# Patient Record
Sex: FEMALE | Race: WHITE | NOT HISPANIC OR LATINO | Employment: OTHER | URBAN - METROPOLITAN AREA
[De-identification: names, ages, dates, MRNs, and addresses within clinical notes are randomized per-mention and may not be internally consistent; named-entity substitution may affect disease eponyms.]

---

## 2017-02-12 ENCOUNTER — HOSPITAL ENCOUNTER (INPATIENT)
Facility: CLINIC | Age: 77
LOS: 4 days | Discharge: NURSING, REHAB OR LONG-TERM CARE | DRG: 195 | End: 2017-02-16
Attending: EMERGENCY MEDICINE | Admitting: INTERNAL MEDICINE
Payer: COMMERCIAL

## 2017-02-12 ENCOUNTER — APPOINTMENT (OUTPATIENT)
Dept: GENERAL RADIOLOGY | Facility: CLINIC | Age: 77
DRG: 195 | End: 2017-02-12
Attending: EMERGENCY MEDICINE
Payer: COMMERCIAL

## 2017-02-12 DIAGNOSIS — J18.9 PNEUMONIA DUE TO INFECTIOUS ORGANISM, UNSPECIFIED LATERALITY, UNSPECIFIED PART OF LUNG: ICD-10-CM

## 2017-02-12 DIAGNOSIS — J11.1 INFLUENZA: ICD-10-CM

## 2017-02-12 DIAGNOSIS — F31.9 BIPOLAR 1 DISORDER (H): ICD-10-CM

## 2017-02-12 LAB
ALBUMIN SERPL-MCNC: 3.6 G/DL (ref 3.4–5)
ALBUMIN UR-MCNC: 30 MG/DL
ALP SERPL-CCNC: 63 U/L (ref 40–150)
ALT SERPL W P-5'-P-CCNC: 25 U/L (ref 0–50)
ANION GAP SERPL CALCULATED.3IONS-SCNC: 8 MMOL/L (ref 3–14)
APPEARANCE UR: CLEAR
AST SERPL W P-5'-P-CCNC: 32 U/L (ref 0–45)
BACTERIA #/AREA URNS HPF: ABNORMAL /HPF
BASOPHILS # BLD AUTO: 0 10E9/L (ref 0–0.2)
BASOPHILS NFR BLD AUTO: 0.2 %
BILIRUB SERPL-MCNC: 0.6 MG/DL (ref 0.2–1.3)
BILIRUB UR QL STRIP: ABNORMAL
BUN SERPL-MCNC: 16 MG/DL (ref 7–30)
CALCIUM SERPL-MCNC: 8.4 MG/DL (ref 8.5–10.1)
CHLORIDE SERPL-SCNC: 105 MMOL/L (ref 94–109)
CO2 BLDCOV-SCNC: 24 MMOL/L (ref 21–28)
CO2 SERPL-SCNC: 24 MMOL/L (ref 20–32)
COLOR UR AUTO: YELLOW
CREAT SERPL-MCNC: 0.98 MG/DL (ref 0.52–1.04)
DIFFERENTIAL METHOD BLD: ABNORMAL
EOSINOPHIL # BLD AUTO: 0 10E9/L (ref 0–0.7)
EOSINOPHIL NFR BLD AUTO: 0 %
ERYTHROCYTE [DISTWIDTH] IN BLOOD BY AUTOMATED COUNT: 12.6 % (ref 10–15)
FLUAV+FLUBV AG SPEC QL: ABNORMAL
FLUAV+FLUBV AG SPEC QL: ABNORMAL
GFR SERPL CREATININE-BSD FRML MDRD: 55 ML/MIN/1.7M2
GLUCOSE SERPL-MCNC: 93 MG/DL (ref 70–99)
GLUCOSE UR STRIP-MCNC: NEGATIVE MG/DL
HCT VFR BLD AUTO: 40.8 % (ref 35–47)
HGB BLD-MCNC: 13.9 G/DL (ref 11.7–15.7)
HGB UR QL STRIP: ABNORMAL
HYALINE CASTS #/AREA URNS LPF: ABNORMAL /LPF (ref 0–2)
IMM GRANULOCYTES # BLD: 0 10E9/L (ref 0–0.4)
IMM GRANULOCYTES NFR BLD: 0 %
INTERPRETATION ECG - MUSE: NORMAL
KETONES UR STRIP-MCNC: 15 MG/DL
LACTATE BLD-SCNC: 0.7 MMOL/L (ref 0.7–2.1)
LEUKOCYTE ESTERASE UR QL STRIP: NEGATIVE
LYMPHOCYTES # BLD AUTO: 0.7 10E9/L (ref 0.8–5.3)
LYMPHOCYTES NFR BLD AUTO: 16.7 %
MCH RBC QN AUTO: 30.3 PG (ref 26.5–33)
MCHC RBC AUTO-ENTMCNC: 34.1 G/DL (ref 31.5–36.5)
MCV RBC AUTO: 89 FL (ref 78–100)
MONOCYTES # BLD AUTO: 0.4 10E9/L (ref 0–1.3)
MONOCYTES NFR BLD AUTO: 8.7 %
MUCOUS THREADS #/AREA URNS LPF: PRESENT /LPF
NEUTROPHILS # BLD AUTO: 3.2 10E9/L (ref 1.6–8.3)
NEUTROPHILS NFR BLD AUTO: 74.4 %
NITRATE UR QL: NEGATIVE
NRBC # BLD AUTO: 0 10*3/UL
NRBC BLD AUTO-RTO: 0 /100
PCO2 BLDV: 34 MM HG (ref 40–50)
PH BLDV: 7.46 PH (ref 7.32–7.43)
PH UR STRIP: 5.5 PH (ref 5–7)
PLATELET # BLD AUTO: 132 10E9/L (ref 150–450)
PO2 BLDV: 106 MM HG (ref 25–47)
POTASSIUM SERPL-SCNC: 3.6 MMOL/L (ref 3.4–5.3)
PROT SERPL-MCNC: 7.1 G/DL (ref 6.8–8.8)
RBC # BLD AUTO: 4.58 10E12/L (ref 3.8–5.2)
RBC #/AREA URNS AUTO: ABNORMAL /HPF (ref 0–2)
SAO2 % BLDV FROM PO2: 98 %
SODIUM SERPL-SCNC: 137 MMOL/L (ref 133–144)
SP GR UR STRIP: >1.03 (ref 1–1.03)
SPECIMEN SOURCE: ABNORMAL
URN SPEC COLLECT METH UR: ABNORMAL
UROBILINOGEN UR STRIP-ACNC: 1 EU/DL (ref 0.2–1)
WBC # BLD AUTO: 4.2 10E9/L (ref 4–11)
WBC #/AREA URNS AUTO: ABNORMAL /HPF (ref 0–2)

## 2017-02-12 PROCEDURE — 85025 COMPLETE CBC W/AUTO DIFF WBC: CPT | Performed by: EMERGENCY MEDICINE

## 2017-02-12 PROCEDURE — 87040 BLOOD CULTURE FOR BACTERIA: CPT | Performed by: EMERGENCY MEDICINE

## 2017-02-12 PROCEDURE — 96374 THER/PROPH/DIAG INJ IV PUSH: CPT

## 2017-02-12 PROCEDURE — 36415 COLL VENOUS BLD VENIPUNCTURE: CPT

## 2017-02-12 PROCEDURE — 87804 INFLUENZA ASSAY W/OPTIC: CPT | Performed by: EMERGENCY MEDICINE

## 2017-02-12 PROCEDURE — 81001 URINALYSIS AUTO W/SCOPE: CPT | Performed by: EMERGENCY MEDICINE

## 2017-02-12 PROCEDURE — 25000132 ZZH RX MED GY IP 250 OP 250 PS 637: Performed by: EMERGENCY MEDICINE

## 2017-02-12 PROCEDURE — 99223 1ST HOSP IP/OBS HIGH 75: CPT | Mod: AI | Performed by: INTERNAL MEDICINE

## 2017-02-12 PROCEDURE — 87086 URINE CULTURE/COLONY COUNT: CPT | Performed by: EMERGENCY MEDICINE

## 2017-02-12 PROCEDURE — 96361 HYDRATE IV INFUSION ADD-ON: CPT

## 2017-02-12 PROCEDURE — 80053 COMPREHEN METABOLIC PANEL: CPT | Performed by: EMERGENCY MEDICINE

## 2017-02-12 PROCEDURE — 25000128 H RX IP 250 OP 636: Performed by: EMERGENCY MEDICINE

## 2017-02-12 PROCEDURE — 71020 XR CHEST 2 VW: CPT

## 2017-02-12 PROCEDURE — 12000000 ZZH R&B MED SURG/OB

## 2017-02-12 PROCEDURE — 93005 ELECTROCARDIOGRAM TRACING: CPT

## 2017-02-12 PROCEDURE — 99285 EMERGENCY DEPT VISIT HI MDM: CPT | Mod: 25

## 2017-02-12 PROCEDURE — 82803 BLOOD GASES ANY COMBINATION: CPT

## 2017-02-12 PROCEDURE — 83605 ASSAY OF LACTIC ACID: CPT

## 2017-02-12 RX ORDER — PROCHLORPERAZINE MALEATE 5 MG
5 TABLET ORAL EVERY 6 HOURS PRN
Status: DISCONTINUED | OUTPATIENT
Start: 2017-02-12 | End: 2017-02-16 | Stop reason: HOSPADM

## 2017-02-12 RX ORDER — OSELTAMIVIR PHOSPHATE 75 MG/1
75 CAPSULE ORAL 2 TIMES DAILY
Status: DISCONTINUED | OUTPATIENT
Start: 2017-02-13 | End: 2017-02-13 | Stop reason: DRUGHIGH

## 2017-02-12 RX ORDER — SODIUM CHLORIDE 9 MG/ML
INJECTION, SOLUTION INTRAVENOUS CONTINUOUS
Status: DISCONTINUED | OUTPATIENT
Start: 2017-02-13 | End: 2017-02-15

## 2017-02-12 RX ORDER — PROCHLORPERAZINE 25 MG
12.5 SUPPOSITORY, RECTAL RECTAL EVERY 12 HOURS PRN
Status: DISCONTINUED | OUTPATIENT
Start: 2017-02-12 | End: 2017-02-16 | Stop reason: HOSPADM

## 2017-02-12 RX ORDER — LEVOFLOXACIN 5 MG/ML
500 INJECTION, SOLUTION INTRAVENOUS EVERY 24 HOURS
Status: DISCONTINUED | OUTPATIENT
Start: 2017-02-13 | End: 2017-02-13 | Stop reason: DRUGHIGH

## 2017-02-12 RX ORDER — FERROUS GLUCONATE 324(38)MG
324 TABLET ORAL
Status: DISCONTINUED | OUTPATIENT
Start: 2017-02-13 | End: 2017-02-16 | Stop reason: HOSPADM

## 2017-02-12 RX ORDER — HYDROMORPHONE HYDROCHLORIDE 1 MG/ML
.3-.5 INJECTION, SOLUTION INTRAMUSCULAR; INTRAVENOUS; SUBCUTANEOUS
Status: DISCONTINUED | OUTPATIENT
Start: 2017-02-12 | End: 2017-02-16 | Stop reason: HOSPADM

## 2017-02-12 RX ORDER — ONDANSETRON 2 MG/ML
4 INJECTION INTRAMUSCULAR; INTRAVENOUS EVERY 6 HOURS PRN
Status: DISCONTINUED | OUTPATIENT
Start: 2017-02-12 | End: 2017-02-16 | Stop reason: HOSPADM

## 2017-02-12 RX ORDER — LEVOFLOXACIN 5 MG/ML
500 INJECTION, SOLUTION INTRAVENOUS ONCE
Status: COMPLETED | OUTPATIENT
Start: 2017-02-12 | End: 2017-02-13

## 2017-02-12 RX ORDER — LEVOTHYROXINE SODIUM 75 UG/1
75 TABLET ORAL DAILY
Status: DISCONTINUED | OUTPATIENT
Start: 2017-02-13 | End: 2017-02-16 | Stop reason: HOSPADM

## 2017-02-12 RX ORDER — BISACODYL 10 MG
10 SUPPOSITORY, RECTAL RECTAL DAILY PRN
Status: DISCONTINUED | OUTPATIENT
Start: 2017-02-12 | End: 2017-02-16 | Stop reason: HOSPADM

## 2017-02-12 RX ORDER — ACETAMINOPHEN 325 MG/1
650 TABLET ORAL EVERY 4 HOURS PRN
Status: DISCONTINUED | OUTPATIENT
Start: 2017-02-12 | End: 2017-02-16 | Stop reason: HOSPADM

## 2017-02-12 RX ORDER — OSELTAMIVIR PHOSPHATE 75 MG/1
75 CAPSULE ORAL ONCE
Status: COMPLETED | OUTPATIENT
Start: 2017-02-12 | End: 2017-02-12

## 2017-02-12 RX ORDER — LOPERAMIDE HCL 2 MG
2 CAPSULE ORAL 4 TIMES DAILY PRN
Status: DISCONTINUED | OUTPATIENT
Start: 2017-02-12 | End: 2017-02-16 | Stop reason: HOSPADM

## 2017-02-12 RX ORDER — CLONAZEPAM 0.25 MG/1
0.25 TABLET, ORALLY DISINTEGRATING ORAL AT BEDTIME
Status: DISCONTINUED | OUTPATIENT
Start: 2017-02-13 | End: 2017-02-16 | Stop reason: HOSPADM

## 2017-02-12 RX ORDER — NALOXONE HYDROCHLORIDE 0.4 MG/ML
.1-.4 INJECTION, SOLUTION INTRAMUSCULAR; INTRAVENOUS; SUBCUTANEOUS
Status: DISCONTINUED | OUTPATIENT
Start: 2017-02-12 | End: 2017-02-16 | Stop reason: HOSPADM

## 2017-02-12 RX ORDER — POLYETHYLENE GLYCOL 3350 17 G/17G
17 POWDER, FOR SOLUTION ORAL DAILY PRN
Status: DISCONTINUED | OUTPATIENT
Start: 2017-02-12 | End: 2017-02-16 | Stop reason: HOSPADM

## 2017-02-12 RX ORDER — ONDANSETRON 4 MG/1
4 TABLET, ORALLY DISINTEGRATING ORAL EVERY 6 HOURS PRN
Status: DISCONTINUED | OUTPATIENT
Start: 2017-02-12 | End: 2017-02-16 | Stop reason: HOSPADM

## 2017-02-12 RX ADMIN — OSELTAMIVIR PHOSPHATE 75 MG: 75 CAPSULE ORAL at 23:19

## 2017-02-12 RX ADMIN — LEVOFLOXACIN 500 MG: 5 INJECTION, SOLUTION INTRAVENOUS at 23:19

## 2017-02-12 RX ADMIN — SODIUM CHLORIDE 1000 ML: 9 INJECTION, SOLUTION INTRAVENOUS at 21:35

## 2017-02-12 ASSESSMENT — ENCOUNTER SYMPTOMS
SORE THROAT: 0
WEAKNESS: 1
FEVER: 1
SHORTNESS OF BREATH: 0
COUGH: 1

## 2017-02-12 NOTE — IP AVS SNAPSHOT
"[place] [address] [facility]: [phone]                                              INTERAGENCY TRANSFER FORM - PHYSICIAN ORDERS   2017                    Hospital Admission Date: 2017  CHILO PEREA   : 1940  Sex: Female        Attending Provider: Rubina Sewell MD     Allergies:  Penicillins, Bactrim [Sulfamethoxazole W/trimethoprim]    Infection:  None   Service:  HOSPITALIST    Ht:  1.626 m (5' 4\")   Wt:  71.5 kg (157 lb 10.1 oz)   Admission Wt:  71.5 kg (157 lb 10.1 oz)    BMI:  27.06 kg/m 2   BSA:  1.8 m 2            Patient PCP Information     Provider PCP Type    Kassie Wyatt MD General      ED Clinical Impression     Diagnosis Description Comment Added By Time Added    Influenza [J11.1] Influenza [J11.1]  Stone Perry 2017 10:54 PM    Pneumonia due to infectious organism, unspecified laterality, unspecified part of lung [J18.9] Pneumonia due to infectious organism, unspecified laterality, unspecified part of lung [J18.9]  Lizette Avendaño MD 2017 11:06 PM      Hospital Problems as of 2017              Priority Class Noted POA    Pneumonia Medium  2017 Yes      Non-Hospital Problems as of 2017              Priority Class Noted    Bipolar 1 disorder (H)   2013    Chronic diarrhea   2013    Thyroid function test abnormal   11/15/2013    Other specified hypothyroidism Medium  10/18/2016      Code Status History     Date Active Date Inactive Code Status Order ID Comments User Context    2017 10:39 AM  Full Code [id number]  Amada Dove DO Outpatient    2017 11:57 PM 2017 10:39 AM Full Code [id number]  Rubina Sewell MD Inpatient         Medication Review      START taking        Dose / Directions Comments    oseltamivir 75 MG capsule   Commonly known as:  TAMIFLU   Indication:  Flu   Used for:  Influenza        Dose:  75 mg   Take 1 capsule (75 mg) by mouth 2 times daily for 3 doses "   Quantity:  3 capsule   Refills:  0          CONTINUE these medications which have NOT CHANGED        Dose / Directions Comments    ACETAMINOPHEN PO        Dose:  500 mg   Take 500 mg by mouth every 6 hours as needed for pain   Refills:  0        ARIPiprazole 15 MG tablet   Commonly known as:  ABILIFY   Used for:  Bipolar 1 disorder (H)        Dose:  7.5 mg   Take 0.5 tablets (7.5 mg) by mouth At Bedtime   Quantity:  30 tablet   Refills:  1        calcium carb 1250 mg (500 mg [place])/vitamin D 200 units 500-200 MG-UNIT per tablet   Commonly known as:  OSCAL with D   Used for:  Routine general medical examination at a health care facility        Dose:  1 tablet   Take 1 tablet by mouth daily   Quantity:  90 tablet   Refills:  1        clonazePAM 0.25 MG Tbdp ODT tab   Commonly known as:  klonoPIN   Used for:  Bipolar 1 disorder (H)        Dose:  0.25 mg   Take 1 tablet (0.25 mg) by mouth At Bedtime   Quantity:  30 tablet   Refills:  0        ferrous gluconate 324 (38 FE) MG tablet   Commonly known as:  FERGON   Used for:  Fatigue        Dose:  324 mg   Take 1 tablet (324 mg) by mouth daily (with breakfast)   Quantity:  90 tablet   Refills:  1        levothyroxine 75 MCG tablet   Commonly known as:  SYNTHROID/LEVOTHROID   Used for:  Other specified hypothyroidism        Dose:  75 mcg   Take 1 tablet (75 mcg) by mouth daily   Quantity:  30 tablet   Refills:  3        loperamide 2 MG tablet   Commonly known as:  IMODIUM A-D   Used for:  Diarrhea        Start with 2 tabs (4 mg), then take one tab (2 mg) after each diarrheal stool.  Do not use more than  8 tabs (16 mg) per day.   Quantity:  30 tablet   Refills:  0        nystatin [id number] UNIT/GM Powd   Commonly known as:  MYCOSTATIN   Used for:  Candidiasis of skin        Apply to area as needed three times per day   Quantity:  60 g   Refills:  1                  Further instructions from your care team       Discharge to [facility]  [phone]    Summary  of Visit     Reason for your hospital stay       Management of the flu and pneumonia.             After Care     Activity - Up with nursing assistance           Advance Diet as Tolerated       Follow this diet upon discharge: Regular       General info for SNF       Length of Stay Estimate: Short Term Care: Estimated # of Days <30  Condition at Discharge: Improving  Level of care: skilled   Rehabilitation Potential: Excellent  Admission H&P remains valid and up-to-date: Yes  Recent Chemotherapy: N/A                     Use Nursing Home Standing Orders: N/A       Mantoux instructions       Give two-step Mantoux (PPD) Per Facility Policy Yes             Referrals     Occupational Therapy Adult Consult       Evaluate and treat as clinically indicated.    Reason:  Generalized weakness/deconditioning       Physical Therapy Adult Consult       Evaluate and treat as clinically indicated.    Reason:  Generalized weakness/deconditioning             Follow-Up Appointment Instructions     Future Labs/Procedures    Follow Up and recommended labs and tests     Comments:    1. Follow up with your PCP in 1-2 weeks.      Follow-Up Appointment Instructions     Follow Up and recommended labs and tests       1. Follow up with your PCP in 1-2 weeks.             Statement of Approval     Ordered          02/16/17 1040  I have reviewed and agree with all the recommendations and orders detailed in this document.  EFFECTIVE NOW     Approved and electronically signed by:  Amada Dove DO

## 2017-02-12 NOTE — IP AVS SNAPSHOT
` `     [place] [address] [facility]: [phone]                 INTERAGENCY TRANSFER FORM - NOTES (H&P, Discharge Summary, Consults, Procedures, Therapies)   2017                    Hospital Admission Date: 2017  CHILO PEREA   : 1940  Sex: Female        Patient PCP Information     Provider PCP Type    Kassie Wyatt MD General         History & Physicals      H&P signed by Rubina Sewell MD at 2017  3:25 PM      Author:  Rubina Sewell MD Service:  Hospitalist Author Type:  Physician    Filed:  2017  3:25 PM Date of Service:  2017 11:35 PM Note Created:  2017  2:15 AM    Status:  Signed :  Rubina Sewell MD (Physician)         PRIMARY CARE PHYSICIAN:  Kassie Wyatt MD      CHIEF COMPLAINT:  Weakness and fever and cough.      HISTORY OF PRESENT ILLNESS:  Ms. Chilo Perea is a 76-year-old female with history of bipolar disorder, personality disorder, and a history of chronic diarrhea with negative colonoscopy and biopsies x2 in the past, who presented to the emergency room with complaints of several days of nonproductive cough and generalized weakness and fever.  She had fever up to 103 at home prior to hospitalization.  She is a resident of an assisted living facility currently, so EMS was called and she was brought into the emergency room.      She was given Tylenol 30 minutes prior to hospital arrival, and here her temperature was 101.9 degrees Fahrenheit.  Influenza testing was done and it came back positive for influenza B.      Chest x-ray was done, and it showed minimally increased density seen in both lung bases.  This could be some minimal atelectasis or infiltrates.  This was with slightly shallow inspiration.  Upper lung zones were clear.  The aorta was unfolded.      She was hypoxic in the emergency room at 89% on room air.  With a couple of liters of oxygen, she improved to 96%.  The patient was very listless in the emergency  room with flat affect and was a very poor historian.  She did not want to answer any of my questions.  She told me she has a tickly throat, and a cough, but not productive of any sputum, not able to bring up anything when she coughs.  Denied any shortness of breath or wheezing.      She had one episode of diarrhea this morning.  In her history, she has chronic diarrhea issues, and she had two colonoscopies in the past for evaluation of the diarrhea, and biopsies were obtained, and they have all been negative for any type of colitis.  There was only one polyp, and it was removed on last colonoscopy in 2015, which came back as a hyperplastic polyp.      She otherwise denies any sore throat, any blood in the stool, or black stools.      PAST MEDICAL HISTORY:   1.  Hypothyroidism.   2.  Bipolar disorder.   3.  Personality disorder, not otherwise specified.      MEDICATIONS:   1.  Levothyroxine 75 mcg tablet p.o. daily.   2.  Loperamide 4 mg, take 1 tablet with each diarrheal stool.   3.  Ferrous gluconate 325 mg p.o. daily.   4.  Clonazepam 0.25 mg p.o. at bedtime.   5.  Abilify 7.5 mg p.o. at bedtime.   6.  Calcium plus vitamin D.      ALLERGIES:  Penicillins, Bactrim.      PAST SURGICAL HISTORY:   1.  Cholecystectomy.   2.  Gynecological surgery.      FAMILY HISTORY:  Reviewed and noncontributory.      SOCIAL HISTORY:  She has never smoked.  No alcohol use.  She is .  Currently lives in an assisted living facility.      REVIEW OF SYSTEMS:  Positive for fever, cough, and generalized weakness.  Negative for shortness of breath.  A 10-point review of systems was done and is negative except as in HPI and as listed above.      PHYSICAL EXAMINATION:   VITAL SIGNS:  Her temperature currently is febrile at 101.9 degrees Fahrenheit, with heart rate of 101, blood pressure 119/68, respiratory rate of 20, and she is satting 89% on room air.   GENERAL:  She is alert, oriented, age appropriate-looking female who has a flat  affect.   HEENT:  Head is atraumatic.  Pupils equal, round and reactive to light.   NECK:  Supple.   HEART:  S1-S2 heard.  No murmurs, rubs or gallops.   LUNGS:  Clear to auscultation.  No rales, rhonchi or wheezing.   ABDOMEN:  Soft, nontender, nondistended.  No hepatosplenomegaly.   EXTREMITIES:  No clubbing, cyanosis or edema.   NEUROLOGICAL:  Able to move all extremities.  No focal weakness.   SKIN:  Warm and dry.      LABS AND RADIOLOGICAL INVESTIGATIONS:  CBC showed WBC count of 4.2, hemoglobin 13.9, platelet count 132,000.  Venous blood gas showed pH of 7.46, pCO2 of 34, pO2 of 106, bicarbonate 24, 98% oxygen saturation.  Lactate level 0.7.  Influenza B testing positive.  UA is negative for UTI.  BMP is reviewed and is normal.  All of her LFTs are normal.  Blood cultures x2 are done and results are pending.      Chest x-ray showed minimally increased density seen at both lung bases, could be atelectasis versus infiltrates.      ASSESSMENT AND PLAN:  A 76-year-old female with history of bipolar disorder, personality disorder, not otherwise specified, and history of hypothyroidism, and chronic diarrhea of unclear etiology, presenting with:   1.  Fever, cough, and generalized weakness, secondary to influenza and superimposed possible bacterial pneumonia:  She was given Tamiflu in the emergency room, which will be continued, and we will place her on Levaquin 500 mg daily to treat for the pneumonia.  We will follow up on the blood culture results, and follow the fever curve closely, and try to wean her off of oxygen as tolerated.  She is currently needing 2 liters of oxygen by nasal cannula.  She was 89% on room air.   2.  Hypothyroidism.  Will continue levothyroxine.   3.  Iron deficiency anemia:  Will continue with iron.   4.  History of bipolar I disorder and personality disorder:  Will continue Abilify and Klonopin at bedtime.   5.  Chronic diarrhea:  Continue Imodium p.r.n.   6.  DVT prophylaxis:  PCDs.   7.   GI prophylaxis:  Not needed.   8.  She will be admitted as an inpatient, as I am anticipating more than 2-midnight stay.         RUBINA RICHARDS MD             D: 2017 23:35   T: 2017 02:14   MT: EM#[id number]      Name:     SERGE PEREA   MRN:      [id number]        Account:      [id number]   :      1940           Admitted:     [id number]      Document: [id number]       cc: BRIELLE PERSON MD   [ST1.1]   Revision History        User Key Date/Time User Provider Type Action    > ST1.1 2017  3:25 PM Rubina Richards MD Physician Sign                     Discharge Summaries      Discharge Summaries by Amada Dove DO at 2017 10:40 AM     Author:  Amada Dove DO Service:  Hospitalist Author Type:  Physician    Filed:  2017 10:48 AM Date of Service:  2017 10:40 AM Note Created:  2017 10:40 AM    Status:  Signed :  Amada Dove DO (Physician)         [facility]    Discharge Summary  Hospitalist    Date of Admission:  2017  Date of Discharge:  2017  Discharging Provider: Amada Dove    Discharge Diagnoses   Influenza B  Supected CAP  Hypothyroidism  Chronic Iron Deficiency Anemia   Bipolar Disorder / Personality Disorder   Chronic Diarrhea  Generalized Weakness    History of Present Illness   Serge Perea is a 76 year old female with PMHx of hypothyroidism, bipolar disorder, personality disorder and hx of chronic diarrhea with neg workup who was admitted on 2017 with fever, cough and shortness of breath. She was found to be positive for influenza B and supspected to have a superimposed pneumonia was CXR was suggestive of infiltrates.     Hospital Course   Serge Perea was admitted on 2017.  The following problems were addressed during her hospitalization:    Influenza B:[KW1.1]   Started on Tamiflu on admission. Dose adjusted based on CrCl. Maintained on droplet precautions. Was  initially febrile, though fevers resolved with Tamiflu and Tylenol.[KW1.2]      Supected CAP:  Presented with fever (Tmax 101.9) and tachycardia. WBC 4.2 and lactate nl. Initially hypoxic with O2 sats 89% on RA. Improved on 2L NC. CXR showed minimal increased density in the bilateral lung bases dt minimal atelectasis vs infiltrate. ?viral vs bacterial etiology. Urine strep Ag negative. Blood cultures drawn on admission are neg.[KW1.1] Received 5d course of Levaquin this stay. Weaned of supplemental O2 by the time of discharge.[KW1.2]      Hypothyroidism:  Chronic and stable on levothyroxine     Chronic Iron Deficiency Anemia:  Chronic and stable on oral iron supplement     Bipolar Disorder / Personality Disorder:  Chronic and stable on Abilify HS and Klonopin HS     Chronic Diarrhea:  Has undergone workup per GI in the past including colonoscopy and biopsies x2.[KW1.1]   N[KW1.2]o acute issues[KW1.1] this stay. No loose stools.[KW1.2]     Generalized Weakness:  Likely secondary to acute illness. At baseline resides in [facility] and is independent with daily cares and mobility. Staff there note she will need to be at her baseline in order to return[KW1.1]. Seen by PT/OT, TCU stay recommended. [facility].[KW1.2]     Amada Dove    Code Status   Full Code       Primary Care Physician   Kassie Wyatt    Physical Exam   Temp: 98.3  F (36.8  C) Temp src: Oral BP: 115/62   Heart Rate: 67 Resp: 16 SpO2: 94 % O2 Device: None (Room air)    Vitals:    02/13/17 0000   Weight: 71.5 kg (157 lb 10.1 oz)     Vital Signs with Ranges  Temp:  [98.3  F (36.8  C)-98.6  F (37  C)] 98.3  F (36.8  C)  Heart Rate:  [61-67] 67  Resp:  [16-18] 16  BP: (115)/(62-67) 115/62  SpO2:  [94 %-95 %] 94 %  I/O last 3 completed shifts:  In: 500 [P.O.:500]  Out: 400 [Urine:400]    General: Resting comfortably, alert though affect notably flat, answering questions appropriately, NAD  CVS: HRRR, no  MGR  Respiratory: CTAB, no wheeze/rales/rhonchi  GI: S, NT, ND, +BS  Ext: no LE edema  Skin: Warm/dry    Discharge Disposition   Discharged to short-term care facility  Condition at discharge: Stable    Consultations This Hospital Stay   PHYSICAL THERAPY ADULT IP CONSULT  OCCUPATIONAL THERAPY ADULT IP CONSULT    Time Spent on this Encounter   I, Amada Dove, personally saw the patient today and spent greater than 30 minutes discharging this patient.    Discharge Orders     General info for SNF   Length of Stay Estimate: Short Term Care: Estimated # of Days <30  Condition at Discharge: Improving  Level of care: skilled   Rehabilitation Potential: Excellent  Admission H&P remains valid and up-to-date: Yes  Recent Chemotherapy: N/A                     Use Nursing Home Standing Orders: N/A     Mantoux instructions   Give two-step Mantoux (PPD) Per Facility Policy Yes     Reason for your hospital stay   Management of the flu and pneumonia.     Follow Up and recommended labs and tests   1. Follow up with your PCP in 1-2 weeks.     Activity - Up with nursing assistance     Full Code     Physical Therapy Adult Consult   Evaluate and treat as clinically indicated.    Reason:  Generalized weakness/deconditioning     Occupational Therapy Adult Consult   Evaluate and treat as clinically indicated.    Reason:  Generalized weakness/deconditioning     Advance Diet as Tolerated   Follow this diet upon discharge: Regular       Discharge Medications   Current Discharge Medication List      START taking these medications    Details   oseltamivir (TAMIFLU) 75 MG capsule Take 1 capsule (75 mg) by mouth 2 times daily for 3 doses  Qty: 3 capsule, Refills: 0    Associated Diagnoses: Influenza         CONTINUE these medications which have NOT CHANGED    Details   ACETAMINOPHEN PO Take 500 mg by mouth every 6 hours as needed for pain      levothyroxine (SYNTHROID, LEVOTHROID) 75 MCG tablet Take 1 tablet (75 mcg) by mouth  daily  Qty: 30 tablet, Refills: 3    Associated Diagnoses: Other specified hypothyroidism      ARIPiprazole (ABILIFY) 15 MG tablet Take 0.5 tablets (7.5 mg) by mouth At Bedtime  Qty: 30 tablet, Refills: 1    Comments: Ordered by Dr. Carias (Psychiatry)  Associated Diagnoses: Bipolar 1 disorder (H)      clonazePAM (KLONOPIN) 0.25 MG TBDP Take 1 tablet (0.25 mg) by mouth At Bedtime  Qty: 180 tablet    Comments: Ordered by Dr. Carias (Psychiatry)  Associated Diagnoses: Bipolar 1 disorder (H)      nystatin (MYCOSTATIN) 467163 UNIT/GM POWD Apply to area as needed three times per day  Qty: 60 g, Refills: 1    Associated Diagnoses: Candidiasis of skin      ferrous gluconate (FERGON) 324 (38 FE) MG tablet Take 1 tablet (324 mg) by mouth daily (with breakfast)  Qty: 90 tablet, Refills: 1    Associated Diagnoses: Fatigue      calcium carb 1250 mg, 500 mg [place],/vitamin D 200 units (CALCIUM CARB 1250 MG, 500 MG [place],/VITAMIN D 200 UNIT) 500-200 MG-UNIT per tablet Take 1 tablet by mouth daily  Qty: 90 tablet, Refills: 1    Associated Diagnoses: Routine general medical examination at a health care facility      loperamide (IMODIUM A-D) 2 MG tablet Start with 2 tabs (4 mg), then take one tab (2 mg) after each diarrheal stool.  Do not use more than  8 tabs (16 mg) per day.  Qty: 30 tablet, Refills: 0    Associated Diagnoses: Diarrhea           Allergies   Allergies   Allergen Reactions     Penicillins      Bactrim [Sulfamethoxazole W/Trimethoprim] Itching     Patient prescribed Bactrim at eye appointment. Assisted living reports eyes were red and itching.      Data   Most Recent 3 CBC's:  Recent Labs   Lab Test  02/16/17   0823  02/15/17   0755  02/14/17   0905   WBC  2.1*  2.1*  3.5*   HGB  14.2  13.3  13.9   MCV  88  90  89   PLT  111*  83*  100*      Most Recent 3 BMP's:  Recent Labs   Lab Test  02/16/17   0823  02/15/17   0755  02/14/17   0905   NA  143  144  141   POTASSIUM  3.5  3.8  3.5   CHLORIDE  109  112*  108   CO2  27   26  22   BUN  10  11  10   CR  0.79  0.74  0.84   ANIONGAP  7  6  11   BERTA  8.8  8.0*  8.2*   GLC  89  85  127*     Most Recent 2 LFT's:  Recent Labs   Lab Test  02/12/17 2137 06/12/12   1509   AST  32  24   ALT  25  <6   ALKPHOS  63  64   BILITOTAL  0.6  0.7     Most Recent 6 Bacteria Isolates From Any Culture (See EPIC Reports for Culture Details):  Recent Labs   Lab Test  02/12/17   2315 02/12/17 2145 02/12/17 2137   CULT  No growth after 3 days  No growth  No growth after 3 days       Results for orders placed or performed during the hospital encounter of 02/12/17   Chest XR,  PA & LAT    Narrative    CHEST TWO VIEW   2/12/2017 10:25 PM     HISTORY: Check for pneumonia. Cough, fever.    COMPARISON: 4/1/2013      Impression    IMPRESSION: Minimal increased density is seen in both lung bases. This  could be due to some minimal atelectasis or infiltrates. This is a  slightly shallow inspiration. Upper lung zones are clear. Heart size  and pulmonary vasculature are within normal limits. The aorta is  unfolded.    JEFF MA MD[KW1.1]          Revision History        User Key Date/Time User Provider Type Action    > KW1.2 2/16/2017 10:48 AM Amada Dove DO Physician Sign     KW1.1 2/16/2017 10:40 AM Amada Dove DO Physician                   Consult Notes     No notes of this type exist for this encounter.         Progress Notes - Physician (Notes from 02/13/17 through 02/16/17)      Progress Notes by Silvia Castaneda LICSW at 2/16/2017  9:37 AM     Author:  Silvia Castaneda LICSW Service:  (none) Author Type:      Filed:  2/16/2017  2:15 PM Date of Service:  2/16/2017  9:37 AM Note Created:  2/16/2017  9:37 AM    Status:  Addendum :  Silvia Castaneda LICSW ()         SW  D:   spoke with GOLD Bray at patient's building.  Reviewed PT evaluation with him.  Asa does want patient to transfer to  a TCU since she is not independent  with transfers or walking.  Writer met with patient and intially she did not want to transfer to a TCU however understands she is weak and needs to be independent with walking and transfers before she can return to her apartment.[KH1.1] Patient told writer she needs time to process the information.  Writer explained I will make referrals and try to arranage for a TCU in or close to [place].[KH1.2]   Contacted [facility] which requires patient to complete tamilfu before admission, call out to [facility].    Referral was made to [facility] in [place] which has a vacancy for today and their requirement for admission is that patient is symptom free for 24 hours.[KH1.1]    PAS-RR    D: Per DHS regulation, SW completed and submitted PAS-RR to [place] [employer] on Aging Direct Connect via the Senior LinkAge Line.  PAS-RR confirmation # is : [id number]    I: SW spoke with patient and they are aware a PAS-RR has been submitted.  SW reviewed with her that they may be contacted for a follow up appointment within 10 days of hospital discharge if their SNF stay is < 30 days.  Contact information for Senior LinkAge Line was also provided.    A: Patient verbalized understanding.    P: Further questions may be directed to [facility] LinkAge Line at #[phone], option #4 for PAS-RR staff.[KH1.3]    Patient was accepted by [facility] and will be transported at 1500.[KH1.4]    Left messages for the nursing office at patient's building [phone]  and for her [facility] case virgen Gonzalez at [phone][KH1.5].  Also, with patient's permission left a message or her son with her d/c plan and the address and phone number of Dar[place] Moss.[KH1.6]     Revision History        User Key Date/Time User Provider Type Action    > KH1.6 2/16/2017  2:15 PM [facility]  Addend     KH1.5 2/16/2017  2:04 PM [facility]  Addend     KH1.4 2/16/2017  1:28 PM  Silvia Castaneda LICSW  Incomplete Revision     KH1.3 2/16/2017 12:48 PM Silvia Castaneda LICSW  Addend     [N/A] 2/16/2017  9:43 AM Silvia Castaneda LICSW  Addend     KH1.2 2/16/2017  9:41 AM Silvia Castaneda LICSW  Sign     KH1.1 2/16/2017  9:37 AM Silvia Castaneda LICSW              Progress Notes by Shyann Auguste PT at 2/16/2017  8:26 AM     Author:  Shyann Auguste PT Service:  (none) Author Type:  Physical Therapist    Filed:  2/16/2017  8:26 AM Date of Service:  2/16/2017  8:26 AM Note Created:  2/16/2017  8:26 AM    Status:  Signed :  Shyann Auguste PT (Physical Therapist)            02/16/17 0800   Quick Adds   Type of Visit Initial PT Evaluation   Living Environment   Lives With alone   Living Arrangements assisted living   Home Accessibility no concerns   Number of Stairs to Enter Home 0   Number of Stairs Within Home 0   Self-Care   Usual Activity Tolerance moderate   Current Activity Tolerance fair   Regular Exercise no   Equipment Currently Used at Home none   Activity/Exercise/Self-Care Comment Pt has assist for walking to meals in the dining room.   Functional Level Prior   Ambulation 0-->independent   Transferring 0-->independent   Fall history within last six months no   General Information   Onset of Illness/Injury or Date of Surgery - Date 02/12/17   Referring Physician Amada Dove,    Patient/Family Goals Statement return home   Pertinent History of Current Problem (include personal factors and/or comorbidities that impact the POC) Admitted with weakness, fever, cough, pneumonia and influenza. PMH: bipolar, chronic diarrhea.   Precautions/Limitations fall precautions   Weight-Bearing Status - LLE full weight-bearing   Weight-Bearing Status - RLE full weight-bearing   Cognitive Status Examination   Orientation orientation to person, place and time   Level of Consciousness alert   Follows Commands  "and Answers Questions 100% of the time;able to follow single-step instructions   Personal Safety and Judgment impaired   Memory impaired   Pain Assessment   Patient Currently in Pain No   Posture    Posture Forward head position;Protracted shoulders   Range of Motion (ROM)   ROM Quick Adds No deficits were identified   Strength   Strength Comments Pt not following directions for MMT   Bed Mobility   Bed Mobility Comments Supine to sit with SBA   Transfer Skills   Transfer Comments Sit to stand with CGA    Gait   Gait Comments Pt amb 5 ft with no AD and min A, poor balance and shuffling gait   Balance   Balance Comments Balance poor with gait without AD use   General Therapy Interventions   Planned Therapy Interventions bed mobility training;gait training;neuromuscular re-education;strengthening;transfer training   Clinical Impression   Criteria for Skilled Therapeutic Intervention yes, treatment indicated   PT Diagnosis Difficulty ambulating   Influenced by the following impairments Dec strength, balance, activity tolerance   Functional limitations due to impairments Difficulty ambulating and transferring   Clinical Presentation Stable/Uncomplicated   Clinical Presentation Rationale medically stable   Clinical Decision Making (Complexity) Low complexity   Therapy Frequency` daily   Predicted Duration of Therapy Intervention (days/wks) 4 days   Anticipated Discharge Disposition Transitional Care Facility   Risk & Benefits of therapy have been explained Yes   Patient, Family & other staff in agreement with plan of care Yes   [facility] [employer] TM \"6 Clicks\"   2016, Trustees of [facility], under license to [employer].  All rights reserved.   6 Clicks Short Forms Basic Mobility Inpatient Short Form   [facility] [employer]  \"6 Clicks\" V.2 Basic Mobility Inpatient Short Form   1. Turning from your back to your side while in a flat bed without using bedrails? 4 - None   2. Moving from lying on your back " to sitting on the side of a flat bed without using bedrails? 3 - A Little   3. Moving to and from a bed to a chair (including a wheelchair)? 3 - A Little   4. Standing up from a chair using your arms (e.g., wheelchair, or bedside chair)? 3 - A Little   5. To walk in hospital room? 2 - A Lot   6. Climbing 3-5 steps with a railing? 1 - Total   Basic Mobility Raw Score (Score out of 24.Lower scores equate to lower levels of function) 16   Total Evaluation Time   Total Evaluation Time (Minutes) 15[EW1.1]        Revision History        User Key Date/Time User Provider Type Action    > EW1.1 2/16/2017  8:26 AM Shyann Auguste PT Physical Therapist Sign            Progress Notes by Lillie Pino OT at 2/15/2017  4:13 PM     Author:  Lillie Pino OT Service:  (none) Author Type:  Occupational Therapist    Filed:  2/16/2017  7:37 AM Date of Service:  2/15/2017  4:13 PM Note Created:  2/15/2017  4:13 PM    Status:  Signed :  Lillie Pino OT (Occupational Therapist)            02/15/17 0825   Quick Adds   Type of Visit Initial Occupational Therapy Evaluation   Living Environment   Lives With alone   Living Arrangements assisted living   Home Accessibility no concerns   Functional Level Prior   Ambulation 0-->independent   Transferring 0-->independent   Toileting 0-->independent   Bathing 2-->assistive person   Dressing 0-->independent   Eating 0-->independent   Communication 0-->understands/communicates without difficulty   Swallowing 0-->swallows foods/liquids without difficulty   Prior Functional Level Comment Has assistance with bathing and has provided meals.    General Information   Onset of Illness/Injury or Date of Surgery - Date 02/12/17   Referring Physician White   Patient/Family Goals Statement None stated   Additional Occupational Profile Info/Pertinent History of Current Problem Patient admitted 2/12 with fever, cough, and SOB. Patient found to have influenza B and suspected to have a  superimposed pneumonia was CXR was suggestive of infiltrates. PMH includes hypothyroidism, bipolar personality disorder and history of chronic diarrhea with negative workup.    Precautions/Limitations fall precautions;oxygen therapy device and L/min  (2L, 97% )   General Observations Patient [place], flat affect, moves slowly   General Info Comments Patient in chair upon OT arrival. Sats 97% on 2L.   Cognitive Status Examination   Orientation orientation to person, place and time   Level of Consciousness alert   Able to Follow Commands WNL/WFL   Personal Safety (Cognitive) WNL/WFL   Memory impaired   Cognitive Comment Patient able to recall 1/3 words after distraction.   Visual Perception   Visual Perception Wears glasses   Sensory Examination   Sensory Comments denies   Pain Assessment   Patient Currently in Pain No   Integumentary/Edema   Integumentary/Edema Comments Swelling noted to BLE   Posture   Posture forward head position;protracted shoulders   Range of Motion (ROM)   ROM Comment B shoulder flexion AROM about 80 degrees, AAROM WNL, B elbow flexion AROM WNL   Strength   Strength Comments B shoulder and elbow flexion 5/5   Hand Strength   Hand Strength Comments B grasp WFL   Transfer Skill: Sit to Stand   Level of [place]: Sit/Stand minimum assist (75% patients effort)   Transfer Skill: Toilet Transfer   Level of [place]: Toilet minimum assist (75% patients effort)   Balance   Balance Comments impaired, ambulates very slowly and cautiously   Lower Body Dressing   Level of [place]: Dress Lower Body minimum assist (75% patients effort)   Instrumental Activities of Daily Living (IADL)   IADL Comments Meds managed and meals provided by [facility] staff   Activities of Daily Living Analysis   Impairments Contributing to Impaired Activities of Daily Living balance impaired;cognition impaired;ROM decreased;strength decreased   General Therapy Interventions   Planned Therapy Interventions ADL retraining;balance  training;strengthening;transfer training   Clinical Impression   Criteria for Skilled Therapeutic Interventions Met yes, treatment indicated   OT Diagnosis Decreased participation and I in ADLs   Influenced by the following impairments Decreased balance, strength, ROM, activity tolerance and cognition   Assessment of Occupational Performance 1-3 Performance Deficits   Identified Performance Deficits Dressing, toileting transfers, grooming and hygiene, ADL routine   Clinical Decision Making (Complexity) Low complexity   Therapy Frequency daily   Predicted Duration of Therapy Intervention (days/wks) 3 days   Anticipated Equipment Needs at Discharge (to be determined)   Anticipated Discharge Disposition Transitional Care Facility   Risks and Benefits of Treatment have been explained. Yes   Patient, Family & other staff in agreement with plan of care Yes   Total Evaluation Time   Total Evaluation Time (Minutes) 15[JM1.1]        Revision History        User Key Date/Time User Provider Type Action    > JM1.1 2/16/2017  7:37 AM Lillie Pino OT Occupational Therapist Sign            Progress Notes by Amada Dove DO at 2/15/2017  9:12 AM     Author:  Amada Dove DO Service:  Hospitalist Author Type:  Physician    Filed:  2/15/2017  9:27 AM Date of Service:  2/15/2017  9:12 AM Note Created:  2/15/2017  9:12 AM    Status:  Signed :  Amada Dove DO (Physician)         [facility]    Hospitalist Progress Note    Date of Service (when I saw the patient): 02/15/2017    Assessment & Plan   Serge Marin is a 76 year old female with PMHx of hypothyroidism, bipolar disorder, personality disorder and hx of chronic diarrhea with neg workup who was admitted on 2/12/2017 with fever, cough and shortness of breath. She was found to be positive for influenza B and supspected to have a superimposed pneumonia was CXR was suggestive of infiltrates.     Influenza B:   --  started on Tamiflu 30mg BID on 2/12 PM  -- cont droplet precautions  -- cont supportive cares with Tylenol/Ibuprofen prn for fever    Supected CAP:  Presented with fever (Tmax 101.9) and tachycardia. WBC 4.2 and lactate nl. Initially hypoxic with O2 sats 89% on RA. Improved on 2L NC. CXR showed minimal increased density in the bilateral lung bases dt minimal atelectasis vs infiltrate. ?viral vs bacterial etiology. Urine strep Ag negative. Blood cultures drawn on admission are neg.  -- conts Levaquin 250mg IV daily  -- remains on 2L NC -> wean off O2 as able  -- encourage use of incentive spirometer    Hypothyroidism:  Chronic and stable on levothyroxine    Chronic Iron Deficiency Anemia:  Chronic and stable on oral iron supplement    Bipolar Disorder / Personality Disorder:  Chronic and stable on Abilify HS and Klonopin HS    Chronic Diarrhea:  Has undergone workup per GI in the past including colonoscopy and biopsies x2.   -- no acute issues, no concern for cdiff  -- cont prn Imodium    Generalized Weakness:  Likely secondary to acute illness. At baseline resides in [facility] and is independent with daily cares and mobility. Staff there note she will need to be at her baseline in order to return  -- PT/OT consulted -> anticipate she may need TCU stay    FEN: dc IVFs, lytes stable, regular diet  DVT Prophylaxis: PCDs  Code Status: Full Code    Disposition: Pending clinical course and therapy recommendations for discharge. If TCU recommended, will need to complete 5d treatment for influenza prior to discharge.    Amada Dove    Interval History   Remained afebrile since yesterday morning. conts on 2L NC. Sitting up in chair today - looking better, more conversive today. Denies complaints including cp/sob/cough. Tolerating po - ate oatmeal this morning. Asking how much longer she will have to be here.    -Data reviewed today: I reviewed all new labs and imaging results over the last 24 hours. I personally  reviewed no images or EKG's today.    Physical Exam   Temp: 98.4  F (36.9  C) Temp src: Oral BP: 115/68 Pulse: 59 Heart Rate: 51 Resp: 18 SpO2: 97 % O2 Device: Nasal cannula Oxygen Delivery: 2 LPM  Vitals:    02/13/17 0000   Weight: 71.5 kg (157 lb 10.1 oz)     Vital Signs with Ranges  Temp:  [97.5  F (36.4  C)-100.1  F (37.8  C)] 98.4  F (36.9  C)  Pulse:  [59] 59  Heart Rate:  [51-58] 51  Resp:  [14-18] 18  BP: (102-115)/(58-68) 115/68  SpO2:  [87 %-97 %] 97 %  I/O last 3 completed shifts:  In: 763 [P.O.:460; I.V.:303]  Out: 2400 [Urine:2400]    Constitutional: Resting comfortably, flat affect but answering questions appropriately, NAD  Respiratory:CTAB, no wheeze/rales/rhonchi  Cardiovascular: HRRR, no MGR  GI: S, NT, ND, +BS  Skin/Integumen: warm/dry  Other:  no LE edema    Medications     NaCl 50 mL/hr at 02/15/17 0205       levofloxacin  500 mg Intravenous Q24H     oseltamivir  30 mg Oral BID     ARIPiprazole  7.5 mg Oral At Bedtime     clonazePAM  0.25 mg Oral At Bedtime     ferrous gluconate  324 mg Oral Daily with breakfast     levothyroxine  75 mcg Oral Daily       Data     Recent Labs  Lab 02/15/17  0755 02/14/17  0905 02/13/17  0930  02/12/17  2137   WBC 2.1* 3.5*  --   --  4.2   HGB 13.3 13.9  --   --  13.9   MCV 90 89  --   --  89   PLT 83* 100*  --   --  132*    141 140  < > 137   POTASSIUM 3.8 3.5 3.9  < > 3.6   CHLORIDE 112* 108 108  < > 105   CO2 26 22 24  < > 24   BUN 11 10 11  < > 16   CR 0.74 0.84 0.86  < > 0.98   ANIONGAP 6 11 8  < > 8   BERTA 8.0* 8.2* 7.9*  < > 8.4*   GLC 85 127* 87  < > 93   ALBUMIN  --   --   --   --  3.6   PROTTOTAL  --   --   --   --  7.1   BILITOTAL  --   --   --   --  0.6   ALKPHOS  --   --   --   --  63   ALT  --   --   --   --  25   AST  --   --   --   --  32   < > = values in this interval not displayed.    No results found for this or any previous visit (from the past 24 hour(s)).[KW1.1]       Revision History        User Key Date/Time User Provider Type Action     > KW1.1 2/15/2017  9:27 AM Amada Dove DO Physician Sign            Progress Notes by Amada Dove DO at 2/14/2017  2:10 PM     Author:  Amada Dove DO Service:  Hospitalist Author Type:  Physician    Filed:  2/14/2017  2:28 PM Date of Service:  2/14/2017  2:10 PM Note Created:  2/14/2017  2:10 PM    Status:  Signed :  Amada Dove DO (Physician)         [facility]    Hospitalist Progress Note    Date of Service (when I saw the patient): 02/14/2017    Assessment & Plan   Serge Marin is a 76 year old female with PMHx of hypothyroidism, bipolar disorder, personality disorder and hx of chronic diarrhea with neg workup who was admitted on 2/12/2017 with fever, cough and shortness of breath. She was found to be positive for influenza B and supspected to have a superimposed pneumonia was CXR was suggestive of infiltrates.     Influenza B:   -- started on Tamiflu 30mg BID.  -- cont droplet precautions  -- cont supportive cares with Tylenol[KW1.1]/Ibuprofen[KW1.2] prn for fever    Supected CAP:  Presented with fever (Tmax 101.9) and tachycardia. WBC 4.2 and lactate nl. Initially hypoxic with O2 sats 89% on RA. Improved on 2L NC. CXR showed minimal increased density in the bilateral lung bases dt minimal atelectasis vs infiltrate. ?viral vs bacterial etiology. Urine strep Ag negative.   -- blood cultures remain neg  -- conts Levaquin 250mg IV daily for now  -- O2 needs variable - fluctuating between RA and 2L NC     Hypothyroidism:  Chronic and stable on levothyroxine    Chronic Iron Deficiency Anemia:  Chronic and stable on oral iron supplement    Bipolar Disorder / Personality Disorder:  Chronic and stable on Abilify HS and Klonopin HS    Chronic Diarrhea:  Has undergone workup per GI in the past including colonoscopy and biopsies x2.   -- no acute issues, no concern for cdiff  -- cont prn Imodium    Generalized Weakness:  Likely  secondary to acute illness. At baseline resides in [facility] and is independent with daily cares and mobility. Staff there note she will need to be at her baseline in order to return  -- PT/OT consulted -> anticipate she may need TCU stay    FEN: cont NS@ 50ml/h, lytes stable, regular diet  DVT Prophylaxis: PCDs  Code Status: Full Code    Disposition: Pending clinical course and therapy recommendations for discharge. If TCU recommended, will need to complete 5d treatment for influenza prior to discharge.    Amada Dove    Interval History   Febrile overnight with Tmax 102.1. Given Tylenol and Ibuprofen. Febrile again this morning though Tmax 101.3. Affect remains flat. Per RN is notably weak, concerns about her returning to [facility].[KW1.1] Patient with flat affect - no specific concerns this afternoon. Asking when she can go home and if she is over the flu yet. Explained plans for therapy evaluation, ongoing treatment for the flu.[KW1.2]    -Data reviewed today: I reviewed all new labs and imaging results over the last 24 hours. I personally reviewed no images or EKG's today.    Physical Exam   Temp: 99.1  F (37.3  C) Temp src: Oral BP: 107/67 Pulse: 87 Heart Rate: 64 Resp: 13 SpO2: 95 % O2 Device: Nasal cannula Oxygen Delivery: 2 LPM  Vitals:    02/13/17 0000   Weight: 71.5 kg (157 lb 10.1 oz)     Vital Signs with Ranges  Temp:  [99.1  F (37.3  C)-102.8  F (39.3  C)] 99.1  F (37.3  C)  Pulse:  [84-87] 87  Heart Rate:  [64-66] 64  Resp:  [13-20] 13  BP: (106-120)/(52-67) 107/67  SpO2:  [87 %-95 %] 95 %  I/O last 3 completed shifts:  In: 1519 [P.O.:180; I.V.:1339]  Out: 1925 [Urine:1925]    Constitutional: Resting comfortably, flat affect, answering questions appropriately, NAD  Respiratory:CTAB, no wheeze/rales/rhonchi  Cardiovascular: HRRR, no MGR  GI: S, NT, ND, +BS  Skin/Integumen: warm/dry  Other:  no LE edema    Medications     NaCl 50 mL/hr at 02/14/17 0559       levofloxacin  500 mg Intravenous Q24H      oseltamivir  30 mg Oral BID     ARIPiprazole  7.5 mg Oral At Bedtime     clonazePAM  0.25 mg Oral At Bedtime     ferrous gluconate  324 mg Oral Daily with breakfast     levothyroxine  75 mcg Oral Daily       Data     Recent Labs  Lab 02/14/17  0905 02/13/17  0930 02/13/17  0735 02/12/17  2137   WBC 3.5*  --   --  4.2   HGB 13.9  --   --  13.9   MCV 89  --   --  89   *  --   --  132*    140 Canceled, Test credited Unsatisfactory specimen - hemolyzed 137   POTASSIUM 3.5 3.9 Canceled, Test credited Unsatisfactory specimen - hemolyzed 3.6   CHLORIDE 108 108 Canceled, Test credited Unsatisfactory specimen - hemolyzed 105   CO2 22 24 Canceled, Test credited Unsatisfactory specimen - hemolyzed 24   BUN 10 11 Canceled, Test credited Unsatisfactory specimen - hemolyzed 16   CR 0.84 0.86 Canceled, Test credited Unsatisfactory specimen - hemolyzed 0.98   ANIONGAP 11 8 Canceled, Test credited Unsatisfactory specimen - hemolyzed 8   BERTA 8.2* 7.9* Canceled, Test credited Unsatisfactory specimen - hemolyzed 8.4*   * 87 Canceled, Test credited Unsatisfactory specimen - hemolyzed 93   ALBUMIN  --   --   --  3.6   PROTTOTAL  --   --   --  7.1   BILITOTAL  --   --   --  0.6   ALKPHOS  --   --   --  63   ALT  --   --   --  25   AST  --   --   --  32       No results found for this or any previous visit (from the past 24 hour(s)).[KW1.1]       Revision History        User Key Date/Time User Provider Type Action    > KW1.2 2/14/2017  2:28 PM Amada Dove DO Physician Sign     KW1.1 2/14/2017  2:10 PM Amada Dove DO Physician             Progress Notes by Silvia Castaneda LICSW at 2/14/2017 10:08 AM     Author:  Silvia Castaneda LICSW Service:  (none) Author Type:      Filed:  2/14/2017 10:12 AM Date of Service:  2/14/2017 10:08 AM Note Created:  2/14/2017 10:08 AM    Status:  Signed :  Silvia Castaneda LICSW ()         D:  Received a call today from  GOLD Bray, at [facility] where patient resides.  He shares patient at baseline is independent with daily personal cares and mobility.  Her care plan includes lunch and dinner provided, medication management and SBA with shower/bath.  In order for patient to return to her apartment, she needs to be at her baseline.  Here patient is up in room with assistance.  Discussed in Plan of Care Rounds.  PT will be consulted.[KH1.1]       Revision History        User Key Date/Time User Provider Type Action    > KH1.1 2/14/2017 10:12 AM Silvai Castaneda LICSW  Sign            Progress Notes by Silvia Castaneda LICSW at 2/13/2017  4:22 PM     Author:  Silvia Castaneda LICSW Service:  (none) Author Type:      Filed:  2/13/2017  4:26 PM Date of Service:  2/13/2017  4:22 PM Note Created:  2/13/2017  4:22 PM    Status:  Signed :  Silvia Castaneda LICSW ()         SW  D:  Social Work will follow for d/c planning.  Patient resides at [facility], a [facility] with [facility].    Based on patient's care plan, it appears to receives assistanc with meals, showers, medication management and  behavioral managmernt.  Patient's care plan indicates she has a  under her [employer] program and writer has left her a message to call writer. Her name is Ute Gonzalez [phone].[KH1.1]     Revision History        User Key Date/Time User Provider Type Action    > KH1.1 2/13/2017  4:26 PM Silvia Castaneda LICSW  Sign            Progress Notes by Amada Dove DO at 2/13/2017  9:16 AM     Author:  Amada Dove DO Service:  Hospitalist Author Type:  Physician    Filed:  2/13/2017  9:41 AM Date of Service:  2/13/2017  9:16 AM Note Created:  2/13/2017  9:16 AM    Status:  Signed :  Amada Dove DO (Physician)         [facility]    Hospitalist Progress Note    Date of Service  (when I saw the patient):[KW1.1] 02/13/2017    Assessment & Plan[KW1.2]   Serge Marin is a 76 year old female with PMHx of hypothyroidism, bipolar disorder, personality disorder and hx of chronic diarrhea with neg workup who was admitted on 2/12/2017 with fever, cough and shortness of breath. She was found to be positive for influenza B and supspected to have a superimposed pneumonia was CXR was suggestive of infiltrates.     Influenza B:   -- started on Tamiflu 30mg BID.  -- cont droplet precautions  -- cont supportive cares with Tylenol prn for fever    Supected CAP:  Presented with fever (Tmax 101.9) and tachycardia. WBC 4.2 and lactate nl. Initially hypoxic with O2 sats 89% on RA. Improved on 2L NC. CXR showed minimal increased density in the bilateral lung bases dt minimal atelectasis vs infiltrate. ?viral vs bacterial etiology.   -- will add on urine strep Ag  -- blood cultures remain neg  -- conts Levaquin 250mg IV daily for now  -- wean off O2 as able    Hypothyroidism:  Chronic and stable on levothyroxine    Chronic Iron Deficiency Anemia:  Chronic and stable on oral iron supplement    Bipolar Disorder / Personality Disorder:  Chronic and stable on Abilify HS and Klonopin HS    Chronic Diarrhea:  Has undergone workup per GI in the past including colonoscopy and biopsies x2.   -- no acute issues, no concern for cdiff  -- cont prn Imodium    FEN: decrease NS to 50ml/h as she has good appetite this rmoning, lytes stable on admission, regular diet  DVT Prophylaxis: PCDs  Code Status:[KW1.1] Full Code[KW1.2]    Disposition: Pending clinical course - likely 2-3 more days. Anticipate dc back to assisted living facility.[KW1.1]    Amada Dove    Interval History[KW1.2]   Uneventful night. Low grade fever this morning. Flat affect. Tells me she feels hungry this morning. No sob/cough.     -Data reviewed today: I reviewed all new labs and imaging results over the last 24 hours. I personally reviewed  no images or EKG's today.[KW1.1]    Physical Exam   Temp: 100.6  F (38.1  C) Temp src: Oral BP: 114/56 Pulse: 71 Heart Rate: 75 Resp: 18 SpO2: 97 % O2 Device: Nasal cannula Oxygen Delivery: 2 LPM  Vitals:    02/13/17 0000   Weight: 71.5 kg (157 lb 10.1 oz)[KW1.2]     Vital Signs with Ranges[KW1.1]  Temp:  [99.5  F (37.5  C)-101.9  F (38.8  C)] 100.6  F (38.1  C)  Pulse:  [71-81] 71  Heart Rate:  [] 75  Resp:  [16-20] 18  BP: (111-123)/(56-68) 114/56  SpO2:  [89 %-97 %] 97 %  I/O last 3 completed shifts:  In: 549 [I.V.:549]  Out: -[KW1.2]     Constitutional: Resting comfortably, flat affect, answering questions appropriately, NAD  Respiratory:CTAB, no wheeze/rales/rhonchi  Cardiovascular: HRRR, no MGR  GI: S, NT, ND, +BS  Skin/Integumen: warm/dry  Other:  no LE edema[KW1.1]    Medications     NaCl 100 mL/hr at 02/13/17 0031       oseltamivir  30 mg Oral BID     levofloxacin  250 mg Intravenous Q24H     ARIPiprazole  7.5 mg Oral At Bedtime     clonazePAM  0.25 mg Oral At Bedtime     ferrous gluconate  324 mg Oral Daily with breakfast     levothyroxine  75 mcg Oral Daily       Data     Recent Labs  Lab 02/13/17  0735 02/12/17  2137   WBC  --  4.2   HGB  --  13.9   MCV  --  89   PLT  --  132*   NA Canceled, Test credited Unsatisfactory specimen - hemolyzed 137   POTASSIUM Canceled, Test credited Unsatisfactory specimen - hemolyzed 3.6   CHLORIDE Canceled, Test credited Unsatisfactory specimen - hemolyzed 105   CO2 Canceled, Test credited Unsatisfactory specimen - hemolyzed 24   BUN Canceled, Test credited Unsatisfactory specimen - hemolyzed 16   CR Canceled, Test credited Unsatisfactory specimen - hemolyzed 0.98   ANIONGAP Canceled, Test credited Unsatisfactory specimen - hemolyzed 8   BERTA Canceled, Test credited Unsatisfactory specimen - hemolyzed 8.4*   GLC Canceled, Test credited Unsatisfactory specimen - hemolyzed 93   ALBUMIN  --  3.6   PROTTOTAL  --  7.1   BILITOTAL  --  0.6   ALKPHOS  --  63   ALT  --  25    AST  --  32       Recent Results (from the past 24 hour(s))   Chest XR,  PA & LAT    Narrative    CHEST TWO VIEW   2/12/2017 10:25 PM     HISTORY: Check for pneumonia. Cough, fever.    COMPARISON: 4/1/2013      Impression    IMPRESSION: Minimal increased density is seen in both lung bases. This  could be due to some minimal atelectasis or infiltrates. This is a  slightly shallow inspiration. Upper lung zones are clear. Heart size  and pulmonary vasculature are within normal limits. The aorta is  unfolded.    JEFF MA MD[KW1.2]          Revision History        User Key Date/Time User Provider Type Action    > KW1.2 2/13/2017  9:41 AM Amada Dove DO Physician Sign     KW1.1 2/13/2017  9:16 AM Amada Dove DO Physician             ED Provider Notes by Lizette Avendaño MD at 2/12/2017  8:55 PM     Author:  Lizette Avendaño MD Service:  Emergency Medicine Author Type:  Physician    Filed:  2/13/2017 12:41 AM Date of Service:  2/12/2017  8:55 PM Note Created:  2/12/2017  9:05 PM    Status:  Addendum :  Lizette Avendaño MD (Physician)           History     Chief Complaint:  Weakness & Fever       HPI   HPI is limited[LJ1.1] as the patient is a poor historian.[LJ1.2]     Serge Marin is a 76 year old female with a history of Bipolar I disorder who presents to the emergency department today from assisted living via EMS for evaluation of weakness[LJ1.1] and fever[LJ1.2]. EMS notes that the patient has been having generalized weakness, fever and cough for the past 2 days. The patient had a recorded fever of 103 F at home and had a dose of Tylenol 30 minutes prior to arrival[LJ1.1] per EMS[LJ1.2]. The patient states that she had 1 episode of diarrhea this morning. The patient appears listless. The patient denies sore throat. Denies shortness of breath.[LJ1.1] She denies vomiting, blood in stool, melena, headache, neck pain,  fall.[CA1.1]      Allergies:[LJ1.1]  Penicillins   Bactrim [Sulfamethoxazole W/Trimethoprim][LJ1.2]     Medications:[LJ1.1]    Levothyroxine (synthroid, levothroid) 75 mcg tablet  Aripiprazole (abilify) 15 mg tablet  Clonazepam (klonopin) 0.25 mg tbdp  Nystatin (mycostatin) 415616 unit/gm powd  Ferrous gluconate (fergon) 324 (38 fe) mg tablet  Calcium carb 1250 mg, 500 mg Squaxin,/vitamin d 200 units (calcium carb 1250 mg, 500 mg Squaxin,/vitamin d 200 unit) 500-200 mg-unit per tablet  Loperamide (imodium a-d) 2 mg tablet[LJ1.2]    Past Medical History:[LJ1.1]    Other specified hypothyroidism   Thyroid function test abnormal   Bipolar 1 disorder (H)   Chronic diarrhea[LJ1.2]     Past Surgical History:[LJ1.1]    Gyn surgery   Cholecystectomy   Colonoscopy     Family History:    History reviewed. No pertinent family history.     Social History:  The patient was accompanied to the ED by EMS.  Smoking Status: never  Smokeless Tobacco: never  Alcohol Use: negative[LJ1.2]  Marital Status:   [4]     Review of Systems[LJ1.1]   Unable to perform ROS[CA1.2]:[LJ1.1] Mental status change[CA1.2]   Constitutional: Positive for fever.   HENT: Negative for sore throat.    Respiratory: Positive for cough. Negative for shortness of breath.    Neurological: Positive for weakness (generalized).   All other systems reviewed and are negative.    Physical Exam   Vitals:[LJ1.1]  Patient Vitals for the past 24 hrs:   BP Temp Temp src Heart Rate Resp SpO2   02/12/17 2108 119/68 101.9  F (38.8  C) Oral 101 20 (!) 89 %[LJ1.3]          Physical Exam  Nursing note and vitals reviewed.  Constitutional:  Appears well-developed and well-nourished.   HENT:   Head:    Atraumatic.   Mouth/Throat:   Oropharynx is clear and moist. No oropharyngeal exudate.   Eyes:    Pupils are equal, round, and reactive to light.   Ears:    TMs are clear bilaterally.   Neck:    Normal range of motion. Neck supple.      No tracheal deviation present. No thyromegaly  present.   Cardiovascular:  Normal rate, regular rhythm, no murmur   Pulmonary/Chest: Breath sounds are clear and equal without wheezes or crackles.  Abdominal:   Soft. Bowel sounds are normal. Exhibits no distension and      no mass. There is no tenderness.      There is no rebound and no guarding.   Musculoskeletal:  Exhibits no edema.   Lymphadenopathy:  No cervical adenopathy.   Neurological:   Alert and oriented to person, place, and time. Follows commands. Moves all    extremities. Answers questions.   Skin:    Skin is hot. No rash noted. No pallor.     Emergency Department Course     ECG:  ECG taken at[LJ1.1] 2128[LJ1.4], ECG read at[LJ1.1] 2140  Normal sinus rhythm  Left axis deviation   Anterolateral infarct, age undetermined   Abnormal ECG[LJ1.4]  Rate[LJ1.1] 95[LJ1.4] bpm. WA interval[LJ1.1] 142[LJ1.4]. QRS duration[LJ1.1] 82[LJ1.4]. QT/QTc[LJ1.1] 340/427[LJ1.4]. P-R-T axes[LJ1.1] 25 -40 17[LJ1.4].      Imaging:  Radiology findings were communicated with the[LJ1.1] patient[LJ1.2] who voiced understanding of the findings.[LJ1.1]    Chest XR,  PA & LAT  Final Result  IMPRESSION: Minimal increased density is seen in both lung bases. This  could be due to some minimal atelectasis or infiltrates. This is a  slightly shallow inspiration. Upper lung zones are clear. Heart size  and pulmonary vasculature are within normal limits. The aorta is  Unfolded.  Reading per radiology[LJ1.2]      Laboratory:  Laboratory findings were communicated with the[LJ1.1] patient[LJ1.2] who voiced understanding of the findings.[LJ1.1]    UA: urinebilin small (A), urineketon 15 (A), urine blood moderate (A), protein albumin urine 30 (A), bacteria few (A), mucous urine present (A) o/w WNL.   Urine microscopic: bacteria few (A), mucous urine present (A) o/w WNL.   Urine culture: pending     Blood culture: pending   Blood culture: pending   CBC:  (L) o/w WNL. (WBC 4.2, HGB 13.9)   CMP: GFR estimate 55 (L), calcium 8.4 (L) o/w  WNL (Creatinine: 0.98)  ISTAT gases lactate [place] POCT: pH: 7.46 (H), PCO2: 34 (L), PO2: 106 (H), Bicarbonate: 24, Oxyhgb: 98, lactic acid: 0.7    Influenza A: negative  Influenza B: positive[LJ1.2]      Interventions:[LJ1.1]  2135: NS 1000 mL IV   2319: Tamiflu 75 mg Oral   2319: Levaquin 500 mg IV[LJ1.2]        Emergency Department Course:  Nursing notes and vitals reviewed.  I performed an exam of the patient as documented above.[LJ1.1]   2128: EKG obtained in the ED, see results above.[LJ1.4]   IV was inserted and blood was drawn for laboratory testing, results above.  The patient provided a urine sample here in the emergency department. This was sent for laboratory testing, findings above.  The patient provided specimens from the nose while here in the emergency department. This was sent for laboratory testing, findings above.  The patient was sent for a Chest XR while in the emergency department, results above.   2252: I spoke with Dr. Sewell of the Hospitalist service from [facility] regarding patient's presentation, findings, and plan of care.  I discussed the treatment plan with the patient. They expressed understanding of this plan and consented to admission. I discussed the patient with Dr. Sewell, who will admit the patient to a monitored bed for further evaluation and treatment.[LJ1.2]  At[LJ1.1] 2315[LJ1.2] the patient was rechecked and was updated on the results of[LJ1.1] her[LJ1.2] laboratory and imaging studies.[LJ1.1]   I discussed the treatment plan with the patient. They expressed understanding of this plan and consented to admission. I discussed the patient with Dr. Sewell, who will admit the patient to a monitored bed for further evaluation and treatment.[LJ1.2]  I personally reviewed the laboratory and imaging results with the[LJ1.1] Patient[LJ1.2] and answered all related questions prior to[LJ1.1] admission[LJ1.2].    Impression & Plan      Medical Decision Making:[LJ1.1]  I found the patient  to have acute influenza type B and bibasilar infiltrates concerning for pneumonia with hypoxia. Therefore, she was given Tamiflu orally and IV Levaquin after blood cultures were sent. She is kept on supplemental oxygen for her hypoxia and admitted to the medical telemetry floor under the care of the hospitalist, Dr. Sewell.[LJ1.2]       Diagnosis:[LJ1.1]    ICD-10-CM    1. Influenza J11.1    2. Pneumonia due to infectious organism, unspecified laterality, unspecified part of lung J18.9[LJ1.3]        Disposition:[LJ1.1]   The patient is admitted to the hospital.[LJ1.2]       Scribe Disclosure:  Erinn HUIZAR, am serving as a scribe at 9:05 PM on 2/12/2017 to document services personally performed by Lizette Avendaño MD, based on my observations and the provider's statements to me.    2/12/2017    EMERGENCY DEPARTMENT[LJ1.1]       Lizette Avendaño MD  02/13/17 0041       Lizette Avendaño MD  02/13/17 0041  [CA1.1]     Revision History        User Key Date/Time User Provider Type Action    > [N/A] 2/13/2017 12:41 AM Lizette Avendaño MD Physician Addend     CA1.1 2/13/2017 12:41 AM Lizette Avendaño MD Physician Sign     CA1.2 2/13/2017 12:40 AM Lizette Avendaño MD Physician      LJ1.3 2/12/2017 11:26 PM Erinn Talley     LJ1.2 2/12/2017 10:52 PM Erinn Talley      LJ1.4 2/12/2017 10:09 PM Erinn Talley     LJ1.1 2/12/2017  9:47 PM Erinn Talley                  Procedure Notes     No notes of this type exist for this encounter.         Progress Notes - Therapies (Notes from 02/13/17 through 02/16/17)      Progress Notes by Shyann Auguste PT at 2/16/2017  8:26 AM     Author:  Shyann Auguste PT Service:  (none) Author Type:  Physical Therapist    Filed:  2/16/2017  8:26 AM Date of Service:  2/16/2017  8:26 AM Note Created:  2/16/2017  8:26 AM    Status:  Signed :  Shyann Auguste PT (Physical Therapist)            02/16/17 0800   Quick Adds   Type  of Visit Initial PT Evaluation   Living Environment   Lives With alone   Living Arrangements assisted living   Home Accessibility no concerns   Number of Stairs to Enter Home 0   Number of Stairs Within Home 0   Self-Care   Usual Activity Tolerance moderate   Current Activity Tolerance fair   Regular Exercise no   Equipment Currently Used at Home none   Activity/Exercise/Self-Care Comment Pt has assist for walking to meals in the dining room.   Functional Level Prior   Ambulation 0-->independent   Transferring 0-->independent   Fall history within last six months no   General Information   Onset of Illness/Injury or Date of Surgery - Date 02/12/17   Referring Physician Amada Dove,    Patient/Family Goals Statement return home   Pertinent History of Current Problem (include personal factors and/or comorbidities that impact the POC) Admitted with weakness, fever, cough, pneumonia and influenza. PMH: bipolar, chronic diarrhea.   Precautions/Limitations fall precautions   Weight-Bearing Status - LLE full weight-bearing   Weight-Bearing Status - RLE full weight-bearing   Cognitive Status Examination   Orientation orientation to person, place and time   Level of Consciousness alert   Follows Commands and Answers Questions 100% of the time;able to follow single-step instructions   Personal Safety and Judgment impaired   Memory impaired   Pain Assessment   Patient Currently in Pain No   Posture    Posture Forward head position;Protracted shoulders   Range of Motion (ROM)   ROM Quick Adds No deficits were identified   Strength   Strength Comments Pt not following directions for MMT   Bed Mobility   Bed Mobility Comments Supine to sit with SBA   Transfer Skills   Transfer Comments Sit to stand with CGA    Gait   Gait Comments Pt amb 5 ft with no AD and min A, poor balance and shuffling gait   Balance   Balance Comments Balance poor with gait without AD use   General Therapy Interventions   Planned Therapy Interventions  "bed mobility training;gait training;neuromuscular re-education;strengthening;transfer training   Clinical Impression   Criteria for Skilled Therapeutic Intervention yes, treatment indicated   PT Diagnosis Difficulty ambulating   Influenced by the following impairments Dec strength, balance, activity tolerance   Functional limitations due to impairments Difficulty ambulating and transferring   Clinical Presentation Stable/Uncomplicated   Clinical Presentation Rationale medically stable   Clinical Decision Making (Complexity) Low complexity   Therapy Frequency` daily   Predicted Duration of Therapy Intervention (days/wks) 4 days   Anticipated Discharge Disposition Transitional Care Facility   Risk & Benefits of therapy have been explained Yes   Patient, Family & other staff in agreement with plan of care Yes   [facility] TM \"6 Clicks\"   2016, Trustees of [facility], under license to [employer].  All rights reserved.   6 Clicks Short Forms Basic Mobility Inpatient Short Form   [facility]  \"6 Clicks\" V.2 Basic Mobility Inpatient Short Form   1. Turning from your back to your side while in a flat bed without using bedrails? 4 - None   2. Moving from lying on your back to sitting on the side of a flat bed without using bedrails? 3 - A Little   3. Moving to and from a bed to a chair (including a wheelchair)? 3 - A Little   4. Standing up from a chair using your arms (e.g., wheelchair, or bedside chair)? 3 - A Little   5. To walk in hospital room? 2 - A Lot   6. Climbing 3-5 steps with a railing? 1 - Total   Basic Mobility Raw Score (Score out of 24.Lower scores equate to lower levels of function) 16   Total Evaluation Time   Total Evaluation Time (Minutes) 15[EW1.1]        Revision History        User Key Date/Time User Provider Type Action    > EW1.1 2/16/2017  8:26 AM Shyann Auguste, PT Physical Therapist Sign            Progress Notes by Lillie Pino OT at 2/15/2017  4:13 " PM     Author:  Lillie Pino OT Service:  (none) Author Type:  Occupational Therapist    Filed:  2/16/2017  7:37 AM Date of Service:  2/15/2017  4:13 PM Note Created:  2/15/2017  4:13 PM    Status:  Signed :  Lillie Pino OT (Occupational Therapist)            02/15/17 0825   Quick Adds   Type of Visit Initial Occupational Therapy Evaluation   Living Environment   Lives With alone   Living Arrangements assisted living   Home Accessibility no concerns   Functional Level Prior   Ambulation 0-->independent   Transferring 0-->independent   Toileting 0-->independent   Bathing 2-->assistive person   Dressing 0-->independent   Eating 0-->independent   Communication 0-->understands/communicates without difficulty   Swallowing 0-->swallows foods/liquids without difficulty   Prior Functional Level Comment Has assistance with bathing and has provided meals.    General Information   Onset of Illness/Injury or Date of Surgery - Date 02/12/17   Referring Physician White   Patient/Family Goals Statement None stated   Additional Occupational Profile Info/Pertinent History of Current Problem Patient admitted 2/12 with fever, cough, and SOB. Patient found to have influenza B and suspected to have a superimposed pneumonia was CXR was suggestive of infiltrates. PMH includes hypothyroidism, bipolar personality disorder and history of chronic diarrhea with negative workup.    Precautions/Limitations fall precautions;oxygen therapy device and L/min  (2L, 97% )   General Observations Patient Cheesh-Na, flat affect, moves slowly   General Info Comments Patient in chair upon OT arrival. Sats 97% on 2L.   Cognitive Status Examination   Orientation orientation to person, place and time   Level of Consciousness alert   Able to Follow Commands WNL/WFL   Personal Safety (Cognitive) WNL/WFL   Memory impaired   Cognitive Comment Patient able to recall 1/3 words after distraction.   Visual Perception   Visual Perception Wears glasses    Sensory Examination   Sensory Comments denies   Pain Assessment   Patient Currently in Pain No   Integumentary/Edema   Integumentary/Edema Comments Swelling noted to BLE   Posture   Posture forward head position;protracted shoulders   Range of Motion (ROM)   ROM Comment B shoulder flexion AROM about 80 degrees, AAROM WNL, B elbow flexion AROM WNL   Strength   Strength Comments B shoulder and elbow flexion 5/5   Hand Strength   Hand Strength Comments B grasp WFL   Transfer Skill: Sit to Stand   Level of [place]: Sit/Stand minimum assist (75% patients effort)   Transfer Skill: Toilet Transfer   Level of [place]: Toilet minimum assist (75% patients effort)   Balance   Balance Comments impaired, ambulates very slowly and cautiously   Lower Body Dressing   Level of [place]: Dress Lower Body minimum assist (75% patients effort)   Instrumental Activities of Daily Living (IADL)   IADL Comments Meds managed and meals provided by [facility] staff   Activities of Daily Living Analysis   Impairments Contributing to Impaired Activities of Daily Living balance impaired;cognition impaired;ROM decreased;strength decreased   General Therapy Interventions   Planned Therapy Interventions ADL retraining;balance training;strengthening;transfer training   Clinical Impression   Criteria for Skilled Therapeutic Interventions Met yes, treatment indicated   OT Diagnosis Decreased participation and I in ADLs   Influenced by the following impairments Decreased balance, strength, ROM, activity tolerance and cognition   Assessment of Occupational Performance 1-3 Performance Deficits   Identified Performance Deficits Dressing, toileting transfers, grooming and hygiene, ADL routine   Clinical Decision Making (Complexity) Low complexity   Therapy Frequency daily   Predicted Duration of Therapy Intervention (days/wks) 3 days   Anticipated Equipment Needs at Discharge (to be determined)   Anticipated Discharge Disposition Transitional Care  Facility   Risks and Benefits of Treatment have been explained. Yes   Patient, Family & other staff in agreement with plan of care Yes   Total Evaluation Time   Total Evaluation Time (Minutes) 15[JM1.1]        Revision History        User Key Date/Time User Provider Type Action    > JM1.1 2/16/2017  7:37 AM Lillie Pino, OT Occupational Therapist Sign

## 2017-02-12 NOTE — IP AVS SNAPSHOT
"          [place] [address] [facility]: [phone]                                              INTERAGENCY TRANSFER FORM - LAB / IMAGING / EKG / EMG RESULTS   2017                    Hospital Admission Date: 2017  CHILO PEREA   : 1940  Sex: Female        Attending Provider: Rubina Sewell MD     Allergies:  Penicillins, Bactrim [Sulfamethoxazole W/trimethoprim]    Infection:  None   Service:  HOSPITALIST    Ht:  1.626 m (5' 4\")   Wt:  71.5 kg (157 lb 10.1 oz)   Admission Wt:  71.5 kg (157 lb 10.1 oz)    BMI:  27.06 kg/m 2   BSA:  1.8 m 2            Patient PCP Information     Provider PCP Type    Kassie Wyatt MD General         Lab Results - 3 Days      Basic metabolic panel [[id number]]  Resulted: 17 0853, Result status: Final result    Ordering provider: Amada Dove DO  17 0000 Resulting lab: [facility]    Specimen Information    Type Source Collected On   Blood  17 0823          Components       Value Reference Range Flag Lab   Sodium 143 133 - 144 mmol/L  FrStHsLb   Potassium 3.5 3.4 - 5.3 mmol/L  FrStHsLb   Chloride 109 94 - 109 mmol/L  FrStHsLb   Carbon Dioxide 27 20 - 32 mmol/L  FrStHsLb   Anion Gap 7 3 - 14 mmol/L  FrStHsLb   Glucose 89 70 - 99 mg/dL  FrStHsLb   Urea Nitrogen 10 7 - 30 mg/dL  FrStHsLb   Creatinine 0.79 0.52 - 1.04 mg/dL  FrStHsLb   GFR Estimate 71 >60 mL/min/1.7m2  FrStHsLb   Comment:  Non  GFR Calc   GFR Estimate If Black 86 >60 mL/min/1.7m2  FrStHsLb   Comment:  African American GFR Calc   Calcium 8.8 8.5 - 10.1 mg/dL  FrStHsLb            CBC with platelets [[id number]] (Abnormal)  Resulted: 17 0838, Result status: Final result    Ordering provider: Amada Dove DO  17 0000 Resulting lab: [facility]    Specimen Information    Type Source Collected On   Blood  17 0823          Components       Value Reference Range Flag Lab   WBC " 2.1 4.0 - 11.0 10e9/L L FrStHsLb   RBC Count 4.74 3.8 - 5.2 10e12/L  FrStHsLb   Hemoglobin 14.2 11.7 - 15.7 g/dL  FrStHsLb   Hematocrit 41.9 35.0 - 47.0 %  FrStHsLb   MCV 88 78 - 100 fl  FrStHsLb   MCH 30.0 26.5 - 33.0 pg  FrStHsLb   MCHC 33.9 31.5 - 36.5 g/dL  FrStHsLb   RDW 12.7 10.0 - 15.0 %  FrStHsLb   Platelet Count 111 150 - 450 10e9/L L FrStHsLb            Blood culture [[id number]]  Resulted: 02/16/17 0722, Result status: Preliminary result    Ordering provider: Lizette Avendaño MD  02/12/17 2121 Resulting lab: [facility]    Specimen Information    Type Source Collected On   Blood Hand, Right 02/12/17 2315          Components       Value Reference Range Flag Lab   Specimen Description Blood Right Arm   FrStHsLb   Special Requests Aerobic and anaerobic bottles received   FrStHsLb   Culture Micro No growth after 3 days   75   Micro Report Status Pending   75            Blood culture [[id number]]  Resulted: 02/16/17 0722, Result status: Preliminary result    Ordering provider: Lizette Avendaño MD  02/12/17 2121 Resulting lab: [facility]    Specimen Information    Type Source Collected On   Blood Hand, Left 02/12/17 2137          Components       Value Reference Range Flag Lab   Specimen Description Blood Left Hand   FrStHsLb   Special Requests Aerobic and anaerobic bottles received   FrStHsLb   Culture Micro No growth after 3 days   75   Micro Report Status Pending   75            Basic metabolic panel [[id number]] (Abnormal)  Resulted: 02/15/17 0833, Result status: Final result    Ordering provider: Amada Dove DO  02/15/17 0001 Resulting lab: [facility]    Specimen Information    Type Source Collected On   Blood  02/15/17 0755          Components       Value Reference Range Flag Lab   Sodium 144 133 - 144 mmol/L  FrStHsLb   Potassium 3.8 3.4 - 5.3 mmol/L  FrStHsLb   Chloride 112 94 - 109 mmol/L H FrStHsLb   Carbon  Dioxide 26 20 - 32 mmol/L  FrStHsLb   Anion Gap 6 3 - 14 mmol/L  FrStHsLb   Glucose 85 70 - 99 mg/dL  FrStHsLb   Urea Nitrogen 11 7 - 30 mg/dL  FrStHsLb   Creatinine 0.74 0.52 - 1.04 mg/dL  FrStHsLb   GFR Estimate 76 >60 mL/min/1.7m2  FrStHsLb   Comment:  Non  GFR Calc   GFR Estimate If Black -- >60 mL/min/1.7m2  FrStHsLb   Result:         >90   GFR Calc     Calcium 8.0 8.5 - 10.1 mg/dL L FrStHsLb   Result:              CBC with platelets [[id number]] (Abnormal)  Resulted: 02/15/17 0810, Result status: Final result    Ordering provider: Amada Dove DO  02/15/17 0001 Resulting lab: [facility]    Specimen Information    Type Source Collected On   Blood  02/15/17 0755          Components       Value Reference Range Flag Lab   WBC 2.1 4.0 - 11.0 10e9/L L FrStHsLb   RBC Count 4.49 3.8 - 5.2 10e12/L  FrStHsLb   Hemoglobin 13.3 11.7 - 15.7 g/dL  FrStHsLb   Hematocrit 40.2 35.0 - 47.0 %  FrStHsLb   MCV 90 78 - 100 fl  FrStHsLb   MCH 29.6 26.5 - 33.0 pg  FrStHsLb   MCHC 33.1 31.5 - 36.5 g/dL  FrStHsLb   RDW 12.8 10.0 - 15.0 %  FrStHsLb   Platelet Count 83 150 - 450 10e9/L L FrStHsLb            Basic metabolic panel [[id number]] (Abnormal)  Resulted: 02/14/17 0933, Result status: Final result    Ordering provider: Amada Dove DO  02/14/17 0000 Resulting lab: [facility]    Specimen Information    Type Source Collected On   Blood  02/14/17 0905          Components       Value Reference Range Flag Lab   Sodium 141 133 - 144 mmol/L  FrStHsLb   Potassium 3.5 3.4 - 5.3 mmol/L  FrStHsLb   Chloride 108 94 - 109 mmol/L  FrStHsLb   Carbon Dioxide 22 20 - 32 mmol/L  FrStHsLb   Anion Gap 11 3 - 14 mmol/L  FrStHsLb   Glucose 127 70 - 99 mg/dL H FrStHsLb   Urea Nitrogen 10 7 - 30 mg/dL  FrStHsLb   Creatinine 0.84 0.52 - 1.04 mg/dL  FrStHsLb   GFR Estimate 66 >60 mL/min/1.7m2  FrStHsLb   Comment:  Non  GFR Calc   GFR Estimate If Black  80 >60 mL/min/1.7m2  FrStHsLb   Comment:  African American GFR Calc   Calcium 8.2 8.5 - 10.1 mg/dL L FrStHsLb            CBC with platelets [[id number]] (Abnormal)  Resulted: 02/14/17 0917, Result status: Final result    Ordering provider: Amada Dove DO  02/14/17 0000 Resulting lab: [facility]    Specimen Information    Type Source Collected On   Blood  02/14/17 0905          Components       Value Reference Range Flag Lab   WBC 3.5 4.0 - 11.0 10e9/L L FrStHsLb   RBC Count 4.58 3.8 - 5.2 10e12/L  FrStHsLb   Hemoglobin 13.9 11.7 - 15.7 g/dL  FrStHsLb   Hematocrit 40.8 35.0 - 47.0 %  FrStHsLb   MCV 89 78 - 100 fl  FrStHsLb   MCH 30.3 26.5 - 33.0 pg  FrStHsLb   MCHC 34.1 31.5 - 36.5 g/dL  FrStHsLb   RDW 12.7 10.0 - 15.0 %  FrStHsLb   Platelet Count 100 150 - 450 10e9/L L FrStHsLb            Urine Culture Aerobic Bacterial [[id number]]  Resulted: 02/14/17 0740, Result status: Final result    Ordering provider: Lizette Avendaño MD  02/12/17 2122 Resulting lab: [facility]    Specimen Information    Type Source Collected On   Urine Urine catheter 02/12/17 2145          Components       Value Reference Range Flag Lab   Specimen Description Catheterized Urine   FrStHsLb   Culture Micro No growth   225   Micro Report Status FINAL 02/14/2017   225            Strep Pneumoniae Agn 13 yrs and older [[id number]]  Resulted: 02/13/17 1542, Result status: Final result    Ordering provider: Amada Dove DO  02/13/17 0929 Resulting lab: [facility]    Specimen Information    Type Source Collected On   Urine  02/13/17 1041          Components       Value Reference Range Flag Lab   Specimen Description Midstream Urine   FrStHsLb   S Pneumoniae Antigen --   226   Result:         Negative, no Streptococcus pneumoniae antigen detected by immunochromatographic   membrane assay. A negative Streptococcus pneumoniae antigen result does not   rule out  infection with Streptococcus pneumoniae.     Micro Report Status FINAL 02/13/2017   226   Result:              Basic metabolic panel [[id number]] (Abnormal)  Resulted: 02/13/17 0953, Result status: Final result    Ordering provider: Rubina Sewell MD  02/13/17 0858 Resulting lab: [facility]    Specimen Information    Type Source Collected On     02/13/17 0930          Components       Value Reference Range Flag Lab   Sodium 140 133 - 144 mmol/L  FrStHsLb   Potassium 3.9 3.4 - 5.3 mmol/L  FrStHsLb   Chloride 108 94 - 109 mmol/L  FrStHsLb   Carbon Dioxide 24 20 - 32 mmol/L  FrStHsLb   Anion Gap 8 3 - 14 mmol/L  FrStHsLb   Glucose 87 70 - 99 mg/dL  FrStHsLb   Urea Nitrogen 11 7 - 30 mg/dL  FrStHsLb   Creatinine 0.86 0.52 - 1.04 mg/dL  FrStHsLb   GFR Estimate 64 >60 mL/min/1.7m2  FrStHsLb   Comment:  Non  GFR Calc   GFR Estimate If Black 78 >60 mL/min/1.7m2  FrStHsLb   Comment:  African American GFR Calc   Calcium 7.9 8.5 - 10.1 mg/dL L FrStHsLb            Basic metabolic panel [[id number]]  Resulted: 02/13/17 0857, Result status: Final result    Ordering provider: Rubina Sewell MD  02/13/17 0001 Resulting lab: [facility]    Specimen Information    Type Source Collected On   Blood  02/13/17 0735          Components       Value Reference Range Flag Lab   Sodium -- 133 - 144 mmol/L  FrStHsLb   Result:         Canceled, Test credited   Unsatisfactory specimen - hemolyzed     Potassium -- 3.4 - 5.3 mmol/L  FrStHsLb   Result:         Canceled, Test credited   Unsatisfactory specimen - hemolyzed     Chloride -- 94 - 109 mmol/L  FrStHsLb   Result:         Canceled, Test credited   Unsatisfactory specimen - hemolyzed     Carbon Dioxide -- 20 - 32 mmol/L  FrStHsLb   Result:         Canceled, Test credited   Unsatisfactory specimen - hemolyzed     Anion Gap -- 6 - 17 mmol/L  FrStHsLb   Result:         Canceled, Test credited   Unsatisfactory specimen - hemolyzed     Glucose -- 70 -  99 mg/dL  FrStHsLb   Result:         Canceled, Test credited   Unsatisfactory specimen - hemolyzed     Urea Nitrogen -- 7 - 30 mg/dL  FrStHsLb   Result:         Canceled, Test credited   Unsatisfactory specimen - hemolyzed     Creatinine -- 0.52 - 1.04 mg/dL  FrStHsLb   Result:         Canceled, Test credited   Unsatisfactory specimen - hemolyzed     GFR Estimate -- >60 mL/min/1.7m2  FrStHsLb   Result:         Canceled, Test credited   Unsatisfactory specimen - hemolyzed     GFR Estimate If Black -- >60 mL/min/1.7m2  FrStHsLb   Result:         Canceled, Test credited   Unsatisfactory specimen - hemolyzed     Calcium -- 8.5 - 10.1 mg/dL  FrStHsLb   Result:         Canceled, Test credited   Unsatisfactory specimen - hemolyzed              Testing Performed By     Lab - Abbreviation Name Director Address Valid Date Range    14 - FrStHsLb [facility] Unknown [address]  [place] [address] 05/08/15 1057 - Present    75 - Unknown [facility] Unknown [address] [facility] [address] 01/15/15 1019 - Present    225 - Unknown [facility] Unknown [address] [facility] [address] 12/19/14 0954 - Present    226 - Unknown [facility] Unknown [address] [facility] [address] 12/19/14 0955 - Present            Unresulted Labs     None      Encounter-Level Documents:     There are no encounter-level documents.      Order-Level Documents:     There are no order-level documents.

## 2017-02-12 NOTE — IP AVS SNAPSHOT
"` `     [place] [address] [facility]: [phone]                                              INTERAGENCY TRANSFER FORM - NURSING   2017                    Hospital Admission Date: 2017  CHILO PEREA   : 1940  Sex: Female        Attending Provider: Rubina Sewell MD     Allergies:  Penicillins, Bactrim [Sulfamethoxazole W/trimethoprim]    Infection:  None   Service:  HOSPITALIST    Ht:  1.626 m (5' 4\")   Wt:  71.5 kg (157 lb 10.1 oz)   Admission Wt:  71.5 kg (157 lb 10.1 oz)    BMI:  27.06 kg/m 2   BSA:  1.8 m 2            Patient PCP Information     Provider PCP Type    Kassie Wyatt MD General      Current Code Status     Date Active Code Status Order ID Comments User Context       Prior      Code Status History     Date Active Date Inactive Code Status Order ID Comments User Context    2017 10:39 AM  Full Code [id number]  Amada Dove DO Outpatient    2017 11:57 PM 2017 10:39 AM Full Code [id number]  Rubina Sewell MD Inpatient      Advance Directives        Does patient have a scanned Advance Directive/ACP document in EPIC?           No        Hospital Problems as of 2017              Priority Class Noted POA    Pneumonia Medium  2017 Yes      Non-Hospital Problems as of 2017              Priority Class Noted    Bipolar 1 disorder (H)   2013    Chronic diarrhea   2013    Thyroid function test abnormal   11/15/2013    Other specified hypothyroidism Medium  10/18/2016      Immunizations     Name Date      Influenza (High Dose) 3 valent vaccine 10/13/16     Pneumococcal (PCV 13) 16          END      ASSESSMENT     Discharge Profile Flowsheet     EXPECTED DISCHARGE     Patient's communication style  spoken language (English or Bilingual) 14 1033    Expected Discharge Date  02/17/17 02/15/17 1504   SKIN      DISCHARGE NEEDS ASSESSMENT     Inspection  Full 17 0922    Equipment Currently Used at Home  " "none 02/16/17 0802   Skin WDL  ex 02/16/17 0922    GASTROINTESTINAL (ADULT,PEDIATRIC,OB)     Skin Color/Characteristics  bruised (ecchymotic) 02/16/17 0922    GI WDL  ex 02/16/17 0922   Skin Temperature  warm 02/16/17 0922    Abdominal Appearance  obese 02/16/17 0922   Skin Moisture  dry 02/16/17 0922    All Quadrants Bowel Sounds  audible and active in all quadrants 02/16/17 0922   Skin Integrity  bruise(s) 02/16/17 0922    Last Bowel Movement  02/13/17 02/14/17 0845   SAFETY      Passing flatus  yes 02/16/17 0922   Safety WDL  WDL 02/16/17 0922    COMMUNICATION ASSESSMENT                        Assessment WDL (Within Defined Limits) Definitions           Safety WDL     Effective: 09/28/15    Row Information: <b>WDL Definition:</b> Bed in low position, wheels locked; call light in reach; upper side rails up x 2; ID band on<br> <font color=\"gray\"><i>Item=AS safety wdl>>List=AS safety wdl>>Version=F14</i></font>      Skin WDL     Effective: 09/28/15    Row Information: <b>WDL Definition:</b> Warm; dry; intact; elastic; without discoloration; pressure points without redness<br> <font color=\"gray\"><i>Item=AS skin wdl>>List=AS skin wdl>>Version=F14</i></font>      Vitals     Vital Signs Flowsheet     VITAL SIGNS     Patient's Stated Pain Goal  2 02/13/17 1533    Temp  98.3  F (36.8  C) 02/16/17 0847   0-10 Pain Scale  0 02/14/17 1317    Temp src  Oral 02/16/17 0050   Pain Location  Neck 02/13/17 1659    Resp  16 02/16/17 0847   Pain Orientation  Posterior 02/13/17 1659    Pulse  59 02/14/17 2056   Pain Intervention(s)  Distraction;Emotional support 02/13/17 1533    Heart Rate  -- 02/16/17 1059   HEIGHT AND WEIGHT      Pulse/Heart Rate Source  Monitor 02/16/17 0050   Weight  71.5 kg (157 lb 10.1 oz) 02/13/17 0003    BP  115/62 02/16/17 0847   DAILY CARE      BP Location  Right arm 02/16/17 0847   Activity Type  up in chair 02/16/17 0847    OXYGEN THERAPY     Activity Level of Assistance  assistance, 1 person 02/16/17 " 1013    SpO2  94 % 02/16/17 0847   Activity Assistive Device  gait belt (refuses walker) 02/16/17 1013    O2 Device  None (Room air) 02/16/17 0847   POSITIONING      Oxygen Delivery  2 LPM 02/15/17 0822   Body Position  independently positioning 02/16/17 0924    PAIN/COMFORT     Head of Bed (HOB)  HOB at 30-45 degrees 02/16/17 1013    Patient Currently in Pain  denies 02/16/17 0916   Positioning/Transfer Devices  pillows 02/16/17 0924    Preferred Pain Scale  number (Numeric Rating Pain Scale) 02/13/17 1533   Chair  Upright in chair 02/16/17 0924            Patient Lines/Drains/Airways Status    Active LINES/DRAINS/AIRWAYS     Name: Placement date: Placement time: Site: Days: Last dressing change:    Peripheral IV 02/12/17 Left Hand 02/12/17 2107   Hand   3     Peripheral IV 02/12/17 Right Hand 02/12/17   2333   Hand   3             Patient Lines/Drains/Airways Status    Active PICC/CVC     None            Intake/Output Detail Report     Date Intake     Output Net    Shift P.O. I.V. IV Piggyback Total Urine Total       Noc 02/14/17 2300 - 02/15/17 0659 -- 303 -- 303 1250 1250 -947    Day 02/15/17 0700 - 02/15/17 1459 200 -- -- 200 200 200 0    Yumi 02/15/17 1500 - 02/15/17 2259 300 -- -- 300 200 200 100    Noc 02/15/17 2300 - 02/16/17 0659 -- -- -- -- -- -- 0    Day 02/16/17 0700 - 02/16/17 1459 300 -- -- 300 -- -- 300      Last Void/BM       Most Recent Value    Urine Occurrence 1 at 02/16/2017 0750    Stool Occurrence       Case Management/Discharge Planning     Case Management/Discharge Planning Flowsheet     LIVING ENVIRONMENT     Equipment Currently Used at Home  none 02/16/17 0802    Lives With  alone 02/16/17 0802   ABUSE RISK SCREEN      Living Arrangements  assisted living 02/16/17 0820   QUESTION TO PATIENT:  Has a member of your family or a partner(now or in the past) intimidated, hurt, manipulated, or controlled you in any way?  no 02/13/17 0253    COPING/STRESS     QUESTION TO PATIENT: Do you feel safe  going back to the place where you are living?  yes 02/13/17 0253    Major Change/Loss/Stressor  denies 02/13/17 0111   OBSERVATION: Is there reason to believe there has been maltreatment of a vulnerable adult (ie. Physical/Sexual/Emotional abuse, self neglect, lack of adequate food, shelter, medical care, or financial exploitation)?  no 02/13/17 0253    EXPECTED DISCHARGE     (R) MENTAL HEALTH SUICIDE RISK      Expected Discharge Date  02/17/17 02/15/17 1504   Are you depressed or being treated for depression?  Yes 02/13/17 0109    FINAL RESOURCES

## 2017-02-12 NOTE — IP AVS SNAPSHOT
MRN:[id number]                      After Visit Summary   2/12/2017    Serge Marin    MRN: [id number]           Thank you!     Thank you for choosing [facility] for your care. Our goal is always to provide you with excellent care. Hearing back from our patients is one way we can continue to improve our services. Please take a few minutes to complete the written survey that you may receive in the mail after you visit with us. Thank you!        Patient Information     Date Of Birth          1940        About your hospital stay     You were admitted on:  February 12, 2017 You last received care in the:  [place] [address] [facility]    You were discharged on:  February 16, 2017        Reason for your hospital stay       Management of the flu and pneumonia.                  Who to Call     For medical emergencies, please call 911.  For non-urgent questions about your medical care, please call your primary care provider or clinic, [phone]          Attending Provider     Provider Specialty    Lizette Avendaño MD Emergency Medicine    [facility], MD [facility] Internal Medicine       Primary Care Provider Office Phone # Fax #    Kassie Wyatt [phone]       [facility] [address] [facility] [address]        After Care Instructions     Activity - Up with nursing assistance           Advance Diet as Tolerated       Follow this diet upon discharge: Regular            General info for SNF       Length of Stay Estimate: Short Term Care: Estimated # of Days <30  Condition at Discharge: Improving  Level of care: skilled   Rehabilitation Potential: Excellent  Admission H&P remains valid and up-to-date: Yes  Recent Chemotherapy: N/A                     Use Nursing Home Standing Orders: N/A            Mantoux instructions       Give two-step Mantoux (PPD) Per Facility Policy Yes                  Follow-up Appointments     Follow Up and recommended labs and  "tests       1. Follow up with your PCP in 1-2 weeks.                  Additional Services     Occupational Therapy Adult Consult       Evaluate and treat as clinically indicated.    Reason:  Generalized weakness/deconditioning            Physical Therapy Adult Consult       Evaluate and treat as clinically indicated.    Reason:  Generalized weakness/deconditioning                  Further instructions from your care team       Discharge to [facility]  [phone]    Pending Results     Date and Time Order Name Status Description    2017 Blood culture Preliminary     2017 Blood culture Preliminary             Statement of Approval     Ordered          17 1040  I have reviewed and agree with all the recommendations and orders detailed in this document.  EFFECTIVE NOW     Approved and electronically signed by:  Amada Dove DO             Admission Information     Date & Time Provider Department Dept. Phone    2017 Rubina Sewell MD [place] [address] [facility] [phone]      Your Vitals Were     Blood Pressure Pulse Temperature Respirations Weight Pulse Oximetry    115/62 (BP Location: Right arm) 59 98.3  F (36.8  C) 16 71.5 kg (157 lb 10.1 oz) 94%    BMI (Body Mass Index)                   27.06 kg/m2           MyChart Information     [facility] lets you send messages to your doctor, view your test results, renew your prescriptions, schedule appointments and more. To sign up, go to [email or web address] . Click on \"Log in\" on the left side of the screen, which will take you to the Welcome page. Then click on \"Sign up Now\" on the right side of the page.     You will be asked to enter the access code listed below, as well as some personal information. Please follow the directions to create your username and password.     Your access code is: [email or web address]  Expires: 2017  9:05 AM     Your access code will  in 90 days. If you need " help or a new code, please call your [facility] clinic or [phone].        Care EveryWhere ID     This is your Care EveryWhere ID. This could be used by other organizations to access your [facility] medical records  [id number]           Review of your medicines      START taking        Dose / Directions    oseltamivir 75 MG capsule   Commonly known as:  TAMIFLU   Indication:  Flu   Used for:  Influenza        Dose:  75 mg   Take 1 capsule (75 mg) by mouth 2 times daily for 3 doses   Quantity:  3 capsule   Refills:  0         CONTINUE these medicines which have NOT CHANGED        Dose / Directions    ACETAMINOPHEN PO        Dose:  500 mg   Take 500 mg by mouth every 6 hours as needed for pain   Refills:  0       ARIPiprazole 15 MG tablet   Commonly known as:  ABILIFY   Used for:  Bipolar 1 disorder (H)        Dose:  7.5 mg   Take 0.5 tablets (7.5 mg) by mouth At Bedtime   Quantity:  30 tablet   Refills:  1       calcium carb 1250 mg (500 mg [place])/vitamin D 200 units 500-200 MG-UNIT per tablet   Commonly known as:  OSCAL with D   Used for:  Routine general medical examination at a health care facility        Dose:  1 tablet   Take 1 tablet by mouth daily   Quantity:  90 tablet   Refills:  1       clonazePAM 0.25 MG Tbdp ODT tab   Commonly known as:  klonoPIN   Used for:  Bipolar 1 disorder (H)        Dose:  0.25 mg   Take 1 tablet (0.25 mg) by mouth At Bedtime   Quantity:  30 tablet   Refills:  0       ferrous gluconate 324 (38 FE) MG tablet   Commonly known as:  FERGON   Used for:  Fatigue        Dose:  324 mg   Take 1 tablet (324 mg) by mouth daily (with breakfast)   Quantity:  90 tablet   Refills:  1       levothyroxine 75 MCG tablet   Commonly known as:  SYNTHROID/LEVOTHROID   Used for:  Other specified hypothyroidism        Dose:  75 mcg   Take 1 tablet (75 mcg) by mouth daily   Quantity:  30 tablet   Refills:  3       loperamide 2 MG tablet   Commonly known as:  IMODIUM A-D   Used for:  Diarrhea        Start  with 2 tabs (4 mg), then take one tab (2 mg) after each diarrheal stool.  Do not use more than  8 tabs (16 mg) per day.   Quantity:  30 tablet   Refills:  0       nystatin 540403 UNIT/GM Powd   Commonly known as:  MYCOSTATIN   Used for:  Candidiasis of skin        Apply to area as needed three times per day   Quantity:  60 g   Refills:  1            Where to get your medicines      These medications were sent to [facility] - [place], [place] - [address]  [address], [facility] [address]     Phone:  [phone]     oseltamivir 75 MG capsule         Some of these will need a paper prescription and others can be bought over the counter. Ask your nurse if you have questions.     Bring a paper prescription for each of these medications     ARIPiprazole 15 MG tablet    clonazePAM 0.25 MG Tbdp ODT tab                Protect others around you: Learn how to safely use, store and throw away your medicines at www.disposemymeds.org.             Medication List: This is a list of all your medications and when to take them. Check marks below indicate your daily home schedule. Keep this list as a reference.      Medications           Morning Afternoon Evening Bedtime As Needed    ACETAMINOPHEN PO   Take 500 mg by mouth every 6 hours as needed for pain   Last time this was given:  650 mg on 2/14/2017  8:57 PM                                   ARIPiprazole 15 MG tablet   Commonly known as:  ABILIFY   Take 0.5 tablets (7.5 mg) by mouth At Bedtime   Last time this was given:  7.5 mg on 2/15/2017  9:01 PM                                   calcium carb 1250 mg (500 mg [place])/vitamin D 200 units 500-200 MG-UNIT per tablet   Commonly known as:  OSCAL with D   Take 1 tablet by mouth daily                                   clonazePAM 0.25 MG Tbdp ODT tab   Commonly known as:  klonoPIN   Take 1 tablet (0.25 mg) by mouth At Bedtime   Last time this was given:  0.25 mg on 2/15/2017  9:00 PM                                    ferrous gluconate 324 (38 FE) MG tablet   Commonly known as:  FERGON   Take 1 tablet (324 mg) by mouth daily (with breakfast)   Last time this was given:  324 mg on 2/16/2017  8:52 AM                                   levothyroxine 75 MCG tablet   Commonly known as:  SYNTHROID/LEVOTHROID   Take 1 tablet (75 mcg) by mouth daily   Last time this was given:  75 mcg on 2/16/2017  8:52 AM                                   loperamide 2 MG tablet   Commonly known as:  IMODIUM A-D   Start with 2 tabs (4 mg), then take one tab (2 mg) after each diarrheal stool.  Do not use more than  8 tabs (16 mg) per day.                                   nystatin 209308 UNIT/GM Powd   Commonly known as:  MYCOSTATIN   Apply to area as needed three times per day                                         oseltamivir 75 MG capsule   Commonly known as:  TAMIFLU   Take 1 capsule (75 mg) by mouth 2 times daily for 3 doses   Last time this was given:  75 mg on 2/16/2017  8:52 AM

## 2017-02-12 NOTE — IP AVS SNAPSHOT
` ` Patient Information     Patient Name Sex     Serge Marin ([id number]) Female 1940       Room Bed    4710 6610-01      Patient Demographics     Address Phone    [address] [id number] [phone] (Home)  NONE (Work)  [phone] (Mobile)      Patient Ethnicity & Race     Ethnic Group Patient Race    American White      Emergency Contact(s)     Name Relation Home Work Mobile    Delfina Marin [phone]      Documents on File        Status Date Received Description       Documents for the Patient    Consent for Services - [facility] Received 13     External Medication Information Consent       Privacy Notice - [place] Received 13     Insurance Card Received 13     Patient ID Received 13     Consent for Services - Hospital/Clinic Received () 13     Consent for EHR Access Received 13     [facility] Specified Other       Consent for Services - P  13 GENERAL CONSENT FORM: SHARED EHR - ENGLISH, 13    HIM YUSRA Authorization - File Only  [facility] TO [facility]    Consent for Services - Hospital/Clinic Received () 03/23/15 CONSENT FOR SERVICE    HIM YUSRA Authorization - File Only  06[facility]    Consent for Services/Privacy Notice - Hospital/Clinic-[facility] Received 17     Consent for Services/Privacy Notice - Hospital/Clinic          Documents for the Encounter    CMS IM for Patient Signature         Admission Information     Attending Provider Admitting Provider Admission Type Admission Date/Time    Rubina Sewell MD Tata, Sujatha, MD Emergency 17    Discharge Date Hospital Service Auth/Cert Status Service Area     Hospitalist [facility] SERVICES    Unit Room/Bed Admission Status       [address] SPEC UNIT 6610/6610-01 Admission (Confirmed)       Admission     Complaint    Pneumonia      Hospital  Account     Name Acct ID Class Status Primary Coverage    Tania Serge GODFREY [id number] Inpatient Open MEDICA - [employer] [facility] [facility]            Guarantor Account (for Hospital Account #[id number])     Name Relation to Pt Service Area Active? Acct Type    Serge Marin Self FCS Yes Personal/Family    Address Phone          [address] [address] [phone](H)  NONE(O)              Coverage Information (for Hospital Account #[id number])     F/O Payor/Plan Precert #    MEDICA/MED[employer] [facility]     Subscriber Subscriber #    Tania Daltonkatarina EPIFANIO [id number]    Address Phone    [address] [address]  [place] [address] [phone]

## 2017-02-12 NOTE — IP AVS SNAPSHOT
[place] [address] [facility]    [address] [place] [address]    Phone:  [phone]                                       After Visit Summary   2/12/2017    Serge Marin    MRN: [id number]           After Visit Summary Signature Page     I have received my discharge instructions, and my questions have been answered. I have discussed any challenges I see with this plan with the nurse or doctor.    ..........................................................................................................................................  Patient/Patient Representative Signature      ..........................................................................................................................................  Patient Representative Print Name and Relationship to Patient    ..................................................               ................................................  Date                                            Time    ..........................................................................................................................................  Reviewed by Signature/Title    ...................................................              ..............................................  Date                                                            Time

## 2017-02-12 NOTE — IP AVS SNAPSHOT
` `     [place] [address] [facility]: [phone]            Medication Administration Report for Serge Marin as of 02/16/17 1440   Legend:    Given Hold Not Given Due Canceled Entry Other Actions    Time Time (Time) Time  Time-Action       Inactive    Active    Linked        Medications 02/10/17 02/11/17 02/12/17 02/13/17 02/14/17 02/15/17 02/16/17    acetaminophen (TYLENOL) tablet 650 mg  Dose: 650 mg Freq: EVERY 4 HOURS PRN Route: PO  PRN Reasons: mild pain,fever  Start: 02/12/17 2357   Admin Instructions: Alternate ibuprofen (if ordered) with acetaminophen.  Maximum acetaminophen dose from all sources = 75 mg/kg/day not to exceed 4 grams/day.        1628 (650 mg)-Given        1315 (650 mg)-Given       2057 (650 mg)-Given             ARIPiprazole (ABILIFY) half-tab 7.5 mg  Dose: 7.5 mg Freq: AT BEDTIME Route: PO  Start: 02/13/17 0000       0045 (7.5 mg)-Given       2123 (7.5 mg)-Given        2057 (7.5 mg)-Given               2101 (7.5 mg)-Given        [ ] 2200           bisacodyl (DULCOLAX) Suppository 10 mg  Dose: 10 mg Freq: DAILY PRN Route: RE  PRN Reason: constipation  Start: 02/12/17 2357   Admin Instructions: Hold for loose stools.  This is the third step of a three step constipation treatment protocol.               clonazePAM (klonoPIN) ODT tab 0.25 mg  Dose: 0.25 mg Freq: AT BEDTIME Route: PO  Start: 02/13/17 0000       0045 (0.25 mg)-Given       2123 (0.25 mg)-Given        2057 (0.25 mg)-Given               2100 (0.25 mg)-Given        [ ] 2200           ferrous gluconate (FERGON) tablet 324 mg  Dose: 324 mg Freq: DAILY WITH BREAKFAST Route: PO  Start: 02/13/17 0900   Admin Instructions: DO NOT CRUSH. Absorbed best on an empty stomach. If stomach upset occurs, can take with meals.        0946 (324 mg)-Given        0831 (324 mg)-Given        0822 (324 mg)-Given        0852 (324 mg)-Given           HYDROmorphone (PF) (DILAUDID) injection 0.3-0.5 mg  Dose: 0.3-0.5 mg Freq: EVERY 2  HOURS PRN Route: IV  PRN Reason: severe pain  Start: 02/12/17 2357   Admin Instructions: Hold while on PCA.               ibuprofen (ADVIL/MOTRIN) tablet 200-400 mg  Dose: 200-400 mg Freq: EVERY 6 HOURS PRN Route: PO  PRN Reason: moderate pain  Start: 02/13/17 1739              levofloxacin (LEVAQUIN) infusion 500 mg  Dose: 500 mg Freq: EVERY 24 HOURS Route: IV  Indications of Use: COMMUNITY ACQUIRED PNEUMONIA  Last Dose: 500 mg (02/16/17 1142)  Start: 02/14/17 2300   Admin Instructions: Dose adjusted per renal dosing policy.  Estimated CrCl = >50 mL/min.  Administer at a rate of no greater than 100 mL/hr         2227 (500 mg)-New Bag        2231 (500 mg)-New Bag        1142 (500 mg)-New Bag       [ ] 2300           levothyroxine (SYNTHROID/LEVOTHROID) tablet 75 mcg  Dose: 75 mcg Freq: DAILY Route: PO  Start: 02/13/17 0900       0946 (75 mcg)-Given        0831 (75 mcg)-Given        0822 (75 mcg)-Given        0852 (75 mcg)-Given           loperamide (IMODIUM) capsule 2 mg  Dose: 2 mg Freq: 4 TIMES DAILY PRN Route: PO  PRN Reason: diarrhea  Start: 02/12/17 2357              naloxone (NARCAN) injection 0.1-0.4 mg  Dose: 0.1-0.4 mg Freq: EVERY 2 MIN PRN Route: IV  PRN Reason: opioid reversal  Start: 02/12/17 2357   Admin Instructions: For respiratory rate LESS than or EQUAL to 8.  Partial reversal dose:  0.1 mg titrated q 2 minutes for Analgesia Side Effects Monitoring Sedation Level of 3 (frequently drowsy, arousable, drifts to sleep during conversation).Full reversal dose:  0.4 mg bolus for Analgesia Side Effects Monitoring Sedation Level of 4 (somnolent, minimal or no response to stimulation).               ondansetron (ZOFRAN-ODT) ODT tab 4 mg  Dose: 4 mg Freq: EVERY 6 HOURS PRN Route: PO  PRN Reason: nausea  Start: 02/12/17 2357   Admin Instructions: This is Step 1 of nausea and vomiting management.  If nausea not resolved in 15 minutes, go to Step 2 prochlorperazine (COMPAZINE). Do not push through foil backing.  Peel back foil and gently remove. Place on tongue immediately. Administration with liquid unnecessary              Or  ondansetron (ZOFRAN) injection 4 mg  Dose: 4 mg Freq: EVERY 6 HOURS PRN Route: IV  PRN Reasons: nausea,vomiting  Start: 02/12/17 2357   Admin Instructions: This is Step 1 of nausea and vomiting management.  If nausea not resolved in 15 minutes, go to Step 2 prochlorperazine (COMPAZINE).               oseltamivir (TAMIFLU) capsule 75 mg  Dose: 75 mg Freq: 2 TIMES DAILY Route: PO  Indications of Use: INFLUENZA  Start: 02/16/17 0900   Admin Instructions: Dose adjusted per renal dosing policy.  Estimated CrCl = 62 mL/min.           0852 (75 mg)-Given       [ ] 2100           polyethylene glycol (MIRALAX/GLYCOLAX) Packet 17 g  Dose: 17 g Freq: DAILY PRN Route: PO  PRN Reason: constipation  Start: 02/12/17 2357   Admin Instructions: Give in 8oz of  water, juice, or soda. Hold for loose stools.  This is the second step of a three step constipation treatment protocol.  1 Packet = 17 grams. Mixed prescribed dose in 8 ounces of water. Follow with 8 oz. of water.               prochlorperazine (COMPAZINE) injection 5 mg  Dose: 5 mg Freq: EVERY 6 HOURS PRN Route: IV  PRN Reasons: nausea,vomiting  Start: 02/12/17 2357   Admin Instructions: This is Step 2 of nausea and vomiting management.   If nausea not resolved in 15 minutes, give metoclopramide (REGLAN) if ordered (step 3 of nausea and vomiting management)              Or  prochlorperazine (COMPAZINE) tablet 5 mg  Dose: 5 mg Freq: EVERY 6 HOURS PRN Route: PO  PRN Reason: vomiting  Start: 02/12/17 2357   Admin Instructions: This is Step 2 of nausea and vomiting management.   If nausea not resolved in 15 minutes, give metoclopramide (REGLAN) if ordered (step 3 of nausea and vomiting management)              Or  prochlorperazine (COMPAZINE) Suppository 12.5 mg  Dose: 12.5 mg Freq: EVERY 12 HOURS PRN Route: RE  PRN Reasons: nausea,vomiting  Start: 02/12/17 2357    Admin Instructions: This is Step 2 of nausea and vomiting management.   If nausea not resolved in 15 minutes, give metoclopramide (REGLAN) if ordered (step 3 of nausea and vomiting management)              Completed Medications  Medications 02/10/17 02/11/17 02/12/17 02/13/17 02/14/17 02/15/17 02/16/17         Dose: 325 mg Freq: ONCE Route: PO  Start: 02/13/17 1745   End: 02/13/17 1752   Admin Instructions: Maximum acetaminophen dose from all sources = 75 mg/kg/day not to exceed 4 grams/day.        1752 (325 mg)-Given             Discontinued Medications  Medications 02/10/17 02/11/17 02/12/17 02/13/17 02/14/17 02/15/17 02/16/17         Rate: 50 mL/hr Freq: CONTINUOUS Route: IV  Start: 02/13/17 0000   End: 02/15/17 0924       0031 ( )-New Bag       0949 ( )-Rate/Dose Change        0559 ( )-New Bag        0205 ( )-New Bag       0924-Med Discontinued          Dose: 250 mg Freq: EVERY 24 HOURS Route: IV  Indications of Use: COMMUNITY ACQUIRED PNEUMONIA  Last Dose: 250 mg (02/13/17 2233)  Start: 02/13/17 2300   End: 02/14/17 0836   Admin Instructions: Dose adjusted per renal dosing policy.  Estimated CrCl = 47 mL/min.  Administer at a rate of no greater than 100 mL/hr        2233 (250 mg)-New Bag        0836-Med Discontinued           Dose: 30 mg Freq: 2 TIMES DAILY Route: PO  Indications of Use: INFLUENZA  Start: 02/13/17 0900   End: 02/16/17 0827   Admin Instructions: Dose adjusted per renal dosing policy.  Estimated CrCl = 47 mL/min.        0946 (30 mg)-Given       2123 (30 mg)-Given        0831 (30 mg)-Given       2057 (30 mg)-Given        0822 (30 mg)-Given       2101 (30 mg)-Given        0827-Med Discontinued

## 2017-02-13 LAB
ANION GAP SERPL CALCULATED.3IONS-SCNC: 8 MMOL/L (ref 3–14)
ANION GAP SERPL CALCULATED.3IONS-SCNC: NORMAL MMOL/L (ref 6–17)
BUN SERPL-MCNC: 11 MG/DL (ref 7–30)
BUN SERPL-MCNC: NORMAL MG/DL (ref 7–30)
CALCIUM SERPL-MCNC: 7.9 MG/DL (ref 8.5–10.1)
CALCIUM SERPL-MCNC: NORMAL MG/DL (ref 8.5–10.1)
CHLORIDE SERPL-SCNC: 108 MMOL/L (ref 94–109)
CHLORIDE SERPL-SCNC: NORMAL MMOL/L (ref 94–109)
CO2 SERPL-SCNC: 24 MMOL/L (ref 20–32)
CO2 SERPL-SCNC: NORMAL MMOL/L (ref 20–32)
CREAT SERPL-MCNC: 0.86 MG/DL (ref 0.52–1.04)
CREAT SERPL-MCNC: NORMAL MG/DL (ref 0.52–1.04)
GFR SERPL CREATININE-BSD FRML MDRD: 64 ML/MIN/1.7M2
GFR SERPL CREATININE-BSD FRML MDRD: NORMAL ML/MIN/1.7M2
GLUCOSE SERPL-MCNC: 87 MG/DL (ref 70–99)
GLUCOSE SERPL-MCNC: NORMAL MG/DL (ref 70–99)
MICRO REPORT STATUS: NORMAL
POTASSIUM SERPL-SCNC: 3.9 MMOL/L (ref 3.4–5.3)
POTASSIUM SERPL-SCNC: NORMAL MMOL/L (ref 3.4–5.3)
S PNEUM AG SPEC QL: NORMAL
SODIUM SERPL-SCNC: 140 MMOL/L (ref 133–144)
SODIUM SERPL-SCNC: NORMAL MMOL/L (ref 133–144)
SPECIMEN SOURCE: NORMAL

## 2017-02-13 PROCEDURE — 25000132 ZZH RX MED GY IP 250 OP 250 PS 637: Performed by: INTERNAL MEDICINE

## 2017-02-13 PROCEDURE — 36415 COLL VENOUS BLD VENIPUNCTURE: CPT | Performed by: INTERNAL MEDICINE

## 2017-02-13 PROCEDURE — 25000128 H RX IP 250 OP 636: Performed by: INTERNAL MEDICINE

## 2017-02-13 PROCEDURE — 12000000 ZZH R&B MED SURG/OB

## 2017-02-13 PROCEDURE — 99232 SBSQ HOSP IP/OBS MODERATE 35: CPT | Performed by: INTERNAL MEDICINE

## 2017-02-13 PROCEDURE — 80048 BASIC METABOLIC PNL TOTAL CA: CPT | Performed by: INTERNAL MEDICINE

## 2017-02-13 PROCEDURE — 87899 AGENT NOS ASSAY W/OPTIC: CPT | Performed by: INTERNAL MEDICINE

## 2017-02-13 RX ORDER — ACETAMINOPHEN 325 MG/1
325 TABLET ORAL ONCE
Status: COMPLETED | OUTPATIENT
Start: 2017-02-13 | End: 2017-02-13

## 2017-02-13 RX ORDER — LEVOFLOXACIN 5 MG/ML
250 INJECTION, SOLUTION INTRAVENOUS EVERY 24 HOURS
Status: DISCONTINUED | OUTPATIENT
Start: 2017-02-13 | End: 2017-02-14 | Stop reason: DRUGHIGH

## 2017-02-13 RX ORDER — IBUPROFEN 200 MG
200-400 TABLET ORAL EVERY 6 HOURS PRN
Status: DISCONTINUED | OUTPATIENT
Start: 2017-02-13 | End: 2017-02-16 | Stop reason: HOSPADM

## 2017-02-13 RX ORDER — OSELTAMIVIR PHOSPHATE 30 MG/1
30 CAPSULE ORAL 2 TIMES DAILY
Status: DISCONTINUED | OUTPATIENT
Start: 2017-02-13 | End: 2017-02-16

## 2017-02-13 RX ADMIN — Medication 7.5 MG: at 00:45

## 2017-02-13 RX ADMIN — SODIUM CHLORIDE: 9 INJECTION, SOLUTION INTRAVENOUS at 00:31

## 2017-02-13 RX ADMIN — CLONAZEPAM 0.25 MG: 0.25 TABLET, ORALLY DISINTEGRATING ORAL at 00:45

## 2017-02-13 RX ADMIN — FERROUS GLUCONATE 324 MG: 324 TABLET ORAL at 09:46

## 2017-02-13 RX ADMIN — Medication 7.5 MG: at 21:23

## 2017-02-13 RX ADMIN — ACETAMINOPHEN 325 MG: 325 TABLET, FILM COATED ORAL at 17:52

## 2017-02-13 RX ADMIN — OSELTAMIVIR PHOSPHATE 30 MG: 30 CAPSULE ORAL at 09:46

## 2017-02-13 RX ADMIN — CLONAZEPAM 0.25 MG: 0.25 TABLET, ORALLY DISINTEGRATING ORAL at 21:23

## 2017-02-13 RX ADMIN — LEVOTHYROXINE SODIUM 75 MCG: 75 TABLET ORAL at 09:46

## 2017-02-13 RX ADMIN — OSELTAMIVIR PHOSPHATE 30 MG: 30 CAPSULE ORAL at 21:23

## 2017-02-13 RX ADMIN — ACETAMINOPHEN 650 MG: 325 TABLET, FILM COATED ORAL at 16:28

## 2017-02-13 RX ADMIN — LEVOFLOXACIN 250 MG: 5 INJECTION, SOLUTION INTRAVENOUS at 22:33

## 2017-02-13 ASSESSMENT — ACTIVITIES OF DAILY LIVING (ADL)
TRANSFERRING: 0-->INDEPENDENT
FALL_HISTORY_WITHIN_LAST_SIX_MONTHS: NO
SWALLOWING: 0-->SWALLOWS FOODS/LIQUIDS WITHOUT DIFFICULTY
COGNITION: 0 - NO COGNITION ISSUES REPORTED
TOILETING: 0-->INDEPENDENT
BATHING: 2-->ASSISTIVE PERSON
RETIRED_EATING: 0-->INDEPENDENT
RETIRED_COMMUNICATION: 0-->UNDERSTANDS/COMMUNICATES WITHOUT DIFFICULTY
AMBULATION: 0-->INDEPENDENT
DRESS: 0-->INDEPENDENT

## 2017-02-13 NOTE — H&P
PRIMARY CARE PHYSICIAN:  Kassie Wyatt MD      CHIEF COMPLAINT:  Weakness and fever and cough.      HISTORY OF PRESENT ILLNESS:  Ms. Serge Marin is a 76-year-old female with history of bipolar disorder, personality disorder, and a history of chronic diarrhea with negative colonoscopy and biopsies x2 in the past, who presented to the emergency room with complaints of several days of nonproductive cough and generalized weakness and fever.  She had fever up to 103 at home prior to hospitalization.  She is a resident of an assisted living facility currently, so EMS was called and she was brought into the emergency room.      She was given Tylenol 30 minutes prior to hospital arrival, and here her temperature was 101.9 degrees Fahrenheit.  Influenza testing was done and it came back positive for influenza B.      Chest x-ray was done, and it showed minimally increased density seen in both lung bases.  This could be some minimal atelectasis or infiltrates.  This was with slightly shallow inspiration.  Upper lung zones were clear.  The aorta was unfolded.      She was hypoxic in the emergency room at 89% on room air.  With a couple of liters of oxygen, she improved to 96%.  The patient was very listless in the emergency room with flat affect and was a very poor historian.  She did not want to answer any of my questions.  She told me she has a tickly throat, and a cough, but not productive of any sputum, not able to bring up anything when she coughs.  Denied any shortness of breath or wheezing.      She had one episode of diarrhea this morning.  In her history, she has chronic diarrhea issues, and she had two colonoscopies in the past for evaluation of the diarrhea, and biopsies were obtained, and they have all been negative for any type of colitis.  There was only one polyp, and it was removed on last colonoscopy in 2015, which came back as a hyperplastic polyp.      She otherwise denies any sore throat, any blood in  the stool, or black stools.      PAST MEDICAL HISTORY:   1.  Hypothyroidism.   2.  Bipolar disorder.   3.  Personality disorder, not otherwise specified.      MEDICATIONS:   1.  Levothyroxine 75 mcg tablet p.o. daily.   2.  Loperamide 4 mg, take 1 tablet with each diarrheal stool.   3.  Ferrous gluconate 325 mg p.o. daily.   4.  Clonazepam 0.25 mg p.o. at bedtime.   5.  Abilify 7.5 mg p.o. at bedtime.   6.  Calcium plus vitamin D.      ALLERGIES:  Penicillins, Bactrim.      PAST SURGICAL HISTORY:   1.  Cholecystectomy.   2.  Gynecological surgery.      FAMILY HISTORY:  Reviewed and noncontributory.      SOCIAL HISTORY:  She has never smoked.  No alcohol use.  She is .  Currently lives in an assisted living facility.      REVIEW OF SYSTEMS:  Positive for fever, cough, and generalized weakness.  Negative for shortness of breath.  A 10-point review of systems was done and is negative except as in HPI and as listed above.      PHYSICAL EXAMINATION:   VITAL SIGNS:  Her temperature currently is febrile at 101.9 degrees Fahrenheit, with heart rate of 101, blood pressure 119/68, respiratory rate of 20, and she is satting 89% on room air.   GENERAL:  She is alert, oriented, age appropriate-looking female who has a flat affect.   HEENT:  Head is atraumatic.  Pupils equal, round and reactive to light.   NECK:  Supple.   HEART:  S1-S2 heard.  No murmurs, rubs or gallops.   LUNGS:  Clear to auscultation.  No rales, rhonchi or wheezing.   ABDOMEN:  Soft, nontender, nondistended.  No hepatosplenomegaly.   EXTREMITIES:  No clubbing, cyanosis or edema.   NEUROLOGICAL:  Able to move all extremities.  No focal weakness.   SKIN:  Warm and dry.      LABS AND RADIOLOGICAL INVESTIGATIONS:  CBC showed WBC count of 4.2, hemoglobin 13.9, platelet count 132,000.  Venous blood gas showed pH of 7.46, pCO2 of 34, pO2 of 106, bicarbonate 24, 98% oxygen saturation.  Lactate level 0.7.  Influenza B testing positive.  UA is negative for UTI.   BMP is reviewed and is normal.  All of her LFTs are normal.  Blood cultures x2 are done and results are pending.      Chest x-ray showed minimally increased density seen at both lung bases, could be atelectasis versus infiltrates.      ASSESSMENT AND PLAN:  A 76-year-old female with history of bipolar disorder, personality disorder, not otherwise specified, and history of hypothyroidism, and chronic diarrhea of unclear etiology, presenting with:   1.  Fever, cough, and generalized weakness, secondary to influenza and superimposed possible bacterial pneumonia:  She was given Tamiflu in the emergency room, which will be continued, and we will place her on Levaquin 500 mg daily to treat for the pneumonia.  We will follow up on the blood culture results, and follow the fever curve closely, and try to wean her off of oxygen as tolerated.  She is currently needing 2 liters of oxygen by nasal cannula.  She was 89% on room air.   2.  Hypothyroidism.  Will continue levothyroxine.   3.  Iron deficiency anemia:  Will continue with iron.   4.  History of bipolar I disorder and personality disorder:  Will continue Abilify and Klonopin at bedtime.   5.  Chronic diarrhea:  Continue Imodium p.r.n.   6.  DVT prophylaxis:  PCDs.   7.  GI prophylaxis:  Not needed.   8.  She will be admitted as an inpatient, as I am anticipating more than 2-midnight stay.         SUZETTE RICHARDS MD             D: 2017 23:35   T: 2017 02:14   MT: [id number]      Name:     CHILO PEREA   MRN:      [id number]        Account:      [id number]   :      1940           Admitted:     [id number]      Document: [id number]       cc: BRIELLE PERSON MD

## 2017-02-13 NOTE — ED NOTES
[facility]  ED Nurse Handoff Report    ED Chief complaint: Flu Symptoms (Several days of worsening cough, fever, and weakness)      ED Diagnosis:   Final diagnoses:   None       Code Status: Howpitalist to address     Allergies:   Allergies   Allergen Reactions     Penicillins      Bactrim [Sulfamethoxazole W/Trimethoprim] Itching     Patient prescribed Bactrim at eye appointment. Assisted living reports eyes were red and itching.        Activity level:  Total Care     Needed?: No    Isolation: Yes  Infection: Not Applicable  Influenza    Bariatric?: No      Vital Signs:   Vitals:    02/12/17 2108   BP: 119/68   Resp: 20   Temp: 101.9  F (38.8  C)   TempSrc: Oral   SpO2: (!) 89%       Cardiac Rhythm: ,        Pain level:  Denies     Is this patient confused?: No    Patient Report: Initial Complaint: Pt presents to ED with 2 day history of frequent non-productive cough, fevers, and increased weakness.    Focused Assessment: Frequent non-productive cough. Generalized weakness. Skin is clammy, diaphoretic, and hot to the touch. Fever 101.9 on arrival. Pt has hx bipolar disorder. She has a flat affect.   Tests Performed: CBC w/diff. BMP. Lactic acid. Blood cultures. CXR. U/A. Influenza swab.   Abnormal Results: + ve influenza B. CXR: Possible pneumonia   Treatments provided: 1 L NS. Tamiflu. Levaquin     Family Comments: ROBERTO    OBS brochure/video discussed/provided to patient:  ROBERTO     ED Medications:   Medications   0.9% sodium chloride BOLUS (1,000 mLs Intravenous New Bag 2/12/17 2134)   oseltamivir (TAMIFLU) capsule 75 mg (not administered)       Drips infusing?:  Yes. Levaquin       ED NURSE PHONE NUMBER: [phone]

## 2017-02-13 NOTE — PROGRESS NOTES
ADELE  D:  Social Work will follow for d/c planning.  Patient resides at [facility], a [facility] with [facility].    Based on patient's care plan, it appears to receives assistanc with meals, showers, medication management and  behavioral managmernt.  Patient's care plan indicates she has a  under her [facility] and writer has left her a message to call writer. Her name is Ute Gonzalez [phone].

## 2017-02-13 NOTE — ED NOTES
Bed: ED28  Expected date: 2/12/17  Expected time:   Means of arrival: Ambulance  Comments:  712 75 f/weakness

## 2017-02-13 NOTE — PLAN OF CARE
Problem: Goal Outcome Summary  Goal: Goal Outcome Summary  Outcome: No Change  Arrived to floor at 2330. 2L O2. Up with 1 to commode. Generalized weakness. Bipolar, flat affect.

## 2017-02-13 NOTE — ED PROVIDER NOTES
History     Chief Complaint:  Weakness & Fever       HPI   HPI is limited as the patient is a poor historian.     Serge Marin is a 76 year old female with a history of Bipolar I disorder who presents to the emergency department today from assisted living via EMS for evaluation of weakness and fever. EMS notes that the patient has been having generalized weakness, fever and cough for the past 2 days. The patient had a recorded fever of 103 F at home and had a dose of Tylenol 30 minutes prior to arrival per EMS. The patient states that she had 1 episode of diarrhea this morning. The patient appears listless. The patient denies sore throat. Denies shortness of breath. She denies vomiting, blood in stool, melena, headache, neck pain, fall.      Allergies:  Penicillins   Bactrim [Sulfamethoxazole W/Trimethoprim]     Medications:    Levothyroxine (synthroid, levothroid) 75 mcg tablet  Aripiprazole (abilify) 15 mg tablet  Clonazepam (klonopin) 0.25 mg tbdp  Nystatin (mycostatin) 887208 unit/gm powd  Ferrous gluconate (fergon) 324 (38 fe) mg tablet  Calcium carb 1250 mg, 500 mg [place],/vitamin d 200 units (calcium carb 1250 mg, 500 mg [place],/vitamin d 200 unit) 500-200 mg-unit per tablet  Loperamide (imodium a-d) 2 mg tablet    Past Medical History:    Other specified hypothyroidism   Thyroid function test abnormal   Bipolar 1 disorder (H)   Chronic diarrhea     Past Surgical History:    Gyn surgery   Cholecystectomy   Colonoscopy     Family History:    History reviewed. No pertinent family history.     Social History:  The patient was accompanied to the ED by EMS.  Smoking Status: never  Smokeless Tobacco: never  Alcohol Use: negative  Marital Status:   [4]     Review of Systems   Unable to perform ROS: Mental status change   Constitutional: Positive for fever.   HENT: Negative for sore throat.    Respiratory: Positive for cough. Negative for shortness of breath.    Neurological: Positive for weakness  (generalized).   All other systems reviewed and are negative.    Physical Exam   Vitals:  Patient Vitals for the past 24 hrs:   BP Temp Temp src Heart Rate Resp SpO2   02/12/17 2108 119/68 101.9  F (38.8  C) Oral 101 20 (!) 89 %          Physical Exam  Nursing note and vitals reviewed.  Constitutional:  Appears well-developed and well-nourished.   HENT:   Head:    Atraumatic.   Mouth/Throat:   Oropharynx is clear and moist. No oropharyngeal exudate.   Eyes:    Pupils are equal, round, and reactive to light.   Ears:    TMs are clear bilaterally.   Neck:    Normal range of motion. Neck supple.      No tracheal deviation present. No thyromegaly present.   Cardiovascular:  Normal rate, regular rhythm, no murmur   Pulmonary/Chest: Breath sounds are clear and equal without wheezes or crackles.  Abdominal:   Soft. Bowel sounds are normal. Exhibits no distension and      no mass. There is no tenderness.      There is no rebound and no guarding.   Musculoskeletal:  Exhibits no edema.   Lymphadenopathy:  No cervical adenopathy.   Neurological:   Alert and oriented to person, place, and time. Follows commands. Moves all    extremities. Answers questions.   Skin:    Skin is hot. No rash noted. No pallor.     Emergency Department Course     ECG:  ECG taken at 2128, ECG read at 2140  Normal sinus rhythm  Left axis deviation   Anterolateral infarct, age undetermined   Abnormal ECG  Rate 95 bpm. IA interval 142. QRS duration 82. QT/QTc 340/427. P-R-T axes 25 -40 17.      Imaging:  Radiology findings were communicated with the patient who voiced understanding of the findings.    Chest XR,  PA & LAT  Final Result  IMPRESSION: Minimal increased density is seen in both lung bases. This  could be due to some minimal atelectasis or infiltrates. This is a  slightly shallow inspiration. Upper lung zones are clear. Heart size  and pulmonary vasculature are within normal limits. The aorta is  Unfolded.  Reading per  radiology      Laboratory:  Laboratory findings were communicated with the patient who voiced understanding of the findings.    UA: urinebilin small (A), urineketon 15 (A), urine blood moderate (A), protein albumin urine 30 (A), bacteria few (A), mucous urine present (A) o/w WNL.   Urine microscopic: bacteria few (A), mucous urine present (A) o/w WNL.   Urine culture: pending     Blood culture: pending   Blood culture: pending   CBC:  (L) o/w WNL. (WBC 4.2, HGB 13.9)   CMP: GFR estimate 55 (L), calcium 8.4 (L) o/w WNL (Creatinine: 0.98)  ISTAT gases lactate [place] POCT: pH: 7.46 (H), PCO2: 34 (L), PO2: 106 (H), Bicarbonate: 24, Oxyhgb: 98, lactic acid: 0.7    Influenza A: negative  Influenza B: positive      Interventions:  2135: NS 1000 mL IV   2319: Tamiflu 75 mg Oral   2319: Levaquin 500 mg IV        Emergency Department Course:  Nursing notes and vitals reviewed.  I performed an exam of the patient as documented above.   2128: EKG obtained in the ED, see results above.   IV was inserted and blood was drawn for laboratory testing, results above.  The patient provided a urine sample here in the emergency department. This was sent for laboratory testing, findings above.  The patient provided specimens from the nose while here in the emergency department. This was sent for laboratory testing, findings above.  The patient was sent for a Chest XR while in the emergency department, results above.   2252: I spoke with Dr. Sewell of the Hospitalist service from [facility] regarding patient's presentation, findings, and plan of care.  I discussed the treatment plan with the patient. They expressed understanding of this plan and consented to admission. I discussed the patient with Dr. Sewell, who will admit the patient to a monitored bed for further evaluation and treatment.  At 2315 the patient was rechecked and was updated on the results of her laboratory and imaging studies.   I discussed the treatment plan with  the patient. They expressed understanding of this plan and consented to admission. I discussed the patient with Dr. Sewell, who will admit the patient to a monitored bed for further evaluation and treatment.  I personally reviewed the laboratory and imaging results with the Patient and answered all related questions prior to admission.    Impression & Plan      Medical Decision Making:  I found the patient to have acute influenza type B and bibasilar infiltrates concerning for pneumonia with hypoxia. Therefore, she was given Tamiflu orally and IV Levaquin after blood cultures were sent. She is kept on supplemental oxygen for her hypoxia and admitted to the medical telemetry floor under the care of the hospitalist, Dr. Sewell.       Diagnosis:    ICD-10-CM    1. Influenza J11.1    2. Pneumonia due to infectious organism, unspecified laterality, unspecified part of lung J18.9        Disposition:   The patient is admitted to the hospital.       Scribe Disclosure:  GAYE, Erinn Talley, am serving as a scribe at 9:05 PM on 2/12/2017 to document services personally performed by Lizette Avendaño MD, based on my observations and the provider's statements to me.    2/12/2017    EMERGENCY DEPARTMENT       Lizette Avendaño MD  02/13/17 0041       Lizette Avendaño MD  02/13/17 0041

## 2017-02-13 NOTE — PROVIDER NOTIFICATION
T-102.8-Dr Dove notified, BC done pending, continue with Tylenol, additional dose of Tyl 325 mg ordered once, new order for Ibuprofen received.

## 2017-02-13 NOTE — PHARMACY-ADMISSION MEDICATION HISTORY
Admission medication history interview status for the 2/12/2017  admission is complete. See EPIC admission navigator for prior to admission medications     Medication history source reliability:Good    Actions taken by pharmacist (provider contacted, etc): Contacted nursing assisted facility to verify medication administration history.     Additional medication history information not noted on PTA med list :    - Added acetaminophen 500mg -  1Q6H PRN    Medication reconciliation/reorder completed by provider prior to medication history? Yes    Time spent in this activity: 20 minutes    Prior to Admission medications    Medication Sig Last Dose Taking? Auth Provider   ACETAMINOPHEN PO Take 500 mg by mouth every 6 hours as needed for pain PRN Yes Unknown, Entered By History   levothyroxine (SYNTHROID, LEVOTHROID) 75 MCG tablet Take 1 tablet (75 mcg) by mouth daily 2/12/2017 at Unknown time Yes Kassie Wyatt MD   ARIPiprazole (ABILIFY) 15 MG tablet Take 0.5 tablets (7.5 mg) by mouth At Bedtime 2/12/2017 at 8PM Yes Kassie Wyatt MD   clonazePAM (KLONOPIN) 0.25 MG TBDP Take 1 tablet (0.25 mg) by mouth At Bedtime 2/12/2017 at 8PM Yes Kassie Wyatt MD   nystatin (MYCOSTATIN) 799266 UNIT/GM POWD Apply to area as needed three times per day PRN Yes Beka Juarez MD   ferrous gluconate (FERGON) 324 (38 FE) MG tablet Take 1 tablet (324 mg) by mouth daily (with breakfast) 2/12/2017 at Unknown time Yes Jackeline Keane MD   calcium carb 1250 mg, 500 mg [place],/vitamin D 200 units (CALCIUM CARB 1250 MG, 500 MG [place],/VITAMIN D 200 UNIT) 500-200 MG-UNIT per tablet Take 1 tablet by mouth daily 2/12/2017 at Unknown time Yes Guerrero Arthur MD   loperamide (IMODIUM A-D) 2 MG tablet Start with 2 tabs (4 mg), then take one tab (2 mg) after each diarrheal stool.  Do not use more than  8 tabs (16 mg) per day. PRN Yes Guerrero Arthur MD Jonathan L, [employer]acy Student

## 2017-02-13 NOTE — PROGRESS NOTES
[facility]    Hospitalist Progress Note    Date of Service (when I saw the patient): 02/13/2017    Assessment & Plan   Serge Marin is a 76 year old female with PMHx of hypothyroidism, bipolar disorder, personality disorder and hx of chronic diarrhea with neg workup who was admitted on 2/12/2017 with fever, cough and shortness of breath. She was found to be positive for influenza B and supspected to have a superimposed pneumonia was CXR was suggestive of infiltrates.     Influenza B:   -- started on Tamiflu 30mg BID.  -- cont droplet precautions  -- cont supportive cares with Tylenol prn for fever    Supected CAP:  Presented with fever (Tmax 101.9) and tachycardia. WBC 4.2 and lactate nl. Initially hypoxic with O2 sats 89% on RA. Improved on 2L NC. CXR showed minimal increased density in the bilateral lung bases dt minimal atelectasis vs infiltrate. ?viral vs bacterial etiology.   -- will add on urine strep Ag  -- blood cultures remain neg  -- conts Levaquin 250mg IV daily for now  -- wean off O2 as able    Hypothyroidism:  Chronic and stable on levothyroxine    Chronic Iron Deficiency Anemia:  Chronic and stable on oral iron supplement    Bipolar Disorder / Personality Disorder:  Chronic and stable on Abilify HS and Klonopin HS    Chronic Diarrhea:  Has undergone workup per GI in the past including colonoscopy and biopsies x2.   -- no acute issues, no concern for cdiff  -- cont prn Imodium    FEN: decrease NS to 50ml/h as she has good appetite this rmoning, lytes stable on admission, regular diet  DVT Prophylaxis: PCDs  Code Status: Full Code    Disposition: Pending clinical course - likely 2-3 more days. Anticipate dc back to assisted living facility.    Amada Dove    Interval History   Uneventful night. Low grade fever this morning. Flat affect. Tells me she feels hungry this morning. No sob/cough.     -Data reviewed today: I reviewed all new labs and imaging results over the  last 24 hours. I personally reviewed no images or EKG's today.    Physical Exam   Temp: 100.6  F (38.1  C) Temp src: Oral BP: 114/56 Pulse: 71 Heart Rate: 75 Resp: 18 SpO2: 97 % O2 Device: Nasal cannula Oxygen Delivery: 2 LPM  Vitals:    02/13/17 0000   Weight: 71.5 kg (157 lb 10.1 oz)     Vital Signs with Ranges  Temp:  [99.5  F (37.5  C)-101.9  F (38.8  C)] 100.6  F (38.1  C)  Pulse:  [71-81] 71  Heart Rate:  [] 75  Resp:  [16-20] 18  BP: (111-123)/(56-68) 114/56  SpO2:  [89 %-97 %] 97 %  I/O last 3 completed shifts:  In: 549 [I.V.:549]  Out: -     Constitutional: Resting comfortably, flat affect, answering questions appropriately, NAD  Respiratory:CTAB, no wheeze/rales/rhonchi  Cardiovascular: HRRR, no MGR  GI: S, NT, ND, +BS  Skin/Integumen: warm/dry  Other:  no LE edema    Medications     NaCl 100 mL/hr at 02/13/17 0031       oseltamivir  30 mg Oral BID     levofloxacin  250 mg Intravenous Q24H     ARIPiprazole  7.5 mg Oral At Bedtime     clonazePAM  0.25 mg Oral At Bedtime     ferrous gluconate  324 mg Oral Daily with breakfast     levothyroxine  75 mcg Oral Daily       Data     Recent Labs  Lab 02/13/17  0735 02/12/17  2137   WBC  --  4.2   HGB  --  13.9   MCV  --  89   PLT  --  132*   NA Canceled, Test credited Unsatisfactory specimen - hemolyzed 137   POTASSIUM Canceled, Test credited Unsatisfactory specimen - hemolyzed 3.6   CHLORIDE Canceled, Test credited Unsatisfactory specimen - hemolyzed 105   CO2 Canceled, Test credited Unsatisfactory specimen - hemolyzed 24   BUN Canceled, Test credited Unsatisfactory specimen - hemolyzed 16   CR Canceled, Test credited Unsatisfactory specimen - hemolyzed 0.98   ANIONGAP Canceled, Test credited Unsatisfactory specimen - hemolyzed 8   BERTA Canceled, Test credited Unsatisfactory specimen - hemolyzed 8.4*   GLC Canceled, Test credited Unsatisfactory specimen - hemolyzed 93   ALBUMIN  --  3.6   PROTTOTAL  --  7.1   BILITOTAL  --  0.6   ALKPHOS  --  63   ALT  --  25    AST  --  32       Recent Results (from the past 24 hour(s))   Chest XR,  PA & LAT    Narrative    CHEST TWO VIEW   2/12/2017 10:25 PM     HISTORY: Check for pneumonia. Cough, fever.    COMPARISON: 4/1/2013      Impression    IMPRESSION: Minimal increased density is seen in both lung bases. This  could be due to some minimal atelectasis or infiltrates. This is a  slightly shallow inspiration. Upper lung zones are clear. Heart size  and pulmonary vasculature are within normal limits. The aorta is  unfolded.    JEFF MA MD

## 2017-02-13 NOTE — PLAN OF CARE
Problem: Goal Outcome Summary  Goal: Goal Outcome Summary  Outcome: No Change  Pt alert/oriented x3, confused to situation at times? Difficult to assess, pt very flat affect/ slow to respond to questions/ commands. 95% on room air, lowe grade fever 99.5. Up w/ 1/ walker. Up to chair for meals. Poor appetite. Tamiflu/ IV levaquin infrequent cough. D/c pending. 1-2 days

## 2017-02-14 LAB
ANION GAP SERPL CALCULATED.3IONS-SCNC: 11 MMOL/L (ref 3–14)
BACTERIA SPEC CULT: NO GROWTH
BUN SERPL-MCNC: 10 MG/DL (ref 7–30)
CALCIUM SERPL-MCNC: 8.2 MG/DL (ref 8.5–10.1)
CHLORIDE SERPL-SCNC: 108 MMOL/L (ref 94–109)
CO2 SERPL-SCNC: 22 MMOL/L (ref 20–32)
CREAT SERPL-MCNC: 0.84 MG/DL (ref 0.52–1.04)
ERYTHROCYTE [DISTWIDTH] IN BLOOD BY AUTOMATED COUNT: 12.7 % (ref 10–15)
GFR SERPL CREATININE-BSD FRML MDRD: 66 ML/MIN/1.7M2
GLUCOSE SERPL-MCNC: 127 MG/DL (ref 70–99)
HCT VFR BLD AUTO: 40.8 % (ref 35–47)
HGB BLD-MCNC: 13.9 G/DL (ref 11.7–15.7)
MCH RBC QN AUTO: 30.3 PG (ref 26.5–33)
MCHC RBC AUTO-ENTMCNC: 34.1 G/DL (ref 31.5–36.5)
MCV RBC AUTO: 89 FL (ref 78–100)
MICRO REPORT STATUS: NORMAL
PLATELET # BLD AUTO: 100 10E9/L (ref 150–450)
POTASSIUM SERPL-SCNC: 3.5 MMOL/L (ref 3.4–5.3)
RBC # BLD AUTO: 4.58 10E12/L (ref 3.8–5.2)
SODIUM SERPL-SCNC: 141 MMOL/L (ref 133–144)
SPECIMEN SOURCE: NORMAL
WBC # BLD AUTO: 3.5 10E9/L (ref 4–11)

## 2017-02-14 PROCEDURE — 80048 BASIC METABOLIC PNL TOTAL CA: CPT | Performed by: INTERNAL MEDICINE

## 2017-02-14 PROCEDURE — 12000000 ZZH R&B MED SURG/OB

## 2017-02-14 PROCEDURE — 25000128 H RX IP 250 OP 636: Performed by: INTERNAL MEDICINE

## 2017-02-14 PROCEDURE — 25000132 ZZH RX MED GY IP 250 OP 250 PS 637: Performed by: INTERNAL MEDICINE

## 2017-02-14 PROCEDURE — 36415 COLL VENOUS BLD VENIPUNCTURE: CPT | Performed by: INTERNAL MEDICINE

## 2017-02-14 PROCEDURE — 99232 SBSQ HOSP IP/OBS MODERATE 35: CPT | Performed by: INTERNAL MEDICINE

## 2017-02-14 PROCEDURE — 85027 COMPLETE CBC AUTOMATED: CPT | Performed by: INTERNAL MEDICINE

## 2017-02-14 RX ORDER — LEVOFLOXACIN 5 MG/ML
500 INJECTION, SOLUTION INTRAVENOUS EVERY 24 HOURS
Status: DISCONTINUED | OUTPATIENT
Start: 2017-02-14 | End: 2017-02-16 | Stop reason: HOSPADM

## 2017-02-14 RX ADMIN — CLONAZEPAM 0.25 MG: 0.25 TABLET, ORALLY DISINTEGRATING ORAL at 20:57

## 2017-02-14 RX ADMIN — Medication 7.5 MG: at 20:57

## 2017-02-14 RX ADMIN — OSELTAMIVIR PHOSPHATE 30 MG: 30 CAPSULE ORAL at 20:57

## 2017-02-14 RX ADMIN — LEVOTHYROXINE SODIUM 75 MCG: 75 TABLET ORAL at 08:31

## 2017-02-14 RX ADMIN — FERROUS GLUCONATE 324 MG: 324 TABLET ORAL at 08:31

## 2017-02-14 RX ADMIN — ACETAMINOPHEN 650 MG: 325 TABLET, FILM COATED ORAL at 13:15

## 2017-02-14 RX ADMIN — SODIUM CHLORIDE: 9 INJECTION, SOLUTION INTRAVENOUS at 05:59

## 2017-02-14 RX ADMIN — ACETAMINOPHEN 650 MG: 325 TABLET, FILM COATED ORAL at 20:57

## 2017-02-14 RX ADMIN — OSELTAMIVIR PHOSPHATE 30 MG: 30 CAPSULE ORAL at 08:31

## 2017-02-14 RX ADMIN — LEVOFLOXACIN 500 MG: 5 INJECTION, SOLUTION INTRAVENOUS at 22:27

## 2017-02-14 NOTE — PLAN OF CARE
Problem: Goal Outcome Summary  Goal: Goal Outcome Summary  Outcome: No Change  Pt alert/oriented x3, forgetful, very flat affect, withdrawn/ lethargic. Uses bedside commode. Up to chair for meals. Tmax 100.1, gave tylenol x1, 87% on room air, placed pt on 2L, 92%.  IV levaquin. Encouraged activity. D/c pending.

## 2017-02-14 NOTE — PLAN OF CARE
Problem: Goal Outcome Summary  Goal: Goal Outcome Summary  Outcome: No Change  Pt A/O#3-4?, forgetful, flat affect/slow to respond, up w/1 to BR/chair, poor po, T-102.8-Tylenol given/re-check 99.8, Dr Dove called/updated, other VSS on RA, denies pain, continues on Abx, BC/UC-done/pending, will cont to monitor.

## 2017-02-14 NOTE — PLAN OF CARE
Problem: Goal Outcome Summary  Goal: Goal Outcome Summary  Outcome: No Change  A&Ox4, flat affect, withdrawn, doesnt respond to questions at times.  Up w/ asst 1, walker, belt.  T-max 99.4, all other VSS, sats 92% on RA.  Denies pain.  LS diminished, course crackles.  BANEGAS.  Infreq non-productive cough.  Trace BLE edema.  IVF infusing at 50/hr.  IV Levaquin, tamiflu.  Droplet precautions maintained.  D/C 2-3 days pending progress, nursing will continue to monitor.

## 2017-02-14 NOTE — PROGRESS NOTES
[facility]    Hospitalist Progress Note    Date of Service (when I saw the patient): 02/14/2017    Assessment & Plan   Serge Marin is a 76 year old female with PMHx of hypothyroidism, bipolar disorder, personality disorder and hx of chronic diarrhea with neg workup who was admitted on 2/12/2017 with fever, cough and shortness of breath. She was found to be positive for influenza B and supspected to have a superimposed pneumonia was CXR was suggestive of infiltrates.     Influenza B:   -- started on Tamiflu 30mg BID.  -- cont droplet precautions  -- cont supportive cares with Tylenol/Ibuprofen prn for fever    Supected CAP:  Presented with fever (Tmax 101.9) and tachycardia. WBC 4.2 and lactate nl. Initially hypoxic with O2 sats 89% on RA. Improved on 2L NC. CXR showed minimal increased density in the bilateral lung bases dt minimal atelectasis vs infiltrate. ?viral vs bacterial etiology. Urine strep Ag negative.   -- blood cultures remain neg  -- conts Levaquin 250mg IV daily for now  -- O2 needs variable - fluctuating between RA and 2L NC     Hypothyroidism:  Chronic and stable on levothyroxine    Chronic Iron Deficiency Anemia:  Chronic and stable on oral iron supplement    Bipolar Disorder / Personality Disorder:  Chronic and stable on Abilify HS and Klonopin HS    Chronic Diarrhea:  Has undergone workup per GI in the past including colonoscopy and biopsies x2.   -- no acute issues, no concern for cdiff  -- cont prn Imodium    Generalized Weakness:  Likely secondary to acute illness. At baseline resides in [facility] and is independent with daily cares and mobility. Staff there note she will need to be at her baseline in order to return  -- PT/OT consulted -> anticipate she may need TCU stay    FEN: cont NS@ 50ml/h, lytes stable, regular diet  DVT Prophylaxis: PCDs  Code Status: Full Code    Disposition: Pending clinical course and therapy recommendations for discharge. If TCU recommended, will  need to complete 5d treatment for influenza prior to discharge.    Amada Dove    Interval History   Febrile overnight with Tmax 102.1. Given Tylenol and Ibuprofen. Febrile again this morning though Tmax 101.3. Affect remains flat. Per RN is notably weak, concerns about her [facility] to [facility]. Patient with flat affect - no specific concerns this afternoon. Asking when she can go home and if she is over the flu yet. Explained plans for therapy evaluation, ongoing treatment for the flu.    -Data reviewed today: I reviewed all new labs and imaging results over the last 24 hours. I personally reviewed no images or EKG's today.    Physical Exam   Temp: 99.1  F (37.3  C) Temp src: Oral BP: 107/67 Pulse: 87 Heart Rate: 64 Resp: 13 SpO2: 95 % O2 Device: Nasal cannula Oxygen Delivery: 2 LPM  Vitals:    02/13/17 0000   Weight: 71.5 kg (157 lb 10.1 oz)     Vital Signs with Ranges  Temp:  [99.1  F (37.3  C)-102.8  F (39.3  C)] 99.1  F (37.3  C)  Pulse:  [84-87] 87  Heart Rate:  [64-66] 64  Resp:  [13-20] 13  BP: (106-120)/(52-67) 107/67  SpO2:  [87 %-95 %] 95 %  I/O last 3 completed shifts:  In: 1519 [P.O.:180; I.V.:1339]  Out: 1925 [Urine:1925]    Constitutional: Resting comfortably, flat affect, answering questions appropriately, NAD  Respiratory:CTAB, no wheeze/rales/rhonchi  Cardiovascular: HRRR, no MGR  GI: S, NT, ND, +BS  Skin/Integumen: warm/dry  Other:  no LE edema    Medications     NaCl 50 mL/hr at 02/14/17 0559       levofloxacin  500 mg Intravenous Q24H     oseltamivir  30 mg Oral BID     ARIPiprazole  7.5 mg Oral At Bedtime     clonazePAM  0.25 mg Oral At Bedtime     ferrous gluconate  324 mg Oral Daily with breakfast     levothyroxine  75 mcg Oral Daily       Data     Recent Labs  Lab 02/14/17  0905 02/13/17  0930 02/13/17  0735 02/12/17  2137   WBC 3.5*  --   --  4.2   HGB 13.9  --   --  13.9   MCV 89  --   --  89   *  --   --  132*    140 Canceled, Test credited Unsatisfactory specimen -  hemolyzed 137   POTASSIUM 3.5 3.9 Canceled, Test credited Unsatisfactory specimen - hemolyzed 3.6   CHLORIDE 108 108 Canceled, Test credited Unsatisfactory specimen - hemolyzed 105   CO2 22 24 Canceled, Test credited Unsatisfactory specimen - hemolyzed 24   BUN 10 11 Canceled, Test credited Unsatisfactory specimen - hemolyzed 16   CR 0.84 0.86 Canceled, Test credited Unsatisfactory specimen - hemolyzed 0.98   ANIONGAP 11 8 Canceled, Test credited Unsatisfactory specimen - hemolyzed 8   BERTA 8.2* 7.9* Canceled, Test credited Unsatisfactory specimen - hemolyzed 8.4*   * 87 Canceled, Test credited Unsatisfactory specimen - hemolyzed 93   ALBUMIN  --   --   --  3.6   PROTTOTAL  --   --   --  7.1   BILITOTAL  --   --   --  0.6   ALKPHOS  --   --   --  63   ALT  --   --   --  25   AST  --   --   --  32       No results found for this or any previous visit (from the past 24 hour(s)).

## 2017-02-15 ENCOUNTER — APPOINTMENT (OUTPATIENT)
Dept: OCCUPATIONAL THERAPY | Facility: CLINIC | Age: 77
DRG: 195 | End: 2017-02-15
Attending: INTERNAL MEDICINE
Payer: COMMERCIAL

## 2017-02-15 LAB
ANION GAP SERPL CALCULATED.3IONS-SCNC: 6 MMOL/L (ref 3–14)
BUN SERPL-MCNC: 11 MG/DL (ref 7–30)
CALCIUM SERPL-MCNC: 8 MG/DL (ref 8.5–10.1)
CHLORIDE SERPL-SCNC: 112 MMOL/L (ref 94–109)
CO2 SERPL-SCNC: 26 MMOL/L (ref 20–32)
CREAT SERPL-MCNC: 0.74 MG/DL (ref 0.52–1.04)
ERYTHROCYTE [DISTWIDTH] IN BLOOD BY AUTOMATED COUNT: 12.8 % (ref 10–15)
GFR SERPL CREATININE-BSD FRML MDRD: 76 ML/MIN/1.7M2
GLUCOSE SERPL-MCNC: 85 MG/DL (ref 70–99)
HCT VFR BLD AUTO: 40.2 % (ref 35–47)
HGB BLD-MCNC: 13.3 G/DL (ref 11.7–15.7)
MCH RBC QN AUTO: 29.6 PG (ref 26.5–33)
MCHC RBC AUTO-ENTMCNC: 33.1 G/DL (ref 31.5–36.5)
MCV RBC AUTO: 90 FL (ref 78–100)
PLATELET # BLD AUTO: 83 10E9/L (ref 150–450)
POTASSIUM SERPL-SCNC: 3.8 MMOL/L (ref 3.4–5.3)
RBC # BLD AUTO: 4.49 10E12/L (ref 3.8–5.2)
SODIUM SERPL-SCNC: 144 MMOL/L (ref 133–144)
WBC # BLD AUTO: 2.1 10E9/L (ref 4–11)

## 2017-02-15 PROCEDURE — 97165 OT EVAL LOW COMPLEX 30 MIN: CPT | Mod: GO

## 2017-02-15 PROCEDURE — 36415 COLL VENOUS BLD VENIPUNCTURE: CPT | Performed by: INTERNAL MEDICINE

## 2017-02-15 PROCEDURE — 12000000 ZZH R&B MED SURG/OB

## 2017-02-15 PROCEDURE — 40000133 ZZH STATISTIC OT WARD VISIT

## 2017-02-15 PROCEDURE — 80048 BASIC METABOLIC PNL TOTAL CA: CPT | Performed by: INTERNAL MEDICINE

## 2017-02-15 PROCEDURE — 25000128 H RX IP 250 OP 636: Performed by: INTERNAL MEDICINE

## 2017-02-15 PROCEDURE — 97530 THERAPEUTIC ACTIVITIES: CPT | Mod: GO

## 2017-02-15 PROCEDURE — 97535 SELF CARE MNGMENT TRAINING: CPT | Mod: GO

## 2017-02-15 PROCEDURE — 25000132 ZZH RX MED GY IP 250 OP 250 PS 637: Performed by: INTERNAL MEDICINE

## 2017-02-15 PROCEDURE — 99232 SBSQ HOSP IP/OBS MODERATE 35: CPT | Performed by: INTERNAL MEDICINE

## 2017-02-15 PROCEDURE — 85027 COMPLETE CBC AUTOMATED: CPT | Performed by: INTERNAL MEDICINE

## 2017-02-15 RX ADMIN — Medication 7.5 MG: at 21:01

## 2017-02-15 RX ADMIN — FERROUS GLUCONATE 324 MG: 324 TABLET ORAL at 08:22

## 2017-02-15 RX ADMIN — CLONAZEPAM 0.25 MG: 0.25 TABLET, ORALLY DISINTEGRATING ORAL at 21:00

## 2017-02-15 RX ADMIN — LEVOFLOXACIN 500 MG: 5 INJECTION, SOLUTION INTRAVENOUS at 22:31

## 2017-02-15 RX ADMIN — SODIUM CHLORIDE: 9 INJECTION, SOLUTION INTRAVENOUS at 02:05

## 2017-02-15 RX ADMIN — LEVOTHYROXINE SODIUM 75 MCG: 75 TABLET ORAL at 08:22

## 2017-02-15 RX ADMIN — OSELTAMIVIR PHOSPHATE 30 MG: 30 CAPSULE ORAL at 08:22

## 2017-02-15 RX ADMIN — OSELTAMIVIR PHOSPHATE 30 MG: 30 CAPSULE ORAL at 21:01

## 2017-02-15 NOTE — PLAN OF CARE
Problem: Goal Outcome Summary  Goal: Goal Outcome Summary  Outcome: Improving  Afebrile, denies pain. Flat affect. Fall risk-sba. Ambulated to the bathroom. Up in the chair for meals. Diminished lung sounds, no cough, no SOB. Weaned to room air. Started incentive spirometer. Possible discharge tomorrow.

## 2017-02-15 NOTE — PROGRESS NOTES
[facility]    Hospitalist Progress Note    Date of Service (when I saw the patient): 02/15/2017    Assessment & Plan   Serge Marin is a 76 year old female with PMHx of hypothyroidism, bipolar disorder, personality disorder and hx of chronic diarrhea with neg workup who was admitted on 2/12/2017 with fever, cough and shortness of breath. She was found to be positive for influenza B and supspected to have a superimposed pneumonia was CXR was suggestive of infiltrates.     Influenza B:   -- started on Tamiflu 30mg BID on 2/12 PM  -- cont droplet precautions  -- cont supportive cares with Tylenol/Ibuprofen prn for fever    Supected CAP:  Presented with fever (Tmax 101.9) and tachycardia. WBC 4.2 and lactate nl. Initially hypoxic with O2 sats 89% on RA. Improved on 2L NC. CXR showed minimal increased density in the bilateral lung bases dt minimal atelectasis vs infiltrate. ?viral vs bacterial etiology. Urine strep Ag negative. Blood cultures drawn on admission are neg.  -- conts Levaquin 250mg IV daily  -- remains on 2L NC -> wean off O2 as able  -- encourage use of incentive spirometer    Hypothyroidism:  Chronic and stable on levothyroxine    Chronic Iron Deficiency Anemia:  Chronic and stable on oral iron supplement    Bipolar Disorder / Personality Disorder:  Chronic and stable on Abilify HS and Klonopin HS    Chronic Diarrhea:  Has undergone workup per GI in the past including colonoscopy and biopsies x2.   -- no acute issues, no concern for cdiff  -- cont prn Imodium    Generalized Weakness:  Likely secondary to acute illness. At baseline resides in [facility] and is independent with daily cares and mobility. Staff there note she will need to be at her baseline in order to return  -- PT/OT consulted -> anticipate she may need TCU stay    FEN: dc IVFs, lytes stable, regular diet  DVT Prophylaxis: PCDs  Code Status: Full Code    Disposition: Pending clinical course and therapy recommendations for  discharge. If TCU recommended, will need to complete 5d treatment for influenza prior to discharge.    Amada Dove    Interval History   Remained afebrile since yesterday morning. conts on 2L NC. Sitting up in chair today - looking better, more conversive today. Denies complaints including cp/sob/cough. Tolerating po - ate oatmeal this morning. Asking how much longer she will have to be here.    -Data reviewed today: I reviewed all new labs and imaging results over the last 24 hours. I personally reviewed no images or EKG's today.    Physical Exam   Temp: 98.4  F (36.9  C) Temp src: Oral BP: 115/68 Pulse: 59 Heart Rate: 51 Resp: 18 SpO2: 97 % O2 Device: Nasal cannula Oxygen Delivery: 2 LPM  Vitals:    02/13/17 0000   Weight: 71.5 kg (157 lb 10.1 oz)     Vital Signs with Ranges  Temp:  [97.5  F (36.4  C)-100.1  F (37.8  C)] 98.4  F (36.9  C)  Pulse:  [59] 59  Heart Rate:  [51-58] 51  Resp:  [14-18] 18  BP: (102-115)/(58-68) 115/68  SpO2:  [87 %-97 %] 97 %  I/O last 3 completed shifts:  In: 763 [P.O.:460; I.V.:303]  Out: 2400 [Urine:2400]    Constitutional: Resting comfortably, flat affect but answering questions appropriately, NAD  Respiratory:CTAB, no wheeze/rales/rhonchi  Cardiovascular: HRRR, no MGR  GI: S, NT, ND, +BS  Skin/Integumen: warm/dry  Other:  no LE edema    Medications     NaCl 50 mL/hr at 02/15/17 0205       levofloxacin  500 mg Intravenous Q24H     oseltamivir  30 mg Oral BID     ARIPiprazole  7.5 mg Oral At Bedtime     clonazePAM  0.25 mg Oral At Bedtime     ferrous gluconate  324 mg Oral Daily with breakfast     levothyroxine  75 mcg Oral Daily       Data     Recent Labs  Lab 02/15/17  0755 02/14/17  0905 02/13/17  0930  02/12/17  2137   WBC 2.1* 3.5*  --   --  4.2   HGB 13.3 13.9  --   --  13.9   MCV 90 89  --   --  89   PLT 83* 100*  --   --  132*    141 140  < > 137   POTASSIUM 3.8 3.5 3.9  < > 3.6   CHLORIDE 112* 108 108  < > 105   CO2 26 22 24  < > 24   BUN 11 10 11  < > 16    CR 0.74 0.84 0.86  < > 0.98   ANIONGAP 6 11 8  < > 8   BERTA 8.0* 8.2* 7.9*  < > 8.4*   GLC 85 127* 87  < > 93   ALBUMIN  --   --   --   --  3.6   PROTTOTAL  --   --   --   --  7.1   BILITOTAL  --   --   --   --  0.6   ALKPHOS  --   --   --   --  63   ALT  --   --   --   --  25   AST  --   --   --   --  32   < > = values in this interval not displayed.    No results found for this or any previous visit (from the past 24 hour(s)).

## 2017-02-15 NOTE — PLAN OF CARE
Problem: Goal Outcome Summary  Goal: Goal Outcome Summary  Outcome: Improving  Pt A/O#4, forgetful , withdrawn/flat, calm/cooperative, VSS on RA, afibrile this shift, denies pain, some BANEGAS, LS-diminished with kevin LL exp wheezes, up w/1 to BR, good po, adequate UOP, cont on IVF/Levaquin, d/c pending progress, will cont to monitor.

## 2017-02-15 NOTE — PLAN OF CARE
Problem: Goal Outcome Summary  Goal: Goal Outcome Summary  Outcome: No Change  Alert & oriented, flat affect, slow response, droplet precaution in placed, up with 1 assist/walker, lung diminished, vital signs stable on 2 liters oxygen via nasal cannula, denies pain and shortness of breath, IVF at 50 ml/hr.

## 2017-02-15 NOTE — PLAN OF CARE
Problem: Goal Outcome Summary  Goal: Goal Outcome Summary  Order received and chart reviewed.  Pt declined PT evaluation stating she was waiting for her lunch to arrive.  Will check back as schedule allows.

## 2017-02-15 NOTE — PLAN OF CARE
Problem: Goal Outcome Summary  Goal: Goal Outcome Summary  OT: Evaluation completed and treatment initiated. Patient requiring MIN A with LE dressing, sit <> stand, and <> toilet without AD. Patient presents with flat affect and is [place]. Patient ambulates very slowly, cautiously and with short steps without AD. Patient currently limited by decreased ROM, activity tolerance, balance, strength, and cognitive deficits. Recommend: TCU upon discharge.

## 2017-02-15 NOTE — PROGRESS NOTES
02/15/17 0825   Quick Adds   Type of Visit Initial Occupational Therapy Evaluation   Living Environment   Lives With alone   Living Arrangements assisted living   Home Accessibility no concerns   Functional Level Prior   Ambulation 0-->independent   Transferring 0-->independent   Toileting 0-->independent   Bathing 2-->assistive person   Dressing 0-->independent   Eating 0-->independent   Communication 0-->understands/communicates without difficulty   Swallowing 0-->swallows foods/liquids without difficulty   Prior Functional Level Comment Has assistance with bathing and has provided meals.    General Information   Onset of Illness/Injury or Date of Surgery - Date 02/12/17   Referring Physician White   Patient/Family Goals Statement None stated   Additional Occupational Profile Info/Pertinent History of Current Problem Patient admitted 2/12 with fever, cough, and SOB. Patient found to have influenza B and suspected to have a superimposed pneumonia was CXR was suggestive of infiltrates. PMH includes hypothyroidism, bipolar personality disorder and history of chronic diarrhea with negative workup.    Precautions/Limitations fall precautions;oxygen therapy device and L/min  (2L, 97% )   General Observations Patient [place], flat affect, moves slowly   General Info Comments Patient in chair upon OT arrival. Sats 97% on 2L.   Cognitive Status Examination   Orientation orientation to person, place and time   Level of Consciousness alert   Able to Follow Commands WNL/WFL   Personal Safety (Cognitive) WNL/WFL   Memory impaired   Cognitive Comment Patient able to recall 1/3 words after distraction.   Visual Perception   Visual Perception Wears glasses   Sensory Examination   Sensory Comments denies   Pain Assessment   Patient Currently in Pain No   Integumentary/Edema   Integumentary/Edema Comments Swelling noted to BLE   Posture   Posture forward head position;protracted shoulders   Range of Motion (ROM)   ROM Comment B  shoulder flexion AROM about 80 degrees, AAROM WNL, B elbow flexion AROM WNL   Strength   Strength Comments B shoulder and elbow flexion 5/5   Hand Strength   Hand Strength Comments B grasp WFL   Transfer Skill: Sit to Stand   Level of [place]: Sit/Stand minimum assist (75% patients effort)   Transfer Skill: Toilet Transfer   Level of [place]: Toilet minimum assist (75% patients effort)   Balance   Balance Comments impaired, ambulates very slowly and cautiously   Lower Body Dressing   Level of [place]: Dress Lower Body minimum assist (75% patients effort)   Instrumental Activities of Daily Living (IADL)   IADL Comments Meds managed and meals provided by [facility] staff   Activities of Daily Living Analysis   Impairments Contributing to Impaired Activities of Daily Living balance impaired;cognition impaired;ROM decreased;strength decreased   General Therapy Interventions   Planned Therapy Interventions ADL retraining;balance training;strengthening;transfer training   Clinical Impression   Criteria for Skilled Therapeutic Interventions Met yes, treatment indicated   OT Diagnosis Decreased participation and I in ADLs   Influenced by the following impairments Decreased balance, strength, ROM, activity tolerance and cognition   Assessment of Occupational Performance 1-3 Performance Deficits   Identified Performance Deficits Dressing, toileting transfers, grooming and hygiene, ADL routine   Clinical Decision Making (Complexity) Low complexity   Therapy Frequency daily   Predicted Duration of Therapy Intervention (days/wks) 3 days   Anticipated Equipment Needs at Discharge (to be determined)   Anticipated Discharge Disposition Transitional Care Facility   Risks and Benefits of Treatment have been explained. Yes   Patient, Family & other staff in agreement with plan of care Yes   Total Evaluation Time   Total Evaluation Time (Minutes) 15

## 2017-02-16 ENCOUNTER — APPOINTMENT (OUTPATIENT)
Dept: PHYSICAL THERAPY | Facility: CLINIC | Age: 77
DRG: 195 | End: 2017-02-16
Attending: INTERNAL MEDICINE
Payer: COMMERCIAL

## 2017-02-16 VITALS
TEMPERATURE: 98.3 F | WEIGHT: 157.63 LBS | OXYGEN SATURATION: 94 % | DIASTOLIC BLOOD PRESSURE: 62 MMHG | BODY MASS INDEX: 27.06 KG/M2 | SYSTOLIC BLOOD PRESSURE: 115 MMHG | HEART RATE: 59 BPM | RESPIRATION RATE: 16 BRPM

## 2017-02-16 LAB
ANION GAP SERPL CALCULATED.3IONS-SCNC: 7 MMOL/L (ref 3–14)
BUN SERPL-MCNC: 10 MG/DL (ref 7–30)
CALCIUM SERPL-MCNC: 8.8 MG/DL (ref 8.5–10.1)
CHLORIDE SERPL-SCNC: 109 MMOL/L (ref 94–109)
CO2 SERPL-SCNC: 27 MMOL/L (ref 20–32)
CREAT SERPL-MCNC: 0.79 MG/DL (ref 0.52–1.04)
ERYTHROCYTE [DISTWIDTH] IN BLOOD BY AUTOMATED COUNT: 12.7 % (ref 10–15)
GFR SERPL CREATININE-BSD FRML MDRD: 71 ML/MIN/1.7M2
GLUCOSE SERPL-MCNC: 89 MG/DL (ref 70–99)
HCT VFR BLD AUTO: 41.9 % (ref 35–47)
HGB BLD-MCNC: 14.2 G/DL (ref 11.7–15.7)
MCH RBC QN AUTO: 30 PG (ref 26.5–33)
MCHC RBC AUTO-ENTMCNC: 33.9 G/DL (ref 31.5–36.5)
MCV RBC AUTO: 88 FL (ref 78–100)
PLATELET # BLD AUTO: 111 10E9/L (ref 150–450)
POTASSIUM SERPL-SCNC: 3.5 MMOL/L (ref 3.4–5.3)
RBC # BLD AUTO: 4.74 10E12/L (ref 3.8–5.2)
SODIUM SERPL-SCNC: 143 MMOL/L (ref 133–144)
WBC # BLD AUTO: 2.1 10E9/L (ref 4–11)

## 2017-02-16 PROCEDURE — 25000128 H RX IP 250 OP 636: Performed by: INTERNAL MEDICINE

## 2017-02-16 PROCEDURE — 85027 COMPLETE CBC AUTOMATED: CPT | Performed by: INTERNAL MEDICINE

## 2017-02-16 PROCEDURE — 25000132 ZZH RX MED GY IP 250 OP 250 PS 637: Performed by: INTERNAL MEDICINE

## 2017-02-16 PROCEDURE — 97161 PT EVAL LOW COMPLEX 20 MIN: CPT | Mod: GP

## 2017-02-16 PROCEDURE — 97116 GAIT TRAINING THERAPY: CPT | Mod: GP

## 2017-02-16 PROCEDURE — 40000193 ZZH STATISTIC PT WARD VISIT

## 2017-02-16 PROCEDURE — 80048 BASIC METABOLIC PNL TOTAL CA: CPT | Performed by: INTERNAL MEDICINE

## 2017-02-16 PROCEDURE — 99239 HOSP IP/OBS DSCHRG MGMT >30: CPT | Performed by: INTERNAL MEDICINE

## 2017-02-16 PROCEDURE — 36415 COLL VENOUS BLD VENIPUNCTURE: CPT | Performed by: INTERNAL MEDICINE

## 2017-02-16 RX ORDER — OSELTAMIVIR PHOSPHATE 75 MG/1
75 CAPSULE ORAL 2 TIMES DAILY
Qty: 3 CAPSULE | Refills: 0 | Status: SHIPPED
Start: 2017-02-16 | End: 2017-02-18

## 2017-02-16 RX ORDER — OSELTAMIVIR PHOSPHATE 75 MG/1
75 CAPSULE ORAL 2 TIMES DAILY
Status: DISCONTINUED | OUTPATIENT
Start: 2017-02-16 | End: 2017-02-16 | Stop reason: HOSPADM

## 2017-02-16 RX ORDER — ARIPIPRAZOLE 15 MG/1
7.5 TABLET ORAL AT BEDTIME
Qty: 30 TABLET | Refills: 1 | Status: SHIPPED | OUTPATIENT
Start: 2017-02-16 | End: 2019-08-16

## 2017-02-16 RX ORDER — CLONAZEPAM 0.25 MG/1
0.25 TABLET, ORALLY DISINTEGRATING ORAL AT BEDTIME
Qty: 30 TABLET | Refills: 0 | Status: SHIPPED | OUTPATIENT
Start: 2017-02-16 | End: 2019-08-16

## 2017-02-16 RX ADMIN — OSELTAMIVIR PHOSPHATE 75 MG: 75 CAPSULE ORAL at 08:52

## 2017-02-16 RX ADMIN — LEVOFLOXACIN 500 MG: 5 INJECTION, SOLUTION INTRAVENOUS at 11:42

## 2017-02-16 RX ADMIN — LEVOTHYROXINE SODIUM 75 MCG: 75 TABLET ORAL at 08:52

## 2017-02-16 RX ADMIN — FERROUS GLUCONATE 324 MG: 324 TABLET ORAL at 08:52

## 2017-02-16 NOTE — PLAN OF CARE
Problem: Goal Outcome Summary  Goal: Goal Outcome Summary  PT: Orders received, eval completed, treatment initiated. Pt admitted with with weakness, fever, cough, influenza and pneumonia. Prior to admit pt was living alone in an skilled nursing apartment, no AD use, independent with mobility in her apartment but states she gets an escort to meals but does walk there. Currently requires SBA supine to sit, CGA sit to stand, min A for gait of 25 ft with no AD with poor balance and shuffled gait. Pt refusing to use walker and stating she was SOB, sats 97% post on RA. Pt demonstrates dec strength, balance, activity tolerance and difficulty ambulating and transferring and would benefit from skilled PT services in order to improve this. Recommend discharge to TCU.

## 2017-02-16 NOTE — PROGRESS NOTES
02/16/17 0800   Quick Adds   Type of Visit Initial PT Evaluation   Living Environment   Lives With alone   Living Arrangements assisted living   Home Accessibility no concerns   Number of Stairs to Enter Home 0   Number of Stairs Within Home 0   Self-Care   Usual Activity Tolerance moderate   Current Activity Tolerance fair   Regular Exercise no   Equipment Currently Used at Home none   Activity/Exercise/Self-Care Comment Pt has assist for walking to meals in the dining room.   Functional Level Prior   Ambulation 0-->independent   Transferring 0-->independent   Fall history within last six months no   General Information   Onset of Illness/Injury or Date of Surgery - Date 02/12/17   Referring Physician Amada Dove,    Patient/Family Goals Statement return home   Pertinent History of Current Problem (include personal factors and/or comorbidities that impact the POC) Admitted with weakness, fever, cough, pneumonia and influenza. PMH: bipolar, chronic diarrhea.   Precautions/Limitations fall precautions   Weight-Bearing Status - LLE full weight-bearing   Weight-Bearing Status - RLE full weight-bearing   Cognitive Status Examination   Orientation orientation to person, place and time   Level of Consciousness alert   Follows Commands and Answers Questions 100% of the time;able to follow single-step instructions   Personal Safety and Judgment impaired   Memory impaired   Pain Assessment   Patient Currently in Pain No   Posture    Posture Forward head position;Protracted shoulders   Range of Motion (ROM)   ROM Quick Adds No deficits were identified   Strength   Strength Comments Pt not following directions for MMT   Bed Mobility   Bed Mobility Comments Supine to sit with SBA   Transfer Skills   Transfer Comments Sit to stand with CGA    Gait   Gait Comments Pt amb 5 ft with no AD and min A, poor balance and shuffling gait   Balance   Balance Comments Balance poor with gait without AD use   General Therapy  "Interventions   Planned Therapy Interventions bed mobility training;gait training;neuromuscular re-education;strengthening;transfer training   Clinical Impression   Criteria for Skilled Therapeutic Intervention yes, treatment indicated   PT Diagnosis Difficulty ambulating   Influenced by the following impairments Dec strength, balance, activity tolerance   Functional limitations due to impairments Difficulty ambulating and transferring   Clinical Presentation Stable/Uncomplicated   Clinical Presentation Rationale medically stable   Clinical Decision Making (Complexity) Low complexity   Therapy Frequency` daily   Predicted Duration of Therapy Intervention (days/wks) 4 days   Anticipated Discharge Disposition Transitional Care Facility   Risk & Benefits of therapy have been explained Yes   Patient, Family & other staff in agreement with plan of care Yes   [facility] TM \"6 Clicks\"   2016, Trustees of [facility], under license to [employer].  All rights reserved.   6 Clicks Short Forms Basic Mobility Inpatient Short Form   [facility]  \"6 Clicks\" V.2 Basic Mobility Inpatient Short Form   1. Turning from your back to your side while in a flat bed without using bedrails? 4 - None   2. Moving from lying on your back to sitting on the side of a flat bed without using bedrails? 3 - A Little   3. Moving to and from a bed to a chair (including a wheelchair)? 3 - A Little   4. Standing up from a chair using your arms (e.g., wheelchair, or bedside chair)? 3 - A Little   5. To walk in hospital room? 2 - A Lot   6. Climbing 3-5 steps with a railing? 1 - Total   Basic Mobility Raw Score (Score out of 24.Lower scores equate to lower levels of function) 16   Total Evaluation Time   Total Evaluation Time (Minutes) 15     "

## 2017-02-16 NOTE — PLAN OF CARE
Problem: Goal Outcome Summary  Goal: Goal Outcome Summary  OT: pt has discharged to TCU prior to OT session, GOALS NOT MET, see discharge summary

## 2017-02-16 NOTE — DISCHARGE SUMMARY
[facility]    Discharge Summary  Hospitalist    Date of Admission:  2/12/2017  Date of Discharge:  2/16/2017  Discharging Provider: Amada Dove    Discharge Diagnoses   Influenza B  Supected CAP  Hypothyroidism  Chronic Iron Deficiency Anemia   Bipolar Disorder / Personality Disorder   Chronic Diarrhea  Generalized Weakness    History of Present Illness   Serge Marin is a 76 year old female with PMHx of hypothyroidism, bipolar disorder, personality disorder and hx of chronic diarrhea with neg workup who was admitted on 2/12/2017 with fever, cough and shortness of breath. She was found to be positive for influenza B and supspected to have a superimposed pneumonia was CXR was suggestive of infiltrates.     Hospital Course   Serge Marin was admitted on 2/12/2017.  The following problems were addressed during her hospitalization:    Influenza B:   Started on Tamiflu on admission. Dose adjusted based on CrCl. Maintained on droplet precautions. Was initially febrile, though fevers resolved with Tamiflu and Tylenol.      Supected CAP:  Presented with fever (Tmax 101.9) and tachycardia. WBC 4.2 and lactate nl. Initially hypoxic with O2 sats 89% on RA. Improved on 2L NC. CXR showed minimal increased density in the bilateral lung bases dt minimal atelectasis vs infiltrate. ?viral vs bacterial etiology. Urine strep Ag negative. Blood cultures drawn on admission are neg. Received 5d course of Levaquin this stay. Weaned of supplemental O2 by the time of discharge.      Hypothyroidism:  Chronic and stable on levothyroxine     Chronic Iron Deficiency Anemia:  Chronic and stable on oral iron supplement     Bipolar Disorder / Personality Disorder:  Chronic and stable on Abilify HS and Klonopin HS     Chronic Diarrhea:  Has undergone workup per GI in the past including colonoscopy and biopsies x2.   No acute issues this stay. No loose stools.     Generalized Weakness:  Likely secondary to  acute illness. At baseline resides in [facility] and is independent with daily cares and mobility. Staff there note she will need to be at her baseline in order to return. Seen by PT/OT, TCU stay recommended. Anticipate discharge back to [facility] after TCU.     Amada Dove    Code Status   Full Code       Primary Care Physician   Kassie Wyatt    Physical Exam   Temp: 98.3  F (36.8  C) Temp src: Oral BP: 115/62   Heart Rate: 67 Resp: 16 SpO2: 94 % O2 Device: None (Room air)    Vitals:    02/13/17 0000   Weight: 71.5 kg (157 lb 10.1 oz)     Vital Signs with Ranges  Temp:  [98.3  F (36.8  C)-98.6  F (37  C)] 98.3  F (36.8  C)  Heart Rate:  [61-67] 67  Resp:  [16-18] 16  BP: (115)/(62-67) 115/62  SpO2:  [94 %-95 %] 94 %  I/O last 3 completed shifts:  In: 500 [P.O.:500]  Out: 400 [Urine:400]    General: Resting comfortably, alert though affect notably flat, answering questions appropriately, NAD  CVS: HRRR, no MGR  Respiratory: CTAB, no wheeze/rales/rhonchi  GI: S, NT, ND, +BS  Ext: no LE edema  Skin: Warm/dry    Discharge Disposition   Discharged to short-term care facility  Condition at discharge: Stable    Consultations This Hospital Stay   PHYSICAL THERAPY ADULT IP CONSULT  OCCUPATIONAL THERAPY ADULT IP CONSULT    Time Spent on this Encounter   I, Amada Dove, personally saw the patient today and spent greater than 30 minutes discharging this patient.    Discharge Orders     General info for SNF   Length of Stay Estimate: Short Term Care: Estimated # of Days <30  Condition at Discharge: Improving  Level of care: skilled   Rehabilitation Potential: Excellent  Admission H&P remains valid and up-to-date: Yes  Recent Chemotherapy: N/A                     Use Nursing Home Standing Orders: N/A     Mantoux instructions   Give two-step Mantoux (PPD) Per Facility Policy Yes     Reason for your hospital stay   Management of the flu and pneumonia.     Follow Up and recommended labs and tests   1. Follow up  with your PCP in 1-2 weeks.     Activity - Up with nursing assistance     Full Code     Physical Therapy Adult Consult   Evaluate and treat as clinically indicated.    Reason:  Generalized weakness/deconditioning     Occupational Therapy Adult Consult   Evaluate and treat as clinically indicated.    Reason:  Generalized weakness/deconditioning     Advance Diet as Tolerated   Follow this diet upon discharge: Regular       Discharge Medications   Current Discharge Medication List      START taking these medications    Details   oseltamivir (TAMIFLU) 75 MG capsule Take 1 capsule (75 mg) by mouth 2 times daily for 3 doses  Qty: 3 capsule, Refills: 0    Associated Diagnoses: Influenza         CONTINUE these medications which have NOT CHANGED    Details   ACETAMINOPHEN PO Take 500 mg by mouth every 6 hours as needed for pain      levothyroxine (SYNTHROID, LEVOTHROID) 75 MCG tablet Take 1 tablet (75 mcg) by mouth daily  Qty: 30 tablet, Refills: 3    Associated Diagnoses: Other specified hypothyroidism      ARIPiprazole (ABILIFY) 15 MG tablet Take 0.5 tablets (7.5 mg) by mouth At Bedtime  Qty: 30 tablet, Refills: 1    Comments: Ordered by Dr. Carias (Psychiatry)  Associated Diagnoses: Bipolar 1 disorder (H)      clonazePAM (KLONOPIN) 0.25 MG TBDP Take 1 tablet (0.25 mg) by mouth At Bedtime  Qty: 180 tablet    Comments: Ordered by Dr. Carias (Psychiatry)  Associated Diagnoses: Bipolar 1 disorder (H)      nystatin (MYCOSTATIN) 523705 UNIT/GM POWD Apply to area as needed three times per day  Qty: 60 g, Refills: 1    Associated Diagnoses: Candidiasis of skin      ferrous gluconate (FERGON) 324 (38 FE) MG tablet Take 1 tablet (324 mg) by mouth daily (with breakfast)  Qty: 90 tablet, Refills: 1    Associated Diagnoses: Fatigue      calcium carb 1250 mg, 500 mg [place] IRA,/vitamin D 200 units (CALCIUM CARB 1250 MG, 500 MG [place],/VITAMIN D 200 UNIT) 500-200 MG-UNIT per tablet Take 1 tablet by mouth daily  Qty: 90 tablet, Refills: 1     Associated Diagnoses: Routine general medical examination at a health care facility      loperamide (IMODIUM A-D) 2 MG tablet Start with 2 tabs (4 mg), then take one tab (2 mg) after each diarrheal stool.  Do not use more than  8 tabs (16 mg) per day.  Qty: 30 tablet, Refills: 0    Associated Diagnoses: Diarrhea           Allergies   Allergies   Allergen Reactions     Penicillins      Bactrim [Sulfamethoxazole W/Trimethoprim] Itching     Patient prescribed Bactrim at eye appointment. Assisted living reports eyes were red and itching.      Data   Most Recent 3 CBC's:  Recent Labs   Lab Test  02/16/17   0823  02/15/17   0755  02/14/17   0905   WBC  2.1*  2.1*  3.5*   HGB  14.2  13.3  13.9   MCV  88  90  89   PLT  111*  83*  100*      Most Recent 3 BMP's:  Recent Labs   Lab Test  02/16/17   0823  02/15/17   0755  02/14/17   0905   NA  143  144  141   POTASSIUM  3.5  3.8  3.5   CHLORIDE  109  112*  108   CO2  27  26  22   BUN  10  11  10   CR  0.79  0.74  0.84   ANIONGAP  7  6  11   BERTA  8.8  8.0*  8.2*   GLC  89  85  127*     Most Recent 2 LFT's:  Recent Labs   Lab Test  02/12/17 2137 06/12/12   1509   AST  32  24   ALT  25  <6   ALKPHOS  63  64   BILITOTAL  0.6  0.7     Most Recent 6 Bacteria Isolates From Any Culture (See EPIC Reports for Culture Details):  Recent Labs   Lab Test  02/12/17   2315  02/12/17   2145  02/12/17 2137   CULT  No growth after 3 days  No growth  No growth after 3 days       Results for orders placed or performed during the hospital encounter of 02/12/17   Chest XR,  PA & LAT    Narrative    CHEST TWO VIEW   2/12/2017 10:25 PM     HISTORY: Check for pneumonia. Cough, fever.    COMPARISON: 4/1/2013      Impression    IMPRESSION: Minimal increased density is seen in both lung bases. This  could be due to some minimal atelectasis or infiltrates. This is a  slightly shallow inspiration. Upper lung zones are clear. Heart size  and pulmonary vasculature are within normal limits. The aorta  is  unfolded.    JEFF MA MD

## 2017-02-16 NOTE — PLAN OF CARE
Problem: Goal Outcome Summary  Goal: Goal Outcome Summary  Outcome: No Change  VSS and afebrile on RA. A&O X2, forgetful with flat affect. Fall risk - assist X2 with gait belt. Droplet precautions maintained. Bilateral wheezing LLS. BANEGAS while ambulating to the bathroom. Plan for possible discharge to TCU upon completion of Tamiflu.

## 2017-02-16 NOTE — PLAN OF CARE
Problem: Goal Outcome Summary  Goal: Goal Outcome Summary  PT: Patient discharged to TCU. PT goals partially met. Bed mobility met, transfers and gait not met.     Physical Therapy Discharge Summary     Reason for therapy discharge:    Discharged to transitional care facility.     Progress towards therapy goal(s). See goals on Care Plan in [facility] electronic health record for goal details.  Goals partially met.  Barriers to achieving goals:   discharge on same date as initial evaluation.     Therapy recommendation(s):    Continued therapy is recommended.  Rationale/Recommendations:  eval and treat at TCU.

## 2017-02-16 NOTE — PLAN OF CARE
Problem: Goal Outcome Summary  Goal: Goal Outcome Summary  Outcome: No Change  Alert & oriented, up with 1 assist/walker, lung sounds diminished, crackles in left lower lobes, VSS on RA, denies pain, c/o dyspnea on exertion, infrequent non productive cough, on Levaquin and tamiflu. Possible discharge to TCU.

## 2017-02-16 NOTE — PROGRESS NOTES
ADELE  D:   spoke with Asa, RN at patient's building.  Reviewed PT evaluation with him.  Asa does want patient to transfer to  a TCU since she is not independent with transfers or walking.  Writer met with patient and intially she did not want to transfer to a TCU however understands she is weak and needs to be independent with walking and transfers before she can return to her apartment. Patient told writer she needs time to process the information.  Writer explained I will make referrals and try to arranage for a TCU in or close to [place].   Contacted [facility] which requires patient to complete tamilfu before admission, call out to [facility].    Referral was made to [facility] in [place] which has a vacancy for today and their requirement for admission is that patient is symptom free for 24 hours.    PAS-RR    D: Per DHS regulation, SW completed and submitted PAS-RR to [place] [employer] on [employer]ging Direct Connect via the Senior LinkAge Line.  PAS-RR confirmation # is : [id number]    I: SW spoke with patient and they are aware a PAS-RR has been submitted.  SW reviewed with her that they may be contacted for a follow up appointment within 10 days of hospital discharge if their SNF stay is < 30 days.  Contact information for Senior LinkAge Line was also provided.    A: Patient verbalized understanding.    P: Further questions may be directed to [facility] LinkAge Line at #[phone], option #4 for PAS-RR staff.    Patient was accepted by [facility] and will be transported at 1500.    Left messages for the nursing office at patient's building [phone]  and for her [facility] case manger Ute Gonzalez at [phone].  Also, with patient's permission left a message or her son with her d/c plan and the address and phone number of [facility] Moss.

## 2017-02-16 NOTE — PLAN OF CARE
Problem: Goal Outcome Summary  Goal: Goal Outcome Summary  Outcome: Adequate for Discharge Date Met:  02/16/17  A&O x2 but forgetful. Flat affect. VSS on RA. Afebrile. Infrequent, nonproductive cough. Lung sounds diminished, wheezing and crackles auscultated. Ambulated with 1A with GB and walker to bathroom. Tolerating diet. Droplet precautions. Pt reports feeling better. Last dose of Levaquin administered.   Discharging to [facility] via medical transport. Original paper prescriptions faxed to facility and placed in packet. AVS discussed with patient and questions answered.

## 2017-02-19 LAB
BACTERIA SPEC CULT: NO GROWTH
BACTERIA SPEC CULT: NO GROWTH
Lab: NORMAL
Lab: NORMAL
MICRO REPORT STATUS: NORMAL
MICRO REPORT STATUS: NORMAL
SPECIMEN SOURCE: NORMAL
SPECIMEN SOURCE: NORMAL

## 2017-02-28 ENCOUNTER — CARE COORDINATION (OUTPATIENT)
Dept: FAMILY MEDICINE | Facility: CLINIC | Age: 77
End: 2017-02-28

## 2017-02-28 NOTE — PROGRESS NOTES
Planned discharge from TCU back to home/assisted living on 3/1/17.     Participated in Care Conference at [facility] today, participants included Serge, writer/[facility] CM, TCU SW, Nursing and Rehab. Assisted living RN also assessed client late last week in anticipation of her return home, and agrees with current discharge plan per our phone discussion after conference today.     Serge admitted on 2/16 following influenza/pneumonia with deconditioning. She has participated in rehab and has returned to her baseline functional status. She is deemed safe to return to her home in assisted living w/ previous level of care which includes:    -Assisted living to provide: hygiene/bathing assist; 2meals/day; med mgmt and administration; lifeline; hmking/laundry; some transportation; activities and behavioral supports  -Rep payee to manage all finances thru Payee Mgmt - Adrianna Greene [phone]  -[facility] worker 2h/mo to help client access community thru [facility] [phone]  -[facility] worker for  support thru [facility] - Gini Bello [phone]  -[facility]  will continue to follow and coordinate care as needed, follow client thru any transitions and complete interim and annual reviews per established shedule.     All parties agree on discharge plan. Ant was arranged to pick her up at [facility] 11am on 3/1/17.     Ute Gonzalez RN  [facility]   [phone]

## 2017-03-01 ENCOUNTER — DOCUMENTATION ONLY (OUTPATIENT)
Dept: FAMILY MEDICINE | Facility: CLINIC | Age: 77
End: 2017-03-01

## 2017-03-01 NOTE — PROGRESS NOTES
"When opening a documentation only encounter, be sure to enter in \"Chief Complaint\" Forms and in \" Comments\" Title of form, description if needed.    Serge is a 76 year old  female  Form received via: Fax  Form now resides in: Provider Ready    Shawna Schroeder        Form has been completed by provider.     Form sent out via: Fax to [facility] at Fax Number: [phone]  Patient informed: No  Output date: March 3, 2017    Shawna Schroeder      **Please close the encounter**              "

## 2017-03-02 ENCOUNTER — TELEPHONE (OUTPATIENT)
Dept: FAMILY MEDICINE | Facility: CLINIC | Age: 77
End: 2017-03-02

## 2017-03-02 NOTE — TELEPHONE ENCOUNTER
[facility] Family Medicine phone call message - order or referral request for patient:     Order or referral being requested: Verbal Order      Additional Comments: Patient discharging from nursing home with orders for PT and OT.  Okay needed for PT and OT in order for initial evaluations to happen on 03/03/2017.    OK to leave a message on voice mail? Yes    Primary language: English      needed? No    Call taken on March 2, 2017 at 3:10 PM by Sanjuana Marie

## 2017-03-03 ENCOUNTER — TELEPHONE (OUTPATIENT)
Dept: FAMILY MEDICINE | Facility: CLINIC | Age: 77
End: 2017-03-03

## 2017-03-03 ENCOUNTER — MEDICAL CORRESPONDENCE (OUTPATIENT)
Dept: HEALTH INFORMATION MANAGEMENT | Facility: CLINIC | Age: 77
End: 2017-03-03

## 2017-03-03 NOTE — TELEPHONE ENCOUNTER
Returned call to home care nurse, unable to reach. Left VM with verbal orders per protocol as requested. Left callback number for questions.    Nupur Velasquez RN

## 2017-03-03 NOTE — TELEPHONE ENCOUNTER
[facility] Family Medicine phone call message - order or referral request for patient:     Order or referral being requested: Orders for skilled nursing for 2x admission and discharge      Additional Comments: verbal order is ok    OK to leave a message on voice mail? Yes    Primary language: English      needed? No    Call taken on March 3, 2017 at 4:23 PM by Nohemi Laughlin

## 2017-03-06 ENCOUNTER — TELEPHONE (OUTPATIENT)
Dept: FAMILY MEDICINE | Facility: CLINIC | Age: 77
End: 2017-03-06

## 2017-03-06 NOTE — TELEPHONE ENCOUNTER
Returned call to home care OT to give verbal orders per protocol as requested. OT verbalized understanding.    Nupur Velasquez RN

## 2017-03-06 NOTE — TELEPHONE ENCOUNTER
[facility] Medicine phone call message - order or referral request for patient:     Order or referral being requested: Occupation Therapy orders for Strengthening, ADL's and Home safety      Additional Comments: OT 2x per week for 3 weeks    OK to leave a message on voice mail? Yes    Primary language: English      needed? No    Call taken on March 6, 2017 at 3:34 PM by Bri Luong

## 2017-03-07 ENCOUNTER — TELEPHONE (OUTPATIENT)
Dept: FAMILY MEDICINE | Facility: CLINIC | Age: 77
End: 2017-03-07

## 2017-03-07 NOTE — TELEPHONE ENCOUNTER
[facility] Family Medicine phone call message - order or referral request for patient:     Order or referral being requested: PT      Additional Comments: nurse from [facility] is requesting verbal ok for PT visits 1 times a week  For 2 weeks, 2 times a week for for 3 weeks for strenghting      OK to leave a message on voice mail? Yes    Primary language: English      needed? No    Call taken on March 7, 2017 at 4:42 PM by Cyn Ann

## 2017-03-07 NOTE — TELEPHONE ENCOUNTER
Returned call to home care nurse, unable to reach. Left VM on PT line with verbal orders per protocol as requested. Left callback number for questions.    Nupur Velasquez RN

## 2017-03-08 ENCOUNTER — DOCUMENTATION ONLY (OUTPATIENT)
Dept: FAMILY MEDICINE | Facility: CLINIC | Age: 77
End: 2017-03-08

## 2017-03-08 NOTE — PROGRESS NOTES
"When opening a documentation only encounter, be sure to enter in \"Chief Complaint\" Forms and in \" Comments\" Title of form, description if needed.    Serge is a 76 year old  female  Form received via: Fax  Form now resides in: Provider Ready    Shawna Schroeder                Form has been completed by provider.     Form sent out via: [facility] at Fax Number: [phone]  Patient informed: No  Output date: March 15, 2017    Shawna Schroeder      **Please close the encounter**      "

## 2017-03-08 NOTE — PROGRESS NOTES
"When opening a documentation only encounter, be sure to enter in \"Chief Complaint\" Forms and in \" Comments\" Title of form, description if needed.    Serge is a 76 year old  female  Form received via: Fax  Form now resides in: Provider Ready    Shawna Schroeder            Form has been completed by provider.     Form sent out via: Fax to [facility] at Fax Number: [phone]  Patient informed: No  Output date: March 15, 2017    Shawna Schroeder      **Please close the encounter**      "

## 2017-03-15 ENCOUNTER — MEDICAL CORRESPONDENCE (OUTPATIENT)
Dept: HEALTH INFORMATION MANAGEMENT | Facility: CLINIC | Age: 77
End: 2017-03-15

## 2017-03-16 ENCOUNTER — DOCUMENTATION ONLY (OUTPATIENT)
Dept: FAMILY MEDICINE | Facility: CLINIC | Age: 77
End: 2017-03-16

## 2017-03-16 ENCOUNTER — TELEPHONE (OUTPATIENT)
Dept: FAMILY MEDICINE | Facility: CLINIC | Age: 77
End: 2017-03-16

## 2017-03-16 NOTE — TELEPHONE ENCOUNTER
Returned call to home care OT, unable to reach. Left VM with verbal orders per protocol as requested. Left callback number for questions.    Nupur Velasquez RN

## 2017-03-16 NOTE — TELEPHONE ENCOUNTER
[facility] Family Medicine phone call message - order or referral request for patient:     Order or referral being requested: OT      Additional Comments: Needs verbal order to discharge early secondary to patient refusing all further OT services.     OK to leave a message on voice mail? Yes    Primary language: English      needed? No    Call taken on March 16, 2017 at 1:38 PM by Bri Luong

## 2017-03-16 NOTE — PROGRESS NOTES
"When opening a documentation only encounter, be sure to enter in \"Chief Complaint\" Forms and in \" Comments\" Title of form, description if needed.    Serge is a 76 year old  female  Form received via: Fax  Form now resides in: Provider Ready    Shawna Schroeder CMA         Form has been completed by provider.     Form sent out via: Fax to [facility] at Fax Number: [phone]  Patient informed: No  Output date: March 20, 2017    Shawna Schroeder      **Please close the encounter**      "

## 2017-03-17 ENCOUNTER — MEDICAL CORRESPONDENCE (OUTPATIENT)
Dept: HEALTH INFORMATION MANAGEMENT | Facility: CLINIC | Age: 77
End: 2017-03-17

## 2017-03-17 ENCOUNTER — TELEPHONE (OUTPATIENT)
Dept: FAMILY MEDICINE | Facility: CLINIC | Age: 77
End: 2017-03-17

## 2017-03-17 NOTE — TELEPHONE ENCOUNTER
Returned call to home care PT, unable to reach. Left VM with verbal orders per protocol as requested. Left callback number for questions.    Nupur Velasquez RN

## 2017-03-17 NOTE — TELEPHONE ENCOUNTER
[facility] Family Medicine phone call message - order or referral request for patient:     Order or referral being requested: PT to discharge prior to frequency per patient request.    Additional Comments: verbal order okay    OK to leave a message on voice mail? Yes    Primary language: English      needed? No    Call taken on March 17, 2017 at 3:37 PM by Shanelle Kong

## 2017-03-21 ENCOUNTER — OFFICE VISIT (OUTPATIENT)
Dept: FAMILY MEDICINE | Facility: CLINIC | Age: 77
End: 2017-03-21

## 2017-03-21 VITALS
SYSTOLIC BLOOD PRESSURE: 119 MMHG | TEMPERATURE: 98.3 F | DIASTOLIC BLOOD PRESSURE: 73 MMHG | RESPIRATION RATE: 18 BRPM | WEIGHT: 159 LBS | OXYGEN SATURATION: 94 % | BODY MASS INDEX: 27.29 KG/M2 | HEART RATE: 86 BPM

## 2017-03-21 DIAGNOSIS — J11.00 INFLUENZAL PNEUMONIA: Primary | ICD-10-CM

## 2017-03-21 DIAGNOSIS — F31.9 BIPOLAR 1 DISORDER (H): ICD-10-CM

## 2017-03-21 DIAGNOSIS — Z09 HOSPITAL DISCHARGE FOLLOW-UP: ICD-10-CM

## 2017-03-21 NOTE — MR AVS SNAPSHOT
After Visit Summary   3/21/2017    Serge Marin    MRN: [id number]           Patient Information     Date Of Birth          1940        Visit Information        Provider Department      3/21/2017 9:00 AM aKssie Wyatt MD [facility]        Care Instructions    Here is the plan from today's visit    Influenza Pneumonia - improving  - Continue to use incentive spirometry with all TV commercials      Bipolar and Anxiety  Follow up with Dr. Nieves regarding worsening anxiety and clonazepam dose. Also, consider switching to full tablet formulation (2mg + 5mg) of the Abilify if the 1/2 tablet of 15mg (7.5mg) is crumbling    Ok to continue iron supplement    Please call or return to clinic if your symptoms don't go away.    Follow up plan  Follow up in 6 months    Thank you for coming to [facility] today.  Lab Testing:  **If you had lab testing today and your results are reassuring or normal they will be mailed to you or sent through [employer] within 7 days.   **If the lab tests need quick action we will call you with the results.  The phone number we will call with results is # [phone] (home) NONE (work). If this is not the best number please call our clinic and change the number.  Medication Refills:  If you need any refills please call your pharmacy and they will contact us.   If you need to  your refill at a new pharmacy, please contact the new pharmacy directly. The new pharmacy will help you get your medications transferred faster.   Scheduling:  If you have any concerns about today's visit or wish to schedule another appointment please call our office during normal business hours [phone] (8-5:00 M-F)  If a referral was made to a [facility] and you don't get a call from central scheduling please call [phone].  If a Mammogram was ordered for you at [facility] call [phone] to schedule or change your  appointment.  If you had an XRay/CT/Ultrasound/MRI ordered the number is [phone] to schedule or change your radiology appointment.   Medical Concerns:  If you have urgent medical concerns please call [phone] at any time of the day.            Follow-ups after your visit        Who to contact     Please call your clinic at [phone] to:    Ask questions about your health    Make or cancel appointments    Discuss your medicines    Learn about your test results    Speak to your doctor   If you have compliments or concerns about an experience at your clinic, or if you wish to file a complaint, please contact [facility] Patient Relations at [phone] or email us at [email or web address]         Additional Information About Your Visit        [employer] Information     [employer] is an electronic gateway that provides easy, online access to your medical records. With [employer], you can request a clinic appointment, read your test results, renew a prescription or communicate with your care team.     To sign up for [employer] visit the website at [email or web address]   You will be asked to enter the access code listed below, as well as some personal information. Please follow the directions to create your username and password.     Your access code is: [email or web address]  Expires: 2017 10:05 AM     Your access code will  in 90 days. If you need help or a new code, please contact your [facility] or call [phone] for assistance.        Care EveryWhere ID     This is your Care EveryWhere ID. This could be used by other organizations to access your [facility] medical records  [id number]        Your Vitals Were     Pulse Temperature Respirations Pulse Oximetry Breastfeeding? BMI (Body Mass Index)    86 98.3  F (36.8  C) (Oral) 18 94% No 27.29 kg/m2       Blood Pressure from Last 3 Encounters:   17 119/73   17 115/62   10/21/16  136/79    Weight from Last 3 Encounters:   03/21/17 159 lb (72.1 kg)   02/13/17 157 lb 10.1 oz (71.5 kg)   10/21/16 163 lb (73.9 kg)              Today, you had the following     No orders found for display       Primary Care Provider Office Phone # Fax #    Kassie Wyatt [phone]       [facility] [address] [facility] [address]        Thank you!     Thank you for choosing [facility]  for your care. Our goal is always to provide you with excellent care. Hearing back from our patients is one way we can continue to improve our services. Please take a few minutes to complete the written survey that you may receive in the mail after your visit with us. Thank you!             Your Updated Medication List - Protect others around you: Learn how to safely use, store and throw away your medicines at www.disposemymeds.org.          This list is accurate as of: 3/21/17  9:43 AM.  Always use your most recent med list.                   Brand Name Dispense Instructions for use    ACETAMINOPHEN PO      Take 500 mg by mouth every 6 hours as needed for pain       ARIPiprazole 15 MG tablet    ABILIFY    30 tablet    Take 0.5 tablets (7.5 mg) by mouth At Bedtime       calcium carb 1250 mg (500 mg [place])/vitamin D 200 units 500-200 MG-UNIT per tablet    OSCAL with D    90 tablet    Take 1 tablet by mouth daily       clonazePAM 0.25 MG Tbdp ODT tab    klonoPIN    30 tablet    Take 1 tablet (0.25 mg) by mouth At Bedtime       ferrous gluconate 324 (38 FE) MG tablet    FERGON    90 tablet    Take 1 tablet (324 mg) by mouth daily (with breakfast)       levothyroxine 75 MCG tablet    SYNTHROID/LEVOTHROID    30 tablet    Take 1 tablet (75 mcg) by mouth daily       loperamide 2 MG tablet    IMODIUM A-D    30 tablet    Start with 2 tabs (4 mg), then take one tab (2 mg) after each diarrheal stool.  Do not use more than  8 tabs (16 mg) per day.       nystatin 224555 UNIT/GM Powd     MYCOSTATIN    60 g    Apply to area as needed three times per day

## 2017-03-21 NOTE — PROGRESS NOTES
HPI:       Serge Marin is a 76 year old who presents for the following  Patient presents with:  RECHECK    Pt is here for follow up after a hospitalization in February for influenza/CAP. Reports that she is feeling much better. She was in rehab at [facility] for one week working with PT/OT, continued with them for an additional week. Denies pain, CP, SOB at rest, fever, or cough. She has mild SOB going up the stairs, but feels that she is continuing to improve. She enjoyed the food at rehab. Concerned that she was taken off of her iron supplement in the hospital, reassured pt that she could continue to take it.     Some increased anxiety in the last 2 weeks over her baseline. She is wondering if anything changed with her psych medications, reassured pt that they had not changed. Would like to follow up with her psychiatrist to see if any changes can be made to help with her increased anxiety, and because her Abilify pills are crumbling when they are cut. They also taste very bitter.     Problem, Medication and Allergy Lists were reviewed and are current.  Patient is an established patient of this clinic.         Review of Systems:   Review of Systems: See above.           Physical Exam:   Patient Vitals for the past 24 hrs:   BP Temp Temp src Pulse Resp SpO2 Weight   03/21/17 0855 119/73 98.3  F (36.8  C) Oral 86 18 94 % 159 lb (72.1 kg)     Body mass index is 27.29 kg/(m^2).  Vitals were reviewed and were normal     Physical Exam   Cardiovascular: Normal rate, regular rhythm and normal heart sounds.  Exam reveals no gallop.    No murmur heard.  Pulmonary/Chest: Effort normal. She has no wheezes. She has no rales.   Mild decreased breath sounds bilaterally in the bases.    Psychiatric:   Flat Affect. Delayed responses.          Results:      Results from the last 24 hoursNo results found for this or any previous visit (from the past 24 hour(s)).  Assessment and Plan     Influenza Pneumonia - improving  -  Continue to use incentive spirometry with all TV commercials    Bipolar and Anxiety  Follow up with Dr. Nieves regarding worsening anxiety and clonazepam dose. Also, consider switching to full tablet formulation (2mg + 5mg) of the Abilify if the 1/2 tablet of 15mg (7.5mg) is crumbling    Ok to continue iron supplement    Follow up in 6 months    This note is scribed by Cm Cristobal, medical student on behalf of KASSIE PERSON.    There are no discontinued medications.  Options for treatment and follow-up care were reviewed with the patient. Serge Marin  engaged in the decision making process and verbalized understanding of the options discussed and agreed with the final plan.    The medical student acted as scribe and the encounter documented above was completely performed by myself and the documentation reflects the work I have performed today. Kassie Person MD     I spent 25 min face to face with the patient and >50% was spent counselling the patient about the above medical conditions, educating patient and discussing the recommendations and followup .    Kassie Person MD   [facility]

## 2017-03-21 NOTE — PATIENT INSTRUCTIONS
Here is the plan from today's visit    Influenza Pneumonia - improving  - Continue to use incentive spirometry with all TV commercials      Bipolar and Anxiety  Follow up with Dr. Nieves regarding worsening anxiety and clonazepam dose. Also, consider switching to full tablet formulation (2mg + 5mg) of the Abilify if the 1/2 tablet of 15mg (7.5mg) is crumbling    Ok to continue iron supplement    Please call or return to clinic if your symptoms don't go away.    Follow up plan  Follow up in 6 months    Thank you for coming to [facility] today.  Lab Testing:  **If you had lab testing today and your results are reassuring or normal they will be mailed to you or sent through [employer] within 7 days.   **If the lab tests need quick action we will call you with the results.  The phone number we will call with results is # [phone] (home) NONE (work). If this is not the best number please call our clinic and change the number.  Medication Refills:  If you need any refills please call your pharmacy and they will contact us.   If you need to  your refill at a new pharmacy, please contact the new pharmacy directly. The new pharmacy will help you get your medications transferred faster.   Scheduling:  If you have any concerns about today's visit or wish to schedule another appointment please call our office during normal business hours [phone] (8-5:00 M-F)  If a referral was made to a [facility] and you don't get a call from central scheduling please call [phone].  If a Mammogram was ordered for you at [facility] call [phone] to schedule or change your appointment.  If you had an XRay/CT/Ultrasound/MRI ordered the number is [phone] to schedule or change your radiology appointment.   Medical Concerns:  If you have urgent medical concerns please call [phone] at any time of the day.

## 2017-03-22 ENCOUNTER — TELEPHONE (OUTPATIENT)
Dept: FAMILY MEDICINE | Facility: CLINIC | Age: 77
End: 2017-03-22

## 2017-03-22 NOTE — TELEPHONE ENCOUNTER
[facility] phone call message - order or referral request for patient:     Order or referral being requested: New order needed to start iron supplements      Additional Comments: Mariama will fax the order to us so we can sign and fax back.,  No call back needed    OK to leave a message on voice mail? Yes    Primary language: English      needed? No    Call taken on March 22, 2017 at 12:22 PM by Nohemi Laughlin

## 2017-03-23 ENCOUNTER — DOCUMENTATION ONLY (OUTPATIENT)
Dept: FAMILY MEDICINE | Facility: CLINIC | Age: 77
End: 2017-03-23

## 2017-03-23 NOTE — PROGRESS NOTES
"When opening a documentation only encounter, be sure to enter in \"Chief Complaint\" Forms and in \" Comments\" Title of form, description if needed.    Serge is a 76 year old  female  Form received via: Fax  Form now resides in: Provider Ready    Shawna Schroeder      Form has been completed by provider.     Form sent out via: Fax to Nursing at Fax Number: [phone]  Patient informed: No  Output date: March 29, 2017    Shawna Schroeder      **Please close the encounter**                "

## 2017-04-07 ENCOUNTER — TELEPHONE (OUTPATIENT)
Dept: FAMILY MEDICINE | Facility: CLINIC | Age: 77
End: 2017-04-07

## 2017-04-07 DIAGNOSIS — J30.2 SEASONAL ALLERGIC RHINITIS, UNSPECIFIED ALLERGIC RHINITIS TRIGGER: Primary | ICD-10-CM

## 2017-04-07 NOTE — TELEPHONE ENCOUNTER
[facility] phone call message- medication clarification/question:    Question: Jenna calling from [facility] stating that patient is having really bad allergies.  They would like to know if there is something the doctor can prescribe for patient to use prn that would be able to be filled right on site there versus patient trying to find a ride to leave the facility to purchase OTC allergy medications.   Please call Jenna to advise and she stated the prescription could just be faxed to [facility] pharmacy.    Pharmacy confirmed as    [facility] - [place], [place] - [address]  : Yes    OK to leave a message on voice mail? Yes    Primary language: English      needed? No    Call taken on April 7, 2017 at 3:39 PM by Renee Garg

## 2017-04-10 RX ORDER — CETIRIZINE HYDROCHLORIDE 10 MG/1
10 TABLET ORAL DAILY PRN
Qty: 30 TABLET | Refills: 11 | Status: SHIPPED | OUTPATIENT
Start: 2017-04-10 | End: 2017-04-11

## 2017-04-11 ENCOUNTER — DOCUMENTATION ONLY (OUTPATIENT)
Dept: FAMILY MEDICINE | Facility: CLINIC | Age: 77
End: 2017-04-11

## 2017-04-11 RX ORDER — CETIRIZINE HYDROCHLORIDE 10 MG/1
10 TABLET ORAL DAILY PRN
Qty: 30 TABLET | Refills: 11 | Status: SHIPPED | OUTPATIENT
Start: 2017-04-11 | End: 2019-10-09

## 2017-04-11 NOTE — PROGRESS NOTES
"When opening a documentation only encounter, be sure to enter in \"Chief Complaint\" Forms and in \" Comments\" Title of form, description if needed.    Serge is a 76 year old  female  Form received via: Fax  Form now resides in: Provider Ready    Shawna Schroeder      Form has been completed by provider.     Form sent out via: Fax to [facility] at Fax Number: [phone]  Patient informed: No  Output date: April 19, 2017    Shawna Schroeder      **Please close the encounter**                "

## 2017-04-18 DIAGNOSIS — Z00.00 HEALTH CARE MAINTENANCE: Primary | ICD-10-CM

## 2017-04-18 RX ORDER — FERROUS GLUCONATE 324(38)MG
324 TABLET ORAL
Qty: 90 TABLET | Refills: 3 | Status: ON HOLD | OUTPATIENT
Start: 2017-04-18 | End: 2022-12-15

## 2017-04-18 NOTE — TELEPHONE ENCOUNTER
Request for medication refill:    Date of last visit at clinic: 3/21/17    Please complete refill if appropriate and CLOSE ENCOUNTER.    Closing the encounter signifies the refill is complete.    If refill has been denied, please complete the smart phrase .smirefuse and route it to the [facility] RN TRIAGE pool to inform the patient and the pharmacy.    Shawna Schroeder

## 2017-05-30 ENCOUNTER — DOCUMENTATION ONLY (OUTPATIENT)
Dept: FAMILY MEDICINE | Facility: CLINIC | Age: 77
End: 2017-05-30

## 2017-05-30 NOTE — PROGRESS NOTES
"When opening a documentation only encounter, be sure to enter in \"Chief Complaint\" Forms and in \" Comments\" Title of form, description if needed.    Serge is a 76 year old  female  Form received via: Fax  Form now resides in: Provider Ready    Shawna Schroeder      Form has been completed by provider.     Form sent out via: Fax to [employer] at Fax Number: [phone]  Patient informed: No  Output date: May 30, 2017    Shawna Schroeder CMA      **Please close the encounter**    Shawna Schroeder CMA            "

## 2017-06-20 ENCOUNTER — OFFICE VISIT (OUTPATIENT)
Dept: AUDIOLOGY | Facility: CLINIC | Age: 77
End: 2017-06-20
Payer: COMMERCIAL

## 2017-06-20 DIAGNOSIS — H91.93: Primary | ICD-10-CM

## 2017-06-20 PROCEDURE — V5266 BATTERY FOR HEARING DEVICE: HCPCS | Performed by: AUDIOLOGIST

## 2017-06-20 PROCEDURE — 92593 HC HEARING AID CHECK, BINAURAL: CPT | Performed by: AUDIOLOGIST

## 2017-06-20 NOTE — PROGRESS NOTES
Audiology RECHECK    SUBJECTIVE:  Serge Marin is a 76 year old female, was seen today for a recheck of her [place]  Reciever in the Canal Behind the Ear hearing aids obtained at [employer] on 1/30/13 and is not [place] medicaid [place] for replacement at this time.    OBJECTIVE:    Otoscopy revealed clear ear canals bilaterally.     Listening check revealed good output and sound quality bilaterally     Reviewed cleaning and battery replacement, but she does need help with assistive living for insertion and removal     New information on hearing aid batteries with the need to wait 2 minutes when tape is removed before use was discussed and a summary handout was provided.    24 size 312 batteries were provided upon request.    PLAN: As we do not work with [place] products she was referred to a dispenser that is closer to her home that has parts and programming ability.  Return to clinic as needed.    So Barrera M.S., -[facility]  Audiologist  [place] License #[id number]

## 2017-06-20 NOTE — MR AVS SNAPSHOT
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  Normal or non-critical lab and imaging results will be communicated to you by [employer], letter or phone within 4 business days after the clinic has received the results. If you do not hear from us within 7 days, please contact the clinic through [employer] or phone. If you have a critical or abnormal lab result, we will notify you by phone as soon as possible.  Submit refill requests through [employer] or call your pharmacy and they will forward the refill request to us. Please allow 3 business days for your refill to be completed.          Additional Information About Your Visit        MyChart Information     [employer] lets you send messages to your doctor, view your test results, renew your prescriptions, schedule appointments and more. To sign up, go to [email or web address] . Click on \"Log in\" on the left side of the screen, which will take you to the Welcome page. Then click on \"Sign up Now\" on the right side of the page.     You will be asked to enter the access code listed below, as well as some personal information. Please follow the directions to create your username and password.     Your access code is: [email or web address]  Expires: 2017  4:09 PM     Your access code will  in 90 days. If you need help or a new code, please call your [facility] or [phone].        Care EveryWhere ID     This is your Care EveryWhere ID. " This could be used by other organizations to access your [place] medical records  [id number]         Blood Pressure from Last 3 Encounters:   03/21/17 119/73   02/16/17 115/62   10/21/16 136/79    Weight from Last 3 Encounters:   03/21/17 159 lb (72.1 kg)   02/13/17 157 lb 10.1 oz (71.5 kg)   10/21/16 163 lb (73.9 kg)              We Performed the Following     HEARING AID CHECK, BINAURAL     HEARING DEVICE BATTERY        Primary Care Provider Office Phone # Fax #    Kassie Perry Wyatt [phone]       [facility] [address] [facility] [address]        Thank you!     Thank you for choosing [facility]  for your care. Our goal is always to provide you with excellent care. Hearing back from our patients is one way we can continue to improve our services. Please take a few minutes to complete the written survey that you may receive in the mail after your visit with us. Thank you!             Your Updated Medication List - Protect others around you: Learn how to safely use, store and throw away your medicines at www.disposemymeds.org.          This list is accurate as of: 6/20/17  4:09 PM.  Always use your most recent med list.                   Brand Name Dispense Instructions for use    ACETAMINOPHEN PO      Take 500 mg by mouth every 6 hours as needed for pain       ARIPiprazole 15 MG tablet    ABILIFY    30 tablet    Take 0.5 tablets (7.5 mg) by mouth At Bedtime       calcium carb 1250 mg (500 mg [place])/vitamin D 200 units 500-200 MG-UNIT per tablet    OSCAL with D    90 tablet    Take 1 tablet by mouth daily       cetirizine 10 MG tablet    zyrTEC    30 tablet    Take 1 tablet (10 mg) by mouth daily as needed for allergies or rhinitis       clonazePAM 0.25 MG Tbdp ODT tab    klonoPIN    30 tablet    Take 1 tablet (0.25 mg) by mouth At Bedtime       ferrous gluconate 324 (38 FE) MG tablet    FERGON    90 tablet    Take 1 tablet (324 mg) by mouth daily (with breakfast)        levothyroxine 75 MCG tablet    SYNTHROID/LEVOTHROID    30 tablet    Take 1 tablet (75 mcg) by mouth daily       loperamide 2 MG tablet    IMODIUM A-D    30 tablet    Start with 2 tabs (4 mg), then take one tab (2 mg) after each diarrheal stool.  Do not use more than  8 tabs (16 mg) per day.       nystatin 461224 UNIT/GM Powd    MYCOSTATIN    60 g    Apply to area as needed three times per day

## 2017-06-28 ENCOUNTER — DOCUMENTATION ONLY (OUTPATIENT)
Dept: FAMILY MEDICINE | Facility: CLINIC | Age: 77
End: 2017-06-28

## 2017-06-28 NOTE — PROGRESS NOTES
Face to face visit w/ client on 6/7/17 for annual [facility] health risk assessment, assisted living service agreement, and care planning.     Summary of visit and plan:      Current situation/living environment/hospitalization:  Serge lives in assisted living. She has a private apartment, which is well furnished. She is able to navigate her environment indep. She did have transition Feb. 2017 for influenza, followed by short TCU stay for rehab. She returned to baseline function and returned to assisted living.       Activities of daily living (ADL)/instrumental activities of daily living (IADL) and functional issues: Serge is primarily indep in ADL s, she does receive daily assist via prompting/cueing to maintain hygiene, and assist with bathing 2xwk. She receives IADL supports in all areas either thru assisted living, or outside provides, including: [facility] dulce, [facility] [place]  services [employer], as well as regular med mgmt., meals, hmking, laundry, and social/emotional/behavioral supports via assisted living.     Health concerns/updates: No current concerns expressed by Serge. She agrees to preventive health goals to have an eye and dental exam/clean this year.      Cognition/mental health:  Serge is A&Ox3, she directs her own care. She has bipolar w/ hx self- neglect including non-compliance and homelessness. Her mood and behaviors have been stable w/ ongoing supports including MH services (Psychiatry, Psychology, [facility], Meds, and daily behavioral supports thru assisted living).     CURRENT PLAN OF CARE:  -24h supervision in assisted living ([facility]) and the following regularly scheduled supports:  -Regularly scheduled check in's throughout the day, w/ behavior interventions as needed  -2 congregate meals (lunch and dinner) daily, they also provide breakfast foods for client to have in her apt.  -AM aide for prompting and assisting as needed to ensure adequate grooming/hygeine  -Assist w/ bathing  2x/wk  -[employer]/PERS  -Med mgmt and administration per assisted living nurse  -Assist w/ making appts as needed  -coordinated social events and outings as she desires, she also gets her hair done at the in-house salon weekly.  -[facility] worker, Geneva Dar [phone] thru [employer], 2h/mo for assist w/ mail, paperwork, accessing community  -[facility] worker, Gini Ace [phone] thru [employer] - weekly visit for MH supports  -Rep PayeeAdrianna [phone] thru Payee Mgmt - to ensure proper mgmt of funds and avoid loss of housing (client has hx homelessness and gambling issues)  -MH services thru [facility] Saini, she sees both Dr. Dowell and Dr. Carias.   -Rides to medical appts thru [employer] #[phone]  -[facility]  will continue to follow client and coordinate care as needed, follow client thru any transitions, and complete interim and annual assessment per established schedule.    Ute Gonzalez RN   [facility]   [phone]

## 2017-07-10 NOTE — PROGRESS NOTES
D:  Received a call today from GOLD Bray, at [facility] where patient resides.  He shares patient at baseline is independent with daily personal cares and mobility.  Her care plan includes lunch and dinner provided, medication management and SBA with shower/bath.  In order for patient to return to her apartment, she needs to be at her baseline.  Here patient is up in room with assistance.  Discussed in Plan of Care Rounds.  PT will be consulted.     abdomen

## 2017-11-17 ENCOUNTER — TELEPHONE (OUTPATIENT)
Dept: FAMILY MEDICINE | Facility: CLINIC | Age: 77
End: 2017-11-17

## 2017-11-17 ENCOUNTER — OFFICE VISIT (OUTPATIENT)
Dept: FAMILY MEDICINE | Facility: CLINIC | Age: 77
End: 2017-11-17

## 2017-11-17 VITALS
HEART RATE: 75 BPM | SYSTOLIC BLOOD PRESSURE: 130 MMHG | RESPIRATION RATE: 24 BRPM | BODY MASS INDEX: 28.32 KG/M2 | OXYGEN SATURATION: 93 % | DIASTOLIC BLOOD PRESSURE: 77 MMHG | WEIGHT: 165 LBS | TEMPERATURE: 98.5 F

## 2017-11-17 DIAGNOSIS — M79.641 PAIN OF RIGHT HAND: Primary | ICD-10-CM

## 2017-11-17 DIAGNOSIS — S09.90XA CLOSED HEAD INJURY, INITIAL ENCOUNTER: ICD-10-CM

## 2017-11-17 ASSESSMENT — ENCOUNTER SYMPTOMS
CHILLS: 0
LIGHT-HEADEDNESS: 0
CONFUSION: 0
WEAKNESS: 0
DIZZINESS: 0
FEVER: 0

## 2017-11-17 NOTE — TELEPHONE ENCOUNTER
[facility] phone call message- patient requesting to speak with RN    PCP: Kassie Wyatt    Additional Comments: patient fell out of bed last night. Has a bump/bruise on her forehead and nose. Patient right hand is also starting to swell. Jenna wanted to speak to an RN before scheduling appointment.    Is a call back needed? Yes    Patient informed that it may take up to 2 business days to hear back from PCP:No    OK to leave a message on voice mail? Yes    Primary language: English      needed? No    Call taken on November 17, 2017 at 11:21 AM by Veronica Monique

## 2017-11-17 NOTE — MR AVS SNAPSHOT
After Visit Summary   11/17/2017    Serge Marin    MRN: [id number]           Patient Information     Date Of Birth          1940        Visit Information        Provider Department      11/17/2017 2:20 PM Jani Juárez,  [facility]        Today's Diagnoses     Pain of right hand    -  1      Care Instructions    Here is the plan from today's visit    1. Pain of right hand  No fractures seen on xray although radiologist will read the imaging as well and will call if any concerns.  Take tylenol for the pain.  Can apply bacitracin (or triple antibiotic ointment) to forehead to keep from getting infected.  Otherwise, call or come in if any further concerns.  Expect to take 1-2 weeks to heal.  - XR WRIST RT G/E 3 VW; Future        Please call or return to clinic if your symptoms don't go away.    Follow up plan: if does not resolve or if worsens        Thank you for coming to [facility] today.  Lab Testing:  **If you had lab testing today and your results are reassuring or normal they will be mailed to you or sent through [employer] within 7 days.   **If the lab tests need quick action we will call you with the results.  The phone number we will call with results is # [phone] (home) NONE (work). If this is not the best number please call our clinic and change the number.  Medication Refills:  If you need any refills please call your pharmacy and they will contact us.   If you need to  your refill at a new pharmacy, please contact the new pharmacy directly. The new pharmacy will help you get your medications transferred faster.   Scheduling:  If you have any concerns about today's visit or wish to schedule another appointment please call our office during normal business hours [phone] (8-5:00 M-F)  If a referral was made to a [facility] and you don't get a call from central scheduling please call [phone].  If a  Mammogram was ordered for you at [facility] call [phone] to schedule or change your appointment.  If you had an XRay/CT/Ultrasound/MRI ordered the number is [phone] to schedule or change your radiology appointment.   Medical Concerns:  If you have urgent medical concerns please call [phone] at any time of the day.  If you have a medical emergency please call 911.            Follow-ups after your visit        Who to contact     Please call your clinic at [phone] to:    Ask questions about your health    Make or cancel appointments    Discuss your medicines    Learn about your test results    Speak to your doctor   If you have compliments or concerns about an experience at your clinic, or if you wish to file a complaint, please contact [facility] Patient Relations at [phone] or email us at [email or web address]         Additional Information About Your Visit        iSquareharREHAPP Information     Balakam is an electronic gateway that provides easy, online access to your medical records. With Balakam, you can request a clinic appointment, read your test results, renew a prescription or communicate with your care team.     To sign up for Balakam visit the website at [email or web address]   You will be asked to enter the access code listed below, as well as some personal information. Please follow the directions to create your username and password.     Your access code is: [email or web address]  Expires: 2/15/2018  3:08 PM     Your access code will  in 90 days. If you need help or a new code, please contact your [facility] or call [phone] for assistance.        Care EveryWhere ID     This is your Care EveryWhere ID. This could be used by other organizations to access your [place] medical records  [id number]        Your Vitals Were     Pulse Temperature Respirations Pulse Oximetry Breastfeeding? BMI (Body Mass Index)     75 98.5  F (36.9  C) (Oral) 24 93% No 28.32 kg/m2       Blood Pressure from Last 3 Encounters:   11/17/17 130/77   03/21/17 119/73   02/16/17 115/62    Weight from Last 3 Encounters:   11/17/17 165 lb (74.8 kg)   03/21/17 159 lb (72.1 kg)   02/13/17 157 lb 10.1 oz (71.5 kg)               Primary Care Provider Office Phone # Fax #    Kassie Perry Wyatt [phone]       [address] [facility] [address]        Equal Access to Services     [facility]: Hadii solo hartman hadasho Soobed, waaxda luqadaha, qaybta kaalmada brynn, dana gee . So [facility] [phone].    ATENCIÓN: Si habla español, tiene a whyte disposición servicios gratuitos de asistencia lingüística. Llame al [phone].    We comply with applicable federal civil rights laws and [place] laws. We do not discriminate on the basis of race, color, national origin, age, disability, sex, sexual orientation, or gender identity.            Thank you!     Thank you for choosing [facility]  for your care. Our goal is always to provide you with excellent care. Hearing back from our patients is one way we can continue to improve our services. Please take a few minutes to complete the written survey that you may receive in the mail after your visit with us. Thank you!             Your Updated Medication List - Protect others around you: Learn how to safely use, store and throw away your medicines at www.disposemymeds.org.          This list is accurate as of: 11/17/17  3:08 PM.  Always use your most recent med list.                   Brand Name Dispense Instructions for use Diagnosis    ACETAMINOPHEN PO      Take 500 mg by mouth every 6 hours as needed for pain        ARIPiprazole 15 MG tablet    ABILIFY    30 tablet    Take 0.5 tablets (7.5 mg) by mouth At Bedtime    Bipolar 1 disorder (H)       Calcium carb-Vitamin D 500 mg [place]-200 units 500-200 MG-UNIT per tablet    OSCAL with D;Oyster Shell  Calcium    90 tablet    Take 1 tablet by mouth daily    Routine general medical examination at a health care facility       cetirizine 10 MG tablet    zyrTEC    30 tablet    Take 1 tablet (10 mg) by mouth daily as needed for allergies or rhinitis    Seasonal allergic rhinitis, unspecified allergic rhinitis trigger       clonazePAM 0.25 MG Tbdp ODT tab    klonoPIN    30 tablet    Take 1 tablet (0.25 mg) by mouth At Bedtime    Bipolar 1 disorder (H)       ferrous gluconate 324 (38 FE) MG tablet    FERGON    90 tablet    Take 1 tablet (324 mg) by mouth daily (with breakfast)    Health care maintenance       levothyroxine 75 MCG tablet    SYNTHROID/LEVOTHROID    30 tablet    Take 1 tablet (75 mcg) by mouth daily    Other specified hypothyroidism       loperamide 2 MG tablet    IMODIUM A-D    30 tablet    Start with 2 tabs (4 mg), then take one tab (2 mg) after each diarrheal stool.  Do not use more than  8 tabs (16 mg) per day.    Diarrhea       nystatin 590635 UNIT/GM Powd    MYCOSTATIN    60 g    Apply to area as needed three times per day    Candidiasis of skin

## 2017-11-17 NOTE — TELEPHONE ENCOUNTER
"RN called edward back.     Stated patient stated they were having a \"wet dream\" and got up and then fell out of bed. Pt Hit head and hand. Has a skin tear on forehead. Rt hand is slightly swollen but can still move it. Same day appt made for 2:20 with Dr. Franck Mazariegos, RN    "

## 2017-11-17 NOTE — PROGRESS NOTES
HPI:       Serge Marin is a 77 year old who presents for the following  Patient presents with:  Fall: Fell yesterday from her bed, hurt her rt hand and face       Patient said she had a dream and fell out of her twin sized bed and hit her head on a rocker and fell on her right wrist.  She did not lose consciousness. She had no N/V after the fall.  Denies dizziness or other concerns. Did not trip or lose balance. Says the fall was due to the dream. She denies headache or confusion.      She complains of pain in her right wrist after the fall. Able to move her wrist. Notes a little swelling.    Problem, Medication and Allergy Lists were reviewed and are current.  Patient is an established patient of this clinic.         Review of Systems:   Review of Systems   Constitutional: Negative for chills and fever.   Musculoskeletal:        + right wrist pain   Neurological: Negative for dizziness, weakness and light-headedness.   Psychiatric/Behavioral: Negative for confusion.             Physical Exam:   Patient Vitals for the past 24 hrs:   BP Temp Temp src Pulse Resp SpO2 Weight   11/17/17 1414 130/77 98.5  F (36.9  C) Oral 75 24 93 % 165 lb (74.8 kg)     Body mass index is 28.32 kg/(m^2).  Vitals were reviewed and were normal     Physical Exam   Constitutional: She appears well-developed and well-nourished. No distress.   HENT:   Head:       Superficial laceration on nose and above left eye   Musculoskeletal:        Right shoulder: She exhibits normal range of motion, no deformity, no laceration and normal strength.   Right wrist tenderness of 4th and 5th metacarpal base with mild edema   Psychiatric:   Flat affect.  Psychomotor delay.       Results:     Xray Wrist 3 view 11/17/17  No fracture seen.  Radiology read pending.    Assessment and Plan     1. Pain of right hand  - XR WRIST RT G/E 3 VW; Future  Mild swelling and tenderness of 4th and 5th metacarpal base.  No fracture seen on xray.  Recommend ice and  tylenol as needed for pain and swelling and to follow up if not improving.  Consider re-imaging in 3-4 weeks if no improvement as some fractures difficult to see on initial xray.    2.  Head injury  Superficial laceration noted above left eye and on nose.  Patient asymptomatic with superficial laceration.  No need for head imaging at this time.  If any mental status changes, would recommend returning to clinic or going to ED. Recommended bacitracin to apply on laceration.      There are no discontinued medications.  Options for treatment and follow-up care were reviewed with the patient. Serge Marin  engaged in the decision making process and verbalized understanding of the options discussed and agreed with the final plan.    Jani Juárez, DO

## 2017-11-17 NOTE — PATIENT INSTRUCTIONS
Here is the plan from today's visit    1. Pain of right hand  No fractures seen on xray although radiologist will read the imaging as well and will call if any concerns.  Take tylenol for the pain.  Can apply bacitracin (or triple antibiotic ointment) to forehead to keep from getting infected.  Otherwise, call or come in if any further concerns.  Expect to take 1-2 weeks to heal.  - XR WRIST RT G/E 3 VW; Future        Please call or return to clinic if your symptoms don't go away.    Follow up plan: if does not resolve or if worsens        Thank you for coming to [facility] today.  Lab Testing:  **If you had lab testing today and your results are reassuring or normal they will be mailed to you or sent through "[employer] within 7 days.   **If the lab tests need quick action we will call you with the results.  The phone number we will call with results is # [phone] (home) NONE (work). If this is not the best number please call our clinic and change the number.  Medication Refills:  If you need any refills please call your pharmacy and they will contact us.   If you need to  your refill at a new pharmacy, please contact the new pharmacy directly. The new pharmacy will help you get your medications transferred faster.   Scheduling:  If you have any concerns about today's visit or wish to schedule another appointment please call our office during normal business hours [phone] (8-5:00 M-F)  If a referral was made to a [facility] and you don't get a call from central scheduling please call [phone].  If a Mammogram was ordered for you at [facility] call [phone] to schedule or change your appointment.  If you had an XRay/CT/Ultrasound/MRI ordered the number is [phone] to schedule or change your radiology appointment.   Medical Concerns:  If you have urgent medical concerns please call [phone] at any time of the day.  If you have a medical emergency please  call 911.

## 2017-11-20 NOTE — PROGRESS NOTES
Preceptor Attestation:   Patient seen and discussed with the resident. Assessment and plan reviewed with resident and agreed upon.   Supervising Physician:  Beka Juarez MD  [facility]

## 2017-11-28 ENCOUNTER — OFFICE VISIT (OUTPATIENT)
Dept: FAMILY MEDICINE | Facility: CLINIC | Age: 77
End: 2017-11-28

## 2017-11-28 VITALS
SYSTOLIC BLOOD PRESSURE: 125 MMHG | TEMPERATURE: 98.2 F | DIASTOLIC BLOOD PRESSURE: 79 MMHG | WEIGHT: 160.2 LBS | HEART RATE: 73 BPM | OXYGEN SATURATION: 94 % | BODY MASS INDEX: 27.5 KG/M2

## 2017-11-28 DIAGNOSIS — R39.15 URINARY URGENCY: Primary | ICD-10-CM

## 2017-11-28 DIAGNOSIS — B35.4 TINEA CORPORIS: ICD-10-CM

## 2017-11-28 LAB
BILIRUBIN UR: NEGATIVE
BLOOD UR: ABNORMAL
GLUCOSE URINE: NEGATIVE
KETONES UR QL: NEGATIVE
LEUKOCYTE ESTERASE UR: ABNORMAL
NITRITE UR QL STRIP: NEGATIVE
PH UR STRIP: 7 [PH] (ref 5–7)
PROTEIN UR: NEGATIVE
SP GR UR STRIP: 1.02
UROBILINOGEN UR STRIP-ACNC: ABNORMAL

## 2017-11-28 RX ORDER — KETOCONAZOLE 20 MG/G
CREAM TOPICAL 2 TIMES DAILY
Qty: 60 G | Refills: 1 | Status: SHIPPED | OUTPATIENT
Start: 2017-11-28 | End: 2017-12-12

## 2017-11-28 ASSESSMENT — ENCOUNTER SYMPTOMS
RESPIRATORY NEGATIVE: 1
GASTROINTESTINAL NEGATIVE: 1
ROS SKIN COMMENTS: SEE HPI
DYSURIA: 0
FREQUENCY: 0
CARDIOVASCULAR NEGATIVE: 1

## 2017-11-28 NOTE — PATIENT INSTRUCTIONS
Here is the plan from today's visit    1. Urinary urgency  - Urinalysis, Micro If (UA) ([facility]):  Not suggestive of infection, urine culture pending  - Urine Culture Aerobic Bacterial    2. Tinea corporis  Use Ketoconazole cream instead of Nystatin powder.    Assist Wrennie with keeping skin folds clean and dry.    - ketoconazole (NIZORAL) 2 % cream; Apply topically 2 times daily for 14 days  Dispense: 60 g; Refill: 1    Follow-up with no improvement or worsening of symptoms.       Thank you for coming to [facility] today.  Lab Testing:  **If you had lab testing today and your results are reassuring or normal they will be mailed to you or sent through [employer] within 7 days.   **If the lab tests need quick action we will call you with the results.  The phone number we will call with results is # [phone] (home) NONE (work). If this is not the best number please call our clinic and change the number.  Medication Refills:  If you need any refills please call your pharmacy and they will contact us.   If you need to  your refill at a new pharmacy, please contact the new pharmacy directly. The new pharmacy will help you get your medications transferred faster.   Scheduling:  If you have any concerns about today's visit or wish to schedule another appointment please call our office during normal business hours [phone] (8-5:00 M-F)  If a referral was made to a [facility] and you don't get a call from central scheduling please call [phone].  If a Mammogram was ordered for you at [facility] call [phone] to schedule or change your appointment.  If you had an XRay/CT/Ultrasound/MRI ordered the number is [phone] to schedule or change your radiology appointment.   Medical Concerns:  If you have urgent medical concerns please call [phone] at any time of the day.  If you have a medical emergency please call 229.

## 2017-11-28 NOTE — PROGRESS NOTES
HPI:       Serge Marin is a 77 year old who presents for the following  Patient presents with:  Abdominal Pain: Rashes by surgical hysterectomy site. Lower abdomen  Forms: Pt brought forms in to review    Patient presents with forms from [facility] reporting rash/redness under breasts and abdominal folds and concern regarding urinary urgency.    Has been using Nystatin powder 2 times daily without improvement.  No itching.      Had Diarrhea 2 weeks after after eating something that didn't settle with her.  Then had urinary urgency, which has now resolved.  No dysuria or urinary frequency.  Denies abdominal pain, fever, nausea or vomiting.     Problem, Medication and Allergy Lists were   reviewed and are current.     Patient Active Problem List    Diagnosis Date Noted     Pneumonia 02/12/2017     Priority: Medium     Other specified hypothyroidism 10/18/2016     Priority: Medium     Thyroid function test abnormal 11/15/2013     Priority: Medium     History of elevated TSH, has not been on medication.  Further f/up with PMD is advised.       Bipolar 1 disorder (H) 04/19/2013     Priority: Medium     Chronic diarrhea 04/19/2013     Priority: Medium   ,     Current Outpatient Prescriptions   Medication Sig Dispense Refill     ferrous gluconate (FERGON) 324 (38 FE) MG tablet Take 1 tablet (324 mg) by mouth daily (with breakfast) 90 tablet 3     cetirizine (ZYRTEC) 10 MG tablet Take 1 tablet (10 mg) by mouth daily as needed for allergies or rhinitis 30 tablet 11     clonazePAM (KLONOPIN) 0.25 MG TBDP ODT tab Take 1 tablet (0.25 mg) by mouth At Bedtime 30 tablet 0     ARIPiprazole (ABILIFY) 15 MG tablet Take 0.5 tablets (7.5 mg) by mouth At Bedtime 30 tablet 1     ACETAMINOPHEN PO Take 500 mg by mouth every 6 hours as needed for pain       levothyroxine (SYNTHROID, LEVOTHROID) 75 MCG tablet Take 1 tablet (75 mcg) by mouth daily 30 tablet 3     nystatin (MYCOSTATIN) 271797 UNIT/GM POWD Apply to area as  needed three times per day 60 g 1     calcium carb 1250 mg, 500 mg [place],/vitamin D 200 units (CALCIUM CARB 1250 MG, 500 MG [place],/VITAMIN D 200 UNIT) 500-200 MG-UNIT per tablet Take 1 tablet by mouth daily 90 tablet 1     loperamide (IMODIUM A-D) 2 MG tablet Start with 2 tabs (4 mg), then take one tab (2 mg) after each diarrheal stool.  Do not use more than  8 tabs (16 mg) per day. 30 tablet 0   ,     Allergies   Allergen Reactions     Penicillins      Bactrim [Sulfamethoxazole W/Trimethoprim] Itching     Patient prescribed Bactrim at eye appointment. Assisted living reports eyes were red and itching.      Patient is an established patient of this clinic.         Review of Systems:   Review of Systems   Respiratory: Negative.    Cardiovascular: Negative.    Gastrointestinal: Negative.    Genitourinary: Negative for dysuria, frequency and urgency (now resolved).   Skin:        See HPI             Physical Exam:   Patient Vitals for the past 24 hrs:   BP Temp Temp src Pulse SpO2 Weight   11/28/17 1319 125/79 98.2  F (36.8  C) Oral 73 94 % 160 lb 3.2 oz (72.7 kg)     Body mass index is 27.5 kg/(m^2).  Vitals were reviewed and were normal     Physical Exam   Constitutional: She is oriented to person, place, and time. She appears well-developed and well-nourished. No distress.   Cardiovascular: Normal rate and regular rhythm.    Pulmonary/Chest: Effort normal and breath sounds normal. No respiratory distress. She has no wheezes.   Neurological: She is alert and oriented to person, place, and time.   Skin:   Skin folds under breasts and pannus with erythematous rash, with well demarcated borders         Results:     Results for orders placed or performed in visit on 11/28/17   Urinalysis, Micro If (UA) ([facility])   Result Value Ref Range    Specific Gravity Urine 1.020 1.005 - 1.030    pH Urine 7.0 4.5 - 8.0    Leukocyte Esterase UR 1+ (A) NEGATIVE    Nitrite Urine Negative NEGATIVE    Protein UR Negative NEGATIVE     Glucose Urine Negative NEGATIVE    Ketones Urine Negative NEGATIVE    Urobilinogen mg/dL 0.2 E.U./dL 0.2 E.U./dL    Bilirubin UR Negative NEGATIVE    Blood UR Trace-intact (A) NEGATIVE       Assessment and Plan       Serge was seen today for abdominal pain and forms.    Diagnoses and all orders for this visit:    Urinary urgency - now resolved per patient  -     Urinalysis, Micro If (UA) ([facility]  -     Urine Culture Aerobic Bacterial - pending, will rule out infection    Tinea corporis  Previously using Nystatin powder without improvement.   Stop Nystatin powder and initiate ketoconazole cream.   -     ketoconazole (NIZORAL) 2 % cream; Apply topically 2 times daily for 14 days    Follow-up with no improvement or worsening of symptoms.     Nursing contact at [facility] :  [phone]      Options for treatment and follow-up care were reviewed with the patient. Serge EPIFANIO Dylonkhanh  engaged in the decision making process and verbalized understanding of the options discussed and agreed with the final plan.    Clara Davenport, APRN CNP

## 2017-11-28 NOTE — MR AVS SNAPSHOT
After Visit Summary   11/28/2017    Serge Marin    MRN: [id number]           Patient Information     Date Of Birth          1940        Visit Information        Provider Department      11/28/2017 1:00 PM Clara Davenport APRN CNP [facility]        Today's Diagnoses     Urinary urgency    -  1    Tinea corporis          Care Instructions    Here is the plan from today's visit    1. Urinary urgency  - Urinalysis, Micro If (UA) ([facility]):  Not suggestive of infection, urine culture pending  - Urine Culture Aerobic Bacterial    2. Tinea corporis  Use Ketoconazole cream instead of Nystatin powder.    Assist Serge with keeping skin folds clean and dry.    - ketoconazole (NIZORAL) 2 % cream; Apply topically 2 times daily for 14 days  Dispense: 60 g; Refill: 1    Follow-up with no improvement or worsening of symptoms.       Thank you for coming to [facility] today.  Lab Testing:  **If you had lab testing today and your results are reassuring or normal they will be mailed to you or sent through goBramble within 7 days.   **If the lab tests need quick action we will call you with the results.  The phone number we will call with results is # [phone] (home) NONE (work). If this is not the best number please call our clinic and change the number.  Medication Refills:  If you need any refills please call your pharmacy and they will contact us.   If you need to  your refill at a new pharmacy, please contact the new pharmacy directly. The new pharmacy will help you get your medications transferred faster.   Scheduling:  If you have any concerns about today's visit or wish to schedule another appointment please call our office during normal business hours [phone] (8-5:00 M-F)  If a referral was made to a [facility] and you don't get a call from central scheduling please call [phone].  If a Mammogram was ordered for you at [facility] call [phone] to schedule or change your appointment.  If you had an XRay/CT/Ultrasound/MRI ordered the number is [phone] to schedule or change your radiology appointment.   Medical Concerns:  If you have urgent medical concerns please call [phone] at any time of the day.  If you have a medical emergency please call 911.            Follow-ups after your visit        Who to contact     Please call your clinic at [phone] to:    Ask questions about your health    Make or cancel appointments    Discuss your medicines    Learn about your test results    Speak to your doctor   If you have compliments or concerns about an experience at your clinic, or if you wish to file a complaint, please contact [facility] Patient Relations at [phone] or email us at [email or web address]         Additional Information About Your Visit        [employer] Information     MetraTech is an electronic gateway that provides easy, online access to your medical records. With MetraTech, you can request a clinic appointment, read your test results, renew a prescription or communicate with your care team.     To sign up for MetraTech visit the website at [email or web address]   You will be asked to enter the access code listed below, as well as some personal information. Please follow the directions to create your username and password.     Your access code is: [email or web address]  Expires: 2/15/2018  3:08 PM     Your access code will  in 90 days. If you need help or a new code, please contact your [facility] or call [phone] for assistance.        Care EveryWhere ID     This is your Care EveryWhere ID. This could be used by other organizations to access your [facility] medical records  [id number]        Your Vitals Were     Pulse Temperature Pulse Oximetry Breastfeeding? BMI (Body Mass Index)       73 98.2  F (36.8  C) (Oral) 94% No 27.5 kg/m2         Blood Pressure from Last 3 Encounters:   11/28/17 125/79   11/17/17 130/77   03/21/17 119/73    Weight from Last 3 Encounters:   11/28/17 160 lb 3.2 oz (72.7 kg)   11/17/17 165 lb (74.8 kg)   03/21/17 159 lb (72.1 kg)              We Performed the Following     Urinalysis, Micro If (UA) ([facility])     Urine Culture Aerobic Bacterial          Today's Medication Changes          These changes are accurate as of: 11/28/17  2:13 PM.  If you have any questions, ask your nurse or doctor.               Start taking these medicines.        Dose/Directions    ketoconazole 2 % cream   Commonly known as:  NIZORAL   Used for:  Tinea corporis   Started by:  Clara Davenport APRN CNP        Apply topically 2 times daily for 14 days   Quantity:  60 g   Refills:  1            Where to get your medicines      These medications were sent to [place] [address] [place], [place] [address] [address] [facility] [address]     Phone:  [phone]     ketoconazole 2 % cream                Primary Care Provider Office Phone # Fax #    Kassie Wyatt [phone]       [address] [facility] [address]        Equal Access to Services     PAPO VALENCIA AH: Hadii solo hartman hadasho Soanthonyali, waaxda luqadaha, qaybta kaalmada adeegyada, dana riggs. So [facility] [phone].    ATENCIÓN: Si habla español, tiene a whyte disposición servicios gratuitos de asistencia lingüística. Llame al [phone].    We comply with applicable federal civil rights laws and [place] laws. We do not discriminate on the basis of race, color, national origin, age, disability, sex, sexual orientation, or gender identity.            Thank you!     Thank you for choosing [facility]  for your care. Our goal is always to provide you with excellent care. Hearing back from our patients is one way we can continue to improve our services. Please take a few minutes to complete  the written survey that you may receive in the mail after your visit with us. Thank you!             Your Updated Medication List - Protect others around you: Learn how to safely use, store and throw away your medicines at www.disposemymeds.org.          This list is accurate as of: 11/28/17  2:13 PM.  Always use your most recent med list.                   Brand Name Dispense Instructions for use Diagnosis    ACETAMINOPHEN PO      Take 500 mg by mouth every 6 hours as needed for pain        ARIPiprazole 15 MG tablet    ABILIFY    30 tablet    Take 0.5 tablets (7.5 mg) by mouth At Bedtime    Bipolar 1 disorder (H)       Calcium carb-Vitamin D 500 mg [place]-200 units 500-200 MG-UNIT per tablet    OSCAL with D;Oyster Shell Calcium    90 tablet    Take 1 tablet by mouth daily    Routine general medical examination at a health care facility       cetirizine 10 MG tablet    zyrTEC    30 tablet    Take 1 tablet (10 mg) by mouth daily as needed for allergies or rhinitis    Seasonal allergic rhinitis, unspecified allergic rhinitis trigger       clonazePAM 0.25 MG Tbdp ODT tab    klonoPIN    30 tablet    Take 1 tablet (0.25 mg) by mouth At Bedtime    Bipolar 1 disorder (H)       ferrous gluconate 324 (38 FE) MG tablet    FERGON    90 tablet    Take 1 tablet (324 mg) by mouth daily (with breakfast)    Health care maintenance       ketoconazole 2 % cream    NIZORAL    60 g    Apply topically 2 times daily for 14 days    Tinea corporis       levothyroxine 75 MCG tablet    SYNTHROID/LEVOTHROID    30 tablet    Take 1 tablet (75 mcg) by mouth daily    Other specified hypothyroidism       loperamide 2 MG tablet    IMODIUM A-D    30 tablet    Start with 2 tabs (4 mg), then take one tab (2 mg) after each diarrheal stool.  Do not use more than  8 tabs (16 mg) per day.    Diarrhea       nystatin 153352 UNIT/GM Powd    MYCOSTATIN    60 g    Apply to area as needed three times per day    Candidiasis of skin

## 2017-11-28 NOTE — LETTER
November 30, 2017      Serge Marin  [address]    [place] [address]        Dear Serge,    Thank you for getting your care at [facility]. Please see below for your test results.    The urine culture did not grow out any significant bacteria and was reassuring.     Resulted Orders   Urinalysis, Micro If (UA) ([facility])   Result Value Ref Range    Specific Gravity Urine 1.020 1.005 - 1.030    pH Urine 7.0 4.5 - 8.0    Leukocyte Esterase UR 1+ (A) NEGATIVE    Nitrite Urine Negative NEGATIVE    Protein UR Negative NEGATIVE    Glucose Urine Negative NEGATIVE    Ketones Urine Negative NEGATIVE    Urobilinogen mg/dL 0.2 E.U./dL 0.2 E.U./dL    Bilirubin UR Negative NEGATIVE    Blood UR Trace-intact (A) NEGATIVE   Urine Culture Aerobic Bacterial   Result Value Ref Range    Specimen Description Midstream Urine     Special Requests Specimen received in preservative     Culture Micro       <10,000 colonies/mL  urogenital rosalba  Susceptibility testing not routinely done         If you have any concerns about these results please call and leave a message for me or send a [employer] message to the clinic.    Sincerely,    Clara Davenport, JASSON CNP

## 2017-11-29 LAB
BACTERIA SPEC CULT: NORMAL
Lab: NORMAL
SPECIMEN SOURCE: NORMAL

## 2018-03-07 ENCOUNTER — DOCUMENTATION ONLY (OUTPATIENT)
Dept: FAMILY MEDICINE | Facility: CLINIC | Age: 78
End: 2018-03-07

## 2018-03-07 NOTE — PROGRESS NOTES
"When opening a documentation only encounter, be sure to enter in \"Chief Complaint\" Forms and in \" Comments\" Title of form, description if needed.    Serge is a 77 year old  female  Form received via: Fax  Form now resides in: Provider Ready    Shawna Schroeder              Form has been completed by provider.     Form sent out via: Fax to Alessandra at Fax Number: [phone]  Patient informed: No   Output date: March 16, 2018    Shawna Schroeder      **Please close the encounter**      "

## 2018-03-16 ENCOUNTER — DOCUMENTATION ONLY (OUTPATIENT)
Dept: FAMILY MEDICINE | Facility: CLINIC | Age: 78
End: 2018-03-16

## 2018-03-28 ENCOUNTER — OFFICE VISIT (OUTPATIENT)
Dept: FAMILY MEDICINE | Facility: CLINIC | Age: 78
End: 2018-03-28
Payer: COMMERCIAL

## 2018-03-28 VITALS
HEART RATE: 66 BPM | WEIGHT: 157.8 LBS | BODY MASS INDEX: 27.09 KG/M2 | SYSTOLIC BLOOD PRESSURE: 121 MMHG | DIASTOLIC BLOOD PRESSURE: 80 MMHG | TEMPERATURE: 98.5 F | RESPIRATION RATE: 16 BRPM | OXYGEN SATURATION: 95 %

## 2018-03-28 DIAGNOSIS — E03.8 OTHER SPECIFIED HYPOTHYROIDISM: ICD-10-CM

## 2018-03-28 DIAGNOSIS — L98.9 SKIN LESION: ICD-10-CM

## 2018-03-28 DIAGNOSIS — B35.4 TINEA CORPORIS: Primary | ICD-10-CM

## 2018-03-28 PROBLEM — J18.9 PNEUMONIA: Status: RESOLVED | Noted: 2017-02-12 | Resolved: 2018-03-28

## 2018-03-28 LAB — TSH SERPL DL<=0.005 MIU/L-ACNC: 2.69 MU/L (ref 0.4–4)

## 2018-03-28 RX ORDER — CLOTRIMAZOLE 1 %
CREAM (GRAM) TOPICAL 2 TIMES DAILY
Qty: 30 G | Refills: 3 | Status: SHIPPED | OUTPATIENT
Start: 2018-03-28 | End: 2019-01-15

## 2018-03-28 NOTE — MR AVS SNAPSHOT
After Visit Summary   3/28/2018    Serge Marin    MRN: [id number]           Patient Information     Date Of Birth          1940        Visit Information        Provider Department      3/28/2018 10:40 AM Kassie Wyatt MD [facility]        Today's Diagnoses     Tinea corporis    -  1    Skin lesion        Other specified hypothyroidism          Care Instructions    Here is the plan from today's visit    1. Tinea corporis in left pannus  Not improving with nystatin.   Stop nystatin  Start clotrimazole (broader coverage)  - clotrimazole (LOTRIMIN) 1 % cream; Apply topically 2 times daily To left abdominal skin fold.  Dispense: 30 g; Refill: 3    2. Raised skin lesion in left skin fold  - Dermatology Referral    3. Hypothyroidism  - need to check TSH level after dose increase October 2016    Please call or return to clinic if your symptoms don't go away.    Thank you for coming to [facility] today.  Lab Testing:  **If you had lab testing today and your results are reassuring or normal they will be mailed to you or sent through Animalvitae within 7 days.   **If the lab tests need quick action we will call you with the results.  The phone number we will call with results is # [phone] (home) NONE (work). If this is not the best number please call our clinic and change the number.  Medication Refills:  If you need any refills please call your pharmacy and they will contact us.   If you need to  your refill at a new pharmacy, please contact the new pharmacy directly. The new pharmacy will help you get your medications transferred faster.   Scheduling:  If you have any concerns about today's visit or wish to schedule another appointment please call our office during normal business hours [phone] (8-5:00 M-F)  If a referral was made to a [facility] and you don't get a call from central scheduling please call [phone].  If a  Mammogram was ordered for you at [facility] call [phone] to schedule or change your appointment.  If you had an XRay/CT/Ultrasound/MRI ordered the number is [phone] to schedule or change your radiology appointment.   Medical Concerns:  If you have urgent medical concerns please call [phone] at any time of the day.    Kassie Wyatt MD            Follow-ups after your visit        Additional Services     DERMATOLOGY REFERRAL - INTERNAL       Your provider has referred you to: [facility]: Dermatology Clinic [facility] [phone]   [email or web address]    Please be aware that coverage of these services is subject to the terms and limitations of your health insurance plan.  Call member services at your health plan with any benefit or coverage questions.      Please bring the following with you to your appointment:    (1) Any X-Rays, CTs or MRIs which have been performed.  Contact the facility where they were done to arrange for  prior to your scheduled appointment.    (2) List of current medications  (3) This referral request   (4) Any documents/labs given to you for this referral                  Who to contact     Please call your clinic at [phone] to:    Ask questions about your health    Make or cancel appointments    Discuss your medicines    Learn about your test results    Speak to your doctor            Additional Information About Your Visit        MyChart Information     Radionomy is an electronic gateway that provides easy, online access to your medical records. With Radionomy, you can request a clinic appointment, read your test results, renew a prescription or communicate with your care team.     To sign up for [employer]t visit the website at [email or web address]   You will be asked to enter the access code listed below, as well as some personal information. Please follow the directions to create your username and password.     Your  access code is: [email or web address]  Expires: 2018 11:09 AM     Your access code will  in 90 days. If you need help or a new code, please contact your [facility] or call [phone] for assistance.        Care EveryWhere ID     This is your Care EveryWhere ID. This could be used by other organizations to access your [facility] medical records  [id number]        Your Vitals Were     Pulse Temperature Respirations Pulse Oximetry BMI (Body Mass Index)       66 98.5  F (36.9  C) (Oral) 16 95% 27.09 kg/m2        Blood Pressure from Last 3 Encounters:   18 121/80   17 125/79   17 130/77    Weight from Last 3 Encounters:   18 157 lb 12.8 oz (71.6 kg)   17 160 lb 3.2 oz (72.7 kg)   17 165 lb (74.8 kg)              We Performed the Following     DERMATOLOGY REFERRAL - INTERNAL     TSH with free T4 reflex          Today's Medication Changes          These changes are accurate as of 3/28/18 11:09 AM.  If you have any questions, ask your nurse or doctor.               Start taking these medicines.        Dose/Directions    clotrimazole 1 % cream   Commonly known as:  LOTRIMIN   Used for:  Tinea corporis   Started by:  Kassie Wyatt MD        Apply topically 2 times daily To left abdominal skin fold.   Quantity:  30 g   Refills:  3         Stop taking these medicines if you haven't already. Please contact your care team if you have questions.     nystatin 834421 UNIT/GM Powd   Commonly known as:  MYCOSTATIN   Stopped by:  Kassie Wyatt MD                Where to get your medicines      These medications were sent to [place], [place] - [address] [facility]  [address] [facility] [address]     Phone:  [phone]     clotrimazole 1 % cream                Primary Care Provider Office Phone # Fax #    Kassie Wyatt [phone]       [address]  [facility] [address]        Equal Access to  Services     [facility]: Hadii solo hartman dean Agarwalali, waaxda luqadaha, qaybta kaalmada brynn, dana gee . So [facility] [phone].    ATENCIÓN: Si jose andrews, tiene a whyte disposición servicios gratuitos de asistencia lingüística. Llame al [phone].    We comply with applicable federal civil rights laws and [place] laws. We do not discriminate on the basis of race, color, national origin, age, disability, sex, sexual orientation, or gender identity.            Thank you!     Thank you for choosing [facility]  for your care. Our goal is always to provide you with excellent care. Hearing back from our patients is one way we can continue to improve our services. Please take a few minutes to complete the written survey that you may receive in the mail after your visit with us. Thank you!             Your Updated Medication List - Protect others around you: Learn how to safely use, store and throw away your medicines at www.disposemymeds.org.          This list is accurate as of 3/28/18 11:09 AM.  Always use your most recent med list.                   Brand Name Dispense Instructions for use Diagnosis    ACETAMINOPHEN PO      Take 500 mg by mouth every 6 hours as needed for pain        ARIPiprazole 15 MG tablet    ABILIFY    30 tablet    Take 0.5 tablets (7.5 mg) by mouth At Bedtime    Bipolar 1 disorder (H)       Calcium carb-Vitamin D 500 mg [place]-200 units 500-200 MG-UNIT per tablet    OSCAL with D;Oyster Shell Calcium    90 tablet    Take 1 tablet by mouth daily    Routine general medical examination at a health care facility       cetirizine 10 MG tablet    zyrTEC    30 tablet    Take 1 tablet (10 mg) by mouth daily as needed for allergies or rhinitis    Seasonal allergic rhinitis, unspecified allergic rhinitis trigger       clonazePAM 0.25 MG Tbdp ODT tab    klonoPIN    30 tablet    Take 1 tablet (0.25 mg) by mouth At Bedtime    Bipolar 1 disorder (H)        clotrimazole 1 % cream    LOTRIMIN    30 g    Apply topically 2 times daily To left abdominal skin fold.    Tinea corporis       ferrous gluconate 324 (38 FE) MG tablet    FERGON    90 tablet    Take 1 tablet (324 mg) by mouth daily (with breakfast)    Health care maintenance       levothyroxine 75 MCG tablet    SYNTHROID/LEVOTHROID    30 tablet    Take 1 tablet (75 mcg) by mouth daily    Other specified hypothyroidism       loperamide 2 MG tablet    IMODIUM A-D    30 tablet    Start with 2 tabs (4 mg), then take one tab (2 mg) after each diarrheal stool.  Do not use more than  8 tabs (16 mg) per day.    Diarrhea

## 2018-03-28 NOTE — PROGRESS NOTES
HPI:       Serge Marin is a 77 year old who presents for left abdominal skin rash    She has had the rash for many months. Previously treated with nystatin cream and powder. She is currently using the cream. The rash will go away afer a while, and then return. She reports she uses the cream even after the rash goes away.     Patient very hard of hearing, did not answer most questions. She lives in a facility where meds are managed for her.     Problem, Medication and Allergy Lists were reviewed and are current.  Patient is an established patient of this clinic.         Review of Systems:   Review of Systems          Physical Exam:   Patient Vitals for the past 24 hrs:   Weight   03/28/18 1018 157 lb 12.8 oz (71.6 kg)   /80  Pulse 66  Temp 98.5  F (36.9  C) (Oral)  Resp 16  Wt 157 lb 12.8 oz (71.6 kg)  SpO2 95%  BMI 27.09 kg/m2   Body mass index is 27.09 kg/(m^2).     Physical Exam   HENT:   Right Ear: Decreased hearing is noted.   Left Ear: Decreased hearing is noted.   Skin:   Left groin crease: 6 inches of bright pink shiny skin extending along left groin crease, lateral aspect of crease has a 2cm raised lesion that is dark pink. Scattered 1-2cm hyperpigmented plaques on LLQ skin       Results:       Assessment and Plan     Patient Instructions   Here is the plan from today's visit    1. Tinea corporis in left pannus  Not improving with nystatin.   Stop nystatin  Start clotrimazole (broader coverage)  - clotrimazole (LOTRIMIN) 1 % cream; Apply topically 2 times daily To left abdominal skin fold.  Dispense: 30 g; Refill: 3    2. Raised skin lesion in left skin fold  Likely benign but warrants a second opinion and possible biopsy.   - Dermatology Referral    3. Hypothyroidism  - TSH with reflex T4  - need to check TSH level after dose increase October 2016    There are no discontinued medications.  Options for treatment and follow-up care were reviewed with the patient. Serge Marin   engaged in the decision making process and verbalized understanding of the options discussed and agreed with the final plan.    I spent 25 min face to face with the patient and >50% was spent counselling the patient about the above medical conditions, educating patient and discussing the recommendations and followup .    Kassie Wyatt MD   [facility]

## 2018-03-28 NOTE — LETTER
March 30, 2018      Serge Marin  [address]    [place] [address]        Dear Serge,    Thank you [facility] getting [facility]. Please see below for your test results.  Normal thyroid level. This is reassuring. No change in medication dosing.     Resulted Orders   TSH with free T4 reflex   Result Value Ref Range    TSH 2.69 0.40 - 4.00 mU/L       Sincerely,    Kassie Wyatt MD

## 2018-03-28 NOTE — PATIENT INSTRUCTIONS
Here is the plan from today's visit    1. Tinea corporis in left pannus  Not improving with nystatin.   Stop nystatin  Start clotrimazole (broader coverage)  - clotrimazole (LOTRIMIN) 1 % cream; Apply topically 2 times daily To left abdominal skin fold.  Dispense: 30 g; Refill: 3    2. Raised skin lesion in left skin fold  - Dermatology Referral    3. Hypothyroidism  - need to check TSH level after dose increase October 2016    Please call or return to clinic if your symptoms don't go away.    Thank you for coming to [facility] today.  Lab Testing:  **If you had lab testing today and your results are reassuring or normal they will be mailed to you or sent through Consano within 7 days.   **If the lab tests need quick action we will call you with the results.  The phone number we will call with results is # [phone] (home) NONE (work). If this is not the best number please call our clinic and change the number.  Medication Refills:  If you need any refills please call your pharmacy and they will contact us.   If you need to  your refill at a new pharmacy, please contact the new pharmacy directly. The new pharmacy will help you get your medications transferred faster.   Scheduling:  If you have any concerns about today's visit or wish to schedule another appointment please call our office during normal business hours [phone] (8-5:00 M-F)  If a referral was made to a [facility] and you don't get a call from central scheduling please call [phone].  If a Mammogram was ordered for you at [facility] call [phone] to schedule or change your appointment.  If you had an XRay/CT/Ultrasound/MRI ordered the number is [phone] to schedule or change your radiology appointment.   Medical Concerns:  If you have urgent medical concerns please call [phone] at any time of the day.    Kassie Wyatt MD      Dermatology referral  [phone]  Patient declined  appt

## 2018-05-08 ENCOUNTER — OFFICE VISIT (OUTPATIENT)
Dept: FAMILY MEDICINE | Facility: CLINIC | Age: 78
End: 2018-05-08
Payer: COMMERCIAL

## 2018-05-08 VITALS
TEMPERATURE: 98 F | WEIGHT: 154.6 LBS | SYSTOLIC BLOOD PRESSURE: 124 MMHG | BODY MASS INDEX: 26.54 KG/M2 | DIASTOLIC BLOOD PRESSURE: 72 MMHG | OXYGEN SATURATION: 95 % | HEART RATE: 69 BPM

## 2018-05-08 DIAGNOSIS — Z23 ENCOUNTER FOR IMMUNIZATION: ICD-10-CM

## 2018-05-08 DIAGNOSIS — Z00.00 HEALTHCARE MAINTENANCE: Primary | ICD-10-CM

## 2018-05-08 DIAGNOSIS — Z78.9 NONIMMUNE TO HEPATITIS B VIRUS: ICD-10-CM

## 2018-05-08 DIAGNOSIS — R73.01 ELEVATED FASTING GLUCOSE: ICD-10-CM

## 2018-05-08 LAB
CHOLEST SERPL-MCNC: 202.8 MG/DL (ref 0–200)
CHOLEST/HDLC SERPL: 2.9 {RATIO} (ref 0–5)
GLUCOSE P FAST SERPL-MCNC: 111 MG/DL (ref 51–110)
HDLC SERPL-MCNC: 70.1 MG/DL
LDLC SERPL CALC-MCNC: 117 MG/DL (ref 0–129)
TRIGL SERPL-MCNC: 77.6 MG/DL (ref 0–150)
VLDL CHOLESTEROL: 15.5 MG/DL (ref 7–32)

## 2018-05-08 NOTE — PROGRESS NOTES
">65 year old Wellness Visit ( Medicare etc)         HPI       This 77 year old female presents as an established patient  who presents for a  Annual Wellness Exam.    Other issues patient wants to address today:    none    Visual Acuity: :  Testing not done; patient will be seeing eye doctor next week. Per patient, this is not scheduled, she sees Dr. Stone.     Patient Active Problem List   Diagnosis     Bipolar 1 disorder (H)     Chronic diarrhea     Thyroid function test abnormal     Other specified hypothyroidism     Tinea corporis       History reviewed. No pertinent past medical history.     History reviewed. No pertinent family history.      Problem List, Family History and past Medical History reviewed and unchanged/updated.       Health Risk Assessment / Review of Systems     Constitutional:   Fevers or night sweats?  NO      Eyes:   Vision problems?  No           Hearing:  Do you feel you have hearing loss?  Yes, has hearing aids, doesn't wear them, \"too noisy\"  Cardiovascular:  Chest pain, palpitations, or pain with walking? No    Respiratory:   Breathing problems or cough?  No, unless having anxiety problems  :   Difficulty controlling urination?  NO        Musculoskeletal:   Stiffness or sore joints?  NO            Skin:   concerning lesions or moles?  NO           Nervous System:   loss of strength or sensation,  numbness or tingling,  tremor,  dizziness,  headache?  NO         Mental Health:   depression or anxiety,  sleep problems?  Anxiety is \"medium\"  Cognition:  Memory problems?  NO       WWeight Loss: Have you lost 10 or more pounds unintentionally in the previous year?  NO  Energy: How much of the time during the past 3 weeks did you feel tired?   (check one of the following):   ?  All of the time X Most of the time  ? Some of the time  ? A little of the time  ? None of the Time    PHQ-2 Score:   PHQ-2 ( 1999 [employer]) 3/28/2018 11/28/2017   Q1: Little interest or pleasure in doing things 0 1 "   Q2: Feeling down, depressed or hopeless 0 1   PHQ-2 Score 0 2            Medical Care     What other specialists or organizations are involved in your medical care?     Patient Care Team       Relationship Specialty Notifications Start End    Kassie Wyatt MD PCP - General Family Practice  7/1/14     Phone: [phone] Fax: [phone]         [address] [facility] [id number]      Dr. Stone - eye doctor  Audiologist (hearing aids)         Social History / Home Safety     Social History   Substance Use Topics     Smoking status: Never Smoker     Smokeless tobacco: Never Used     Alcohol use No     Marital Status:  Who lives in your household? Self  Does your home have any of the following safety concerns? Loose rugs in the hallway, no grab bars in the bathroom, no handrails on the stairs or have poorly lit areas?  No   Do you feel threatened or controlled by a partner, ex-partner or anyone in your life? {Yes No   Has anyone hurt you physically, for example by pushing, hitting, slapping or kicking you   or forcing you to have sex? No          Functional Status     Do you need help with dressing yourself, bathing, or walking? YES Few times with dressing   Do you need help with the phone, transportation, shopping, preparing meals, housework, laundry, medications or managing money? YES Pt stated that this is all done for her  By yourself and not using aids, do you have any difficulty walking up 10 steps  without resting? No   By yourself and not using aids, do you have any difficulty walking several hundred yards? No                Risk Behaviors and Healthy Habits     History   Smoking Status     Never Smoker   Smokeless Tobacco     Never Used     How many servings of fruits and vegetables do you eat a day? 3  Exercise:walking  6-7 days/week for an average of 15-30 minutes  Do you frequently drive without a seatbelt? No   History   Smoking Status     Never Smoker   Smokeless Tobacco     Never  "Used     Do you use any other drugs? No       Do you use alcohol?No    Functional Ability     Was the patient's timed Up & Go test (Get up from chair walk, 10 feet turn, return to chair and sit down) unsteady or longer than 30 seconds? No, 16 sec     Fall risk:     1. Have you fallen two or more times in the past year? No   2. Have you fallen and had an injury in the past year?   YES Fell off bed and hurt R hand      EVALUATION OF COGNITIVE FUNCTION     Family member/caregiver input: none  COGNITIVE SCREEN  1) Repeat 3 items (Banana, Sunrise, Chair)    2) Clock draw: ABNORMAL Not able to draw clock for MA. When provider asked, pt stated \"I don't understand what a clock has to do with anything\". I explained it was to test how her brain is working. After a long pause she repeated that she does not understand the question. She \"doesn't need a clock\" because she's on a schedule. \"Lunch is at 11:30, if you're late you get no lunch. Dinner is at 4:30, if you're late, no dinner.\"She knows it is 11:30 for lunch because of the clock on her microwave.   3) 3 item recall: Recalls 2 object for the MA at 3 minuts, recalls 3 objects for MD at 30 minutes.   Results:  PROBABLE COGNITIVE IMPAIRMENT  Mini-CogTM Copyright S Melita. Licensed by the author for use in [facility]; reprinted with permission (soob@.[email or web address]). All rights reserved.      Other Assessments     Advance Directives: Discussed with patient and family as appropriate.  Has patient completed advance directives and/or a living will?  no   Patient reports that Pernell (son) is substitute medical decision maker, he lives in [place], they do not see eachother or talk much because he works a lot and has 3 children. She is not aware of any paperwork that designates him as her substitute medical decision maker. But she does have a \"will\" (maybe a living will?). Provided with health care directive form today, explained that she needs to complete it, ideally with " Pernell, and the proper steps to have it legalized (notary or 2 witnesses).     Immunization History   Administered Date(s) Administered     Influenza (High Dose) 3 valent vaccine 10/13/2016     Pneumo Conj 13-V (2010&after) 02/01/2016     Reviewed Immunization Record Today         Physical Exam   /72  Pulse 69  Temp 98  F (36.7  C) (Oral)  Wt 154 lb 9.6 oz (70.1 kg)  SpO2 95%  Breastfeeding? No  BMI 26.54 kg/m2   BMI= There is no height or weight on file to calculate BMI.  GENERAL APPEARANCE: alert and no distress  HENT: ear canals patent and TM's normal; mouth without ulcers or lesions; and oral mucous membranes moist  Hearing loss screen:  Not performed, known significant hearing deficit. Patient acknowledges about half of questions asked and I was speaking in a loud voice the entire visit.    DENTITION:  Good repair?  no  RESP: lungs clear to auscultation - no rales, rhonchi or wheezes  CV: regular rates and rhythm, no murmur, click or rub, no peripheral edema and peripheral pulses strong  SKIN: no suspicious lesions or rashes  NEURO: Normal strength and tone  MENTAL STATUS EXAM  Appearance: appropriate  Attitude: cooperative  Behavior: normal  Eye Contact: normal  Speech: normal  Orientation: oreinted to person , place, time and situation  Mood:  Medium anxiety  Affect: Mood Congruent  Thought Process: clear    Currently fasting    Results for orders placed or performed in visit on 05/08/18   Lipid [place] ([facility])   Result Value Ref Range    Cholesterol 202.8 (H) 0.0 - 200.0 mg/dL    Cholesterol/HDL Ratio 2.9 0.0 - 5.0    HDL Cholesterol 70.1 >40.0 mg/dL    LDL Cholesterol Calculated 117 0 - 129 mg/dL    Triglycerides 77.6 0.0 - 150.0 mg/dL    VLDL Cholesterol 15.5 7.0 - 32.0 mg/dL   Glucose Fasting ([facility])   Result Value Ref Range    Glucose Fasting 111.0 (H) 51.0 - 110.0 mg/dL             Assessment and Plan     Serge was seen today for wellness visit.    Diagnoses and all orders for this  visit:    Healthcare maintenance  -     Lipid [place] ([facility])  -     Glucose Fasting ([facility])  -     Hepatitis B Surface Antibody  -     Hepatitis B surface antigen    Encounter for immunization  -     ADMIN VACCINE, INITIAL  -     Pneumococcal vaccine 23 valent PPSV23  (Pneumovax) [[id number]]      Patient Instructions     1. Call Dr. Stone for eye appointment     2. Follow up with Audiologist for hearing aid adjustments    3. Complete Health Care Directive     [facility] [place] MEDICARE PERSONAL PREVENTIVE SERVICES PLAN - SERVICES     Welcome to Medicare Preventive Visit / Initial Medicare Annual Wellness Exam - reviewed Preventive Services and Plan form with patient as specified in Patient Instructions.                                                    IMMUNIZATIONS Description Recommend today?     Hepatitis B  If you have any of the following risk factors you should be immunized for hepatitis B: severe kidney disease, people who live in the same house as a carrier of Hepatitis B virus, people who live in  institutions (e.g. nursing homes or group homes), homosexual men, patients with hemophilia who received Factor VIII or IX concentrates, abusers of illicit injectable drugs Yes; Recommended and ordered.     SCREENING TESTS     Description   Year of Last Screening   Recommended Today?   Heart disease screening blood tests    Cholesterol level Reducing cholesterol can reduce your risk of heart attacks by 25%.  Screening is recommended yearly if you are at risk of heart disease otherwise every 4-5 years  Yes; Recommended and ordered.   Diabetes screening tests    Hemoglobin A1c blood test   Finding and treating diabetes early can reduce complications.  Screening recommended/covered yearly if you have high blood pressure, high cholesterol, obesity (BMI >30), or a history of high blood glucose tests; or 2 of the following: family history of diabetes, overweight (BMI >25 but <30), age 65 years or older, and a history of  diabetes of pregnancy or gave birth to baby weighing more than 9 lbs.  Yes; Recommended and ordered.   Hepatitis B screening Finding hepatitis B early can reduce complications.  Screening is recommended for persons with selected risk factors.  Yes; Recommended and ordered.   Hepatitis C screening Finding hepatitis C early can reduce complications.  Screening is recommended for all persons born from 1945 through 1965 and for those with selected other risk factors.   No: is not indicated today.   HIV screening Finding HIV early can reduce complications.  Screening is recommended for persons with risk factors for HIV infection.  Yes; Recommended and ordered.   Glaucoma screening Early detection of glaucoma can prevent blindness.   Please talk to your eye doctor about this.       SCREENING TESTS     Description   Year of Last Screening   Recommended Today?   Colorectal cancer screening    Fecal occult blood test     Screening colonoscopy Screening for colon cancer has been shown to reduce death from colon cancer by 25-30%. Screening recommended to start at 50 years and continuing until age 75 years.    No: is not indicated today.   Breast Cancer Screening (women)    Mammogram Mammogram screening for breast cancer has been shown to reduce the risk of dying from breast cancer and prolong life. Screening is recommended every 1-2 years for women aged 50 to 74 years.   No: is not indicated today.   Cervical Cancer screening (women)    Pap Cervical pap smears can reduce cervical cancer. Screening is recommended annually if high risk (history of abnormal pap smears) otherwise every 2-3 years, stop screening at 65 years of age if history of normal paps.  No: is not indicated today.   Screening for Osteoporosis:  Bone mass measurements (women)    Dexa Scan Screening and treating Osteoporosis can reduce the risk of hip and spine fractures. Screening is recommended in women 65 years or older and in women and men at risk of  osteoporosis. Never completed Recommeded and patient declined.    Screening for Lung Cancer     Low-dose CT scanning Screening can reduce mortality in persons aged 55-80 who have smoked at least 30 pack-years and who are either still smoking or have quit in the past 15 years.  No: is not indicated today.   Abdominal Aortic Aneurysm (AAA) screening    Ultrasound ([place])   An aneurysm treated before rupture is very safe -a ruptured aneurysm can be fatal.  Screening  by [place] for AAA is limited to patients who meet one of the following criteria:    Men who are 65-75 years old and have smoked more than 100 cigarettes in their lifetime    Anyone with a family history of abdominal aortic aneurysm  No: is not indicated today.       Options for treatment and follow-up care were reviewed with the [facility] and/or guardian engaged in the decision making process and verbalized understanding of the options discussed and agreed with the final plan.    Kassie Wyatt MD

## 2018-05-08 NOTE — PATIENT INSTRUCTIONS
1. Call Dr. Stone for eye appointment     2. Follow up with Audiologist for hearing aid adjustments    3. Complete Health Care Directive     [facility] [place] MEDICARE PERSONAL PREVENTIVE SERVICES PLAN - SERVICES     Review these tests with your doctor then decide which ones you want and take this page home for your reference                                                    IMMUNIZATIONS Description Recommend today?     Hepatitis B  If you have any of the following risk factors you should be immunized for hepatitis B: severe kidney disease, people who live in the same house as a carrier of Hepatitis B virus, people who live in  institutions (e.g. nursing homes or group homes), homosexual men, patients with hemophilia who received Factor VIII or IX concentrates, abusers of illicit injectable drugs Yes; Recommended and ordered.     SCREENING TESTS     Description   Year of Last Screening   Recommended Today?   Heart disease screening blood tests    Cholesterol level Reducing cholesterol can reduce your risk of heart attacks by 25%.  Screening is recommended yearly if you are at risk of heart disease otherwise every 4-5 years  Yes; Recommended and ordered.   Diabetes screening tests    Hemoglobin A1c blood test   Finding and treating diabetes early can reduce complications.  Screening recommended/covered yearly if you have high blood pressure, high cholesterol, obesity (BMI >30), or a history of high blood glucose tests; or 2 of the following: family history of diabetes, overweight (BMI >25 but <30), age 65 years or older, and a history of diabetes of pregnancy or gave birth to baby weighing more than 9 lbs.  Yes; Recommended and ordered.   Hepatitis B screening Finding hepatitis B early can reduce complications.  Screening is recommended for persons with selected risk factors.  Yes; Recommended and ordered.   Hepatitis C screening Finding hepatitis C early can reduce complications.  Screening is recommended for all persons  born from 1945 through 1965 and for those with selected other risk factors.   No: is not indicated today.   HIV screening Finding HIV early can reduce complications.  Screening is recommended for persons with risk factors for HIV infection.  Yes; Recommended and ordered.   Glaucoma screening Early detection of glaucoma can prevent blindness.   Please talk to your eye doctor about this.       SCREENING TESTS     Description   Year of Last Screening   Recommended Today?   Colorectal cancer screening    Fecal occult blood test     Screening colonoscopy Screening for colon cancer has been shown to reduce death from colon cancer by 25-30%. Screening recommended to start at 50 years and continuing until age 75 years.    No: is not indicated today.   Breast Cancer Screening (women)    Mammogram Mammogram screening for breast cancer has been shown to reduce the risk of dying from breast cancer and prolong life. Screening is recommended every 1-2 years for women aged 50 to 74 years.   No: is not indicated today.   Cervical Cancer screening (women)    Pap Cervical pap smears can reduce cervical cancer. Screening is recommended annually if high risk (history of abnormal pap smears) otherwise every 2-3 years, stop screening at 65 years of age if history of normal paps.  No: is not indicated today.   Screening for Osteoporosis:  Bone mass measurements (women)    Dexa Scan Screening and treating Osteoporosis can reduce the risk of hip and spine fractures. Screening is recommended in women 65 years or older and in women and men at risk of osteoporosis. Never completed Recommeded and patient declined.    Screening for Lung Cancer     Low-dose CT scanning Screening can reduce mortality in persons aged 55-80 who have smoked at least 30 pack-years and who are either still smoking or have quit in the past 15 years.  No: is not indicated today.   Abdominal Aortic Aneurysm (AAA) screening    Ultrasound ([place])   An aneurysm treated before  rupture is very safe -a ruptured aneurysm can be fatal.  Screening  by [place] for AAA is limited to patients who meet one of the following criteria:    Men who are 65-75 years old and have smoked more than 100 cigarettes in their lifetime    Anyone with a family history of abdominal aortic aneurysm  No: is not indicated today.             MEDICARE WELLNESS EXAM PATIENT INSTRUCTIONS    Yearly exam:     See your health care provider every year in order to review changes in your health, review medicines that you take, and discuss preventive care needs such as immunizations and cancer screening.    Get a flu shot each year.     Advance Directives:    If you have not done so, you are encouraged to complete advance directives and/or a living will.   More information about advance directives can be found at: http://www.[place]med.org/advocacy/Key-Issues/Advance-Directives    Nutrition:     Eat at least 5 servings of fruits and vegetables each day.     Eat whole-grain bread, whole-wheat pasta and brown rice instead of white grains and rice.     Talk to your doctor about Calcium and Vitamin D.     Lifestyle:    Exercise for at least 150 minutes a week (30 minutes a day, 5 days a week). This will help you control your weight and prevent disease.     Limit alcohol to one drink per day.     If you smoke, try to quit - your doctor will be happy to help.     Wear sunscreen to prevent skin cancer.     See your dentist every six months for an exam and cleaning.     See your eye doctor every 1 to 2 years to screen for conditions such as glaucoma, macular degeneration and cataracts.

## 2018-05-08 NOTE — MR AVS SNAPSHOT
After Visit Summary   5/8/2018    Serge Marin    MRN: [id number]           Patient Information     Date Of Birth          1940        Visit Information        Provider Department      5/8/2018 9:20 AM Kassie Wyatt MD [facility]        Today's Diagnoses     Healthcare maintenance    -  1    Encounter for immunization          Care Instructions    1. Call Dr. Stone for eye appointment     2. Follow up with Audiologist for hearing aid adjustments    3. Complete Health Care Directive     [place] MEDICARE PERSONAL PREVENTIVE SERVICES PLAN - SERVICES     Review these tests with your doctor then decide which ones you want and take this page home for your reference                                                    IMMUNIZATIONS Description Recommend today?     Hepatitis B  If you have any of the following risk factors you should be immunized for hepatitis B: severe kidney disease, people who live in the same house as a carrier of Hepatitis B virus, people who live in  institutions (e.g. nursing homes or group homes), homosexual men, patients with hemophilia who received Factor VIII or IX concentrates, abusers of illicit injectable drugs Yes; Recommended and ordered.     SCREENING TESTS     Description   Year of Last Screening   Recommended Today?   Heart disease screening blood tests    Cholesterol level Reducing cholesterol can reduce your risk of heart attacks by 25%.  Screening is recommended yearly if you are at risk of heart disease otherwise every 4-5 years  Yes; Recommended and ordered.   Diabetes screening tests    Hemoglobin A1c blood test   Finding and treating diabetes early can reduce complications.  Screening recommended/covered yearly if you have high blood pressure, high cholesterol, obesity (BMI >30), or a history of high blood glucose tests; or 2 of the following: family history of diabetes, overweight (BMI >25 but <30), age 65 years or older, and a  history of diabetes of pregnancy or gave birth to baby weighing more than 9 lbs.  Yes; Recommended and ordered.   Hepatitis B screening Finding hepatitis B early can reduce complications.  Screening is recommended for persons with selected risk factors.  Yes; Recommended and ordered.   Hepatitis C screening Finding hepatitis C early can reduce complications.  Screening is recommended for all persons born from 1945 through 1965 and for those with selected other risk factors.   No: is not indicated today.   HIV screening Finding HIV early can reduce complications.  Screening is recommended for persons with risk factors for HIV infection.  Yes; Recommended and ordered.   Glaucoma screening Early detection of glaucoma can prevent blindness.   Please talk to your eye doctor about this.       SCREENING TESTS     Description   Year of Last Screening   Recommended Today?   Colorectal cancer screening    Fecal occult blood test     Screening colonoscopy Screening for colon cancer has been shown to reduce death from colon cancer by 25-30%. Screening recommended to start at 50 years and continuing until age 75 years.    No: is not indicated today.   Breast Cancer Screening (women)    Mammogram Mammogram screening for breast cancer has been shown to reduce the risk of dying from breast cancer and prolong life. Screening is recommended every 1-2 years for women aged 50 to 74 years.   No: is not indicated today.   Cervical Cancer screening (women)    Pap Cervical pap smears can reduce cervical cancer. Screening is recommended annually if high risk (history of abnormal pap smears) otherwise every 2-3 years, stop screening at 65 years of age if history of normal paps.  No: is not indicated today.   Screening for Osteoporosis:  Bone mass measurements (women)    Dexa Scan Screening and treating Osteoporosis can reduce the risk of hip and spine fractures. Screening is recommended in women 65 years or older and in women and men at risk  of osteoporosis. Never completed Recommeded and patient declined.    Screening for Lung Cancer     Low-dose CT scanning Screening can reduce mortality in persons aged 55-80 who have smoked at least 30 pack-years and who are either still smoking or have quit in the past 15 years.  No: is not indicated today.   Abdominal Aortic Aneurysm (AAA) screening    Ultrasound (US)   An aneurysm treated before rupture is very safe -a ruptured aneurysm can be fatal.  Screening  by US for AAA is limited to patients who meet one of the following criteria:    Men who are 65-75 years old and have smoked more than 100 cigarettes in their lifetime    Anyone with a family history of abdominal aortic aneurysm  No: is not indicated today.             MEDICARE WELLNESS EXAM PATIENT INSTRUCTIONS    Yearly exam:     See your health care provider every year in order to review changes in your health, review medicines that you take, and discuss preventive care needs such as immunizations and cancer screening.    Get a flu shot each year.     Advance Directives:    If you have not done so, you are encouraged to complete advance directives and/or a living will.   More information about advance directives can be found at: http://www.[place]med.org/advocacy/Key-Issues/Advance-Directives    Nutrition:     Eat at least 5 servings of fruits and vegetables each day.     Eat whole-grain bread, whole-wheat pasta and brown rice instead of white grains and rice.     Talk to your doctor about Calcium and Vitamin D.     Lifestyle:    Exercise for at least 150 minutes a week (30 minutes a day, 5 days a week). This will help you control your weight and prevent disease.     Limit alcohol to one drink per day.     If you smoke, try to quit - your doctor will be happy to help.     Wear sunscreen to prevent skin cancer.     See your dentist every six months for an exam and cleaning.     See your eye doctor every 1 to 2 years to screen for conditions such as glaucoma,  macular degeneration and cataracts.                Follow-ups after your visit        Who to contact     Please call your clinic at [phone] to:    Ask questions about your health    Make or cancel appointments    Discuss your medicines    Learn about your test results    Speak to your doctor            Additional Information About Your Visit        MyChart Information     [employer] is an electronic gateway that provides easy, online access to your medical records. With [employer], you can request a clinic appointment, read your test results, renew a prescription or communicate with your care team.     To sign up for [employer] visit the website at [email or web address]   You will be asked to enter the access code listed below, as well as some personal information. Please follow the directions to create your username and password.     Your access code is: [email or web address]  Expires: 2018 11:09 AM     Your access code will  in 90 days. If you need help or a new code, please contact your [facility] or call [phone] for assistance.        Care EveryWhere ID     This is your Care EveryWhere ID. This could be used by other organizations to access your [place] medical records  [id number]        Your Vitals Were     Pulse Temperature Pulse Oximetry Breastfeeding? BMI (Body Mass Index)       69 98  F (36.7  C) (Oral) 95% No 26.54 kg/m2        Blood Pressure from Last 3 Encounters:   18 124/72   18 121/80   17 125/79    Weight from Last 3 Encounters:   18 154 lb 9.6 oz (70.1 kg)   18 157 lb 12.8 oz (71.6 kg)   17 160 lb 3.2 oz (72.7 kg)              We Performed the Following     ADMIN VACCINE, INITIAL     Glucose Fasting ([facility])     Hepatitis B Surface Antibody     Hepatitis B surface antigen     Lipid Cascade ([facility])     Pneumococcal vaccine 23 valent PPSV23  (Pneumovax) [47690]        Primary Care Provider Office Phone # Fax #    Kassie  Perry Wyatt [phone]       [address] [facility] [address]        Equal Access to Services     CALEBZAFAR ANNIE : Hadii solo hartman edan Mejia, vinnie richey, boogieta kadesireda brynn, dana sancheskevin oneil. So [facility] [phone].    ATENCIÓN: Si habla español, tiene a whyte disposición servicios gratuitos de asistencia lingüística. Rose al [phone].    We comply with applicable federal civil rights laws and [place] laws. We do not discriminate on the basis of race, color, national origin, age, disability, sex, sexual orientation, or gender identity.            Thank you!     Thank you for choosing [facility]  for your care. Our goal is always to provide you with excellent care. Hearing back from our patients is one way we can continue to improve our services. Please take a few minutes to complete the written survey that you may receive in the mail after your visit with us. Thank you!             Your Updated Medication List - Protect others around you: Learn how to safely use, store and throw away your medicines at www.disposemymeds.org.          This list is accurate as of 5/8/18  9:56 AM.  Always use your most recent med list.                   Brand Name Dispense Instructions for use Diagnosis    ACETAMINOPHEN PO      Take 500 mg by mouth every 6 hours as needed for pain        ARIPiprazole 15 MG tablet    ABILIFY    30 tablet    Take 0.5 tablets (7.5 mg) by mouth At Bedtime    Bipolar 1 disorder (H)       Calcium carb-Vitamin D 500 mg [place]-200 units 500-200 MG-UNIT per tablet    OSCAL with D;Oyster Shell Calcium    90 tablet    Take 1 tablet by mouth daily    Routine general medical examination at a health care facility       cetirizine 10 MG tablet    zyrTEC    30 tablet    Take 1 tablet (10 mg) by mouth daily as needed for allergies or rhinitis    Seasonal allergic rhinitis, unspecified allergic rhinitis trigger       clonazePAM 0.25 MG  Tbdp ODT tab    klonoPIN    30 tablet    Take 1 tablet (0.25 mg) by mouth At Bedtime    Bipolar 1 disorder (H)       clotrimazole 1 % cream    LOTRIMIN    30 g    Apply topically 2 times daily To left abdominal skin fold.    Tinea corporis       ferrous gluconate 324 (38 Fe) MG tablet    FERGON    90 tablet    Take 1 tablet (324 mg) by mouth daily (with breakfast)    Health care maintenance       levothyroxine 75 MCG tablet    SYNTHROID/LEVOTHROID    30 tablet    Take 1 tablet (75 mcg) by mouth daily    Other specified hypothyroidism       loperamide 2 MG tablet    IMODIUM A-D    30 tablet    Start with 2 tabs (4 mg), then take one tab (2 mg) after each diarrheal stool.  Do not use more than  8 tabs (16 mg) per day.    Diarrhea

## 2018-05-08 NOTE — LETTER
May 17, 2018      Serge GODFREY [facility]  [address]    [place] [facility] [address]        Dear Serge,    Thank you for getting your care at [facility]. Please see below for your test results.    1. Your cholesterol level was higher than last year. Maintaining a healthy diet with lean proteins, whole grains and healthy fats such as olive oil as well as regular exercise and maintaining an appropriate weight all contribute to healthier cholesterol levels.  2. Your blood sugar was a little high as well, we will recheck this at your next clinic visit  3. Your Hepatitis B test was negative, but the labs also indicate you do not have adequate immunity against Hepatitis B. I recommend another Hepatitis B vaccination at your next visit.       Resulted Orders   Lipid Cascade ([facility])   Result Value Ref Range    Cholesterol 202.8 (H) 0.0 - 200.0 mg/dL    Cholesterol/HDL Ratio 2.9 0.0 - 5.0    HDL Cholesterol 70.1 >40.0 mg/dL    LDL Cholesterol Calculated 117 0 - 129 mg/dL    Triglycerides 77.6 0.0 - 150.0 mg/dL    VLDL Cholesterol 15.5 7.0 - 32.0 mg/dL   Glucose Fasting ([facility])   Result Value Ref Range    Glucose Fasting 111.0 (H) 51.0 - 110.0 mg/dL   Hepatitis B Surface Antibody   Result Value Ref Range    Hepatitis B Surface Antibody 0.32 <8.00 m[IU]/mL      Comment:      Nonreactive, No antibody detected when the value is less than 8.00 m[IU]/mL.   Hepatitis B surface antigen   Result Value Ref Range    Hep B Surface Agn Nonreactive NR^Nonreactive     Sincerely,    Kassie Wyatt MD

## 2018-05-09 LAB
HBV SURFACE AB SERPL IA-ACNC: 0.32 M[IU]/ML
HBV SURFACE AG SERPL QL IA: NONREACTIVE

## 2018-05-15 ENCOUNTER — DOCUMENTATION ONLY (OUTPATIENT)
Dept: FAMILY MEDICINE | Facility: CLINIC | Age: 78
End: 2018-05-15

## 2018-05-15 NOTE — PROGRESS NOTES
"When opening a documentation only encounter, be sure to enter in \"Chief Complaint\" Forms and in \" Comments\" Title of form, description if needed.    Serge is a 77 year old  female  Form received via: Fax  Form now resides in: Provider Ready    Sari Tucker CMA                Form has been completed by provider.     Form sent out via: Fax to [phone]  Patient informed: No, Reason:Fax confirmation  Output date: May 25, 2018    Sari Tucker CMA      **Please close the encounter**      "

## 2018-05-17 PROBLEM — R73.01 ELEVATED FASTING GLUCOSE: Status: ACTIVE | Noted: 2018-05-17

## 2018-05-17 PROBLEM — Z78.9 NONIMMUNE TO HEPATITIS B VIRUS: Status: ACTIVE | Noted: 2018-05-17

## 2018-05-24 ENCOUNTER — MEDICAL CORRESPONDENCE (OUTPATIENT)
Dept: HEALTH INFORMATION MANAGEMENT | Facility: CLINIC | Age: 78
End: 2018-05-24

## 2018-06-06 ENCOUNTER — DOCUMENTATION ONLY (OUTPATIENT)
Dept: FAMILY MEDICINE | Facility: CLINIC | Age: 78
End: 2018-06-06

## 2018-06-21 NOTE — PROGRESS NOTES
Visit to the client's assisted living for annual health risk assessment.  An  was not needed.    Current situation/living environment  Assisted living. She is able to navigate her environment indep., no current safety concerns.     Activities of daily living (ADL)/instrumental activities of daily living (IADL) and functional issues  Client needs help with the following ADL's: grooming and bathing  Client needs help with the following IADL's: shopping, cooking, housekeeping, laundry, managing finances/bills and transportation  Client states she is unable to perform the above due to - she doesn't state she can't do these task but known as evidenced by history of self neglect      Health concerns for today  None  Has patient fallen 2 or more times in the last year? No  Has patient fallen with injury in the last year? No    Cognition/mental health  A&Ox3, directs her own care. Has forgetfulness and history of impaired judgement/decision making with self neglect. Seeks routine MH services for Bipolar. Needs are being met with current supports thru assisted living, MH provider, [facility], Rep. Payee, and ILS workers.     STARS/Med Adherence  Client is non-compliant with the following quality measures: NA  Comments: NA    Client's Plan of Care consists of:  Assisted living includinh supervision; med mgmt; meals; housekeeping; personal assist with grooming and bathing; behavioral support; activities  ILS 2h/mo; Rep Pay for finances; [facility] for MH supports; Specialized supplies and equipment (walker, lift chair) and Transportation    Ute Gonzalez RN  [facility] Care Coordinator for [facility]  [phone] or [phone]

## 2018-11-01 ENCOUNTER — DOCUMENTATION ONLY (OUTPATIENT)
Dept: FAMILY MEDICINE | Facility: CLINIC | Age: 78
End: 2018-11-01

## 2018-11-01 NOTE — PROGRESS NOTES
Form has been completed by provider.     Form sent out via: Fax to [phone]  Patient informed: No, Reason:fax confirmed  Output date: November 1, 2018    Sari Tucker CMA      **Please close the encounter**

## 2019-01-15 ENCOUNTER — OFFICE VISIT (OUTPATIENT)
Dept: FAMILY MEDICINE | Facility: CLINIC | Age: 79
End: 2019-01-15
Payer: COMMERCIAL

## 2019-01-15 VITALS
OXYGEN SATURATION: 97 % | DIASTOLIC BLOOD PRESSURE: 69 MMHG | SYSTOLIC BLOOD PRESSURE: 110 MMHG | HEART RATE: 63 BPM | BODY MASS INDEX: 27.02 KG/M2 | WEIGHT: 157.4 LBS | TEMPERATURE: 98.2 F | RESPIRATION RATE: 16 BRPM

## 2019-01-15 DIAGNOSIS — B35.4 TINEA CORPORIS: ICD-10-CM

## 2019-01-15 DIAGNOSIS — L98.9 SKIN LESION: Primary | ICD-10-CM

## 2019-01-15 RX ORDER — CLOTRIMAZOLE 1 %
CREAM (GRAM) TOPICAL DAILY PRN
Qty: 30 G | Refills: 3 | Status: SHIPPED | OUTPATIENT
Start: 2019-01-15 | End: 2019-12-05

## 2019-01-15 RX ORDER — NYSTATIN 100000 [USP'U]/G
POWDER TOPICAL
Qty: 60 G | Refills: 11 | Status: SHIPPED | OUTPATIENT
Start: 2019-01-15 | End: 2021-08-05

## 2019-01-15 NOTE — PATIENT INSTRUCTIONS
"Here is the plan from today's visit    1. Tinea corporis  Change  - clotrimazole (LOTRIMIN) 1 % external cream; Apply topically daily as needed (Red itchy skin)  Dispense: 30 g; Refill: 3  Start  - nystatin (MYCOSTATIN) 822628 UNIT/GM external powder; Apply to abdominal pannus daily  Dispense: 60 g; Refill: 11    2. Skin lesion on left pannus  - DERMATOLOGY REFERRAL - INTERNAL    Thank you for coming to [facility] today.  Lab Testing:  **If you had lab testing today and your results are reassuring or normal they will be mailed to you or sent through [facility] [employer] [facility] within 7 days.   **If the lab tests need quick action we will call you with the results.  The phone number we will call with results is # [phone] (home) NONE (work). If this is not the best number please call our clinic and change the number.  Medication Refills:  If you need any refills please call your pharmacy and they will contact us.   If you need to  your refill at a new pharmacy, please contact the new pharmacy directly. The new pharmacy will help you get your medications transferred faster.   Scheduling:  If you have any concerns about today's visit or wish to schedule another appointment please call our office during normal business hours [phone] (8-5:00 M-F)  If a referral was made to a [facility] and you don't get a call from central scheduling please call [phone].  If a Mammogram was ordered for you at [facility] call [phone] to schedule or change your appointment.  If you had an XRay/CT/Ultrasound/MRI ordered the number is [phone] to schedule or change your radiology appointment.   Medical Concerns:  If you have urgent medical concerns please call [phone] at any time of the day.    Kassie Wyatt MD    Dermatology referral  [phone]  Per [facility], \"Unable to reach patient, VMB is full, Reminder letter sent\"    "

## 2019-01-15 NOTE — PROGRESS NOTES
HPI       Serge Marin is a 78 year old  who presents for   Chief Complaint   Patient presents with     RECHECK     follow up infection accross abdomin      Pannus skin redness and skin itchiness that is flaring up. Present for many years, but worse right now.   Daily she showers, pats it dry, and applies clotrimazole cream. Has used nystatin powder in the past. Group home staff requesting another trial of nystatin powder.     +++++++    Problem, Medication and Allergy Lists were reviewed and updated if needed..    Patient is an established patient of this clinic..         Review of Systems:   Review of Systems         Physical Exam:     Vitals:    01/15/19 0951   BP: 110/69   Pulse: 63   Resp: 16   Temp: 98.2  F (36.8  C)   TempSrc: Oral   SpO2: 97%   Weight: 71.4 kg (157 lb 6.4 oz)     Body mass index is 27.02 kg/m .     Physical Exam   Skin:   Pannus with underlying shiny pink in abdominal fold entire length of lower abdomen. Left pannus lesion 1-2cm raised       Results:   No testing ordered today    Assessment and Plan        Patient Instructions   Here is the plan from today's visit    1. Tinea corporis  Change  - clotrimazole (LOTRIMIN) 1 % external cream; Apply topically daily as needed (Red itchy skin)  Dispense: 30 g; Refill: 3  Start  - nystatin (MYCOSTATIN) 501577 UNIT/GM external powder; Apply to abdominal pannus daily  Dispense: 60 g; Refill: 11    2. Skin lesion on left pannus  Persistent, concern for malignancy, likely needs biopsy  - DERMATOLOGY REFERRAL - INTERNAL       Medications Discontinued During This Encounter   Medication Reason     clotrimazole (LOTRIMIN) 1 % cream        Options for treatment and follow-up care were reviewed with the patient. Serge Marin  engaged in the decision making process and verbalized understanding of the options discussed and agreed with the final plan.    I spent 25 min face to face with the patient and >50% was spent counselling the patient  about the above medical conditions, educating patient and discussing the recommendations and followup .    Kassie Wyatt MD   of [facility]

## 2019-01-29 ENCOUNTER — TELEPHONE (OUTPATIENT)
Dept: DERMATOLOGY | Facility: CLINIC | Age: 79
End: 2019-01-29

## 2019-01-29 NOTE — TELEPHONE ENCOUNTER
Dermatology Pre-visit Call:    Reason for visit : Rash         Patient Reminders Given:  --Please, make sure you bring an updated list of your medications.   --Plan on being in our facility for approximately one hour, this includes the registration process, office visit, education and check-out process.  If you are having a procedure, more time may be required.     --If you are having a procedure, please, present 15 minutes early.  --Location reviewed.   --If you need to cancel or reschedule, call XXXX  --We look forward to seeing you in Dermatology Clinic.

## 2019-02-11 ENCOUNTER — DOCUMENTATION ONLY (OUTPATIENT)
Dept: FAMILY MEDICINE | Facility: CLINIC | Age: 79
End: 2019-02-11

## 2019-02-11 NOTE — PROGRESS NOTES
"When opening a documentation only encounter, be sure to enter in \"Chief Complaint\" Forms and in \" Comments\" Title of form, description if needed.    Serge is a 78 year old  female  Form received via: Fax  Form now resides in: Provider Ready    Sari Stubbs CMA                Form has been completed by provider.     Form sent out via: Fax to [phone]  Patient informed: No, Reason:fax confirmed  Output date: February 11, 2019    Sari Stubbs CMA      **Please close the encounter**      "

## 2019-05-14 ENCOUNTER — DOCUMENTATION ONLY (OUTPATIENT)
Dept: FAMILY MEDICINE | Facility: CLINIC | Age: 79
End: 2019-05-14

## 2019-05-16 NOTE — PROGRESS NOTES
"When opening a documentation only encounter, be sure to enter in \"Chief Complaint\" Forms and in \" Comments\" Title of form, description if needed.    Serge is a 78 year old  female  Form received via: Fax  Form now resides in: Provider Ready    Missy Velasquez CMA    Form has been completed by provider.     Form sent out via: Fax to Fatuma Longoria at Fax Number:   [phone]  Patient informed: Yes  Output date: May 16, 2019    Missy Velasquez CMA     Form has been completed by provider.     Form sent out via: Fax to Fatuma Longoria  at Fax Number: [phone]  Patient informed: NA  Output date: May 30, 2019    Missy Velasquez CMA      **Please close the encounter**          *-*Please close the encounter**                  -  "

## 2019-06-04 ENCOUNTER — DOCUMENTATION ONLY (OUTPATIENT)
Dept: FAMILY MEDICINE | Facility: CLINIC | Age: 79
End: 2019-06-04

## 2019-06-07 NOTE — PROGRESS NOTES
Visit to the client's assisted living for annual health risk assessment.  An  was not needed.    Current situation/living environment  Serge lives in assisted living at [facility]. She is able to navigate her environment.     Activities of daily living (ADL)/instrumental activities of daily living (IADL) and functional issues  Client needs help with the following ADL's: grooming and bathing  Client needs help with the following IADL's: shopping, cooking, housekeeping, laundry, managing finances/bills and transportation  Client states she is unable to perform the above due to back pain, bipolar, hx self neglect      Health concerns for today  No stated concerns  Has patient fallen 2 or more times in the last year? No  Has patient fallen with injury in the last year? No    Cognition/mental health  A&Ox3 w/ forgetfulness, occ confusion, bipolar. Receiving regular MH services, medication mgmt and behavior support from assisted living staff. She also has ILS worker and Rep Payee. Serge states satisfaction w/ her current routine and services.     STARS/Med Adherence  Client is non-compliant with the following quality measures: Not on stars list  Comments: NA    Client's Plan of Care consists of:  -24h assisted living services, daily services include: assist with grooming/hygeine, assist with bathing, medication mgmt and administration, meals, housekeeping, laundry, assist to make appts and schedule rides, activites and behavior support  -[facility] services for accessing community resources (shopping)  -Rep payee manages money  -Specialized supplies and equipment (has walker, bath chair, lift chair)   -Transportation thru [employer] as needed    Ute Gonzalez RN  [facility] Care Coordinator  [phone]

## 2019-08-16 ENCOUNTER — OFFICE VISIT (OUTPATIENT)
Dept: FAMILY MEDICINE | Facility: CLINIC | Age: 79
End: 2019-08-16
Payer: COMMERCIAL

## 2019-08-16 VITALS
RESPIRATION RATE: 16 BRPM | SYSTOLIC BLOOD PRESSURE: 109 MMHG | HEART RATE: 58 BPM | HEIGHT: 63 IN | WEIGHT: 161.2 LBS | DIASTOLIC BLOOD PRESSURE: 71 MMHG | BODY MASS INDEX: 28.56 KG/M2 | TEMPERATURE: 98.2 F | OXYGEN SATURATION: 95 %

## 2019-08-16 DIAGNOSIS — H90.3 SENSORINEURAL HEARING LOSS, BILATERAL: Primary | ICD-10-CM

## 2019-08-16 ASSESSMENT — ENCOUNTER SYMPTOMS
FEVER: 0
SPEECH DIFFICULTY: 0
CHILLS: 0

## 2019-08-16 ASSESSMENT — MIFFLIN-ST. JEOR: SCORE: 1175.33

## 2019-08-16 NOTE — PROGRESS NOTES
Preceptor Attestation:   Patient seen, evaluated and discussed with the resident. I have verified the content of the note, which accurately reflects my assessment of the patient and the plan of care.   Supervising Physician:  Beka Juarez MD

## 2019-08-16 NOTE — PATIENT INSTRUCTIONS
Here is the plan from today's visit    1. Sensorineural hearing loss, bilateral    - AUDIOLOGY ADULT REFERRAL - INTERNAL      Please call or return to clinic if your symptoms don't go away.    Follow up plan      Thank you for coming to [facility] today.  Lab Testing:  **If you had lab testing today and your results are reassuring or normal they will be mailed to you or sent through [facility] within 7 days.   **If the lab tests need quick action we will call you with the results.  The phone number we will call with results is # [phone] (home) NONE (work). If this is not the best number please call our clinic and change the number.  Medication Refills:  If you need any refills please call your pharmacy and they will contact us.   If you need to  your refill at a new pharmacy, please contact the new pharmacy directly. The new pharmacy will help you get your medications transferred faster.   Scheduling:  If you have any concerns about today's visit or wish to schedule another appointment please call our office during normal business hours [phone] (8-5:00 M-F)  If a referral was made to a [facility] and you don't get a call from central scheduling please call [phone].  If a Mammogram was ordered for you at [facility] call [phone] to schedule or change your appointment.  If you had an XRay/CT/Ultrasound/MRI ordered the number is [phone] to schedule or change your radiology appointment.   Medical Concerns:  If you have urgent medical concerns please call [phone] at any time of the day.    Artie Bello, DO    Audiology referral  [address]  4th Floor  [facility] [address]  [phone]    9/25/19 @ 8:30a.m  Patient has also been placed on a cancellation list     Patient informed in clinic

## 2019-08-16 NOTE — PROGRESS NOTES
"       HPI       Serge Marin is a 79 year old  who presents for   Chief Complaint   Patient presents with     Referral     would like to have new hearing aids as she does not wear them due to extra noise they make.      Patient here for eval of hearing aids. She has significant hearing loss, and has difficulty communicating without functioning hearing aids. History taking is quite limited. Patient reports she has an \"infected birthmark\" but that it is embarrassing, so she doesn't want me to look at it. She states it is under control now, so there is no need for me to see. She is under impression she is here to get hearing aids fixed.           +++++++      Problem, Medication and Allergy Lists were reviewed and updated if needed..    Patient is an established patient of this clinic..         Review of Systems:   Review of Systems   Constitutional: Negative for chills and fever.   HENT: Negative for hearing loss.    Skin: Positive for rash.   Neurological: Negative for speech difficulty.            Physical Exam:     Vitals:    08/16/19 0858   BP: 109/71   Pulse: 58   Resp: 16   Temp: 98.2  F (36.8  C)   TempSrc: Oral   SpO2: 95%   Weight: 73.1 kg (161 lb 3.2 oz)   Height: 1.6 m (5' 3\")     Body mass index is 28.56 kg/m .  Vitals were reviewed and were normal     Physical Exam  General- No acute distress, well-appearing, significant hearing difficulty  Skin- warm, no obvious skin lesions  Neuro- AOX3, CN 2-12 grossly intact  Pulm-normal TV, no distress  Psych- appropriate mood and affect    Results:   No testing ordered today    Assessment and Plan        Serge was seen today for referral.    Diagnoses and all orders for this visit:    Sensorineural hearing loss, bilateral    Audiology referral placed. Care coordination assisting with getting patient into Audiology as soon as possible.      -     AUDIOLOGY ADULT REFERRAL - INTERNAL           There are no discontinued medications.    Options for treatment and " follow-up care were reviewed with the patient. Serge Marin  engaged in the decision making process and verbalized understanding of the options discussed and agreed with the final plan.    Artie Bello, DO

## 2019-08-21 ENCOUNTER — TELEPHONE (OUTPATIENT)
Dept: FAMILY MEDICINE | Facility: CLINIC | Age: 79
End: 2019-08-21

## 2019-08-21 NOTE — TELEPHONE ENCOUNTER
RN attempted reaching her, she did not answer. Left message to notify her that I am waiting on the provider to advise because I have no information about the rationale for discontinuation as the note is not complete. RN advised that I will be sending message again for provider to please review and advise.   Aida Arnold RN

## 2019-08-21 NOTE — TELEPHONE ENCOUNTER
Shyann calling back again to discuss office visit and needs a call back ASAP as she leaves at 3:00pm.

## 2019-08-21 NOTE — TELEPHONE ENCOUNTER
"[facility] phone call message- medication clarification/question:    Full Medication Name:   ARIPiprazole (ABILIFY) 15 MG tablet     clonazePAM (KLONOPIN) 0.25 MG TBDP ODT tab    Dose: See chart    Question/Clarification needed: HC RN calling to clarify why medications listed above were discontinued by Dr. Bello. Per RN, patient is very upset that they are not able to take the medications and was informed that there would be \"med changes\", but changes were not specified to patient. Please advise, RN would like a call back ASAP to discuss.     Pharmacy confirmed as   [facility] - [place], [place] - [address]  [address]  [facility] [address]  Phone: [phone] Fax: [phone]  : Yes    Please leave ONLY preferred pharmacy    OK to leave a message on voice mail? Yes    Advised patient that RN would call back within 3 hours, unless emergent.    Primary language: English      needed? No    Call taken on August 21, 2019 at 10:07 AM by Kae Quinteros to [facility] TRIAGE      "

## 2019-08-21 NOTE — TELEPHONE ENCOUNTER
RN cannot find any documentation as far as rationale for discontinuation, deferring to provider to please assist in this matter. Please see message below, urgent.  Aida Arnold RN

## 2019-08-22 RX ORDER — ARIPIPRAZOLE 15 MG/1
10 TABLET ORAL AT BEDTIME
Qty: 1 TABLET | Refills: 0 | COMMUNITY
Start: 2019-08-22 | End: 2020-10-07 | Stop reason: DRUGHIGH

## 2019-08-22 RX ORDER — CLONAZEPAM 0.5 MG/1
0.25 TABLET ORAL AT BEDTIME
Qty: 1 TABLET | Refills: 0 | COMMUNITY
Start: 2019-08-22 | End: 2022-08-04

## 2019-08-22 NOTE — TELEPHONE ENCOUNTER
RN spoke with them and relayed this information regarding the reason the medication reconciliation occurred this way, she verbalized understanding and they are continuing to give her the medications. She also had concerns about the reason her infected birth alan was not addressed. RN relayed that the patient refused to allow physician to look at it because she was embarrassed. Nurse at facility verbalized understanding and states she will see if patient will allow her to clean it out and apply antibiotic ointment.    RN added medications as historical.    Aida Arnold RN

## 2019-08-22 NOTE — TELEPHONE ENCOUNTER
"Medications were listed as \"not taking\" on medication list. I removed them from the medication list because it was listed as not taking since January 2019. If this is incorrect, patient may continue to take medications as prescribed. Patient was unable to reconcile her medications with me to hearing loss and did not have anyone with her at the appointment to assist.  "

## 2019-09-01 ENCOUNTER — MEDICAL CORRESPONDENCE (OUTPATIENT)
Dept: HEALTH INFORMATION MANAGEMENT | Facility: CLINIC | Age: 79
End: 2019-09-01

## 2019-09-19 ENCOUNTER — TELEPHONE (OUTPATIENT)
Dept: FAMILY MEDICINE | Facility: CLINIC | Age: 79
End: 2019-09-19

## 2019-09-19 NOTE — TELEPHONE ENCOUNTER
RN faxed what we currently have and called Sriram and left message giving him information surrounding our last visit, and being unable to reconcile medications since she was alone. Many of them are appearing to be old or patient reported and not actually prescribed by us. RN informed him on message that we are hoping that at her next visit, someone knowledgeable about her care can come with to assist us with an accurate medication reconciliation. RN also requested their assistance to compare what is actually in her home to the list I provided. Left my name and callback number.  Aida Arnold RN

## 2019-09-19 NOTE — TELEPHONE ENCOUNTER
[facility] phone call message- general phone call:    Reason for call: RN Sriram would like a current medication list for this patient faxed to him at [phone]    Action Desired:     Return call needed: No    OK to leave a message on voice mail? Yes    Advised patient response may take up to 2 business days: Yes    Primary language: English      needed? No    Call taken on September 19, 2019 at 10:57 AM by Tammie Quinteros to [facility]

## 2019-10-09 ENCOUNTER — OFFICE VISIT (OUTPATIENT)
Dept: FAMILY MEDICINE | Facility: CLINIC | Age: 79
End: 2019-10-09
Payer: COMMERCIAL

## 2019-10-09 ENCOUNTER — TELEPHONE (OUTPATIENT)
Dept: FAMILY MEDICINE | Facility: CLINIC | Age: 79
End: 2019-10-09

## 2019-10-09 VITALS
BODY MASS INDEX: 29.15 KG/M2 | HEIGHT: 62 IN | OXYGEN SATURATION: 94 % | SYSTOLIC BLOOD PRESSURE: 130 MMHG | WEIGHT: 158.4 LBS | DIASTOLIC BLOOD PRESSURE: 81 MMHG | TEMPERATURE: 97.9 F | HEART RATE: 71 BPM

## 2019-10-09 DIAGNOSIS — H61.92 LESION OF SKIN OF LEFT EAR: ICD-10-CM

## 2019-10-09 DIAGNOSIS — R30.0 DYSURIA: ICD-10-CM

## 2019-10-09 DIAGNOSIS — E03.8 OTHER SPECIFIED HYPOTHYROIDISM: ICD-10-CM

## 2019-10-09 DIAGNOSIS — N90.7 EPIDERMOID CYST OF LABIA MAJORA: Primary | ICD-10-CM

## 2019-10-09 DIAGNOSIS — H90.3 SENSORINEURAL HEARING LOSS, BILATERAL: ICD-10-CM

## 2019-10-09 LAB
BILIRUBIN UR: ABNORMAL MG/DL
BLOOD UR: ABNORMAL MG/DL
GLUCOSE URINE: NEGATIVE
KETONES UR QL: NEGATIVE MG/DL
LEUKOCYTE ESTERASE UR: ABNORMAL
NITRITE UR QL STRIP: NEGATIVE MG/DL
PH UR STRIP: 5.5 [PH] (ref 4.5–8)
PROTEIN UR: ABNORMAL MG/DL
SP GR UR STRIP: 1.02 (ref 1–1.03)
TSH SERPL DL<=0.005 MIU/L-ACNC: 3.45 MU/L (ref 0.4–4)
UROBILINOGEN UR STRIP-ACNC: ABNORMAL E.U./DL

## 2019-10-09 ASSESSMENT — ENCOUNTER SYMPTOMS
FEVER: 0
HEMATURIA: 0
DIFFICULTY URINATING: 0
CHILLS: 0
FLANK PAIN: 0
DYSURIA: 1

## 2019-10-09 ASSESSMENT — MIFFLIN-ST. JEOR: SCORE: 1146.75

## 2019-10-09 NOTE — TELEPHONE ENCOUNTER
[facility] phone call message- general phone call:    Reason for call: Assisted living calling to speak with RN to clarify instructions on AVS regarding procedure aftercare.     Action Desired: Call back    Return call needed: Yes    OK to leave a message on voice mail? Yes    Advised patient response may take up to 2 business days: Yes    Primary language: English      needed? No    Call taken on October 9, 2019 at 2:44 PM by Kae Nicholson    [facility]

## 2019-10-09 NOTE — PROGRESS NOTES
Preceptor Attestation:   Patient seen, evaluated and discussed with the resident. I was present for and supervised the entire procedure. I have verified the content of the note, which accurately reflects my assessment of the patient and the plan of care.   Supervising Physician:  Richard Edwards MD.

## 2019-10-09 NOTE — TELEPHONE ENCOUNTER
RN contacted Sriram and he states that patient is unable to get herself out of the bath and it requires two people to assist her out of the tub. They are not staffed well enough to do this for her and they are either requesting an alternate way for her to care for this area or for home care PCA referral for a week so that she can have the help she needs.     Routing to Dr. Corbett who saw patient today to please advise. He states he will be gone for the day so we can leave a voicemail for him to receive tomorrow.  Aida Arnold RN

## 2019-10-09 NOTE — PROGRESS NOTES
"       HPI   Serge Marin is a 79 year old  who presents for   Chief Complaint   Patient presents with     Vaginal Problem     bump inside of vagina     Bump inside of vagina:  Noticed it 2 weeks ago  Noticed when wiping herself  Never happened before  No vaginal bleeding.   Put lotion inside of her vagina  Dysuria - \"not very long\" duration  No hematuria  Hx of hysterectomy \"years ago\"   No discharge from vagina  No odor  Doesn't smoke.   No pain when not peeing.     Hypothyroid hx:   TSH today  Didn't take thyroid med today    Bipolar disorder I:   Sees psychiatry once monthly  Dr. Nuria Duran?   Doesn't have therapist   Mood \"okay\"    Hearing loss bilaterally:   Has hearing aids at home with batteries  She doesn't like to wear them because it makes her feel \"weak?\" but knows she needs them  I pretty much talked in a very high volume voice throughout the appointment because of her hearing loss.     Problem, Medication and Allergy Lists were reviewed and updated if needed..    Patient is an established patient of this clinic.  History reviewed. No pertinent past medical history..       Review of Systems:   Review of Systems   Constitutional: Negative for chills and fever.   Genitourinary: Positive for dysuria. Negative for difficulty urinating, flank pain, hematuria, pelvic pain, vaginal bleeding, vaginal discharge and vaginal pain.          Physical Exam:     Vitals:    10/09/19 0928   BP: 130/81   Pulse: 71   Temp: 97.9  F (36.6  C)   TempSrc: Oral   SpO2: 94%   Weight: 71.8 kg (158 lb 6.4 oz)   Height: 1.575 m (5' 2\")     Body mass index is 28.97 kg/m .  Vitals were reviewed and were normal     Physical Exam  Vitals signs reviewed. Exam conducted with a chaperone present.   Constitutional:       General: She is not in acute distress.     Appearance: She is not ill-appearing or diaphoretic.      Comments: Flat affect  Hard of hearing.    HENT:      Head: Normocephalic and atraumatic.   Neck:      " "Musculoskeletal: Neck supple.   Cardiovascular:      Rate and Rhythm: Normal rate and regular rhythm.      Pulses: Normal pulses.      Heart sounds: Normal heart sounds. No murmur.   Pulmonary:      Effort: Pulmonary effort is normal. No respiratory distress.      Breath sounds: Normal breath sounds. No stridor. No wheezing or rhonchi.   Abdominal:      Palpations: Abdomen is soft.      Tenderness: There is no tenderness. There is no guarding or rebound.   Genitourinary:     Vagina: No vaginal discharge.          Comments: Importantly, patient has some fecal matter/diarrhea around her rectum and scant around vagina.   Skin:     General: Skin is warm and dry.      Capillary Refill: Capillary refill takes less than 2 seconds.      Comments: Left ear pinnae erythema, scaly irregular almost pearly lesion measuring 0.4 cm on outside cartilage    Neurological:      Mental Status: She is alert.       MENTAL STATUS EXAM  Appearance: appropriate  Attitude: cooperative  Behavior: slowed responses   Eye Contact: minimal  Speech: normal responses but slowed  Orientation: oriented to person , place, time and situation  Mood:  \"okay\"   Affect: Very flat.  Thought Process: clear  Suicidal Ideation: Denies     Results:      Results from this visit  Results for orders placed or performed in visit on 10/09/19   Urinalysis, Micro If (UA) ([facility])   Result Value Ref Range    Specific Gravity Urine 1.025 1.005 - 1.030    pH Urine 5.5 4.5 - 8.0    Leukocyte Esterase UR Trace (A) NEGATIVE    Nitrite Urine Negative NEGATIVE mg/dL    Protein UR 1+ (A) NEGATIVE mg/dL    Glucose Urine Negative NEGATIVE    Ketones Urine Negative NEGATIVE mg/dL    Urobilinogen mg/dL 1.0 E.U./dL 0.2 E.U./dL E.U./dL    Bilirubin UR 1+ (A) NEGATIVE mg/dL    Blood UR 3+ (A) NEGATIVE mg/dL     Assessment and Plan    Serge was seen today for vaginal problem found to have epidermoid cyst of labia majora. Initially was concerned for abscess with dysuria so I&D " performed.     Diagnoses and all orders for this visit:    Epidermoid cyst of labia majora  Given return precautions and bacitracin ointment. Encouraged appropriate  hygiene and wiping techniques. Told to soak in bath twice daily for the next week or so.   [facility]  I&D Procedure Note  Serge Marin is here for Incision and Drainage of labia majora abscess vs cyst  Indication: painful lesion    Consent: Affirmation of informed consent was signed and scanned into the medical record. Risks, benefits and alternatives were discussed. Patient's questions were elicited and answered.   Procedure safety checklist was completed:  Yes  Time Out (Pause for the Cause) completed: Yes  Labs: None   Preoperative Diagnosis: Labia majora abscess   Postoperative Diagnosis: Epidermoid cyst of labia majora     Technique:   Skin prep Alcohol  Anesthesia Not needed  Location:  Right inferior labia majora  Abscess size: 0.5 cm  Incised abscess with 1 blade.    Found to have sebaceous cyst rather than abscess on incision. Cyst was removed with a majority of capsule removed as well without difficulty.   EBL: minimal  Complications:  No  Tolerance:  Pt tolerated procedure well and was in stable condition.   Culture sent: No    Follow up: Pt was instructed to call if bleeding, severe pain or foul smell.   Follow up in 1 week if not improving.    Resident: Terrell Corbett MD  Faculty: Dr. Edwards present for and supervised this entire procedure.    Other specified hypothyroidism  History of elevated TSH, now on medication. Last TSH was back in 2018.   -     TSH with free T4 reflex    Dysuria  Doesn't appear infected and dysuria likely due to cyst irritation. Patient's exam did show some fecal matter around vagina. We discussed this and good hygiene practices. She denies being incontinent. Will await urine culture results. Blood present on UA from procedure.   -     Urinalysis, Micro If (UA) ([facility])  -     Urine  "Culture Aerobic Bacterial    Lesion of skin of left ear  Offered left ear skin lesion biopsy but patient says \"I am fine with the way it is\" and does not want biopsy for further diagnostics. It looks like it could be basal cell possibly.     Sensorineural hearing loss, bilateral  Continued hearing loss. Has hearing aids at home yet doesn't like to wear them.      Medications Discontinued During This Encounter   Medication Reason     cetirizine (ZYRTEC) 10 MG tablet Stopped by Patient     Options for treatment and follow-up care were reviewed with the patient. Serge Marin  engaged in the decision making process and verbalized understanding of the options discussed and agreed with the final plan.    Terrell Corbett MD  "

## 2019-10-09 NOTE — PATIENT INSTRUCTIONS
Soak in tub for 10 mins two times daily for the next 7-10 days.    Wipe from front to back when using the bathroom and keep your vagina clean to help avoid infection.     Apply bacitracin ointment to your wound twice daily for the next 3-5 days.     Return to the clinic if you have a fever, chills, worsening vaginal pain, new vaginal discharge, vaginal bleeding or if the wound does not heal in the next week.

## 2019-10-10 LAB
BACTERIA SPEC CULT: NORMAL
Lab: NORMAL
SPECIMEN SOURCE: NORMAL

## 2019-10-10 NOTE — TELEPHONE ENCOUNTER
"Per provider, \"Please call patient/PCA or group home attendant back and let them know that full body soaks are not necessary but she should have some sort of cleaning/ soaking on her sore area (vagina) once or twice daily. This can be with twice daily cleanings (without a bath) for the next week. \"    RN called and relayed providers message.  Aida Arnold RN   "

## 2019-10-11 ENCOUNTER — TELEPHONE (OUTPATIENT)
Dept: FAMILY MEDICINE | Facility: CLINIC | Age: 79
End: 2019-10-11

## 2019-10-11 NOTE — TELEPHONE ENCOUNTER
"Per Dr. Corbett, \"You do not have a urine infection. Your thyroid hormone is within normal range while taking your thyroid medicine daily. Please check in and see how her sore on her vagina is healing as well if possible. \"    RN called patient to relay her results and she is hard of hearing and hung up on me. RN called assisted living and left message for them with her results. Also requested a call back in regards to how the wound is doing.  Aida Arnold RN   "

## 2019-12-05 ENCOUNTER — TELEPHONE (OUTPATIENT)
Dept: FAMILY MEDICINE | Facility: CLINIC | Age: 79
End: 2019-12-05

## 2019-12-05 NOTE — TELEPHONE ENCOUNTER
[facility] phone call message- general phone call:    Reason for call: [facility] home care nurse calling in to discontinue the clotrimazole (LOTRIMIN) 1 % external cream. Patient doesn't use it.    Action Desired: None    Return call needed: No    OK to leave a message on voice mail? No    Advised patient response may take up to 2 business days: Yes    Primary language: English      needed? No    Call taken on December 5, 2019 at 10:53 AM by Chanel Gray    [facility] to [facility] TRIAGE

## 2020-01-03 ENCOUNTER — DOCUMENTATION ONLY (OUTPATIENT)
Dept: FAMILY MEDICINE | Facility: CLINIC | Age: 80
End: 2020-01-03

## 2020-01-03 NOTE — PROGRESS NOTES
"When opening a documentation only encounter, be sure to enter in \"Chief Complaint\" Forms and in \" Comments\" Title of form, description if needed.    Serge is a 79 year old  female  Form received via: Fax  Form now resides in: Provider Ready    Laisha Kelley CMA                  "

## 2020-01-06 NOTE — PROGRESS NOTES
POLST has been signed by patient. Clinic visit needed to review POLST preferences and for provider to sign.   -Kassie Wyatt MD

## 2020-01-08 ENCOUNTER — TELEPHONE (OUTPATIENT)
Dept: FAMILY MEDICINE | Facility: CLINIC | Age: 80
End: 2020-01-08

## 2020-01-16 ENCOUNTER — OFFICE VISIT (OUTPATIENT)
Dept: FAMILY MEDICINE | Facility: CLINIC | Age: 80
End: 2020-01-16
Payer: COMMERCIAL

## 2020-01-16 VITALS
OXYGEN SATURATION: 99 % | HEIGHT: 62 IN | TEMPERATURE: 98 F | WEIGHT: 156.8 LBS | BODY MASS INDEX: 28.85 KG/M2 | RESPIRATION RATE: 18 BRPM | SYSTOLIC BLOOD PRESSURE: 137 MMHG | DIASTOLIC BLOOD PRESSURE: 82 MMHG | HEART RATE: 62 BPM

## 2020-01-16 DIAGNOSIS — Z02.89 ENCOUNTER FOR COMPLETION OF FORM WITH PATIENT: ICD-10-CM

## 2020-01-16 DIAGNOSIS — B37.2 CANDIDAL INTERTRIGO: Primary | ICD-10-CM

## 2020-01-16 DIAGNOSIS — F31.9 BIPOLAR 1 DISORDER (H): ICD-10-CM

## 2020-01-16 RX ORDER — CLOTRIMAZOLE 1 %
CREAM (GRAM) TOPICAL 2 TIMES DAILY PRN
Status: ON HOLD | COMMUNITY
Start: 2019-02-04 | End: 2022-10-20

## 2020-01-16 ASSESSMENT — MIFFLIN-ST. JEOR: SCORE: 1139.49

## 2020-01-16 ASSESSMENT — PAIN SCALES - GENERAL: PAINLEVEL: NO PAIN (0)

## 2020-01-16 NOTE — PROGRESS NOTES
Preceptor Attestation:   Patient seen and discussed with the resident. Assessment and plan reviewed with resident and agreed upon.   Supervising Physician:  Satnam Benítez MD  [facility]

## 2020-01-16 NOTE — PROGRESS NOTES
"       HPI       Serge Marin is a 79 year old  who presents for   Chief Complaint   Patient presents with     Infection     Patient is complainng of yest infection      Patient is poor historian, intermittently distrusting and unwilling to share information.    Chronic intertriginous candida.    Flare up, current plan: uses powder daily and cream during flareups. Started clotrimazole cream yesterday.  The cream is helping and it is getting better. No pain. Panties rub against it.     +++++++      Problem, Medication and Allergy Lists were reviewed and updated if needed..    Patient is an established patient of this clinic..         Review of Systems:   Review of Systems  4 system ROS negative other than as noted in HPI         Physical Exam:     Vitals:    01/16/20 1415   BP: 137/82   Pulse: 62   Resp: 18   Temp: 98  F (36.7  C)   TempSrc: Oral   SpO2: 99%   Weight: 71.1 kg (156 lb 12.8 oz)   Height: 1.575 m (5' 2\")     Body mass index is 28.68 kg/m .  Vitals were reviewed and were normal     Physical Exam  Vitals signs and nursing note reviewed.   Constitutional:       General: She is not in acute distress.     Appearance: She is not ill-appearing.   HENT:      Head: Normocephalic and atraumatic.   Neck:      Musculoskeletal: Normal range of motion.   Pulmonary:      Effort: Pulmonary effort is normal. No respiratory distress.   Musculoskeletal: Normal range of motion.   Skin:     Comments: Pannus skin fold with diffuse erythema with erythematous satellite papules.  Covered with thick white ointment.  No lesions.   Psychiatric:      Comments: Patient overall cooperative, but at times distrustful and unwilling to share information.  Intermittently slow to respond, or does not respond at all to questions.  At times blank stare.  Flat affect.           Results:   No testing ordered today    Assessment and Plan      1. Candidal intertrigo  2. Bipolar 1 disorder (H)  Serge is a 79-year-old female with a history " of bipolar here today for evaluation of a skin rash.  She is a difficult historian with a flat affect at times not responsive to questions, but overall cooperative.  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  Her treatment plan for her is use of powder daily and at times of flare to she reports improvement with switching to the cream yesterday.      Keep your skin clean by washing the area twice a day. Followed by gentle drying (dabbing or using hair dryer on cool setting)    Any weight loss will help    Use the cream (clotrimazole) as directed until your rash is gone. Once the skin has healed, keep it dry to prevent another infection.     Daily drying powder    2. Form  Attempted to review POLST with patient as this had been delivered to clinic, filled out and signed. On review today she states she had never seen this form and did not know how her signature was on it. She did not wish to continue discussion of form.       There are no discontinued medications.    Options for treatment and follow-up care were reviewed with the patient. Serge Marin  engaged in the decision making process and verbalized understanding of the options discussed and agreed with the final plan.    Rose Marie Thacker MD

## 2020-01-16 NOTE — PATIENT INSTRUCTIONS
Patient Education     Candida Skin Infection (Adult)  Candida is type of yeast. It grows naturally on the skin and in the mouth. If it grows out of control, it can cause an infection. Candida can cause infections in the genital area, skin folds, in the mouth, and under the breasts. Anyone can get this infection. It is more common in a person with a weak immune system, such as from diabetes, HIV, or cancer. It s also more common in someone who has been on antibiotic therapy. And it s more common people who are overweight or who have incontinence. Wearing tight-fitting clothing and taking part in activities with lots of skin-to-skin contact can also put you at risk.  Candida causes the skin to become bright red and inflamed. The border of the infected part of the skin is often raised. The infection causes pain and itching. Sometimes the skin peels and bleeds. In the mouth, candida is called thrush, and may cause white thickened areas.  A Candida rash is most often treated with an antifungal cream or ointment. The rash will clear a few days after starting the medicine. Infections that don t go away may need a prescription medicine. In rare cases, a bacterial infection can also occur.  Home care  Your healthcare provider will recommend an antifungal cream or ointment for the rash. He or she may also prescribe a medicine for the itch. Follow all instructions for using these medicines. Don t use cornstarch powder. Cornstarch can cause the Candida infection to get worse.  General care:    Keep your skin clean by washing the area twice a day.    Use the cream as directed until your rash is gone. Once the skin has healed, keep it dry to prevent another infection.     If you are overweight, talk with your healthcare provider about a plan to lose excess weight.    Avoid clothes that fit tightly.  Follow-up care  Follow up with your healthcare provider, or as advised. Your rash will clear in 7 to 14 days. Call your healthcare  provider if the rash is not gone after 14 days.  When to seek medical advice  Call your healthcare provider right away if any of these occur:    Pain or redness that gets worse or spreads    Fluid coming from the skin    Yellow crusts on the skin    Fever of 100.4 F (38 C) or higher, or as directed by your healthcare provider  Date Last Reviewed: 9/1/2016 2000-2019 [employer]. [address] [place] [address]. All rights reserved. This information is not intended as a substitute for professional medical care. Always follow your healthcare professional's instructions.

## 2020-01-21 ENCOUNTER — DOCUMENTATION ONLY (OUTPATIENT)
Dept: FAMILY MEDICINE | Facility: CLINIC | Age: 80
End: 2020-01-21

## 2020-01-21 NOTE — PROGRESS NOTES
POLST indicates desire for CPR, full medical treatment. Signed by patient on 12/26/20.   At recent clinic visit with Dr. Thacker on 1/16 patient denied ever seeing the POLST form.   POLST will not be signed by provider.   -Kassie Wyatt MD

## 2020-01-24 ENCOUNTER — OFFICE VISIT (OUTPATIENT)
Dept: FAMILY MEDICINE | Facility: CLINIC | Age: 80
End: 2020-01-24
Payer: COMMERCIAL

## 2020-01-24 VITALS
SYSTOLIC BLOOD PRESSURE: 105 MMHG | WEIGHT: 157 LBS | HEART RATE: 78 BPM | OXYGEN SATURATION: 96 % | BODY MASS INDEX: 28.72 KG/M2 | DIASTOLIC BLOOD PRESSURE: 70 MMHG

## 2020-01-24 DIAGNOSIS — B37.2 CANDIDAL INTERTRIGO: Primary | ICD-10-CM

## 2020-01-24 NOTE — PROGRESS NOTES
HPI       Serge Marin is a 79 year old  who presents for   Chief Complaint   Patient presents with     Derm Problem     Possible yeast infection located on lower abdomen. Has been there for 2 weeks.     Here for follow-up of candidal intertrigo. Treated with clotrimazole cream. Improving.     Lives at [facility]. Aids help, come 2 times daily. Give pills and help with other needs as needed.    No other concerns today    +++++++      Problem, Medication and Allergy Lists were reviewed and updated if needed..    Patient is an established patient of this clinic..         Review of Systems:   Review of Systems  4 system ROS negative other than as noted in HPI         Physical Exam:     Vitals:    01/24/20 1436   BP: 105/70   BP Location: Left arm   Patient Position: Sitting   Cuff Size: Adult Large   Pulse: 78   SpO2: 96%   Weight: 71.2 kg (157 lb)     Body mass index is 28.72 kg/m .  Vitals were reviewed and were normal     Physical Exam  Vitals signs and nursing note reviewed.   Constitutional:       General: She is not in acute distress.     Appearance: She is not ill-appearing.   HENT:      Head: Normocephalic and atraumatic.   Neck:      Musculoskeletal: Normal range of motion.   Pulmonary:      Effort: Pulmonary effort is normal. No respiratory distress.   Musculoskeletal: Normal range of motion.   Skin:     Comments: Pannus skin fold with great improvement, still slight erythema, no longer red beefy or satellite lesions, no discharge. No lesions.   Psychiatric:      Comments: Patient overall cooperative.  Intermittently slow to respond, or does not respond at all to questions.  At times blank stare.  Flat affect.           Assessment and Plan        1. Candidal intertrigo, resolved.   Intertrigo continues in pannus skin fold, no long with candidal infection. Stop clotrimazole cream  - Daily cleansing of intertriginous skin with a mild cleanser followed by drying of affected area with a hair  dryer on a cool setting  - Aeration of affected area when feasible  - Daily application of drying powders  - Use of absorbent material or clothing, such as cotton or boone wool, to separate skin in folds  - Application of barrier creams in areas that may come in contact with urine or feces  - Weight loss   - if yeast infection returns, transition to using the cream twice daily instead of the powder.          There are no discontinued medications.    Options for treatment and follow-up care were reviewed with the patient. Serge Marin  engaged in the decision making process and verbalized understanding of the options discussed and agreed with the final plan.    Rose Marie Thacker MD

## 2020-01-24 NOTE — PATIENT INSTRUCTIONS
- twice daily cleansing followed by drying of affected area with a hair dryer on a cool setting  - Air out the area when able  - apply drying powders twice every day.   - wear cotton or boone wool, to separate skin in folds  - Application of barrier creams in areas that may come in contact with urine or feces  - Weight loss   - if yeast infection returns, transition to using the cream twice daily instead of the powder.

## 2020-01-24 NOTE — PROGRESS NOTES
Preceptor Attestation:   Patient seen and discussed with the resident. Assessment and plan reviewed with resident and agreed upon.   Supervising Physician:  DO [facility]

## 2020-02-06 ENCOUNTER — DOCUMENTATION ONLY (OUTPATIENT)
Dept: FAMILY MEDICINE | Facility: CLINIC | Age: 80
End: 2020-02-06

## 2020-02-06 NOTE — PROGRESS NOTES
"When opening a documentation only encounter, be sure to enter in \"Chief Complaint\" Forms and in \" Comments\" Title of form, description if needed.    Serge is a 79 year old  female  Form received via: Fax  Form now resides in: Provider Ready    MIRIAM Rosenberg 10:27 AM February 6, 2020    "

## 2020-02-19 ENCOUNTER — DOCUMENTATION ONLY (OUTPATIENT)
Dept: FAMILY MEDICINE | Facility: CLINIC | Age: 80
End: 2020-02-19

## 2020-02-19 NOTE — PROGRESS NOTES
"When opening a documentation only encounter, be sure to enter in \"Chief Complaint\" Forms and in \" Comments\" Title of form, description if needed.    Serge is a 79 year old  female  Form received via: Fax  Form now resides in: Provider Ready    Laisha Kelley CMA        Form has been completed by provider.     Form sent out via: Fax to [facility] 2/10/2020 at Fax Number: [phone]  Patient informed: NA   Output date: February 19, 2020    Laisha Kelley CMA      **Please close the encounter**          "

## 2020-03-02 ENCOUNTER — DOCUMENTATION ONLY (OUTPATIENT)
Dept: OTHER | Facility: CLINIC | Age: 80
End: 2020-03-02

## 2020-03-02 ENCOUNTER — DOCUMENTATION ONLY (OUTPATIENT)
Dept: FAMILY MEDICINE | Facility: CLINIC | Age: 80
End: 2020-03-02

## 2020-03-02 NOTE — PROGRESS NOTES
"When opening a documentation only encounter, be sure to enter in \"Chief Complaint\" Forms and in \" Comments\" Title of form, description if needed.    Serge is a 79 year old  female  Form received via: Fax  Form now resides in: Provider Ready    Laisha Kelley CMA          Form has been completed by provider.     Form sent out via: Fax to [facility]/ Medication review report 03/02/2020 at Fax Number: [phone]  Patient informed: NA  Output date: March 4, 2020    Laisha Kelley CMA      **Please close the encounter**          "

## 2020-03-04 NOTE — PROGRESS NOTES
Unsure if this was completed as Laisha took over forms. Closing encounter.   Juliette Wayne, MIRIAM 3:16 PM March 4, 2020

## 2020-03-23 ENCOUNTER — TELEPHONE (OUTPATIENT)
Dept: FAMILY MEDICINE | Facility: CLINIC | Age: 80
End: 2020-03-23

## 2020-03-23 DIAGNOSIS — H00.019 HORDEOLUM EXTERNUM, UNSPECIFIED LATERALITY: Primary | ICD-10-CM

## 2020-03-23 NOTE — TELEPHONE ENCOUNTER
RN called to ask what the appearance of the area is. She states that it is a slightly red bump on her top lid that is not quite pea sized, but is close. It is a bit bothersome for her. Denies drainage, surrounding swelling, or itching.    Home care nurse states that patient told her that she used to get an eye wash which helped. If that is not something we can order, she states that an order for [employer] baby wash would be helpful, as they have found success in getting rid of stye's for other patients using this.    Routing to provider to see if something can be ordered.  Aida Arnold, RN

## 2020-03-23 NOTE — TELEPHONE ENCOUNTER
[facility] phone call message- medication clarification/question:    Full Medication Name: UNKNOWN   Dose: UNKNOWN    Question/Clarification needed: Home Care calling to request a medication for a sty. Home Care RN stated patient has had medication prescribed in the past for the sty, therefore requesting a refill.     Pharmacy confirmed as   [facility] - [place], [place] - [address]  [address]  [facility] [address]  Phone: [phone] Fax: [phone]  : Yes    Please leave ONLY preferred pharmacy    OK to leave a message on voice mail? Yes    Advised patient that RN would call back within 3 hours, unless emergent.    Primary language: English      needed? No    Call taken on March 23, 2020 at 12:01 PM by Kae rader [facility] MICAELA

## 2020-03-25 ENCOUNTER — DOCUMENTATION ONLY (OUTPATIENT)
Dept: FAMILY MEDICINE | Facility: CLINIC | Age: 80
End: 2020-03-25

## 2020-03-25 NOTE — PROGRESS NOTES
"When opening a documentation only encounter, be sure to enter in \"Chief Complaint\" Forms and in \" Comments\" Title of form, description if needed.    Serge is a 79 year old  female  Form received via: Fax  Form now resides in: Provider Ready    Tana Pickett CMA              Form has been completed by provider.     Form sent out via: Fax to [facility] at Fax Number: [phone]  Patient informed: n/a  Output date: March 30, 2020    Tana Pickett CMA      **Please close the encounter**        "

## 2020-06-02 ENCOUNTER — DOCUMENTATION ONLY (OUTPATIENT)
Dept: FAMILY MEDICINE | Facility: CLINIC | Age: 80
End: 2020-06-02

## 2020-06-02 NOTE — PROGRESS NOTES
Telephone call to the client's assisted living for annual health risk assessment.  An  was not needed.    Current situation/living environment  Lives in assisted living, has services to meet her needs    Activities of daily living (ADL)/instrumental activities of daily living (IADL) and functional issues  Client needs help with the following ADL's: grooming and bathing  Client needs help with the following IADL's: shopping, cooking, housekeeping, laundry, managing finances/bills and transportation  Client states she is unable to perform the above due to  General weakness, longstanding history of self neglect      Health concerns for today  No stated concerns but she did have recent ER visit after going to the [facility], which goes against the current restrictions of her assisted living and also her behavior plan - she had agreed after previous incidents that she would only go to [facility] with an escort for the duration and check in/out with assisted living staff to ensure her needs would be met.   Has patient fallen 2 or more times in the last year? No  Has patient fallen with injury in the last year? No    Cognition/mental health  Has forgetfulness and confusion at times, recent ER visit was due to confusion. Lives in assisted living that has staffing available 24/7. She has MH diagnosis and receives regular MH services. Nursing staff manage and administer her medication. She states satisfaction with her living situation and verbalized understanding today that she should be staying home for her health and safety during this pandemic. She has a behavior contract with the facility.     STARS/Med Adherence  Client is non-compliant with the following quality measures: Not on STARS  Comments: NA    Client's Plan of Care consists of:  Assisted living services include: 24h staffing for unscheduled assist as needed, supervision, am cares for grooming and bathing 2x wk, meals, medication mgmt and administration, staff  assist w/ medical appts, activities, behavior support, emergency call system; ILS services 2h/mo; Rep Payee services; Specialized supplies and equipment (bathchair, lift chair, walker) and Transportation    Ute Gonzalez RN PHN  [facility] Care Coordinator   [phone]

## 2020-09-23 ENCOUNTER — DOCUMENTATION ONLY (OUTPATIENT)
Dept: FAMILY MEDICINE | Facility: CLINIC | Age: 80
End: 2020-09-23

## 2020-09-23 NOTE — PROGRESS NOTES
"When opening a documentation only encounter, be sure to enter in \"Chief Complaint\" Forms and in \" Comments\" Title of form, description if needed.    Serge is a 80 year old  female  Form received via: Fax  Form now resides in: Provider Ready    Saloni Terrazas MA    Form has been completed by provider.     Form sent out via: Fax to [facility] at Fax Number: [phone]  Patient informed: No, Reason:Not needed  Output date: September 24, 2020    Saloni Terrazas MA      **Please close the encounter**                      "

## 2020-10-01 ENCOUNTER — DOCUMENTATION ONLY (OUTPATIENT)
Dept: FAMILY MEDICINE | Facility: CLINIC | Age: 80
End: 2020-10-01

## 2020-10-01 NOTE — PROGRESS NOTES
"When opening a documentation only encounter, be sure to enter in \"Chief Complaint\" Forms and in \" Comments\" Title of form, description if needed.    Serge is a 80 year old  female  Form received via: Fax  Form now resides in: Provider Ready    Saloni Terrazas MA    Form has been completed by provider.     Form sent out via: Fax to [facility] at Fax Number: [phone]  Patient informed: No, Reason:n/a  Output date: October 8, 2020    Saloni Terrazas MA      **Please close the encounter**                    "

## 2020-10-07 RX ORDER — ARIPIPRAZOLE 20 MG/1
10 TABLET ORAL AT BEDTIME
COMMUNITY
End: 2022-08-04

## 2020-12-09 ENCOUNTER — TELEPHONE (OUTPATIENT)
Dept: FAMILY MEDICINE | Facility: CLINIC | Age: 80
End: 2020-12-09

## 2020-12-09 DIAGNOSIS — Z00.00 HEALTH CARE MAINTENANCE: Primary | ICD-10-CM

## 2020-12-09 NOTE — TELEPHONE ENCOUNTER
"Patient's homecare requesting refill of tylenol 325 mg. Last office visit 1/24/20 with Dr. Thacker. RN unable to fill as medication is \"patient reported\". Routing to PCP high priority as patient is out of medication.   Kendra Levy RN    "

## 2020-12-09 NOTE — TELEPHONE ENCOUNTER
Verify that the refill encounter hasn't been started Yes    [facility] phone call message- patient requesting a refill:    Full Medication Name: acetaminophen (TYLENOL) tablet 325 mg      Dose:      Pharmacy confirmed as     [facility] - [place], [place] - [address]  [facility] [address]  Phone: [phone] Fax: [phone]  : Yes    Medication tab checked to see if medication has been sent  Yes    Additional Comments: Out of meication     OK to leave a message on voice mail? Yes    Advised patient refill may take up to 2 business days? No:     Primary language: English      needed? No    Call taken on December 9, 2020 at 1:12 PM by Annamarie Ovalles    Route to [facility] MED REFILL

## 2020-12-10 RX ORDER — ACETAMINOPHEN 325 MG/1
325-650 TABLET ORAL EVERY 6 HOURS PRN
Qty: 120 TABLET | Refills: 11 | Status: SHIPPED | OUTPATIENT
Start: 2020-12-10 | End: 2022-08-04

## 2020-12-10 NOTE — TELEPHONE ENCOUNTER
Serge was seen today for refill request.    Diagnoses and all orders for this visit:    Health care maintenance  -     acetaminophen (TYLENOL) 325 MG tablet; Take 1-2 tablets (325-650 mg) by mouth every 6 hours as needed for mild pain

## 2021-02-10 ENCOUNTER — DOCUMENTATION ONLY (OUTPATIENT)
Dept: FAMILY MEDICINE | Facility: CLINIC | Age: 81
End: 2021-02-10

## 2021-02-10 NOTE — PROGRESS NOTES
"When opening a documentation only encounter, be sure to enter in \"Chief Complaint\" Forms and in \" Comments\" Title of form, description if needed.    Serge is a 80 year old  female  Form received via: Fax  Form now resides in: Provider Ready    Saloni Terrazas MA    Form has been completed by provider.     Form sent out via: Fax to [facility] at Fax Number: [phone]  Patient informed: No, Reason:n/a  Output date: February 15, 2021    Saloni Terrazas MA      **Please close the encounter**                      "

## 2021-06-25 ENCOUNTER — DOCUMENTATION ONLY (OUTPATIENT)
Dept: FAMILY MEDICINE | Facility: CLINIC | Age: 81
End: 2021-06-25

## 2021-06-25 NOTE — PROGRESS NOTES
Telephone call and visit to the client's assisted living for annual health risk assessment. An  was not needed.     Current situation/living environment  Lives in assisted living, has services to meet her needs     Activities of daily living (ADL)/instrumental activities of daily living (IADL) and functional issues  Client needs help with the following ADL's: grooming and bathing  Client needs help with the following IADL's: shopping, cooking, housekeeping, laundry, managing finances/bills and transportation  Client states she is unable to perform the above due to  General weakness, longstanding history of self neglect        Health concerns for today  No stated concerns but she is due for a routine physical and did assist client to make appt per her specific request for a Thursday afternoon (scheduled 8/5/21 1:20p). Also a goal to obtain hearing aides within the next year.     Has patient fallen 2 or more times in the last year? No  Has patient fallen with injury in the last year? No     Cognition/mental health  Has forgetfulness and confusion at times, very [place] which contributes. Lives in assisted living that has 24/7 staffing. She has MH diagnosis and receives regular MH services. Nursing staff manage and administer her medication. She states satisfaction with her living situation. She does have a behavior contract with the facility due to history of leaving facility without notifying staff. No reported incidents this past year.     STARS/Med Adherence  Client is non-compliant with the following quality measures: Not on STARS  Comments: NA     Client's Plan of Care consists of:  Assisted living services include: 24h staffing for unscheduled assist as needed, supervision, daily am cares for grooming and bathing 2x wk, 2 meals per day, medication mgmt and administration, staff assist w/ medical appts, activities, behavior support, emergency call system;    ILS services 2h/mo for personal assist with  community access for chores as well as help with mail, paperwork, misc.;     Rep Payee services for money mgmt;     Specialized supplies and equipment (bathchair, lift chair, walker) and Transportation     Ute Gonzalez RN PHN  [facility] Care Coordinator   [phone]

## 2021-06-29 DIAGNOSIS — R19.7 DIARRHEA: ICD-10-CM

## 2021-06-29 DIAGNOSIS — R19.7 DIARRHEA, UNSPECIFIED TYPE: Primary | ICD-10-CM

## 2021-06-29 NOTE — TELEPHONE ENCOUNTER
"Request for medication refill:    Imodium 2 mg tablet     Providers if patient needs an appointment and you are willing to give a one month supply please refill for one month and  send a letter/MyChart using \".SMILLIMITEDREFILL\" .smillimited and route chart to \"P SMI \" (Giving one month refill in non controlled medications is strongly recommended before denial)    If refill has been denied, meaning absolutely no refills without visit, please complete the smart phrase \".smirxrefuse\" and route it to the \"P SMI MED REFILLS\"  pool to inform the patient and the pharmacy.    Mary Prasad, CMA        "

## 2021-06-30 RX ORDER — LOPERAMIDE HYDROCHLORIDE 2 MG/1
TABLET ORAL
Qty: 30 TABLET | Refills: 0 | Status: ON HOLD | OUTPATIENT
Start: 2021-06-30 | End: 2022-10-20

## 2021-08-05 ENCOUNTER — OFFICE VISIT (OUTPATIENT)
Dept: FAMILY MEDICINE | Facility: CLINIC | Age: 81
End: 2021-08-05
Payer: COMMERCIAL

## 2021-08-05 VITALS
SYSTOLIC BLOOD PRESSURE: 121 MMHG | HEART RATE: 78 BPM | HEIGHT: 61 IN | DIASTOLIC BLOOD PRESSURE: 74 MMHG | OXYGEN SATURATION: 95 % | BODY MASS INDEX: 24.62 KG/M2 | WEIGHT: 130.4 LBS

## 2021-08-05 DIAGNOSIS — E03.8 OTHER SPECIFIED HYPOTHYROIDISM: ICD-10-CM

## 2021-08-05 DIAGNOSIS — L98.8 SKIN PLAQUE: ICD-10-CM

## 2021-08-05 DIAGNOSIS — Z00.00 ENCOUNTER FOR MEDICARE ANNUAL WELLNESS EXAM: Primary | ICD-10-CM

## 2021-08-05 DIAGNOSIS — B35.4 TINEA CORPORIS: ICD-10-CM

## 2021-08-05 LAB
KOH PREPARATION: NORMAL
KOH PREPARATION: NORMAL
TSH SERPL DL<=0.005 MIU/L-ACNC: 1.43 MU/L (ref 0.4–4)

## 2021-08-05 PROCEDURE — 99397 PER PM REEVAL EST PAT 65+ YR: CPT | Performed by: FAMILY MEDICINE

## 2021-08-05 PROCEDURE — 87220 TISSUE EXAM FOR FUNGI: CPT | Performed by: FAMILY MEDICINE

## 2021-08-05 PROCEDURE — 84443 ASSAY THYROID STIM HORMONE: CPT | Performed by: FAMILY MEDICINE

## 2021-08-05 PROCEDURE — 99213 OFFICE O/P EST LOW 20 MIN: CPT | Mod: 25 | Performed by: FAMILY MEDICINE

## 2021-08-05 PROCEDURE — 36415 COLL VENOUS BLD VENIPUNCTURE: CPT | Performed by: FAMILY MEDICINE

## 2021-08-05 ASSESSMENT — MIFFLIN-ST. JEOR: SCORE: 1000.48

## 2021-08-05 NOTE — LETTER
August 8, 2021      Serge Marin  [address]    [facility] [address]        Dear ,    We are writing to inform you of your test results.    Your test results fall within the expected range(s) or remain unchanged from previous results.  Please continue with current treatment plan.    Your thyroid level is normal    Your skin lesion is unlikely to be from a fungus, and more likely seborrheic keratosis. However it needs to be biopsied to make sure it's not cancer. Follow up with the Dermatology referral.     Resulted Orders   KOH Preparation - Clinic Collect   Result Value Ref Range    KOH Preparation No fungal elements seen     KOH Preparation Reference Range: No fungal elements seen.    TSH   Result Value Ref Range    TSH 1.43 0.40 - 4.00 mU/L       If you have any questions or concerns, please call the clinic at the number listed above.       Sincerely,      Kassie Wyatt MD

## 2021-08-05 NOTE — PROGRESS NOTES
>65 year old Wellness Visit ( Medicare etc)         HPI       This 81 year old female presents as an established patient  Kassie Wyatt who presents for a  Annual Wellness Exam.    Other issues patient wants to address today:    New raised dark area on left hip, noticed it in the last few days, not sure how long it's been there.     Visual Acuity: :  Testing not done; patient has seen eye doctor in the past 12 months.    Patient Active Problem List   Diagnosis     Bipolar 1 disorder (H)     Chronic diarrhea     Thyroid function test abnormal     Other specified hypothyroidism     Tinea corporis     Nonimmune to hepatitis B virus     Elevated fasting glucose       History reviewed. No pertinent past medical history.     History reviewed. No pertinent family history.      Problem List, Family History and past Medical History reviewed and unchanged/updated.       Health risk Assessment/ Review of Systems:         Constitutional:   Fevers or night sweats?  NO      Eyes:   Vision problems?  Yes - dry eyes            Hearing:  Do you feel you have hearing loss?  Yes - has hearing aids, batteries ran out  Cardiovascular:  Chest pain, palpitations, or pain with walking?  NO        Respiratory:   Breathing problems or cough?  NO    :   Difficulty controlling urination?  NO        Musculoskeletal:   Stiffness or sore joints?  NO            Skin:   concerning lesions or moles?  Yes - see HPI           Nervous System:  No-  loss of strength or sensation,  numbness or tingling, ,  dizziness,  headache?  Yes -  tremor   Mental Health: No  Depression sleep problems?  Yes - anxiety   Cognition:  Memory problems?  NO       Weight Loss: Have you lost 10 or more pounds unintentionally in the previous year?  NO  Energy: How much of the time during the past 3 weeks did you feel tired?   (check one of the following):   ?  All of the time  ? Most of the time  ? Some of the time  ? A little of the time  ? None of the Time    PHQ-2  Score:   PHQ-2 ( 1999 Pfizer) 8/5/2021 1/24/2020   Q1: Little interest or pleasure in doing things 0 0   Q2: Feeling down, depressed or hopeless 1 0   PHQ-2 Score 1 0       PHQ-9 Score:   No flowsheet data found.  Medical Care:     What other specialists or organizations are involved in your medical care?     Patient Care Team       Relationship Specialty Notifications Start End    Kassie Wyatt MD PCP - General Family Practice  7/1/14     Phone: [phone] Fax: [phone]         [address] [place] [address]    Ute Gonzalez RN Clinic Care Coordinator   6/21/18     [facility] Care Coordinator for [facility] [phone] or [phone]    Kassie Wyatt MD Assigned PCP   5/27/21     Phone: [phone] Fax: [phone]         [address] [place] [address]          Do you have an advanced directive? Yes      Social History / Home Safety     Social History     Tobacco Use     Smoking status: Never Smoker     Smokeless tobacco: Never Used   Substance Use Topics     Alcohol use: No     Marital Status:Single  Who lives in your household? Alone  Does your home have any of the following safety concerns? Loose rugs in the hallway, no grab bars in the bathroom, no handrails on the stairs or have poorly lit areas?  No   Do you feel threatened or controlled by a partner, ex-partner or anyone in your life? {Yes No   Has anyone hurt you physically, for example by pushing, hitting, slapping or kicking you   or forcing you to have sex? No       Functional Status     Do you need help with dressing yourself, bathing, or walking? YES   Do you need help with the phone, transportation, shopping, preparing meals, housework, laundry, medications or managing money? YES   By yourself and not using aids, do you have any difficulty walking up 10 steps  without resting? No   By yourself and not using aids, do you have any difficulty walking several hundred yards? No              Risk Behaviors and Healthy  Habits     History   Smoking Status     Never Smoker   Smokeless Tobacco     Never Used     How many servings of fruits and vegetables do you eat a day? 0  Exercise:No exercise None  Do you frequently drive without a seatbelt? No   History   Smoking Status     Never Smoker   Smokeless Tobacco     Never Used     Do you use any other drugs? No       Do you use alcohol?No      Functional Ability   Was the patient's timed Up & Go test (Get up from chair walk, 10 feet turn, return to chair and sit down) unsteady or longer than 10 seconds?  YES     Fall risk:     1. Have you fallen two or more times in the past year? No   2. Have you fallen and had an injury in the past year?  No         Evaulation of Cognitive Function       1) Repeat 3 items (Leader, Season, Table)    2) Clock draw: ABNORMAL   3) 3 item recall: Recalls 3 objects  Results: 3 items recalled: COGNITIVE IMPAIRMENT LESS LIKELY    Mini-CogTM Copyright S Melita. Licensed by the author for use in [facility]; reprinted with permission (sandra@[facility]). All rights reserved.            Other Assessments:     CV Risk based on Pooled Cohort Risk (consider assessing every 4-6 years; consider statin in patients with 10-yr risk > 7.5%):   The ASCVD Risk score (Cheryl DC Jr., et al., 2013) failed to calculate for the following reasons:    The 2013 ASCVD risk score is only valid for ages 40 to 79    Immunization History   Administered Date(s) Administered     COVID-19,PF,Moderna 01/29/2021, 02/26/2021     Flu 65+ Years 09/25/2019     Influenza (High Dose) 3 valent vaccine 10/13/2016, 09/04/2018, 10/01/2019     Influenza (IIV3) PF 12/03/2001, 10/14/2008     Influenza Vaccine, 6+MO IM (QUADRIVALENT W/PRESERVATIVES) 09/18/2015, 09/15/2017     Pneumo Conj 13-V (2010&after) 02/01/2016     Pneumococcal 23 valent 05/08/2018     Tdap (Adacel,Boostrix) 01/19/2005     Reviewed Immunization Record Today    Physical Exam     Vitals: /74 (BP Location: Right arm,  "Patient Position: Sitting, Cuff Size: Adult Regular)   Pulse 78   Ht 1.56 m (5' 1.42\")   Wt 59.1 kg (130 lb 6.4 oz)   LMP  (LMP Unknown)   SpO2 95%   BMI 24.31 kg/m    BMI= Body mass index is 24.31 kg/m .  GENERAL APPEARANCE: alert and no distress  HENT: ear canals patent and TM's normal; mouth without ulcers or lesions; and oral mucous membranes moist  Hearing loss screen:  Has diagnosed hearing loss, very hard of hearing during exam, no difficulty with using pocket talker.     DENTITION:  Good repair?  no  RESP: lungs clear to auscultation - no rales, rhonchi or wheezes  CV: regular rates and rhythm, no murmur, click or rub, no peripheral edema and peripheral pulses strong  SKIN: Raised dark 1cm x 3cm stuck-on appearing scaly plaque on left lower abdomen/pannus. Several scattered smaller similar plaques on left abdomen.   NEURO: Normal strength and tone  MENTAL STATUS EXAM  Appearance: appropriate  Attitude: cooperative  Behavior: normal  Eye Contact: normal  Speech: normal  Orientation: oreinted to person , place, time and situation  Mood:  anxious  Affect: Mood Congruent  Thought Process: clear      Assessment and Plan     Welcome to Medicare Preventive Visit / Initial Medicare Annual Wellness Exam - reviewed Preventive Services and Plan form with patient as specified in Patient Instructions.    Patient Education   Here is the plan from today's visit    Skin plaque  Tinea corporis vs seborrheic keratosis  Has documented tinea corporis from preivously, which nystatin is inadequate for.   If KOH negative, then likely is an SK, however needs biopsy to rule out melanoma. Given size/widespread nature, will refer to derm for biopsy & further treatment.   Stop nystatin  Start:  - butenafine (MENTAX) 1 % CREA cream; Apply daily to abdomen rash  Dispense: 30 g; Refill: 1  - KOH Preparation - Clinic Collect  - DERMATOLOGY REFERRAL - INTERNAL    Other specified hypothyroidism  - TSH; Future      Options for treatment " and follow-up care were reviewed with the Serge Marin and/or guardian engaged in the decision making process and verbalized understanding of the options discussed and agreed with the final plan.    Kassie Wyatt MD

## 2021-08-05 NOTE — PATIENT INSTRUCTIONS
Patient Education   Here is the plan from today's visit    3. Skin plaque  Tinea corporis vs seborrheic keratosis  Stop nystatin  Start:  - butenafine (MENTAX) 1 % CREA cream; Apply daily to abdomen rash  Dispense: 30 g; Refill: 1  - KOH Preparation - Clinic Collect  - DERMATOLOGY REFERRAL - INTERNAL    2. Other specified hypothyroidism  - TSH; Future        Please call or return to clinic if your symptoms don't go away.    Follow up plan  No follow-ups on file.    Thank you for coming to [facility] today.  COVID-19 Vaccine:  Starting Monday 8/2/21: [facility] will have walk-in appointments for Moderna, Pfizer and Ace&Ace vaccines. No appointment needed! You will also have the option of receiving Moderna vaccine during your physician appointment. Please ask your care team for more information!  You may be eligible for a $100 Visa giftcard if you receive your first dose between Friday July 30th and Sunday August 15th. Visit https://[place].gov/covid19/100/ for more information.   Lab Testing:  **If you had lab testing today and your results are reassuring or normal they will be mailed to you or sent through [employer] within 7 days.   **If the lab tests need quick action we will call you with the results.  **If you are having labs done on a different day, please call [phone] to schedule at [facility] or [phone] for other [facility] locations.   The phone number we will call with results is # [phone] (home) NONE (work). If this is not the best number please call our clinic and change the number.  Medication Refills:  If you need any refills please call your pharmacy and they will contact us.   If you need to  your refill at a new pharmacy, please contact the new pharmacy directly. The new pharmacy will help you get your medications transferred faster.   Scheduling:  If you have any concerns about today's visit or wish to schedule another appointment please call  our office during normal business hours [phone] (8-5:00 M-F)  If a referral was made to a [facility] and you don't get a call from central scheduling please call [phone].  If a Mammogram was ordered for you at [facility] call [phone] to schedule or change your appointment.  If you had an EKG/XRay/CT/Ultrasound/MRI ordered the number is [phone] to schedule or change your radiology appointment.   Medical Concerns:  If you have urgent medical concerns please call [phone] at any time of the day.    Kassie Wyatt MD       Preventive Health Recommendations    See your health care provider every year to    Review health changes.     Discuss preventive care.      Review your medicines if your doctor has prescribed any.    You no longer need a yearly Pap test unless you've had an abnormal Pap test in the past 10 years. If you have vaginal symptoms, such as bleeding or discharge, be sure to talk with your provider about a Pap test.    Every 1 to 2 years, have a mammogram.  If you are over 69, talk with your health care provider about whether or not you want to continue having screening mammograms.    Every 10 years, have a colonoscopy. Or, have a yearly FIT test (stool test). These exams will check for colon cancer.     Have a cholesterol test every 5 years, or more often if your doctor advises it.     Have a diabetes test (fasting glucose) every three years. If you are at risk for diabetes, you should have this test more often.     At age 65, have a bone density scan (DEXA) to check for osteoporosis (brittle bone disease).    Shots:    Get a flu shot each year.    Get a tetanus shot every 10 years.    Talk to your doctor about your pneumonia vaccines. There are now two you should receive - Pneumovax (PPSV 23) and Prevnar (PCV 13).    Talk to your pharmacist about the shingles vaccine.    Talk to your doctor about the hepatitis B vaccine.    Nutrition:     Eat at  least 5 servings of fruits and vegetables each day.    Eat whole-grain bread, whole-wheat pasta and brown rice instead of white grains and rice.    Get adequate Calcium and Vitamin D.     Lifestyle    Exercise at least 150 minutes a week (30 minutes a day, 5 days a week). This will help you control your weight and prevent disease.    Limit alcohol to one drink per day.    No smoking.     Wear sunscreen to prevent skin cancer.     See your dentist twice a year for an exam and cleaning.    See your eye doctor every 1 to 2 years to screen for conditions such as glaucoma, macular degeneration and cataracts.    Personalized Prevention Plan  You are due for the preventive services outlined below.  Your care team is available to assist you in scheduling these services.  If you have already completed any of these items, please share that information with your care team to update in your medical record.  Health Maintenance   Topic Date Due     ZOSTER IMMUNIZATION (1 of 2) Never done     DTAP/TDAP/TD IMMUNIZATION (2 - Td or Tdap) 01/19/2015     MEDICARE ANNUAL WELLNESS VISIT  05/08/2019     FALL RISK ASSESSMENT  06/06/2019     INFLUENZA VACCINE (1) 09/01/2021     ADVANCE CARE PLANNING  03/02/2025     DEXA  03/17/2031     PHQ-2  Completed     Pneumococcal Vaccine: 65+ Years  Completed     COVID-19 Vaccine  Completed     IPV IMMUNIZATION  Aged Out     MENINGITIS IMMUNIZATION  Aged Out     HEPATITIS B IMMUNIZATION  Aged Out

## 2021-08-06 ENCOUNTER — TELEPHONE (OUTPATIENT)
Dept: FAMILY MEDICINE | Facility: CLINIC | Age: 81
End: 2021-08-06

## 2021-08-06 NOTE — TELEPHONE ENCOUNTER
[facility] phone call message- general phone call:    Reason for call: donavan from [facility]  Request to fax [employer] mai from yesterday visit to fax [phone]    Action Desired: AVS    Return call needed: No    OK to leave a message on voice mail? Yes    Advised patient response may take up to 2 business days: Yes    Primary language: English      needed? No    Call taken on August 6, 2021 at 1:50 PM by Harrison Calderon    Route to [facility] [place] TRIAGE

## 2021-08-06 NOTE — TELEPHONE ENCOUNTER
[facility]isted [facility] requesting copy of AVS from yesterday's appointment with Dr. Wyatt. RN printed and faxed as requested.   Kendra Levy RN

## 2021-10-11 ENCOUNTER — DOCUMENTATION ONLY (OUTPATIENT)
Dept: FAMILY MEDICINE | Facility: CLINIC | Age: 81
End: 2021-10-11

## 2021-10-11 NOTE — PROGRESS NOTES
"When opening a documentation only encounter, be sure to enter in \"Chief Complaint\" Forms and in \" Comments\" Title of form, description if needed.    Serge is a 81 year old  female  Form received via: Fax  Form now resides in: Provider Ready    Saloni Terrazas MA      Form has been completed by provider.     Form sent out via: Fax to [facility] at Fax Number: [phone]  Patient informed: No, Reason:N/A  Output date: October 18, 2021    Mary Prasad CMA      **Please close the encounter**                    "

## 2021-10-15 ENCOUNTER — MEDICAL CORRESPONDENCE (OUTPATIENT)
Dept: HEALTH INFORMATION MANAGEMENT | Facility: CLINIC | Age: 81
End: 2021-10-15
Payer: COMMERCIAL

## 2021-12-22 ENCOUNTER — DOCUMENTATION ONLY (OUTPATIENT)
Dept: FAMILY MEDICINE | Facility: CLINIC | Age: 81
End: 2021-12-22
Payer: COMMERCIAL

## 2021-12-22 NOTE — PROGRESS NOTES
"When opening a documentation only encounter, be sure to enter in \"Chief Complaint\" Forms and in \" Comments\" Title of form, description if needed.    Serge is a 81 year old  female  Form received via: Fax  Form now resides in: Provider Ready    Saloni Terrazas MA    Form has been completed by provider.     Form sent out via: Fax to [facility] at Fax Number: [phone]  Patient informed: No, Reason:n/a  Output date: December 31, 2021    Saloni Terrazas MA      **Please close the encounter**                              "

## 2021-12-30 ENCOUNTER — MEDICAL CORRESPONDENCE (OUTPATIENT)
Dept: HEALTH INFORMATION MANAGEMENT | Facility: CLINIC | Age: 81
End: 2021-12-30
Payer: COMMERCIAL

## 2022-08-04 ENCOUNTER — HOSPITAL ENCOUNTER (INPATIENT)
Facility: CLINIC | Age: 82
LOS: 14 days | Discharge: NURSING, REHAB OR LONG-TERM CARE | DRG: 689 | End: 2022-08-18
Attending: EMERGENCY MEDICINE | Admitting: HOSPITALIST
Payer: COMMERCIAL

## 2022-08-04 ENCOUNTER — APPOINTMENT (OUTPATIENT)
Dept: CT IMAGING | Facility: CLINIC | Age: 82
DRG: 689 | End: 2022-08-04
Attending: EMERGENCY MEDICINE
Payer: COMMERCIAL

## 2022-08-04 ENCOUNTER — APPOINTMENT (OUTPATIENT)
Dept: GENERAL RADIOLOGY | Facility: CLINIC | Age: 82
DRG: 689 | End: 2022-08-04
Attending: EMERGENCY MEDICINE
Payer: COMMERCIAL

## 2022-08-04 DIAGNOSIS — N20.0 KIDNEY STONE: ICD-10-CM

## 2022-08-04 LAB
ALBUMIN SERPL-MCNC: 3.1 G/DL (ref 3.4–5)
ALBUMIN UR-MCNC: 30 MG/DL
ALP SERPL-CCNC: 41 U/L (ref 40–150)
ALT SERPL W P-5'-P-CCNC: 18 U/L (ref 0–50)
ANION GAP SERPL CALCULATED.3IONS-SCNC: 7 MMOL/L (ref 3–14)
APPEARANCE UR: CLEAR
AST SERPL W P-5'-P-CCNC: 49 U/L (ref 0–45)
BASOPHILS # BLD AUTO: 0 10E3/UL (ref 0–0.2)
BASOPHILS NFR BLD AUTO: 0 %
BILIRUB SERPL-MCNC: 0.8 MG/DL (ref 0.2–1.3)
BILIRUB UR QL STRIP: NEGATIVE
BUN SERPL-MCNC: 14 MG/DL (ref 7–30)
CALCIUM SERPL-MCNC: 8.5 MG/DL (ref 8.5–10.1)
CHLORIDE BLD-SCNC: 105 MMOL/L (ref 94–109)
CO2 SERPL-SCNC: 19 MMOL/L (ref 20–32)
COLOR UR AUTO: YELLOW
CREAT SERPL-MCNC: 0.88 MG/DL (ref 0.52–1.04)
EOSINOPHIL # BLD AUTO: 0 10E3/UL (ref 0–0.7)
EOSINOPHIL NFR BLD AUTO: 0 %
ERYTHROCYTE [DISTWIDTH] IN BLOOD BY AUTOMATED COUNT: 12.6 % (ref 10–15)
FLUAV RNA SPEC QL NAA+PROBE: NEGATIVE
FLUBV RNA RESP QL NAA+PROBE: NEGATIVE
GFR SERPL CREATININE-BSD FRML MDRD: 65 ML/MIN/1.73M2
GLUCOSE BLD-MCNC: 98 MG/DL (ref 70–99)
GLUCOSE UR STRIP-MCNC: NEGATIVE MG/DL
HCT VFR BLD AUTO: 38 % (ref 35–47)
HGB BLD-MCNC: 12.5 G/DL (ref 11.7–15.7)
HGB UR QL STRIP: ABNORMAL
IMM GRANULOCYTES # BLD: 0 10E3/UL
IMM GRANULOCYTES NFR BLD: 1 %
KETONES UR STRIP-MCNC: 15 MG/DL
LACTATE SERPL-SCNC: 1.1 MMOL/L (ref 0.7–2)
LEUKOCYTE ESTERASE UR QL STRIP: NEGATIVE
LYMPHOCYTES # BLD AUTO: 0.3 10E3/UL (ref 0.8–5.3)
LYMPHOCYTES NFR BLD AUTO: 7 %
MCH RBC QN AUTO: 29.3 PG (ref 26.5–33)
MCHC RBC AUTO-ENTMCNC: 32.9 G/DL (ref 31.5–36.5)
MCV RBC AUTO: 89 FL (ref 78–100)
MONOCYTES # BLD AUTO: 0.3 10E3/UL (ref 0–1.3)
MONOCYTES NFR BLD AUTO: 7 %
NEUTROPHILS # BLD AUTO: 3.5 10E3/UL (ref 1.6–8.3)
NEUTROPHILS NFR BLD AUTO: 85 %
NITRATE UR QL: NEGATIVE
NRBC # BLD AUTO: 0 10E3/UL
NRBC BLD AUTO-RTO: 0 /100
PH UR STRIP: 6 [PH] (ref 5–7)
PLAT MORPH BLD: NORMAL
PLATELET # BLD AUTO: 68 10E3/UL (ref 150–450)
POTASSIUM BLD-SCNC: 3.9 MMOL/L (ref 3.4–5.3)
PROT SERPL-MCNC: 6 G/DL (ref 6.8–8.8)
RBC # BLD AUTO: 4.26 10E6/UL (ref 3.8–5.2)
RBC MORPH BLD: NORMAL
RBC URINE: 77 /HPF
RSV RNA SPEC NAA+PROBE: NEGATIVE
SARS-COV-2 RNA RESP QL NAA+PROBE: POSITIVE
SODIUM SERPL-SCNC: 131 MMOL/L (ref 133–144)
SP GR UR STRIP: 1.01 (ref 1–1.03)
SQUAMOUS EPITHELIAL: 3 /HPF
UROBILINOGEN UR STRIP-MCNC: NORMAL MG/DL
WBC # BLD AUTO: 4.1 10E3/UL (ref 4–11)
WBC URINE: 7 /HPF

## 2022-08-04 PROCEDURE — 85025 COMPLETE CBC W/AUTO DIFF WBC: CPT | Performed by: EMERGENCY MEDICINE

## 2022-08-04 PROCEDURE — 96366 THER/PROPH/DIAG IV INF ADDON: CPT

## 2022-08-04 PROCEDURE — 36415 COLL VENOUS BLD VENIPUNCTURE: CPT | Performed by: EMERGENCY MEDICINE

## 2022-08-04 PROCEDURE — 81001 URINALYSIS AUTO W/SCOPE: CPT | Performed by: EMERGENCY MEDICINE

## 2022-08-04 PROCEDURE — 87086 URINE CULTURE/COLONY COUNT: CPT | Performed by: EMERGENCY MEDICINE

## 2022-08-04 PROCEDURE — 258N000003 HC RX IP 258 OP 636: Performed by: HOSPITALIST

## 2022-08-04 PROCEDURE — 71045 X-RAY EXAM CHEST 1 VIEW: CPT

## 2022-08-04 PROCEDURE — C9803 HOPD COVID-19 SPEC COLLECT: HCPCS

## 2022-08-04 PROCEDURE — 99285 EMERGENCY DEPT VISIT HI MDM: CPT | Mod: 25

## 2022-08-04 PROCEDURE — 87637 SARSCOV2&INF A&B&RSV AMP PRB: CPT | Performed by: EMERGENCY MEDICINE

## 2022-08-04 PROCEDURE — 83605 ASSAY OF LACTIC ACID: CPT | Performed by: EMERGENCY MEDICINE

## 2022-08-04 PROCEDURE — 250N000011 HC RX IP 250 OP 636: Performed by: EMERGENCY MEDICINE

## 2022-08-04 PROCEDURE — 96361 HYDRATE IV INFUSION ADD-ON: CPT

## 2022-08-04 PROCEDURE — 82310 ASSAY OF CALCIUM: CPT | Performed by: EMERGENCY MEDICINE

## 2022-08-04 PROCEDURE — 96365 THER/PROPH/DIAG IV INF INIT: CPT

## 2022-08-04 PROCEDURE — 99223 1ST HOSP IP/OBS HIGH 75: CPT | Mod: AI | Performed by: HOSPITALIST

## 2022-08-04 PROCEDURE — 250N000009 HC RX 250: Performed by: EMERGENCY MEDICINE

## 2022-08-04 PROCEDURE — 120N000001 HC R&B MED SURG/OB

## 2022-08-04 PROCEDURE — 258N000003 HC RX IP 258 OP 636: Performed by: EMERGENCY MEDICINE

## 2022-08-04 PROCEDURE — 74177 CT ABD & PELVIS W/CONTRAST: CPT

## 2022-08-04 RX ORDER — CEFTRIAXONE 2 G/1
2 INJECTION, POWDER, FOR SOLUTION INTRAMUSCULAR; INTRAVENOUS ONCE
Status: COMPLETED | OUTPATIENT
Start: 2022-08-04 | End: 2022-08-04

## 2022-08-04 RX ORDER — FERROUS GLUCONATE 324(38)MG
324 TABLET ORAL
Status: DISCONTINUED | OUTPATIENT
Start: 2022-08-05 | End: 2022-08-18 | Stop reason: HOSPADM

## 2022-08-04 RX ORDER — CETIRIZINE HYDROCHLORIDE 10 MG/1
10 TABLET ORAL DAILY PRN
Status: ON HOLD | COMMUNITY
End: 2022-10-20

## 2022-08-04 RX ORDER — ESCITALOPRAM OXALATE 10 MG/1
5 TABLET ORAL DAILY
Status: ON HOLD | COMMUNITY
End: 2022-12-15

## 2022-08-04 RX ORDER — LEVOTHYROXINE SODIUM 75 UG/1
75 TABLET ORAL
Status: DISCONTINUED | OUTPATIENT
Start: 2022-08-05 | End: 2022-08-18 | Stop reason: HOSPADM

## 2022-08-04 RX ORDER — CEFTRIAXONE 1 G/1
1 INJECTION, POWDER, FOR SOLUTION INTRAMUSCULAR; INTRAVENOUS EVERY 24 HOURS
Status: DISCONTINUED | OUTPATIENT
Start: 2022-08-05 | End: 2022-08-05

## 2022-08-04 RX ORDER — LIDOCAINE 40 MG/G
CREAM TOPICAL
Status: DISCONTINUED | OUTPATIENT
Start: 2022-08-04 | End: 2022-08-18 | Stop reason: HOSPADM

## 2022-08-04 RX ORDER — ACETAMINOPHEN 500 MG
500 TABLET ORAL EVERY 6 HOURS PRN
COMMUNITY
End: 2022-08-04

## 2022-08-04 RX ORDER — LOPERAMIDE HCL 2 MG
2 CAPSULE ORAL DAILY PRN
Status: DISCONTINUED | OUTPATIENT
Start: 2022-08-04 | End: 2022-08-18 | Stop reason: HOSPADM

## 2022-08-04 RX ORDER — IOPAMIDOL 755 MG/ML
65 INJECTION, SOLUTION INTRAVASCULAR ONCE
Status: COMPLETED | OUTPATIENT
Start: 2022-08-04 | End: 2022-08-04

## 2022-08-04 RX ORDER — ONDANSETRON 2 MG/ML
4 INJECTION INTRAMUSCULAR; INTRAVENOUS EVERY 6 HOURS PRN
Status: DISCONTINUED | OUTPATIENT
Start: 2022-08-04 | End: 2022-08-18 | Stop reason: HOSPADM

## 2022-08-04 RX ORDER — SODIUM CHLORIDE 9 MG/ML
INJECTION, SOLUTION INTRAVENOUS CONTINUOUS
Status: ACTIVE | OUTPATIENT
Start: 2022-08-04 | End: 2022-08-05

## 2022-08-04 RX ORDER — ACETAMINOPHEN 500 MG
500 TABLET ORAL EVERY 6 HOURS PRN
Status: DISCONTINUED | OUTPATIENT
Start: 2022-08-04 | End: 2022-08-18 | Stop reason: HOSPADM

## 2022-08-04 RX ORDER — ONDANSETRON 4 MG/1
4 TABLET, ORALLY DISINTEGRATING ORAL EVERY 6 HOURS PRN
Status: DISCONTINUED | OUTPATIENT
Start: 2022-08-04 | End: 2022-08-18 | Stop reason: HOSPADM

## 2022-08-04 RX ORDER — ESCITALOPRAM OXALATE 5 MG/1
5 TABLET ORAL DAILY
Status: DISCONTINUED | OUTPATIENT
Start: 2022-08-05 | End: 2022-08-18 | Stop reason: HOSPADM

## 2022-08-04 RX ADMIN — SODIUM CHLORIDE 60 ML: 9 INJECTION, SOLUTION INTRAVENOUS at 12:56

## 2022-08-04 RX ADMIN — CEFTRIAXONE SODIUM 2 G: 2 INJECTION, POWDER, FOR SOLUTION INTRAMUSCULAR; INTRAVENOUS at 17:32

## 2022-08-04 RX ADMIN — SODIUM CHLORIDE: 9 INJECTION, SOLUTION INTRAVENOUS at 22:43

## 2022-08-04 RX ADMIN — IOPAMIDOL 65 ML: 755 INJECTION, SOLUTION INTRAVENOUS at 12:55

## 2022-08-04 RX ADMIN — SODIUM CHLORIDE 1000 ML: 9 INJECTION, SOLUTION INTRAVENOUS at 12:35

## 2022-08-04 ASSESSMENT — ACTIVITIES OF DAILY LIVING (ADL)
ADLS_ACUITY_SCORE: 35

## 2022-08-04 NOTE — CONSULTS
"Urology Consult    Name: Serge Marin    MRN: [id number]   YOB: 1940               Chief Complaint:   Left renal calculus, concern for UTI    History is obtained from the patient and chart review          History of Present Illness:   Serge Marin is a 82 year old female with PMH of chronic diarrhea, anxiety, bipolar 1 disorder, and no previous urologic history who presents to ER on 8/4/22 with a fever for two days and new diarrhea and vomiting.    In ER she is febrile up to 100.9 F, hemodynamically stable. Labs show serum Cr of 0.88, WBC 4.1, Hgb 12.5, UA LE negative, 7 WBC, 77 RBC, 3 squamous cells. Urine culture is pending. COVID POSITIVE.    CT abdomen/pelvis obtained (report reproduced below) showing left renal pelvis calculus measuring up to 11 mm with associated mild hydronephrosis and inflammatory changes as well as several small nonobstructing left renal calculi.    She is very hard of hearing, difficult historian, disoriented to time (saying it is \"1940\"). She may have had kidney stones in past with surgical intervention but she cannot confirm despite numerous lines of questioning. Denies flank pain.          Past Medical History:   Chronic diarrhea  Anxiety  Bipolar 1 disorder         Past Surgical History:     Past Surgical History:   Procedure Laterality Date     CHOLECYSTECTOMY       COLONOSCOPY Left 3/18/2015    Procedure: COMBINED COLONOSCOPY, SINGLE OR MULTIPLE BIOPSY/POLYPECTOMY BY BIOPSY;  Surgeon: Stone Lauren MD;  Location:  GI     GYN SURGERY              Social History:     Social History     Tobacco Use     Smoking status: Never Smoker     Smokeless tobacco: Never Used   Substance Use Topics     Alcohol use: No            Family History:   No family history on file.         Allergies:     Allergies   Allergen Reactions     Penicillins      Bactrim [Sulfamethoxazole W/Trimethoprim] Itching     Patient prescribed Bactrim at eye appointment. Assisted living " reports eyes were red and itching.             Medications:     Current Facility-Administered Medications   Medication     cefTRIAXone (ROCEPHIN) 2 g vial to attach to  ml bag for ADULTS or NS 50 ml bag for PEDS     Current Outpatient Medications   Medication Sig     butenafine (MENTAX) 1 % CREA cream Apply daily to abdomen rash (Patient taking differently: Apply topically every morning Apply daily to abdomen rash)     calcium carbonate-vitamin D (OSCAL W/D) 500-200 MG-UNIT tablet Take 1 tablet by mouth daily     cetirizine (ZYRTEC) 10 MG tablet Take 10 mg by mouth daily as needed for allergies     clotrimazole (LOTRIMIN) 1 % external cream Apply topically 2 times daily as needed To groin rash     escitalopram (LEXAPRO) 10 MG tablet Take 5 mg by mouth daily     ferrous gluconate (FERGON) 324 (38 FE) MG tablet Take 1 tablet (324 mg) by mouth daily (with breakfast)     levothyroxine (SYNTHROID, LEVOTHROID) 75 MCG tablet Take 1 tablet (75 mcg) by mouth daily (Patient taking differently: Take 75 mcg by mouth daily before breakfast)     loperamide (IMODIUM A-D) 2 MG tablet Start with 2 tabs (4 mg), then take one tab (2 mg) after each diarrheal stool.  Do not use more than  8 tabs (16 mg) per day. (Patient taking differently: Take 2 mg by mouth daily as needed Start with 2 tabs (4 mg), then take one tab (2 mg) after each diarrheal stool.  Do not use more than  8 tabs (16 mg) per day.)     psyllium (METAMUCIL/KONSYL) 58.6 % powder Take 1 teaspoonful by mouth daily as needed for constipation     ACETAMINOPHEN PO Take 500 mg by mouth every 6 hours as needed for pain     Infant Care Products (JOHNSONS BABY WASH) LIQD Use for cleansing eyelids daily PRN for stye             Review of Systems:    ROS: 10 point ROS neg other than the symptoms noted above in the HPI           Physical Exam:   VS:  T: 100.9    HR: 69    BP: 105/55    RR: 12   GEN:  Alert and oriented to person and place but not time.  NAD.    CV:  Warm,  well perfused.  LUNGS: Non-labored breathing.   BACK:  No left or right CVA tenderness.  ABD:  Soft.  NT.  ND.  No rebound or guarding.  No masses.  : Deferred  EXT:    No BLE edema.  SKIN:  Warm.  Dry.  No rashes.  NEURO:  CN grossly intact.            Data:   All laboratory data reviewed:    Recent Labs   Lab 08/04/22  1155   WBC 4.1   HGB 12.5   PLT 68*     Recent Labs   Lab 08/04/22  1155   *   POTASSIUM 3.9   CHLORIDE 105   CO2 19*   BUN 14   CR 0.88   GLC 98   BERTA 8.5     Recent Labs   Lab 08/04/22  1437   COLOR Yellow   APPEARANCE Clear   URINEGLC Negative   URINEBILI Negative   URINEKETONE 15 *   SG 1.015   URINEPH 6.0   PROTEIN 30 *   NITRITE Negative   LEUKEST Negative   RBCU 77*   WBCU 7*       All pertinent imaging reviewed:    CT scan of the abdomen/pelvis - 8/4/22:   FINDINGS:   LOWER CHEST: Mild bibasilar dependent atelectasis.     HEPATOBILIARY: Normal liver. Cholecystectomy. Mild dilatation of the  extrahepatic common bile ducts to the level of the pancreatic head,  likely related to postcholecystectomy reservoir effect. No calcified  stones in the duct.     PANCREAS: Evaluation slightly limited by breathing motion. Mild  diffuse dilatation of the main pancreatic duct measuring about 6 m. No  focal inflammatory changes. No definite pancreatic masses.     SPLEEN: Normal.     ADRENAL GLANDS: Normal.     KIDNEYS/BLADDER: Left renal pelvis calculus measuring 11 x 6 x 9 mm  (series 2, image 89) with mild surrounding inflammatory changes and  mild left hydronephrosis. A few other punctate nonobstructing left  renal calculi. Simple cyst at the lower pole of the left kidney  requiring no specific follow-up.  No right renal calculi, significant masses or hydronephrosis.     No focal wall thickening or surrounding inflammation.     BOWEL: No small bowel or colonic obstruction or inflammatory changes.  Normal appendix. Evaluation is slightly limited by breathing motion.     PELVIC ORGANS: Hysterectomy.  No pelvic masses.     ADDITIONAL FINDINGS: No lymphadenopathy in the abdomen and pelvis. No  abdominal aortic aneurysm. No free fluid, fluid collections or  extraluminal air.     MUSCULOSKELETAL: Degenerative changes in the spine. No suspicious  lesions in the bones.                                                                      IMPRESSION:   1.  Left renal pelvis 11 x 6 x 9 mm staghorn calculus with surrounding  inflammatory changes, likely an infected or inflamed calculus. Mild  left hydronephrosis. A few other punctate nonobstructing left renal  calculi.  2.  No other acute findings in the abdomen and pelvis.  3.  Mildly prominent main pancreatic duct without an obstructing mass  evident by CT.            Impression and Plan:   Impression:   Serge Marin is a 82 year old female with PMH of chronic diarrhea, anxiety, bipolar 1 disorder, and no previous urologic history who presents to ER on 8/4/22 with a fever for two days and new diarrhea and vomiting. Work up in CT included CT scan revealing a left renal pelvis stone measuring up to 11 mm in size as well as associated mild left hydronephrosis and question of inflammatory changes.  Also notable for a distended bladder.  She is temperature 100.9  F in the ER is hemodynamically stable.  Serum creatinine is within normal limits.  No leukocytosis urinalysis is not concerning for infection at this time.  Urine culture is pending.  She is COVID-positive as well.      Plan:     -Admission to hospital service, appreciate assistance  -Please obtain a postvoid residual volume (PVR) via bladder scanner after patient urinates.  If PVR is elevated (> 200 mL) or if she is unable to urinate for PVR then would recommend placement of [place] catheter for urinary tract decompression.    -NPO at midnight in case of urologic intervention (cystoscopy with left ureteral stent placement) tomorrow morning.    -Continue empiric antibiotics and follow urine culture.  -If patient  clinically decompensates or experiences worsening fever trend please page urology on-call for consideration of more urgent intervention.    - Urology will continue to follow. Please contact resident/PA on call with any questions or concerns.       This patient's exam findings, labs, and imaging discussed with urology staff surgeon, Dr. Lanier, who developed the treatment plan.    Lino Pena MD  Urology Resident  Pager: [phone]

## 2022-08-04 NOTE — PHARMACY-ADMISSION MEDICATION HISTORY
Pharmacy Medication History  Admission medication history interview status for the 8/4/2022  admission is complete. See EPIC admission navigator for prior to admission medications     Location of Interview: Outside patient room but on unit  Medication history sources: med list from facility    [facility]    Significant changes made to the medication list:  Removed Abilify, Clonazepam -  added Lexapro     In the past week, patient estimated taking medication this percent of the time: greater than 90%    Additional medication history information:       Medication reconciliation completed by provider prior to medication history? No    Time spent in this activity: 15 min    Prior to Admission medications    Medication Sig Last Dose Taking? Auth Provider Long Term End Date   butenafine (MENTAX) 1 % CREA cream Apply daily to abdomen rash  Patient taking differently: Apply topically every morning Apply daily to abdomen rash 8/4/2022 at Unknown time Yes Kassie Wyatt MD     calcium carbonate-vitamin D (OSCAL W/D) 500-200 MG-UNIT tablet Take 1 tablet by mouth daily 8/4/2022 at Unknown time Yes Unknown, Entered By History     cetirizine (ZYRTEC) 10 MG tablet Take 10 mg by mouth daily as needed for allergies Past Week at Unknown time Yes Unknown, Entered By History     clotrimazole (LOTRIMIN) 1 % external cream Apply topically 2 times daily as needed To groin rash Past Week at Unknown time Yes Reported, Patient     escitalopram (LEXAPRO) 10 MG tablet Take 5 mg by mouth daily 8/4/2022 at Unknown time Yes Unknown, Entered By History Yes    ferrous gluconate (FERGON) 324 (38 FE) MG tablet Take 1 tablet (324 mg) by mouth daily (with breakfast) 8/4/2022 at Unknown time Yes Kassie Wyatt MD     levothyroxine (SYNTHROID, LEVOTHROID) 75 MCG tablet Take 1 tablet (75 mcg) by mouth daily  Patient taking differently: Take 75 mcg by mouth daily before breakfast 8/4/2022 at Unknown time Yes Kassie Wyatt MD  Yes    loperamide (IMODIUM A-D) 2 MG tablet Start with 2 tabs (4 mg), then take one tab (2 mg) after each diarrheal stool.  Do not use more than  8 tabs (16 mg) per day.  Patient taking differently: Take 2 mg by mouth daily as needed Start with 2 tabs (4 mg), then take one tab (2 mg) after each diarrheal stool.  Do not use more than  8 tabs (16 mg) per day. Past Week at Unknown time Yes Kassie Wyatt MD Yes    psyllium (METAMUCIL/KONSYL) 58.6 % powder Take 1 teaspoonful by mouth daily as needed for constipation Past Week at Unknown time Yes Unknown, Entered By History     ACETAMINOPHEN PO Take 500 mg by mouth every 6 hours as needed for pain   Unknown, Entered By History     Infant Care Products (JOHNSONS BABY WASH) LIQD Use for cleansing eyelids daily PRN for stKassie Croft MD         The information provided in this note is only as accurate as the sources available at the time of update(s)

## 2022-08-04 NOTE — ED NOTES
[facility]  ED Nurse Handoff Report    ED Chief complaint: Nausea, Vomiting, & Diarrhea      ED Diagnosis:   Final diagnoses:   None       Code Status:tbd    Allergies:   Allergies   Allergen Reactions     Penicillins      Bactrim [Sulfamethoxazole W/Trimethoprim] Itching     Patient prescribed Bactrim at eye appointment. Assisted living reports eyes were red and itching.        Patient Story: Serge comes to the ER today for n/v/d and weakness. She did test positive for covid. She's received one liter of NS and IV zofran. Still need urine collected. sats are 93% on room air.      Treatments and/or interventions provided: fluids/zofran  Patient's response to treatments and/or interventions: fair    To be done/followed up on inpatient unit:  cont to monitor respiratory status    Does this patient have any cognitive concerns?: [place]    Activity level - Baseline/Home:  Stand with Assist  Activity Level - Current:   Stand with assist x2    Patient's Preferred language: English   Needed?: No    Isolation: Covid positive  Infection: COVID  and special precautions  Patient tested for COVID 19 prior to admission: YES positive  Bariatric?: No    Vital Signs:   Vitals:    08/04/22 1145 08/04/22 1200 08/04/22 1220 08/04/22 1358   BP: 104/58 107/59 102/51    Pulse: 74 79 72    Resp:   14    Temp:    (!) 100.9  F (38.3  C)   TempSrc:    Temporal   SpO2: 92% 93% 92%        Cardiac Rhythm:     Was the PSS-3 completed:   Yes  What interventions are required if any?               Family Comments: none  OBS brochure/video discussed/provided to patient/family: No              Name of person given brochure if not patient: na              Relationship to patient: na    For the majority of the shift this patient's behavior was Green.   Behavioral interventions performed were na.    ED NURSE PHONE NUMBER: [phone]

## 2022-08-04 NOTE — LETTER
Transition Communication Hand-off for Care Transitions to Next Level of Care Provider    Name: Serge Marin  : 1940  MRN #: [id number]  Primary Care Provider: Kassie Wyatt     Primary Clinic: 2020  [facility] [id number]     Reason for Hospitalization:  Kidney stone [N20.0]  Admit Date/Time: 2022 11:41 AM  Discharge Date: 22  Payor Source: Payor: [employer] / Plan: [employer] CLASSIC MSHO / Product Type: POS /     Readmission Assessment Measure (HERBERTH) Risk Score/category: 9%         Reason for Communication Hand-off Referral: Other Discharge to TCU today     Discharge Plan:       Concern for non-adherence with plan of care: No     Discharge Needs Assessment:  Needs    Flowsheet Row Most Recent Value   Equipment Currently Used at Home walker, standard, grab bar, tub/shower, raised toilet seat, shower chair, grab bar, toilet   PAS Number [id number]          Already enrolled in Tele-monitoring program and name of program:  No  Follow-up specialty is recommended: Yes    Follow-up plan:    Future Appointments   Date Time Provider Department Center   2022  3:00 PM  OT 1 WAITLIST SHOT [facility]   2022 10:00 AM Stevo Talbot MD [facility]       Any outstanding tests or procedures:        Referrals     Future Labs/Procedures    Nutrition Services Adult IP Consult     Comments:    Reason:  moderate malnutrition    Occupational Therapy Adult Consult     Comments:    Evaluate and treat as clinically indicated.    Reason:  physical deconditioning    Physical Therapy Adult Consult     Comments:    Evaluate and treat as clinically indicated.    Reason:  physical deconditioning            Key Recommendations:      EUSEBIO Reyna    AVS/Discharge Summary is the source of truth; this is a helpful guide for improved communication of patient story

## 2022-08-04 NOTE — H&P
[facility]  History and Physical   Hospitalist  Audie Mena MD       Serge Marin MRN# [id number]   YOB: 1940 Age: 82 year old      Date of Admission:  8/4/2022         Assessment and Plan:   Serge Marin is a 82 year old female with PMH significant for bipolar 1 disorder, hypothyroidism, chronic anemia, chronic diarrhea, chronic thrombocytopenia who resides in assisted living facility and was sent to ED for two days of fever vomiting and generalized weakness.    ED workup noted with left renal pelvis staghorn calculus and also noted positive for COVID.    Acute febrile illness likely secondary to infected left renal stone  -will admit as inpatient  -on admission temperature 100.9, WBC 4.1, UA 7 WBC, large blood, negative nitrite/LE, urine culture pending  -CT abdomen noted with left renal pelvis staghorn calculus with surrounding inflammatory changes along with mild left hydronephrosis  -will continue Rocephin initiated in the ER pending urine culture  -urology contacted from ED  -PRN Tylenol for pain and fever  -fall precautions; PT/OT evaluation once evaluated by urology and clinically stable    COVID positive (8/4/2022)  -her symptoms seems more likely due to infected renal stone rather than COVID pneumonia  -she has no respiratory symptoms; not hypoxic  -will get chest x-ray  -will maintain special precautions  -will hold off on COVID directed therapeutics at this time    Mild hyponatremia  -likely prerenal secondary to vomiting and poor PO intake  -sodium 131; got 1000 ml fluid bolus in ED; will continue NS at 75 ML per hour for next 24 hrs and monitor BMP    Chronic thrombocytopenia  -her platelet count has been in 100s (last from 2017)  -platelet on admission 68; will monitor CBC    Bipolar 1 disorder  -will continue PTA Lexapro    Hypothyroidism  - continue PTA Synthroid    Chronic iron deficiency anemia  - Hb on admission 12.5  -continue PTA iron  supplements          Clinically Significant Risk Factors Present on Admission         # Hyponatremia: Na = 131 mmol/L (Ref range: 133 - 144 mmol/L) on admission, will monitor as appropriate     # Hypoalbuminemia: Albumin = 3.1 g/dL (Ref range: 3.4 - 5.0 g/dL) on admission, will monitor as appropriate    # Thrombocytopenia: Plts = 68 10e3/uL (Ref range: 150 - 450 10e3/uL) on admission, will monitor for bleeding           # DVT prophylaxis: mechanical with PCD boots  # Code status: full code    Care plan discussed with ED provider and patient.           Primary Care Physician:   Kassie Wyatt [phone]         Chief Complaint:     Fever, vomiting, generalized weakness    History is obtained from the patient         History of Present Illness:     History slightly limited as patient does not seem to be very good historian.    Serge Marin is a 82 year old female with PMH significant for bipolar 1 disorder, hypothyroidism, chronic anemia, chronic diarrhea, chronic thrombocytopenia who resides in assisted living facility and was sent to ED for two days of fever vomiting and generalized weakness. She states for past two days she has been having fever up to 101 and has been feeling weak.     She was seen by Dr. Aleman; ED workup noted with left renal pelvis staghorn calculus and also noted positive for COVID. She was noted febrile to 100.9, BP was soft 85/57. Urology was contacted and she was given a dose of Rocephin and hospitalist was requested admission for further evaluation.    The patient denies any chills, rigors, chest pain or shortness of breath.  Denies pain abdomen.  No  bladder disturbances. She reports chronic diarrhea.           Past Medical History:     Bipolar 1 disorder  Hypothyroidism  Chronic anemia  Chronic diarrhea  Chronic thrombocytopenia           Past Surgical History:     Past Surgical History:   Procedure Laterality Date     CHOLECYSTECTOMY       COLONOSCOPY Left 3/18/2015     Procedure: COMBINED COLONOSCOPY, SINGLE OR MULTIPLE BIOPSY/POLYPECTOMY BY BIOPSY;  Surgeon: Stone Lauren MD;  Location:  GI     GYN SURGERY                Home Medications:     Prior to Admission Medications   Prescriptions Last Dose Informant Patient Reported? Taking?   ACETAMINOPHEN PO  Nursing Home Yes No   Sig: Take 500 mg by mouth every 6 hours as needed for pain   Infant Care Products (JOHNSONS BABY WASH) LIQD  Nursing Home No No   Sig: Use for cleansing eyelids daily PRN for stye   butenafine (MENTAX) 1 % CREA cream 8/4/2022 at Unknown time [facility] No Yes   Sig: Apply daily to abdomen rash   Patient taking differently: Apply topically every morning Apply daily to abdomen rash   calcium carbonate-vitamin D (OSCAL W/D) 500-200 MG-UNIT tablet 8/4/2022 at Unknown time Nursing Home Yes Yes   Sig: Take 1 tablet by mouth daily   cetirizine (ZYRTEC) 10 MG tablet Past Week at Unknown time Nursing Home Yes Yes   Sig: Take 10 mg by mouth daily as needed for allergies   clotrimazole (LOTRIMIN) 1 % external cream Past Week at Unknown time Nursing Home Yes Yes   Sig: Apply topically 2 times daily as needed To groin rash   escitalopram (LEXAPRO) 10 MG tablet 8/4/2022 at Unknown time Nursing Home Yes Yes   Sig: Take 5 mg by mouth daily   ferrous gluconate (FERGON) 324 (38 FE) MG tablet 8/4/2022 at Unknown time [facility] No Yes   Sig: Take 1 tablet (324 mg) by mouth daily (with breakfast)   levothyroxine (SYNTHROID, LEVOTHROID) 75 MCG tablet 8/4/2022 at Unknown time [facility] No Yes   Sig: Take 1 tablet (75 mcg) by mouth daily   Patient taking differently: Take 75 mcg by mouth daily before breakfast   loperamide (IMODIUM A-D) 2 MG tablet Past Week at Unknown time [facility] No Yes   Sig: Start with 2 tabs (4 mg), then take one tab (2 mg) after each diarrheal stool.  Do not use more than  8 tabs (16 mg) per day.   Patient taking differently: Take 2 mg by mouth daily as needed Start with 2 tabs (4 mg),  then take one tab (2 mg) after each diarrheal stool.  Do not use more than  8 tabs (16 mg) per day.   psyllium (METAMUCIL/KONSYL) 58.6 % powder Past Week at Unknown time Nursing Home Yes Yes   Sig: Take 1 teaspoonful by mouth daily as needed for constipation      Facility-Administered Medications: None            Allergies:     Allergies   Allergen Reactions     Penicillins      Bactrim [Sulfamethoxazole W/Trimethoprim] Itching     Patient prescribed Bactrim at eye appointment. Assisted living reports eyes were red and itching.             Social History:   Serge Marin  reports that she has never smoked. She has never used smokeless tobacco. She reports that she does not drink alcohol and does not use drugs.              Family History:   Serge Marin family history is not on file.    Family history was reviewed by myself and not pertinent to current presentation.           Review of Systems:   A10 point Review of Systems was done and were negative other than noted in the HPI.             Physical Exam:   Blood pressure 105/55, pulse 69, temperature (!) 100.9  F (38.3  C), temperature source Temporal, resp. rate 12, SpO2 92 %, not currently breastfeeding.  0 lbs 0 oz        Constitutional: Alert, awake and oriented but seems slightly slow to respond; lying comfortably in bed in no apparent distress   HEENT: Pupils equal and reactive to light and accomodation, EOMI intact; neck supple no raised JVD or rigidity    Oral cavity: Moist mucosa   Cardiovascular: Normal s1 s2, regular rate and rhythm, no murmur   Lungs: B/l clear to auscultation, no wheezes or crepitations   Abdomen: Soft, nt, nd, no guarding, rigidity or rebound; BS +   LE : No edema   Musculoskeletal: Power 5/5 in all extremities   Neuro: No focal neurological deficits noted, CN II to XII grossly intact   Psychiatry: normal mood and affect  Skin: No obvious skin rashes or ulcers             Data:   All new lab and imaging data was reviewed in  Epic.   Significant labs and imagings include:    CMP notable for sodium 131, albumin 3.1, normal lactic acid  CBC with WBC 4.1, hemoglobin 12.5, platelet 68  UA as noted above  COVID positive  chest x-ray pending  CT abdomen:  IMPRESSION:   1.  Left renal pelvis 11 x 6 x 9 mm staghorn calculus with surrounding  inflammatory changes, likely an infected or inflamed calculus. Mild  left hydronephrosis. A few other punctate nonobstructing left renal  calculi.  2.  No other acute findings in the abdomen and pelvis.  3.  Mildly prominent main pancreatic duct without an obstructing mass  evident by CT.             Audie Mena MD  Hospitalist

## 2022-08-04 NOTE — ED PROVIDER NOTES
History   Chief Complaint:  Nausea, Vomiting, & Diarrhea    The history is provided by the patient. History limited by: poor historian.      Serge Marin is a 82 year old female with history of chronic diarrhea, anxiety, and bipolar 1 disorder who presents with a fever for two days and new diarrhea and vomiting. The patient confirms experiencing diarrhea, vomiting, and abdominal pain. Notably, she reports being hard of hearing and believes that her dental problems may be correlated with her abdominal pain discomfort.     Review of Systems   Reason unable to perform ROS: patient is a poor historian.     Allergies:  Penicillins  Bactrim [Sulfamethoxazole W/Trimethoprim]    Medications:  Escitalopram  Abilify  Pepto-Bismol    Past Medical History:     Cognitive and behavioral changes  Elevated fasting glucose  Chronic diarrhea  Anxiety disorder  Bipolar 1 disorder  Personality disorder  PTSD   Thyroid function test abnormal     Past Surgical History:    Cholecystectomy  GYN surgery  Hysterectomy     Social History:  Presents to ED via EMS.  Presents to ED escorted by acquaintance.    Physical Exam     Patient Vitals for the past 24 hrs:   BP Temp Temp src Pulse Resp SpO2   08/04/22 1920 100/58 -- -- 76 -- 96 %   08/04/22 1900 115/55 -- -- 78 -- 91 %   08/04/22 1800 104/50 -- -- 67 -- 93 %   08/04/22 1759 -- (!) 101.8  F (38.8  C) Rectal -- -- --   08/04/22 1700 108/52 -- -- 66 -- 98 %   08/04/22 1600 106/64 -- -- 65 -- 97 %   08/04/22 1500 105/55 -- -- 69 12 92 %   08/04/22 1455 109/53 -- -- 70 14 93 %   08/04/22 1445 (!) 85/57 -- -- -- -- --   08/04/22 1415 110/54 -- -- -- -- --   08/04/22 1400 120/55 -- -- 75 16 --   08/04/22 1358 -- (!) 100.9  F (38.3  C) Temporal -- -- --   08/04/22 1345 131/77 -- -- -- -- 93 %   08/04/22 1315 120/56 -- -- -- -- 94 %   08/04/22 1245 109/48 -- -- -- -- 92 %   08/04/22 1220 102/51 -- -- 72 14 92 %   08/04/22 1200 107/59 -- -- 79 -- 93 %   08/04/22 1145 104/58 -- -- 74 -- 92 %    08/04/22 1144 -- 98  F (36.7  C) Oral -- 16 --     Physical Exam  General: Resting on the gurney, appears uncomfortable  Head:  The scalp, face, and head appear normal  Mouth/Throat: Mucus membranes are moist  CV:  Regular rate    Normal S1 and S2  No pathological murmur   Resp:  Breath sounds clear and equal bilaterally    Non-labored, no retractions or accessory muscle use    No coarseness    No wheezing   GI:  Abdomen is soft, no rigidity    Mild diffuse abdominal tenderness to palpation. No guarding or rebound.  MS:  Normal motor assessment of all extremities.    Good capillary refill noted.  Skin:  No rash or lesions noted.  Psych:  Awake. Alert.      Emergency Department Course     Imaging:  XR Chest Port 1 View   Final Result   IMPRESSION: Tortuous calcified thoracic aorta. Chest otherwise negative. Lungs clear.      CT Abdomen Pelvis w Contrast   Final Result   IMPRESSION:    1.  Left renal pelvis 11 x 6 x 9 mm staghorn calculus with surrounding   inflammatory changes, likely an infected or inflamed calculus. Mild   left hydronephrosis. A few other punctate nonobstructing left renal   calculi.   2.  No other acute findings in the abdomen and pelvis.   3.  Mildly prominent main pancreatic duct without an obstructing mass   evident by CT.      SPIKE BILLINGS MD            SYSTEM ID:  [id number]        Report per radiology    Laboratory:  Labs Ordered and Resulted from Time of ED Arrival to Time of ED Departure   COMPREHENSIVE METABOLIC PANEL - Abnormal       Result Value    Sodium 131 (*)     Potassium 3.9      Chloride 105      Carbon Dioxide (CO2) 19 (*)     Anion Gap 7      Urea Nitrogen 14      Creatinine 0.88      Calcium 8.5      Glucose 98      Alkaline Phosphatase 41      AST 49 (*)     ALT 18      Protein Total 6.0 (*)     Albumin 3.1 (*)     Bilirubin Total 0.8      GFR Estimate 65     INFLUENZA A/B & SARS-COV2 PCR MULTIPLEX - Abnormal    Influenza A PCR Negative      Influenza B PCR Negative      RSV  PCR Negative      SARS CoV2 PCR Positive (*)    CBC WITH PLATELETS AND DIFFERENTIAL - Abnormal    WBC Count 4.1      RBC Count 4.26      Hemoglobin 12.5      Hematocrit 38.0      MCV 89      MCH 29.3      MCHC 32.9      RDW 12.6      Platelet Count 68 (*)     % Neutrophils 85      % Lymphocytes 7      % Monocytes 7      % Eosinophils 0      % Basophils 0      % Immature Granulocytes 1      NRBCs per 100 WBC 0      Absolute Neutrophils 3.5      Absolute Lymphocytes 0.3 (*)     Absolute Monocytes 0.3      Absolute Eosinophils 0.0      Absolute Basophils 0.0      Absolute Immature Granulocytes 0.0      Absolute NRBCs 0.0     ROUTINE UA WITH MICROSCOPIC REFLEX TO CULTURE - Abnormal    Color Urine Yellow      Appearance Urine Clear      Glucose Urine Negative      Bilirubin Urine Negative      Ketones Urine 15  (*)     Specific Gravity Urine 1.015      Blood Urine Large (*)     pH Urine 6.0      Protein Albumin Urine 30  (*)     Urobilinogen Urine Normal      Nitrite Urine Negative      Leukocyte Esterase Urine Negative      RBC Urine 77 (*)     WBC Urine 7 (*)     Squamous Epithelials Urine 3 (*)    LACTIC ACID WHOLE BLOOD - Normal    Lactic Acid 1.1     RBC AND PLATELET MORPHOLOGY    Platelet Assessment        Value: Automated Count Confirmed. Platelet morphology is normal.    RBC Morphology Confirmed RBC Indices     URINE CULTURE     Emergency Department Course:     Reviewed:  I reviewed nursing notes, vitals, past medical history and Care Everywhere.    Assessments:  1201 I obtained history and examined the patient as noted above.   1400 I rechecked the patient and explained findings.  1442 I rechecked the patient.  1631 I rechecked the patient and explained findings. I discussed plan for admission and the patient is in agreement.    Consults:  1718 I consulted with Dr. Mena, hospitalist.regarding the patient's history and presentation here in the emergency department who accepted the patient for  admission.  1737 I spoke with Dr. Mena, hospitalist.  1800 I spoke with Dr. Pena, Urology    Interventions:  1235 NS 1L IV  1732 Rocephin 2 g IV    Disposition:  The patient was admitted to the hospital under the care of Dr. Mena.     Impression & Plan     Medical Decision Making:    Serge Marin is a 82 year old female who presents complaining of nausea, vomiting, diarrhea, abdominal pain, and fever. She had vomiting and fever, covid is positive.  Given her age and vomiting nad fever, I as concerned for intraabdominal etiology of her fever with her covid being an incidental finding.  CT scan showed 11 x 6 x 9 mm staghorn calculus in the left renal pelvis with surrounding inflammation and mild left hydronephrosis. UA was obtained and given the presence of the kidney stone, urine culture was sent. Lactic acid was negative. The patient received pain control, antiemetic and IV fluids as documented above.  Pain and nausea was well controlled with these measures. Given the size of calculus and possible infection, as well as low BP, the patient was promptly discussed with urology.  They request hospitalist admission which was arranged.  IV antibiotics started. Patient will be admitted to hospitalist serve for further care.    Diagnosis:    ICD-10-CM    1. Kidney stone  N20.0      Scribe Disclosure:  I, Cheryle Tejeda, am serving as a scribe at 12:01 PM on 8/4/2022 to document services personally performed by Sandhya Aleman MD based on my observations and the provider's statements to me.     I, Manuela Patino, am serving as a scribe at 12:01 PM on 8/4/2022 to document services personally performed by Sandhya Aleman MD based on my observations and the provider's statements to me.       Sandhya Aleman MD  08/04/22 3310

## 2022-08-04 NOTE — ED NOTES
Bed: ED09  Expected date:   Expected time:   Means of arrival:   Comments:  [place] - 82 F N/V eta 1132

## 2022-08-05 ENCOUNTER — APPOINTMENT (OUTPATIENT)
Dept: CT IMAGING | Facility: CLINIC | Age: 82
DRG: 689 | End: 2022-08-05
Attending: INTERNAL MEDICINE
Payer: COMMERCIAL

## 2022-08-05 LAB
ANION GAP SERPL CALCULATED.3IONS-SCNC: 7 MMOL/L (ref 3–14)
BASOPHILS # BLD MANUAL: 0 10E3/UL (ref 0–0.2)
BASOPHILS NFR BLD MANUAL: 0 %
BUN SERPL-MCNC: 10 MG/DL (ref 7–30)
CALCIUM SERPL-MCNC: 8.4 MG/DL (ref 8.5–10.1)
CHLORIDE BLD-SCNC: 106 MMOL/L (ref 94–109)
CO2 SERPL-SCNC: 24 MMOL/L (ref 20–32)
CREAT SERPL-MCNC: 0.8 MG/DL (ref 0.52–1.04)
CRP SERPL-MCNC: 11.5 MG/L (ref 0–8)
EOSINOPHIL # BLD MANUAL: 0 10E3/UL (ref 0–0.7)
EOSINOPHIL NFR BLD MANUAL: 0 %
ERYTHROCYTE [DISTWIDTH] IN BLOOD BY AUTOMATED COUNT: 12.6 % (ref 10–15)
GFR SERPL CREATININE-BSD FRML MDRD: 73 ML/MIN/1.73M2
GLUCOSE BLD-MCNC: 83 MG/DL (ref 70–99)
HCT VFR BLD AUTO: 35.7 % (ref 35–47)
HGB BLD-MCNC: 11.9 G/DL (ref 11.7–15.7)
LYMPHOCYTES # BLD MANUAL: 0.2 10E3/UL (ref 0.8–5.3)
LYMPHOCYTES NFR BLD MANUAL: 8 %
MCH RBC QN AUTO: 29.6 PG (ref 26.5–33)
MCHC RBC AUTO-ENTMCNC: 33.3 G/DL (ref 31.5–36.5)
MCV RBC AUTO: 89 FL (ref 78–100)
MONOCYTES # BLD MANUAL: 0.1 10E3/UL (ref 0–1.3)
MONOCYTES NFR BLD MANUAL: 4 %
NEUTROPHILS # BLD MANUAL: 2.7 10E3/UL (ref 1.6–8.3)
NEUTROPHILS NFR BLD MANUAL: 88 %
PLAT MORPH BLD: ABNORMAL
PLATELET # BLD AUTO: 62 10E3/UL (ref 150–450)
POTASSIUM BLD-SCNC: 3.6 MMOL/L (ref 3.4–5.3)
RBC # BLD AUTO: 4.02 10E6/UL (ref 3.8–5.2)
RBC MORPH BLD: ABNORMAL
SODIUM SERPL-SCNC: 137 MMOL/L (ref 133–144)
WBC # BLD AUTO: 3.1 10E3/UL (ref 4–11)

## 2022-08-05 PROCEDURE — 99233 SBSQ HOSP IP/OBS HIGH 50: CPT | Performed by: INTERNAL MEDICINE

## 2022-08-05 PROCEDURE — 258N000003 HC RX IP 258 OP 636: Performed by: HOSPITALIST

## 2022-08-05 PROCEDURE — 250N000011 HC RX IP 250 OP 636: Performed by: HOSPITALIST

## 2022-08-05 PROCEDURE — 70450 CT HEAD/BRAIN W/O DYE: CPT

## 2022-08-05 PROCEDURE — 85027 COMPLETE CBC AUTOMATED: CPT | Performed by: HOSPITALIST

## 2022-08-05 PROCEDURE — 80048 BASIC METABOLIC PNL TOTAL CA: CPT | Performed by: HOSPITALIST

## 2022-08-05 PROCEDURE — 36415 COLL VENOUS BLD VENIPUNCTURE: CPT | Performed by: HOSPITALIST

## 2022-08-05 PROCEDURE — 99221 1ST HOSP IP/OBS SF/LOW 40: CPT | Performed by: STUDENT IN AN ORGANIZED HEALTH CARE EDUCATION/TRAINING PROGRAM

## 2022-08-05 PROCEDURE — 85007 BL SMEAR W/DIFF WBC COUNT: CPT | Performed by: HOSPITALIST

## 2022-08-05 PROCEDURE — 86140 C-REACTIVE PROTEIN: CPT | Performed by: INTERNAL MEDICINE

## 2022-08-05 PROCEDURE — 250N000013 HC RX MED GY IP 250 OP 250 PS 637: Performed by: HOSPITALIST

## 2022-08-05 PROCEDURE — 120N000001 HC R&B MED SURG/OB

## 2022-08-05 RX ORDER — SODIUM CHLORIDE, SODIUM LACTATE, POTASSIUM CHLORIDE, CALCIUM CHLORIDE 600; 310; 30; 20 MG/100ML; MG/100ML; MG/100ML; MG/100ML
INJECTION, SOLUTION INTRAVENOUS CONTINUOUS
Status: CANCELLED | OUTPATIENT
Start: 2022-08-05

## 2022-08-05 RX ORDER — CEFTRIAXONE 2 G/1
2 INJECTION, POWDER, FOR SOLUTION INTRAMUSCULAR; INTRAVENOUS EVERY 24 HOURS
Status: DISCONTINUED | OUTPATIENT
Start: 2022-08-06 | End: 2022-08-07

## 2022-08-05 RX ADMIN — SODIUM CHLORIDE: 9 INJECTION, SOLUTION INTRAVENOUS at 12:20

## 2022-08-05 RX ADMIN — FERROUS GLUCONATE 324 MG: 324 TABLET ORAL at 08:34

## 2022-08-05 RX ADMIN — LEVOTHYROXINE SODIUM 75 MCG: 75 TABLET ORAL at 08:32

## 2022-08-05 RX ADMIN — CEFTRIAXONE SODIUM 1 G: 1 INJECTION, POWDER, FOR SOLUTION INTRAMUSCULAR; INTRAVENOUS at 12:19

## 2022-08-05 RX ADMIN — ESCITALOPRAM OXALATE 5 MG: 5 TABLET, FILM COATED ORAL at 08:34

## 2022-08-05 ASSESSMENT — ACTIVITIES OF DAILY LIVING (ADL)
ADLS_ACUITY_SCORE: 41
ADLS_ACUITY_SCORE: 35
ADLS_ACUITY_SCORE: 49
ADLS_ACUITY_SCORE: 49
ADLS_ACUITY_SCORE: 35
ADLS_ACUITY_SCORE: 49
ADLS_ACUITY_SCORE: 41
ADLS_ACUITY_SCORE: 49

## 2022-08-05 NOTE — UTILIZATION REVIEW
Admission Status; Secondary Review Determination    Under the authority of the Utilization Management Committee, the utilization review process indicated a secondary review on the above patient. The review outcome is based on review of the medical records, discussions with staff, and applying clinical experience noted on the date of the review.    (x) Inpatient Status Appropriate - This patient's medical care is consistent with medical management for inpatient care and reasonable inpatient medical practice.    RATIONALE FOR DETERMINATION: 82-year-old female with bipolar disorder, chronic anemia, chronic diarrhea, chronic thrombocytopenia who resides at assisted living facility now presents to the hospital with 2 days of fever, nausea, vomiting and progressive weakness.  Fever has been up to 201 degrees.  In the emergency room patient found to have positive COVID-19, imaging revealed a left renal pelvis staghorn calculus with inflammatory changes surrounding it consistent with an acute infection, temperature 100.9 degrees with relative hypotension blood pressure 85/57 and patient has relative neutropenia with a white blood count of 3.1 thousand and a significant left shift of 88% neutrophils.  Due to patient's significant obstructive calculus with some hydronephrosis associated with infectious inflammatory changes and fever as high as 101.8 degrees after admission, patient will require broad-spectrum IV antibiotics, IV fluids as well as urological intervention appropriate for inpatient management.      At the time of admission with the information available to the attending physician more than 2 nights Hospital complex care was anticipated, based on patient risk of adverse outcome if treated as outpatient and complex care required. Inpatient admission is appropriate based on the Medicare guidelines.    This document was produced using voice recognition software    The information on this document is developed by  the utilization review team in order for the business office to ensure compliance. This only denotes the appropriateness of proper admission status and does not reflect the quality of care rendered.    The definitions of Inpatient Status and Observation Status used in making the determination above are those provided in the CMS Coverage Manual, Chapter 1 and Chapter 6, section 70.4.    Sincerely,    Grady Hernandez MD  Utilization Review  Physician Advisor  [facility].

## 2022-08-05 NOTE — PROGRESS NOTES
[facility]    Medicine Progress Note - Hospitalist Service    Date of Admission:  8/4/2022    Assessment & Plan        Serge Marin is a 82 year old female with PMH significant for bipolar 1 disorder, hypothyroidism, chronic anemia, chronic diarrhea, chronic thrombocytopenia who resides in assisted living facility and was sent to ED for two days of fever vomiting and generalized weakness.     ED workup noted with left renal pelvis staghorn calculus and also noted positive for COVID.     Acute febrile illness likely secondary to infected left renal stone  -on admission temperature 100.9, WBC 4.1, UA 7 WBC, large blood, negative nitrite/LE, urine culture pending  -CT abdomen noted with left renal pelvis staghorn calculus with surrounding inflammatory changes along with mild left hydronephrosis  - continue Rocephin pending urine culture  -Appreciate urology review.  Tentative plan for cystoscopy, left retrograde pyelogram, and left ureteral stent today (patient altered and cannot give consent at present.,  Appreciate  discussing with the  for next decision maker.).  I will also get CT head  -fall precautions; PT/OT evaluation once evaluated by urology and clinically stable     AMS: Likely toxic metabolic related to UTI   -CT head    COVID positive (8/4/2022)  -her symptoms seems more likely due to infected renal stone rather than COVID pneumonia  -she has no respiratory symptoms; not hypoxic  -will hold off on COVID directed therapeutics at this time     Mild hyponatremia  -likely prerenal secondary to vomiting and poor PO intake  -sodium 131; got 1000 ml fluid bolus in ED; resolved     Chronic thrombocytopenia  -her platelet count has been in 100s (last from 2017)  -platelet on admission 68; and today of 62.     Bipolar 1 disorder  -will continue PTA Lexapro     Hypothyroidism  - continue PTA Synthroid     Chronic iron deficiency anemia  - Hb on  admission 12.5  -continue PTA iron supplements        Diet: NPO per Anesthesia Guidelines for Procedure/Surgery Except for: Meds    DVT Prophylaxis: Pneumatic Compression Devices.  Will reevaluate after urologic procedure tomorrow     Sheffield Catheter: Not present  Central Lines: None  Cardiac Monitoring: None  Code Status: Full Code      Disposition Plan      Expected Discharge Date: 08/06/2022                The patient's care was discussed with the Bedside Nurse and Patient.    Shmuel Nam MD, MD  Hospitalist Service  [facility]  Securely message with the Vocera Web Console (learn more here)  Text page via [employer] Paging/Directory         Clinically Significant Risk Factors Present on Admission             # Hypoalbuminemia: Albumin = 3.1 g/dL (Ref range: 3.4 - 5.0 g/dL) on admission, will monitor as appropriate    # Thrombocytopenia: Plts = 62 10e3/uL (Ref range: 150 - 450 10e3/uL) on admission, will monitor for bleeding           ______________________________________________________________________    Interval History   Discussed with RN.  No specific new complaints.  Patient remains confused    Data reviewed today: I reviewed all medications, new labs and imaging results over the last 24 hours. I personally reviewed the abdominal CT image(s) showing As below.    Physical Exam   /60 (BP Location: Right arm, Patient Position: Semi-Garcia's, Cuff Size: Adult Regular)   Pulse 77   Temp 98.1  F (36.7  C) (Oral)   Resp 14   LMP  (LMP Unknown)   SpO2 97%   Gen- pleasant lying in bed  HEENT- NAD, BERNABE  Neck- supple, no JVD elevation, no thyromegaly  CVS- I+II+ no m/r/g  RS- CTAB  Abdo- soft, no tenderness . No g/r/r   Ext- no edema   CNS-confused    Data    BMPRecent Labs   Lab 08/05/22  0529 08/04/22  1155    131*   POTASSIUM 3.6 3.9   CHLORIDE 106 105   BERTA 8.4* 8.5   CO2 24 19*   BUN 10 14   CR 0.80 0.88   GLC 83 98     CBC  Recent Labs   Lab 08/05/22  0633 08/04/22  3491    WBC 3.1* 4.1   RBC 4.02 4.26   HGB 11.9 12.5   HCT 35.7 38.0   MCV 89 89   MCH 29.6 29.3   MCHC 33.3 32.9   RDW 12.6 12.6   PLT 62* 68*     INRNo lab results found in last 7 days.  LFTs  Recent Labs   Lab 08/04/22  1155   ALKPHOS 41   AST 49*   ALT 18   BILITOTAL 0.8   PROTTOTAL 6.0*   ALBUMIN 3.1*      PANCNo lab results found in last 7 days.    Recent Results (from the past 24 hour(s))   CT Abdomen Pelvis w Contrast    Narrative    CT ABDOMEN PELVIS W CONTRAST 8/4/2022 1:16 PM    CLINICAL HISTORY: Nausea and vomiting.    TECHNIQUE: CT scan of the abdomen and pelvis was performed following  injection of IV contrast. Multiplanar reformats were obtained. Dose  reduction techniques were used.  CONTRAST: 65 mL Isovue-[id number]    COMPARISON: None.    FINDINGS:   LOWER CHEST: Mild bibasilar dependent atelectasis.    HEPATOBILIARY: Normal liver. Cholecystectomy. Mild dilatation of the  extrahepatic common bile ducts to the level of the pancreatic head,  likely related to postcholecystectomy reservoir effect. No calcified  stones in the duct.    PANCREAS: Evaluation slightly limited by breathing motion. Mild  diffuse dilatation of the main pancreatic duct measuring about 6 m. No  focal inflammatory changes. No definite pancreatic masses.    SPLEEN: Normal.    ADRENAL GLANDS: Normal.    KIDNEYS/BLADDER: Left renal pelvis calculus measuring 11 x 6 x 9 mm  (series 2, image 89) with mild surrounding inflammatory changes and  mild left hydronephrosis. A few other punctate nonobstructing left  renal calculi. Simple cyst at the lower pole of the left kidney  requiring no specific follow-up.  No right renal calculi, significant masses or hydronephrosis.    No focal wall thickening or surrounding inflammation.    BOWEL: No small bowel or colonic obstruction or inflammatory changes.  Normal appendix. Evaluation is slightly limited by breathing motion.    PELVIC ORGANS: Hysterectomy. No pelvic masses.    ADDITIONAL FINDINGS: No  lymphadenopathy in the abdomen and pelvis. No  abdominal aortic aneurysm. No free fluid, fluid collections or  extraluminal air.    MUSCULOSKELETAL: Degenerative changes in the spine. No suspicious  lesions in the bones.      Impression    IMPRESSION:   1.  Left renal pelvis 11 x 6 x 9 mm staghorn calculus with surrounding  inflammatory changes, likely an infected or inflamed calculus. Mild  left hydronephrosis. A few other punctate nonobstructing left renal  calculi.  2.  No other acute findings in the abdomen and pelvis.  3.  Mildly prominent main pancreatic duct without an obstructing mass  evident by CT.    SPIKE BILLINGS MD         SYSTEM ID:  [id number]   XR Chest Port 1 View    Narrative    EXAM: XR CHEST PORT 1 VIEW  LOCATION: [facility]  DATE/TIME: 8/4/2022 5:45 PM    INDICATION: COVID 19 positive.  COMPARISON: 02/12/2017.      Impression    IMPRESSION: Tortuous calcified thoracic aorta. Chest otherwise negative. Lungs clear.

## 2022-08-05 NOTE — PLAN OF CARE
Goal Outcome Evaluation:    Plan of Care Reviewed With: patient     Overall Patient Progress: no change     [id number]  ED admit for infection of left kidney. Pt is also covid positive. VSS. Pt Aox3. Disoriented to time but able to state year. Presented with fever x2 days, vomiting, and generalized weakness. Not OOB during shift. Able to answer questions but needs clarifying questions and is inconsistent with following commands and often refuses to respond to questions. Incontinent of bowel and bladder. NPO. Plan for chest xray. Urology to consult, PT/OT to consult following urology. Will continue to monitor.

## 2022-08-05 NOTE — PLAN OF CARE
[id number]    Presented in ED w/ infection of left kidney & COVID+. Presented with fever x2 days, vomiting, and generalized weakness. VSS on 2L O2. Not OOB during shift. Pt oriented to self only. PIV infusing NS w/ int abx. Inconsistent with following commands and often does not respond to questions. Incontinent of bowel and bladder. NPO. Plan to head to OR for cytoscopy w/ L pyelogram and L urethral stent placement. SW/CC attempting to get consent. Will continue to monitor.

## 2022-08-05 NOTE — PROGRESS NOTES
Urology  Progress Note  08/05/2022    PRISCA, transferred from ER to floor  Tmax 101.8 at 1759 yesterday, currently afebrile  Lower blood pressure noted but stable (100s/50s), normal HR, on 2 L O2 ovn  Lactic acid 1.1 yesterday  Denies flank pain    Exam  /55 (BP Location: Right arm, Patient Position: Semi-Garcia's, Cuff Size: Adult Regular)   Pulse 72   Temp 98.7  F (37.1  C) (Oral)   Resp 14   LMP  (LMP Unknown)   SpO2 96%   No acute distress  Unlabored breathing  Abdomen soft, appropriately tender, nondistended.  No CVA tenderness    I/O's (last 24/since midnight)  UOP - NR    Labs  COVID +     WBC (4.1)  Hgb (12.5)  Cr (0.8)    AM labs pending    Assessment/Plan  82 year old female with PMH of chronic diarrhea, anxiety, bipolar 1 disorder, and no previous urologic history who presented to ER on 8/4/22 with a fever x 2 days and associated diarrhea & vomiting. CT scan revealed a left renal pelvis stone measuring up to 11 mm with associated mild left hydronephrosis and inflammatory changes.  Also notable for a distended bladder. Exam had no CVA tenderness. Febrile yesterday but hemodynamically stable.  Serum Cr WNL. No leukocytosis urinalysis is relatively.  Urine culture is pending. On ceftriaxone. COVID-positive as well.    -Please keep NPO (ordered)  -Will add on for OR today for cystoscopy, left retrograde pyelogram, and left ureteral stent  -Consent needs to be obtained from decision maker ?    Seen and examined. Will discuss with staff, Dr. Lanier.    Lino Pena MD  Urology Resident, PGY-5  (p) [phone]

## 2022-08-05 NOTE — PROGRESS NOTES
RECEIVING UNIT ED HANDOFF REVIEW    ED Nurse Handoff Report was reviewed by: Rafael Musa RN on August 5, 2022 at 1:58 AM

## 2022-08-06 LAB — BACTERIA UR CULT: NORMAL

## 2022-08-06 PROCEDURE — 120N000001 HC R&B MED SURG/OB

## 2022-08-06 PROCEDURE — 99232 SBSQ HOSP IP/OBS MODERATE 35: CPT | Performed by: INTERNAL MEDICINE

## 2022-08-06 PROCEDURE — 250N000011 HC RX IP 250 OP 636: Performed by: INTERNAL MEDICINE

## 2022-08-06 PROCEDURE — 250N000013 HC RX MED GY IP 250 OP 250 PS 637: Performed by: HOSPITALIST

## 2022-08-06 RX ADMIN — CEFTRIAXONE SODIUM 2 G: 2 INJECTION, POWDER, FOR SOLUTION INTRAMUSCULAR; INTRAVENOUS at 12:00

## 2022-08-06 RX ADMIN — ESCITALOPRAM OXALATE 5 MG: 5 TABLET, FILM COATED ORAL at 09:01

## 2022-08-06 RX ADMIN — LEVOTHYROXINE SODIUM 75 MCG: 75 TABLET ORAL at 09:01

## 2022-08-06 ASSESSMENT — ACTIVITIES OF DAILY LIVING (ADL)
ADLS_ACUITY_SCORE: 51
ADLS_ACUITY_SCORE: 49
ADLS_ACUITY_SCORE: 51
ADLS_ACUITY_SCORE: 51
ADLS_ACUITY_SCORE: 49
ADLS_ACUITY_SCORE: 51
ADLS_ACUITY_SCORE: 49
ADLS_ACUITY_SCORE: 51

## 2022-08-06 NOTE — PROGRESS NOTES
[facility]    Internal Medicine Hospitalist Progress Note  08/06/2022  I evaluated patient on the above date.    Freedom Nguyen Jr., MD  [phone] (p)  Text Page  Vocera        Assessment & Plan New actions/orders today (08/06/2022) are underlined.    Serge Marin is a 82 year old female with PMH significant for bipolar 1 disorder, depression/anxiety and hypothyroidism, who presented from assisted living 8/4/2022 with two days of fever vomiting and generalized weakness and found with a left renal pelvis staghorn calculus and also noted positive for COVID.    On initial evaluation 8/4, was febrile up to 101.8; WBC normal; cr normal; CRP 11.5, UA showed 7 WBC, lg blood, negative LE; COVID-19 positive. CT abdomen and pelvis with contrast 8/4 showed left renal pelvis 11 x 6 x 9 mm staghorn calculus with surrounding inflammatory changes, likely an infected or inflamed calculus with mild left hydronephrosis and a few other punctate nonobstructing left renal calculi; no other acute findings in the abdomen and pelvis; mildly prominent main pancreatic duct without an obstructing mass evident by CT. CXR 8/4 showed clear lungs.       Acute febrile illness, suspect secondary to infected left renal pelvis staghorn calculus with acute left pyelonephritis with left hydronephrosis.  * Initial presentation as above. Started on ceftriaxone on admit 8/4. UC 8/4 growing <10K mixed urogenital rosalba. Urology consulted on admit.  * Seen by Urology and not felt that left renal stones were obstructing.  - Continue ceftriaxone (started 8/4).  - Monitor cultures.  - Appreciate Urology help.     AMS, suspect metabolic/infectious encephalopathy related to UTI.  * Head CT 8/5 negative for acute findings.  - Continue to treat other issues as noted.  - Re-orient as needed.  - Maintain normal day/night, sleep/wake cycles.  - Minimize sedating medications as able.    COVID-19 positive (8/4/2022).  * Initial  presentation as above. No respiratory symptoms on admit. On admit, noted that her symptoms seemed more likely due to infected renal stone rather than COVID pneumonia. COVID directed therapeutics not indicated.  * 8/5: Afebrile, sats in 90's; placed on O2, but possibly done empirically.  Recent Labs   Lab 08/05/22  0633 08/05/22  0529 08/04/22  1353 08/04/22  1155   WBC 3.1*  --   --  4.1   CRP  --  11.5*  --   --    LACT  --   --  1.1  --    - Try off O2, if hypoxic, then will repeat CXR.  - Continue special precautions.  - Monitor clinically.     Mild hyponatremia, suspect due to hypovolemia/nuritional, resolved.  * Sodium 131 on admit. Received IVF's.  * Sodium normalized 8/5.  Recent Labs   Lab 08/05/22  0529 08/04/22  1155    131*       Acute on possibly chronic thrombocytopenia, unclear etiology, possibly from infection, medication effect or chronic from other cause.  * Noted that her platelet count has been in 100s in the past (last from 2017).  * Platelets 68K on admit 8/4.  Recent Labs   Lab 08/05/22  0633 08/04/22  1155   PLT 62* 68*   - Monitor CBC.  - Consider platelet transfusion if needs a procedure and less than 50,000; or if less than 10,000.    Leukopenia, question related to infection.  * WBC normal on admit.  * WBC decreased 8/5.  Recent Labs   Lab 08/05/22  0633 08/04/22  1155   WBC 3.1* 4.1   - Monitor CBC.    Bipolar 1 disorder.  Depression/anxiety.  - Continue escitalopram.     Hypothyroidism.  - Continue levothyroxine.     Chronic iron deficiency anemia  * Hgb normal on admit.  - Continue iron supplements.      Clinically Significant Risk Factors Present on Admission                       COVID-19 testing.  COVID-19 PCR Results    COVID-19 PCR Results 8/4/22   SARS CoV2 PCR Positive (A)   (A) Abnormal value       Comments are available for some flowsheets but are not being displayed.         COVID-19 Antibody Results, Testing for Immunity    COVID-19 Antibody Results, Testing for  Immunity   No data to display.             Diet: Regular Diet Adult    Prophylaxis: PCD's, ambulation.   Sheffield Catheter: Not present  Central Lines: None  Code Status: Full Code    Disposition Plan   Expected discharge: 1-2d recommended to prior living arrangement pending above.  Entered: Freedom Nguyen MD 08/06/2022, 7:53 AM         Interval History   Feeling better.  Feels that antibiotics are helping.    -Data reviewed today: I reviewed all new labs and imaging over the last 24 hours. I personally reviewed no images or EKG's today.    Physical Exam    , Blood pressure 120/60, pulse 60, temperature 98  F (36.7  C), temperature source Oral, resp. rate 16, SpO2 96 %, not currently breastfeeding. O2 Device: Nasal cannula Oxygen Delivery: 2 LPM  There were no vitals filed for this visit.  Vital Signs with Ranges  Temp:  [98  F (36.7  C)-98.1  F (36.7  C)] 98  F (36.7  C)  Pulse:  [60-77] 60  Resp:  [14-16] 16  BP: (110-121)/(54-60) 120/60  FiO2 (%):  [2 %] 2 %  SpO2:  [96 %-98 %] 96 %  Patient Vitals for the past 24 hrs:   BP Temp Temp src Pulse Resp SpO2   08/06/22 0300 120/60 98  F (36.7  C) Oral 60 16 96 %   08/05/22 1546 121/54 98  F (36.7  C) Oral 62 14 98 %   08/05/22 0848 110/60 98.1  F (36.7  C) Oral 77 14 97 %     I/O's Last 24 hours  I/O last 3 completed shifts:  In: 240 [P.O.:240]  Out: -     Constitutional: Awake, alert, appropriate, some delay in responses.  Respiratory: Diminished in bases. No crackles or wheezes.  Cardiovascular: RRR, no m/r/g.  GI:   Skin/Integumen:   Other:        Data   Recent Labs   Lab 08/05/22  0633 08/05/22  0529 08/04/22  1155   WBC 3.1*  --  4.1   HGB 11.9  --  12.5   MCV 89  --  89   PLT 62*  --  68*   NA  --  137 131*   POTASSIUM  --  3.6 3.9   CHLORIDE  --  106 105   CO2  --  24 19*   BUN  --  10 14   CR  --  0.80 0.88   ANIONGAP  --  7 7   BERTA  --  8.4* 8.5   GLC  --  83 98   ALBUMIN  --   --  3.1*   PROTTOTAL  --   --  6.0*   BILITOTAL  --   --  0.8   ALKPHOS  --    --  41   ALT  --   --  18   AST  --   --  49*     Recent Labs   Lab Test 08/05/22  0529 08/04/22  1155 05/08/18  1010 02/16/17  0823 02/15/17  0755   GLC 83 98 111.0* 89 85     Recent Labs   Lab 08/05/22  0633 08/05/22  0529 08/04/22  1353 08/04/22  1155   WBC 3.1*  --   --  4.1   CRP  --  11.5*  --   --    LACT  --   --  1.1  --          Recent Results (from the past 24 hour(s))   CT Head w/o Contrast    Narrative    CT SCAN OF THE HEAD WITHOUT CONTRAST   8/5/2022 2:29 PM     HISTORY: Altered mental status.    TECHNIQUE:  Axial images of the head and coronal reformations without  IV contrast material. Radiation dose for this scan was reduced using  automated exposure control, adjustment of the mA and/or kV according  to patient size, or iterative reconstruction technique.    COMPARISON: None.    FINDINGS: Hyperattenuation along the floors of the bilateral middle  cranial fossae, probably artifactual. Otherwise, no evidence of  hemorrhage. Volume loss and white matter hypoattenuation likely  represent chronic small vessel ischemic change. No acute osseous  abnormality. Mild paranasal sinus mucosal thickening.      Impression    IMPRESSION:   1. No acute intracranial abnormality.  2. Hyperattenuation along the floors of the bilateral middle cranial  fossae, probably artifactual.     DANIELLE POLANCO MD         SYSTEM ID:  [id number]       Medications   All medications were reviewed.      cefTRIAXone  2 g Intravenous Q24H     escitalopram  5 mg Oral Daily     ferrous gluconate  324 mg Oral Daily with breakfast     levothyroxine  75 mcg Oral QAM AC     sodium chloride (PF)  3 mL Intracatheter Q8H     acetaminophen, lidocaine 4%, lidocaine (buffered or not buffered), loperamide, melatonin, ondansetron **OR** ondansetron, sodium chloride (PF)

## 2022-08-06 NOTE — PROGRESS NOTES
Urology  Progress Note  08/06/2022    NAEO  Afebrile since 8/4 at 1759  VSS including normotensive and normal HR  No flank pain    Exam  /60 (BP Location: Right arm, Cuff Size: Adult Regular)   Pulse 60   Temp 98  F (36.7  C) (Oral)   Resp 16   LMP  (LMP Unknown)   SpO2 96%   No acute distress  Unlabored breathing  Abdomen soft, appropriately tender, nondistended.  No CVA tenderness    I/O's (last 24/since midnight)  UOP - NR    Labs  COVID + on 8/4/22    8/5  WBC (3.1)  Hgb (11.9)  Cr (0.8)    UCx - 8/4 - PENDING    AM labs pending    Imaging:  CT head - 8/5/22 - negative    Assessment/Plan  82 year old female with PMH of chronic diarrhea, anxiety, bipolar 1 disorder, and no previous urologic history who presented to ER on 8/4/22 with a fever x 2 days and associated diarrhea & vomiting. CT scan revealed a left renal pelvis stone measuring up to 11 mm with minimal hydronephrosis and no significant stranding.  Also notable for a distended bladder. Exam had no CVA tenderness. Febrile on day of admission but afebrile for >24 hrs. UA not definitive for UTI as leukocyte esterase negative and only 7 WBC. Urine culture is pending. On ceftriaxone. COVID-positive as well.    -No urologic intervention planned as stone does not appear to be obstructing, she does not appear to have obstructive urosepsis, and is clinically improving  -Ok for diet  -Continue empiric abx, follow urine culture  -Urology will continue to follow  -If patient clinically clinically decompensates or experiences worsening fever trend please page urology on-call for consideration of more urgent intervention  -Urology will sign off  -Will plan for outpatient follow up to discuss elective left renal stone treatment. Patient will be contacted to schedule appointment.    Seen and examined. Will discuss with staff, Dr. Talbot.    Lino Pena MD  Urology Resident, PGY-5  (p) [phone]

## 2022-08-06 NOTE — PLAN OF CARE
[id number]    Presented in ED w/ infection of left kidney & COVID+ 8/5. VSS on 2L O2. Not OOB during shift. T/R q2. Pt oriented to self only. PIV infusing LR upon arrival. Stopped fluids per MAR as NS was ordered and discontinued. Int abx. Inconsistent with following commands and often does not respond to questions. Incontinent of bowel and bladder. Tolerating regular diet. CRS following. Will continue to monitor.

## 2022-08-06 NOTE — PROGRESS NOTES
/54 (BP Location: Right arm, Patient Position: Supine, Cuff Size: Adult Regular)   Pulse 62   Temp 98  F (36.7  C) (Oral)   Resp 14   LMP  (LMP Unknown)   SpO2 98%     VSS; incontinent of bladder and bowel. Flat affect; inconsistent with commands. Generalized weakness. 2L NC; remains in covid precautions. Plan to reassess possible cystoscopy. Continue antibiotics.     Evan Ritchie RN

## 2022-08-07 LAB
ANION GAP SERPL CALCULATED.3IONS-SCNC: 4 MMOL/L (ref 3–14)
BASOPHILS # BLD AUTO: 0 10E3/UL (ref 0–0.2)
BASOPHILS NFR BLD AUTO: 1 %
BUN SERPL-MCNC: 5 MG/DL (ref 7–30)
CALCIUM SERPL-MCNC: 8.5 MG/DL (ref 8.5–10.1)
CHLORIDE BLD-SCNC: 106 MMOL/L (ref 94–109)
CO2 SERPL-SCNC: 28 MMOL/L (ref 20–32)
CREAT SERPL-MCNC: 0.6 MG/DL (ref 0.52–1.04)
CRP SERPL-MCNC: 8 MG/L (ref 0–8)
EOSINOPHIL # BLD AUTO: 0 10E3/UL (ref 0–0.7)
EOSINOPHIL NFR BLD AUTO: 1 %
ERYTHROCYTE [DISTWIDTH] IN BLOOD BY AUTOMATED COUNT: 12.7 % (ref 10–15)
GFR SERPL CREATININE-BSD FRML MDRD: 89 ML/MIN/1.73M2
GLUCOSE BLD-MCNC: 96 MG/DL (ref 70–99)
HCT VFR BLD AUTO: 38.2 % (ref 35–47)
HGB BLD-MCNC: 12.7 G/DL (ref 11.7–15.7)
IMM GRANULOCYTES # BLD: 0 10E3/UL
IMM GRANULOCYTES NFR BLD: 0 %
LYMPHOCYTES # BLD AUTO: 0.5 10E3/UL (ref 0.8–5.3)
LYMPHOCYTES NFR BLD AUTO: 25 %
MCH RBC QN AUTO: 29.5 PG (ref 26.5–33)
MCHC RBC AUTO-ENTMCNC: 33.2 G/DL (ref 31.5–36.5)
MCV RBC AUTO: 89 FL (ref 78–100)
MONOCYTES # BLD AUTO: 0.2 10E3/UL (ref 0–1.3)
MONOCYTES NFR BLD AUTO: 9 %
NEUTROPHILS # BLD AUTO: 1.4 10E3/UL (ref 1.6–8.3)
NEUTROPHILS NFR BLD AUTO: 64 %
NRBC # BLD AUTO: 0 10E3/UL
NRBC BLD AUTO-RTO: 0 /100
PLATELET # BLD AUTO: 75 10E3/UL (ref 150–450)
POTASSIUM BLD-SCNC: 3.4 MMOL/L (ref 3.4–5.3)
RBC # BLD AUTO: 4.3 10E6/UL (ref 3.8–5.2)
SODIUM SERPL-SCNC: 138 MMOL/L (ref 133–144)
WBC # BLD AUTO: 2.2 10E3/UL (ref 4–11)

## 2022-08-07 PROCEDURE — 86140 C-REACTIVE PROTEIN: CPT | Performed by: INTERNAL MEDICINE

## 2022-08-07 PROCEDURE — 250N000011 HC RX IP 250 OP 636: Performed by: INTERNAL MEDICINE

## 2022-08-07 PROCEDURE — 99232 SBSQ HOSP IP/OBS MODERATE 35: CPT | Performed by: INTERNAL MEDICINE

## 2022-08-07 PROCEDURE — 120N000001 HC R&B MED SURG/OB

## 2022-08-07 PROCEDURE — 82310 ASSAY OF CALCIUM: CPT | Performed by: INTERNAL MEDICINE

## 2022-08-07 PROCEDURE — 250N000013 HC RX MED GY IP 250 OP 250 PS 637: Performed by: HOSPITALIST

## 2022-08-07 PROCEDURE — 85025 COMPLETE CBC W/AUTO DIFF WBC: CPT | Performed by: INTERNAL MEDICINE

## 2022-08-07 PROCEDURE — 36415 COLL VENOUS BLD VENIPUNCTURE: CPT | Performed by: INTERNAL MEDICINE

## 2022-08-07 RX ORDER — LEVOFLOXACIN 500 MG/1
500 TABLET, FILM COATED ORAL DAILY
Status: COMPLETED | OUTPATIENT
Start: 2022-08-08 | End: 2022-08-13

## 2022-08-07 RX ADMIN — CEFTRIAXONE SODIUM 2 G: 2 INJECTION, POWDER, FOR SOLUTION INTRAMUSCULAR; INTRAVENOUS at 11:42

## 2022-08-07 RX ADMIN — FERROUS GLUCONATE 324 MG: 324 TABLET ORAL at 08:40

## 2022-08-07 RX ADMIN — ESCITALOPRAM OXALATE 5 MG: 5 TABLET, FILM COATED ORAL at 08:40

## 2022-08-07 RX ADMIN — LEVOTHYROXINE SODIUM 75 MCG: 75 TABLET ORAL at 06:46

## 2022-08-07 ASSESSMENT — ACTIVITIES OF DAILY LIVING (ADL)
ADLS_ACUITY_SCORE: 51
ADLS_ACUITY_SCORE: 47
ADLS_ACUITY_SCORE: 51

## 2022-08-07 NOTE — PLAN OF CARE
8/6/[id number]    VSS on 1LNC, afebrile. Oriented to self. Withdrawn and inconsistent with following commands. COVID+/isolation. Turn q2h, mepi to coccyx, small red, blanchable area on R buttocks. Requests drinks of water. SL IV. Incont. Urine.

## 2022-08-07 NOTE — PLAN OF CARE
Pt alert to self. VSS on 1 L of oxygen. Pt is on regular diet and tolerating well. Incontinent of bowel and bladder. PIV is saline locked. Turned and repositioned every 2 hours. Will continue to monitor.

## 2022-08-07 NOTE — PROGRESS NOTES
[facility]    Internal Medicine Hospitalist Progress Note  08/07/2022  I evaluated patient on the above date.    Freedom Nguyen Jr., MD  [phone] (p)  Text Page  Vocera        Assessment & Plan New actions/orders today (08/07/2022) are underlined.    Serge Marin is a 82 year old female with PMH significant for bipolar 1 disorder, depression/anxiety and hypothyroidism, who presented from assisted living 8/4/2022 with two days of fever vomiting and generalized weakness and found with a left renal pelvis staghorn calculus and also noted positive for COVID.    On initial evaluation 8/4, was febrile up to 101.8; WBC normal; cr normal; CRP 11.5, UA showed 7 WBC, lg blood, negative LE; COVID-19 positive. CT abdomen and pelvis with contrast 8/4 showed left renal pelvis 11 x 6 x 9 mm staghorn calculus with surrounding inflammatory changes, likely an infected or inflamed calculus with mild left hydronephrosis and a few other punctate nonobstructing left renal calculi; no other acute findings in the abdomen and pelvis; mildly prominent main pancreatic duct without an obstructing mass evident by CT. CXR 8/4 showed clear lungs.       Acute febrile illness, suspect secondary to infected left renal pelvis staghorn calculus with acute left pyelonephritis with left hydronephrosis.  * Initial presentation as above. Started on ceftriaxone on admit 8/4. UC 8/4 <10K mixed urogenital rosalba. Urology consulted on admit.  * Seen by Urology and not felt that left renal stones were obstructing.  - Discontinue ceftriaxone (started 8/4) and start levofloxacin to stop after 8/13.  - Monitor cultures.  - Per Urology, plan for outpatient follow up to discuss elective left renal stone treatment; patient will be contacted to schedule appointment.     AMS, suspect metabolic/infectious encephalopathy related to UTI.  * Head CT 8/5 negative for acute findings.  * Mental status improved with treatment of acute medical  issues as noted.  - Continue to treat other issues as noted.  - Re-orient as needed.  - Maintain normal day/night, sleep/wake cycles.  - Minimize sedating medications as able.    COVID-19 positive (8/4/2022).  * Initial presentation as above. No respiratory symptoms on admit. On admit, noted that her symptoms seemed more likely due to infected renal stone rather than COVID pneumonia. COVID directed therapeutics not indicated.  * 8/6: Afebrile, sats in 90's; placed on O2, but done empirically and subsequently sats in 90's on RA.  * 8/7: Sats 90's on RA.  Recent Labs   Lab 08/07/22  0904 08/05/22  0633 08/05/22  0529 08/04/22  1353 08/04/22  1155   WBC 2.2* 3.1*  --   --  4.1   CRP 8.0  --  11.5*  --   --    LACT  --   --   --  1.1  --    - Continue special precautions.  - Monitor clinically.     Mild hyponatremia, suspect due to hypovolemia/nuritional, resolved.  * Sodium 131 on admit. Received IVF's.  * Sodium normalized 8/5.      Acute on possibly chronic thrombocytopenia, unclear etiology, possibly from infection, medication effect or chronic from other cause.  * Noted that her platelet count has been in 100s in the past (last from 2017).  * Platelets 68K on admit 8/4.  Recent Labs   Lab 08/07/22  0904 08/05/22  0633 08/04/22  1155   PLT 75* 62* 68*   - Monitor CBC.  - Consider platelet transfusion if needs a procedure and less than 50,000; or if less than 10,000.    Leukopenia, question related to infection.  * WBC normal on admit.  * WBC decreased 8/5.  Recent Labs   Lab 08/07/22  0904 08/05/22  0633 08/04/22  1155   WBC 2.2* 3.1* 4.1   - Monitor CBC.    Weakness and physical deconditioning due to multiple acute and chronic medical issues.  * Lives alone.  - Order PT and OT.    Bipolar 1 disorder.  Depression/anxiety.  - Continue escitalopram.     Hypothyroidism.  - Continue levothyroxine.     Chronic iron deficiency anemia  * Hgb normal on admit.  - Continue iron supplements.      Clinically Significant Risk  Factors Present on Admission                       COVID-19 testing.  COVID-19 PCR Results    COVID-19 PCR Results 8/4/22   SARS CoV2 PCR Positive (A)   (A) Abnormal value       Comments are available for some flowsheets but are not being displayed.         COVID-19 Antibody Results, Testing for Immunity    COVID-19 Antibody Results, Testing for Immunity   No data to display.             Diet: Regular Diet Adult    Prophylaxis: PCD's, ambulation.   Sheffield Catheter: Not present  Central Lines: None  Code Status: Full Code    Disposition Plan   Expected discharge: 1-2d recommended to prior living arrangement pending above.  Entered: Freedom Nguyen MD 08/07/2022, 12:11 PM     Communication.  - I called pt's son, no response, left message 8/7.      Interval History   Doing better.  Asking about when she will be ready to go home.  No chest pain or dyspnea.    -Data reviewed today: I reviewed all new labs and imaging over the last 24 hours. I personally reviewed no images or EKG's today.    Physical Exam    , Blood pressure 119/60, pulse 65, temperature 97.3  F (36.3  C), temperature source Oral, resp. rate 16, SpO2 96 %, not currently breastfeeding. O2 Device: None (Room air) Oxygen Delivery: 1 LPM  There were no vitals filed for this visit.  Vital Signs with Ranges  Temp:  [97.3  F (36.3  C)-98.2  F (36.8  C)] 97.3  F (36.3  C)  Pulse:  [60-65] 65  Resp:  [16] 16  BP: (105-128)/(53-60) 119/60  SpO2:  [96 %-98 %] 96 %  Patient Vitals for the past 24 hrs:   BP Temp Temp src Pulse Resp SpO2   08/07/22 0848 119/60 97.3  F (36.3  C) Oral 65 -- 96 %   08/07/22 0220 128/56 98.2  F (36.8  C) Oral 60 16 96 %   08/06/22 1524 105/53 98.1  F (36.7  C) Oral 64 16 98 %     I/O's Last 24 hours  I/O last 3 completed shifts:  In: 750 [P.O.:750]  Out: -     Constitutional: Awake, alert, appropriate, some delay in responses, improved.  Respiratory: Diminished in bases. No crackles or wheezes.  Cardiovascular: RRR, no m/r/g.  GI:  Soft, nt, nd, +BS.  Skin/Integumen:   Other:        Data   Recent Labs   Lab 08/07/22  0904 08/05/22  0633 08/05/22  0529 08/04/22  1155   WBC 2.2* 3.1*  --  4.1   HGB 12.7 11.9  --  12.5   MCV 89 89  --  89   PLT 75* 62*  --  68*     --  137 131*   POTASSIUM 3.4  --  3.6 3.9   CHLORIDE 106  --  106 105   CO2 28  --  24 19*   BUN 5*  --  10 14   CR 0.60  --  0.80 0.88   ANIONGAP 4  --  7 7   BERTA 8.5  --  8.4* 8.5   GLC 96  --  83 98   ALBUMIN  --   --   --  3.1*   PROTTOTAL  --   --   --  6.0*   BILITOTAL  --   --   --  0.8   ALKPHOS  --   --   --  41   ALT  --   --   --  18   AST  --   --   --  49*     Recent Labs   Lab Test 08/07/22  0904 08/05/22  0529 08/04/22  1155 05/08/18  1010 02/16/17  0823   GLC 96 83 98 111.0* 89     Recent Labs   Lab 08/07/22  0904 08/05/22  0633 08/05/22  0529 08/04/22  1353 08/04/22  1155   WBC 2.2* 3.1*  --   --  4.1   CRP 8.0  --  11.5*  --   --    LACT  --   --   --  1.1  --          No results found for this or any previous visit (from the past 24 hour(s)).    Medications   All medications were reviewed.      cefTRIAXone  2 g Intravenous Q24H     escitalopram  5 mg Oral Daily     ferrous gluconate  324 mg Oral Daily with breakfast     levothyroxine  75 mcg Oral QAM AC     sodium chloride (PF)  3 mL Intracatheter Q8H     acetaminophen, lidocaine 4%, lidocaine (buffered or not buffered), loperamide, melatonin, ondansetron **OR** ondansetron, sodium chloride (PF)

## 2022-08-07 NOTE — PLAN OF CARE
[id number]    Presented in ED w/ infection of left kidney & COVID+ 8/5. VSS on RA. T/R q2. Ambulated to chair w/GBW. Pt oriented to self only. PIV SL w/Int abx. Inconsistent with following commands/questions. Incontinent of bowel and bladder. Tolerating regular diet. PT/OT consulted. Will continue to monitor.

## 2022-08-08 ENCOUNTER — APPOINTMENT (OUTPATIENT)
Dept: OCCUPATIONAL THERAPY | Facility: CLINIC | Age: 82
DRG: 689 | End: 2022-08-08
Attending: INTERNAL MEDICINE
Payer: COMMERCIAL

## 2022-08-08 ENCOUNTER — APPOINTMENT (OUTPATIENT)
Dept: PHYSICAL THERAPY | Facility: CLINIC | Age: 82
DRG: 689 | End: 2022-08-08
Attending: INTERNAL MEDICINE
Payer: COMMERCIAL

## 2022-08-08 PROCEDURE — 97530 THERAPEUTIC ACTIVITIES: CPT | Mod: GP

## 2022-08-08 PROCEDURE — 120N000001 HC R&B MED SURG/OB

## 2022-08-08 PROCEDURE — 250N000013 HC RX MED GY IP 250 OP 250 PS 637: Performed by: INTERNAL MEDICINE

## 2022-08-08 PROCEDURE — 97530 THERAPEUTIC ACTIVITIES: CPT | Mod: GO | Performed by: OCCUPATIONAL THERAPIST

## 2022-08-08 PROCEDURE — 250N000013 HC RX MED GY IP 250 OP 250 PS 637: Performed by: HOSPITALIST

## 2022-08-08 PROCEDURE — 99232 SBSQ HOSP IP/OBS MODERATE 35: CPT | Performed by: INTERNAL MEDICINE

## 2022-08-08 PROCEDURE — 97161 PT EVAL LOW COMPLEX 20 MIN: CPT | Mod: GP

## 2022-08-08 PROCEDURE — 97166 OT EVAL MOD COMPLEX 45 MIN: CPT | Mod: GO | Performed by: OCCUPATIONAL THERAPIST

## 2022-08-08 RX ORDER — LEVOFLOXACIN 500 MG/1
500 TABLET, FILM COATED ORAL DAILY
Qty: 5 TABLET | Refills: 0 | Status: SHIPPED | OUTPATIENT
Start: 2022-08-09 | End: 2022-08-17

## 2022-08-08 RX ADMIN — LEVOFLOXACIN 500 MG: 500 TABLET, FILM COATED ORAL at 08:51

## 2022-08-08 RX ADMIN — LEVOTHYROXINE SODIUM 75 MCG: 75 TABLET ORAL at 06:43

## 2022-08-08 RX ADMIN — FERROUS GLUCONATE 324 MG: 324 TABLET ORAL at 08:51

## 2022-08-08 RX ADMIN — ESCITALOPRAM OXALATE 5 MG: 5 TABLET, FILM COATED ORAL at 08:51

## 2022-08-08 ASSESSMENT — ACTIVITIES OF DAILY LIVING (ADL)
ADLS_ACUITY_SCORE: 47
ADLS_ACUITY_SCORE: 51
ADLS_ACUITY_SCORE: 43
ADLS_ACUITY_SCORE: 47
ADLS_ACUITY_SCORE: 43
ADLS_ACUITY_SCORE: 51
ADLS_ACUITY_SCORE: 51
ADLS_ACUITY_SCORE: 47

## 2022-08-08 NOTE — PROGRESS NOTES
[facility]    Internal Medicine Hospitalist Progress Note  08/08/2022  I evaluated patient on the above date.    Freedom Nguyen Jr., MD  [phone] (p)  Text Page  Vocera        Assessment & Plan New actions/orders today (08/08/2022) are underlined.    Serge Marin is a 82 year old female with PMH significant for bipolar 1 disorder, depression/anxiety and hypothyroidism, who presented from assisted living 8/4/2022 with two days of fever vomiting and generalized weakness and found with a left renal pelvis staghorn calculus and also noted positive for COVID.    On initial evaluation 8/4, was febrile up to 101.8; WBC normal; cr normal; CRP 11.5, UA showed 7 WBC, lg blood, negative LE; COVID-19 positive. CT abdomen and pelvis with contrast 8/4 showed left renal pelvis 11 x 6 x 9 mm staghorn calculus with surrounding inflammatory changes, likely an infected or inflamed calculus with mild left hydronephrosis and a few other punctate nonobstructing left renal calculi; no other acute findings in the abdomen and pelvis; mildly prominent main pancreatic duct without an obstructing mass evident by CT. CXR 8/4 showed clear lungs.       Acute febrile illness, suspect secondary to infected left renal pelvis staghorn calculus with acute left pyelonephritis with left hydronephrosis.  * Initial presentation as above. Started on ceftriaxone on admit 8/4. UC 8/4 <10K mixed urogenital rosalba. Urology consulted on admit.  * Seen by Urology and not felt that left renal stones were obstructing.  * Discontinued ceftriaxone and started levofloxacin 8/7.  - Continue levofloxacin (started 8/7) to stop after 8/13.   - Monitor cultures.  - Per Urology, plan for outpatient follow up to discuss elective left renal stone treatment; patient will be contacted to schedule appointment.     AMS, suspect metabolic/infectious encephalopathy related to pyelonephritis.  * Head CT 8/5 negative for acute findings.  * Mental  status improved with treatment of acute medical issues as noted.  - Continue to treat other issues as noted.  - Re-orient as needed.  - Maintain normal day/night, sleep/wake cycles.  - Minimize sedating medications as able.    COVID-19 positive (8/4/2022).  * Initial presentation as above. No respiratory symptoms on admit. On admit, noted that her symptoms seemed more likely due to infected renal stone rather than COVID pneumonia. COVID directed therapeutics not indicated.  * 8/6: Afebrile, sats in 90's; placed on O2, but done empirically and subsequently sats in 90's on RA.  * 8/7: Sats 90's on RA.  Recent Labs   Lab 08/07/22  0904 08/05/22  0633 08/05/22  0529 08/04/22  1353 08/04/22  1155   WBC 2.2* 3.1*  --   --  4.1   CRP 8.0  --  11.5*  --   --    LACT  --   --   --  1.1  --    - Continue special precautions.  - Monitor clinically.     Mild hyponatremia, suspect due to hypovolemia/nuritional, resolved.  * Sodium 131 on admit. Received IVF's.  * Sodium normalized 8/5.      Acute on possibly chronic thrombocytopenia, unclear etiology, possibly from infection, medication effect or chronic from other cause.  * Noted that her platelet count has been in 100s in the past (last from 2017).  * Platelets 68K on admit 8/4.  Recent Labs   Lab 08/07/22  0904 08/05/22  0633 08/04/22  1155   PLT 75* 62* 68*   - Monitor CBC.  - Consider platelet transfusion if needs a procedure and less than 50,000; or if less than 10,000.    Leukopenia, question related to infection.  * WBC normal on admit.  * WBC decreased 8/5.  Recent Labs   Lab 08/07/22  0904 08/05/22  0633 08/04/22  1155   WBC 2.2* 3.1* 4.1   - Monitor CBC.    Weakness and physical deconditioning due to multiple acute and chronic medical issues.  * Lives in assisted living.  * PT and OT consulted.   - Plan home PT, OT.    Bipolar 1 disorder.  Depression/anxiety.  - Continue escitalopram.     Hypothyroidism.  - Continue levothyroxine.     Chronic iron deficiency anemia  *  "Hgb normal on admit.  - Continue iron supplements.      Clinically Significant Risk Factors Present on Admission                       COVID-19 testing.  COVID-19 PCR Results    COVID-19 PCR Results 8/4/22   SARS CoV2 PCR Positive (A)   (A) Abnormal value       Comments are available for some flowsheets but are not being displayed.         COVID-19 Antibody Results, Testing for Immunity    COVID-19 Antibody Results, Testing for Immunity   No data to display.             Diet: Regular Diet Adult    Prophylaxis: PCD's, ambulation.   Sheffield Catheter: Not present  Central Lines: None  Code Status: Full Code    Disposition Plan   Expected discharge: 1-2d recommended to prior living arrangement pending above.  Entered: Freedom Nguyen MD 08/08/2022, 1:50 PM     Communication.  - I called pt's son, no response, left message 8/7 and 8/8.      Interval History   Doing OK.  \"I want to walk\".  Feels capable to go back to assisted living.    -Data reviewed today: I reviewed all new labs and imaging over the last 24 hours. I personally reviewed no images or EKG's today.    Physical Exam    , Blood pressure 110/56, pulse 56, temperature 97.7  F (36.5  C), temperature source Axillary, resp. rate 16, height 1.56 m (5' 1.42\"), weight 59.1 kg (130 lb 4.7 oz), SpO2 95 %, not currently breastfeeding. O2 Device: None (Room air)    Vitals:    08/08/22 0920   Weight: 59.1 kg (130 lb 4.7 oz)     Vital Signs with Ranges  Temp:  [96.5  F (35.8  C)-97.7  F (36.5  C)] 97.7  F (36.5  C)  Pulse:  [56-65] 56  Resp:  [16] 16  BP: (110-134)/(56-69) 110/56  SpO2:  [95 %-96 %] 95 %  Patient Vitals for the past 24 hrs:   BP Temp Temp src Pulse Resp SpO2 Height Weight   08/08/22 0920 -- -- -- -- -- -- 1.56 m (5' 1.42\") 59.1 kg (130 lb 4.7 oz)   08/08/22 0818 110/56 97.7  F (36.5  C) Axillary 56 16 95 % -- --   08/08/22 0100 134/69 97.4  F (36.3  C) Axillary -- 16 95 % -- --   08/07/22 1650 121/63 (!) 96.5  F (35.8  C) Oral 65 -- 96 % -- -- "     I/O's Last 24 hours  I/O last 3 completed shifts:  In: 480 [P.O.:480]  Out: -     Constitutional: Awake, alert, appropriate, some delay in responses, stable.  Respiratory: Diminished in bases. No crackles or wheezes.  Cardiovascular: RRR, no m/r/g.  GI: Soft, nt, nd, +BS.  Skin/Integumen:   Other:        Data   Recent Labs   Lab 08/07/22  0904 08/05/22  0633 08/05/22  0529 08/04/22  1155   WBC 2.2* 3.1*  --  4.1   HGB 12.7 11.9  --  12.5   MCV 89 89  --  89   PLT 75* 62*  --  68*     --  137 131*   POTASSIUM 3.4  --  3.6 3.9   CHLORIDE 106  --  106 105   CO2 28  --  24 19*   BUN 5*  --  10 14   CR 0.60  --  0.80 0.88   ANIONGAP 4  --  7 7   BERTA 8.5  --  8.4* 8.5   GLC 96  --  83 98   ALBUMIN  --   --   --  3.1*   PROTTOTAL  --   --   --  6.0*   BILITOTAL  --   --   --  0.8   ALKPHOS  --   --   --  41   ALT  --   --   --  18   AST  --   --   --  49*     Recent Labs   Lab Test 08/07/22  0904 08/05/22  0529 08/04/22  1155 05/08/18  1010 02/16/17  0823   GLC 96 83 98 111.0* 89     Recent Labs   Lab 08/07/22  0904 08/05/22  0633 08/05/22  0529 08/04/22  1353 08/04/22  1155   WBC 2.2* 3.1*  --   --  4.1   CRP 8.0  --  11.5*  --   --    LACT  --   --   --  1.1  --          No results found for this or any previous visit (from the past 24 hour(s)).    Medications   All medications were reviewed.      escitalopram  5 mg Oral Daily     ferrous gluconate  324 mg Oral Daily with breakfast     levofloxacin  500 mg Oral Daily     levothyroxine  75 mcg Oral QAM AC     sodium chloride (PF)  3 mL Intracatheter Q8H     acetaminophen, lidocaine 4%, lidocaine (buffered or not buffered), loperamide, melatonin, ondansetron **OR** ondansetron, sodium chloride (PF)

## 2022-08-08 NOTE — PROGRESS NOTES
SW:  SW received voicemail from patient's [facility] care coordinator inquiring about discharge plans for patient.  SW called back and left a voicemail with update that hospital is waiting to hear back from facility. Asked to call with any information.      EUSEBIO Mauro

## 2022-08-08 NOTE — PLAN OF CARE
Pt alert to self. VSS on RA. PT/OT consult. PIV is saline locked. Incontinent of bowel and bladder.  Turned and repositioned every 2 hours..Pt is on regular diet and tolerating well.  Will continue to monitor.

## 2022-08-08 NOTE — PROGRESS NOTES
08/08/22 0859   Quick Adds   Type of Visit Initial PT Evaluation   Living Environment   People in Home facility resident   Current Living Arrangements assisted living   Home Accessibility no concerns   Transportation Anticipated public transportation  (taxi)   Living Environment Comments Pt lives alone in an [facility] that she reports has no stairs or safety concerns.   Self-Care   Usual Activity Tolerance moderate   Current Activity Tolerance fair   Regular Exercise No   Equipment Currently Used at Home walker, standard   Fall history within last six months yes   Number of times patient has fallen within last six months 1   Activity/Exercise/Self-Care Comment Pt reports the only exercise she gets is walking down to lunch in her building. [facility] that [facility] to provide assistance to her if she needs it.   General Information   Onset of Illness/Injury or Date of Surgery 08/04/22   Referring Physician Freedom Nguyen MD   Patient/Family Therapy Goals Statement (PT) To return back to the [facility]   Pertinent History of Current Problem (include personal factors and/or comorbidities that impact the POC) Serge Marin is a 82 year old female with PMH significant for bipolar 1 disorder, depression/anxiety and hypothyroidism, who presented from assisted living 8/4/2022 with two days of fever vomiting and generalized weakness and found with a left renal pelvis staghorn calculus and also noted positive for COVID.     On initial evaluation 8/4, was febrile up to 101.8; WBC normal; cr normal; CRP 11.5, UA showed 7 WBC, lg blood, negative LE; COVID-19 positive. CT abdomen and pelvis with contrast 8/4 showed left renal pelvis 11 x 6 x 9 mm staghorn calculus with surrounding inflammatory changes, likely an infected or inflamed calculus with mild left hydronephrosis and a few other punctate nonobstructing left renal calculi; no other acute findings in the abdomen and pelvis; mildly prominent main pancreatic  duct without an obstructing mass evident by CT. CXR 8/4 showed clear lungs.   Existing Precautions/Restrictions fall   Cognition   Cognitive Status Comments AOx4   Pain Assessment   Patient Currently in Pain No   Posture    Posture Kyphosis;Forward head position;Protracted shoulders   Range of Motion (ROM)   Range of Motion ROM deficits secondary to weakness   Strength (Manual Muscle Testing)   Strength (Manual Muscle Testing) Deficits observed during functional mobility   Bed Mobility   Comment, (Bed Mobility) MinAx1 for supine>sitting EOB, MaxAx2 fro boost   Transfers   Comment, (Transfers) MinAx1 w/FWW for STS   Balance   Balance Comments MinAx1 w/FWW for static standing balance   Sensory Examination   Sensory Perception WNL   Clinical Impression   Criteria for Skilled Therapeutic Intervention Yes, treatment indicated   PT Diagnosis (PT) Impaired functional mobility   Influenced by the following impairments Decreased strength, activity tolerance   Functional limitations due to impairments Bed mobility, transfers, ambulation, stairs   Clinical Presentation (PT Evaluation Complexity) Stable/Uncomplicated   Clinical Presentation Rationale Clinical judgement   Clinical Decision Making (Complexity) low complexity   Planned Therapy Interventions (PT) balance training;bed mobility training;gait training;neuromuscular re-education;patient/family education;postural re-education;ROM (range of motion);stair training;strengthening;stretching;transfer training;progressive activity/exercise   Anticipated Equipment Needs at Discharge (PT) walker, standard   Risk & Benefits of therapy have been explained evaluation/treatment results reviewed;care plan/treatment goals reviewed;risks/benefits reviewed;current/potential barriers reviewed;participants voiced agreement with care plan;participants included;patient   PT Discharge Planning   PT Discharge Recommendation (DC Rec) home with assist;home with home care physical  therapy;Transitional Care Facility   PT Rationale for DC Rec Pt presents to PT below baseline with impaired functional mobility with deficits in strength and activity tolerance. Pt has a low floor for mobility and currently requires Min-ModAx1 for bed mobility and Min-ModAx1 w/FWW for STS. Pt requires Ax2 for stand pivot transfers. Although pt lives in an long-term, if facility cannot provide Ax2 for pt, then pt should be discharged to TCU in order to address aforementioned deficits and increase functional independence before returning home.   PT Brief overview of current status MinAx1 w/FWW for supine>sitting EOB, MinAx1 w/FWW for STS   Plan of Care Review   Plan of Care Reviewed With patient   Total Evaluation Time   Total Evaluation Time (Minutes) 10   Physical Therapy Goals   PT Frequency Daily   PT Predicted Duration/Target Date for Goal Attainment 08/15/22   PT Goals Bed Mobility;Transfers;Gait   PT: Bed Mobility Independent   PT: Transfers Supervision/stand-by assist;Sit to/from stand;Assistive device   PT: Gait Supervision/stand-by assist;Standard walker;100 feet

## 2022-08-08 NOTE — PLAN OF CARE
Alert to self only, pleasantly confused. [place]. Requesting to go home often. VSS on RA ex hypotensive this afternoon, MD updated & BP later improved. Denies pain/nausea. Refusing cares/meds, often will agree after much encouragement. PIV SL. Incontinent of B&B, purewick in place. Mepilex to sacrum. A2 for transfers, very weak. Turn & reposition while in bed. Regular diet, fair intake. Continue COVID precautions, discharge pending. Will likely need TCU prior to [facility].

## 2022-08-08 NOTE — PROGRESS NOTES
08/08/22 1526   Quick Adds   Type of Visit Initial Occupational Therapy Evaluation   Living Environment   People in Home facility resident   Current Living Arrangements assisted living   Home Accessibility no concerns   Transportation Anticipated public transportation   Living Environment Comments Pt lives alone in an [facility] that she reports has no stairs or safety concerns.   Self-Care   Usual Activity Tolerance moderate   Current Activity Tolerance fair   Regular Exercise No   Equipment Currently Used at Home walker, standard;grab bar, tub/shower;raised toilet seat;shower chair;grab bar, toilet   Fall history within last six months yes   Number of times patient has fallen within last six months 1   Instrumental Activities of Daily Living (IADL)   IADL Comments Pt lives at [facility] and recieves help with IADL   General Information   Onset of Illness/Injury or Date of Surgery 08/04/22   Referring Physician Freedom Nguyen MD   Patient/Family Therapy Goal Statement (OT) return to [facility]   Additional Occupational Profile Info/Pertinent History of Current Problem Serge Marin is a 82 year old female with PMH significant for bipolar 1 disorder, depression/anxiety and hypothyroidism, who presented from assisted living 8/4/2022 with two days of fever vomiting and generalized weakness and found with a left renal pelvis staghorn calculus and also noted positive for COVID.   Cognitive Status Examination   Orientation Status orientation to person, place and time   Pain Assessment   Patient Currently in Pain No   Range of Motion Comprehensive   General Range of Motion bilateral upper extremity ROM WFL   Strength Comprehensive (MMT)   Comment, General Manual Muscle Testing (MMT) Assessment B UE WFL   Coordination   Upper Extremity Coordination Left UE impaired   Coordination Comments Intrinsic mucle atrophy left hand   Bed Mobility   Bed Mobility supine-sit;sit-supine   Supine-Sit [place] (Bed Mobility) moderate assist  (50% patient effort)   Sit-Supine [place] (Bed Mobility) moderate assist (50% patient effort)   Assistive Device (Bed Mobility) bed rails   Transfers   Transfers sit-stand transfer   Sit-Stand Transfer   Sit-Stand [place] (Transfers) minimum assist (75% patient effort)   Assistive Device (Sit-Stand Transfers) walker, front-wheeled   Activities of Daily Living   BADL Assessment/Intervention lower body dressing   Lower Body Dressing Assessment/Training   Position (Lower Body Dressing) unsupported sitting   Comment, (Lower Body Dressing) Unable to access feet to to weakness decreased mobility   [place] Level (Lower Body Dressing) maximum assist (25% patient effort)   Clinical Impression   OT Diagnosis [place], decreaed mobility   OT Problem List-Impairments impacting ADL problems related to;activity tolerance impaired;mobility;range of motion (ROM);strength   Assessment of Occupational Performance 3-5 Performance Deficits   Identified Performance Deficits UE/LE Dressing, toileting, mobility for [place] transfer   Planned Therapy Interventions (OT) ADL retraining;strengthening;progressive activity/exercise;cognition   Intervention Comments Pt. is very [place] complicated by PPE   Clinical Decision Making Complexity (OT) moderate complexity   Anticipated Equipment Needs Upon Discharge (OT) shower chair;raised toilet seat;walker, rolling;wheelchair   Risk & Benefits of therapy have been explained evaluation/treatment results reviewed;care plan/treatment goals reviewed;risks/benefits reviewed;current/potential barriers reviewed;participants voiced agreement with care plan;participants included;patient   OT Discharge Planning   OT Discharge Recommendation (DC Rec) home with assist;home with home care occupational therapy;Transitional Care Facility   OT Rationale for DC Rec Pt is below baseline level of function eith increased weakness. Pt will beneit from conitnued OT for strengtheing and ADL retraining to regain  funcitonal mobility. Pt lives in assist living facility and would benefit from [facility] level of assist with increased services PRN. TCU placment indicated or HH OT upon return to [facility] facility if provided.   OT Brief overview of current status MOD A bed mobility, MIn A sit>stand [place]   Total Evaluation Time (Minutes)   Total Evaluation Time (Minutes) 22   OT Goals   Therapy Frequency (OT) 5 times/wk   OT Predicted Duration/Target Date for Goal Attainment 08/15/22   OT Goals Upper Body Dressing;Hygiene/Grooming;Lower Body Dressing;Toilet Transfer/Toileting   OT: Hygiene/Grooming supervision/stand-by assist;while standing   OT: Upper Body Dressing Supervision/stand-by assist   OT: Lower Body Dressing Supervision/stand-by assist   OT: Toilet Transfer/Toileting Supervision/stand-by assist

## 2022-08-08 NOTE — PLAN OF CARE
Goal Outcome Evaluation:        [id number]    Here w/ infection of left kidney & COVID+ 8/5. VSS on RA. Needs encouragement to turn. Pt oriented to self only. PIV SL w/Int abx. Inconsistent with following commands/questions. Incontinent of bowel and bladder.purewick placed. Tolerating regular diet. PT/OT consulted. Will continue to monitor.

## 2022-08-08 NOTE — PROGRESS NOTES
Care Transitions Note:  Writer place call to [facility].  [facility] for call back re patient's services/baseline activity.  Await call back to [facility] mobile phone. SIRI

## 2022-08-08 NOTE — PROVIDER NOTIFICATION
MD Notification    Notified Person: MD    Notified Person Name: Dr. Nguyen     Notification Date/Time: 17:02, 8/8/22    Notification Interaction: Maintenance Assistant messaging     Purpose of Notification:    BP low this afternoon: 86/62, patient resting in bed. Denies symptoms. Any new orders?          Orders Received:    Comments:

## 2022-08-09 ENCOUNTER — APPOINTMENT (OUTPATIENT)
Dept: OCCUPATIONAL THERAPY | Facility: CLINIC | Age: 82
DRG: 689 | End: 2022-08-09
Payer: COMMERCIAL

## 2022-08-09 ENCOUNTER — APPOINTMENT (OUTPATIENT)
Dept: PHYSICAL THERAPY | Facility: CLINIC | Age: 82
DRG: 689 | End: 2022-08-09
Payer: COMMERCIAL

## 2022-08-09 PROCEDURE — 99232 SBSQ HOSP IP/OBS MODERATE 35: CPT | Performed by: STUDENT IN AN ORGANIZED HEALTH CARE EDUCATION/TRAINING PROGRAM

## 2022-08-09 PROCEDURE — 120N000001 HC R&B MED SURG/OB

## 2022-08-09 PROCEDURE — 97530 THERAPEUTIC ACTIVITIES: CPT | Mod: GP

## 2022-08-09 PROCEDURE — 97535 SELF CARE MNGMENT TRAINING: CPT | Mod: GO

## 2022-08-09 PROCEDURE — 97530 THERAPEUTIC ACTIVITIES: CPT | Mod: GO

## 2022-08-09 PROCEDURE — 250N000013 HC RX MED GY IP 250 OP 250 PS 637: Performed by: HOSPITALIST

## 2022-08-09 PROCEDURE — 250N000013 HC RX MED GY IP 250 OP 250 PS 637: Performed by: STUDENT IN AN ORGANIZED HEALTH CARE EDUCATION/TRAINING PROGRAM

## 2022-08-09 PROCEDURE — 250N000013 HC RX MED GY IP 250 OP 250 PS 637: Performed by: INTERNAL MEDICINE

## 2022-08-09 RX ORDER — POLYETHYLENE GLYCOL 3350 17 G/17G
17 POWDER, FOR SOLUTION ORAL 2 TIMES DAILY PRN
Status: DISCONTINUED | OUTPATIENT
Start: 2022-08-09 | End: 2022-08-18 | Stop reason: HOSPADM

## 2022-08-09 RX ORDER — AMOXICILLIN 250 MG
2 CAPSULE ORAL DAILY
Status: DISCONTINUED | OUTPATIENT
Start: 2022-08-09 | End: 2022-08-18 | Stop reason: HOSPADM

## 2022-08-09 RX ADMIN — LEVOFLOXACIN 500 MG: 500 TABLET, FILM COATED ORAL at 08:45

## 2022-08-09 RX ADMIN — ESCITALOPRAM OXALATE 5 MG: 5 TABLET, FILM COATED ORAL at 08:45

## 2022-08-09 RX ADMIN — LEVOTHYROXINE SODIUM 75 MCG: 75 TABLET ORAL at 06:24

## 2022-08-09 RX ADMIN — FERROUS GLUCONATE 324 MG: 324 TABLET ORAL at 08:45

## 2022-08-09 RX ADMIN — POLYETHYLENE GLYCOL 3350 17 G: 17 POWDER, FOR SOLUTION ORAL at 14:49

## 2022-08-09 RX ADMIN — SENNOSIDES AND DOCUSATE SODIUM 2 TABLET: 50; 8.6 TABLET ORAL at 15:32

## 2022-08-09 ASSESSMENT — ACTIVITIES OF DAILY LIVING (ADL)
ADLS_ACUITY_SCORE: 51
ADLS_ACUITY_SCORE: 51
ADLS_ACUITY_SCORE: 52
ADLS_ACUITY_SCORE: 53
ADLS_ACUITY_SCORE: 52
ADLS_ACUITY_SCORE: 52
ADLS_ACUITY_SCORE: 51
ADLS_ACUITY_SCORE: 51
ADLS_ACUITY_SCORE: 53
ADLS_ACUITY_SCORE: 51

## 2022-08-09 NOTE — PROGRESS NOTES
[facility]    Internal Medicine Hospitalist Progress Note  08/09/2022   I evaluated patient on the above date.    Lev Ortiz MD   Text Page  Vocera        Assessment & Plan New actions/orders today (08/09/2022) are underlined.    Serge Marin is a 82 year old female with PMH significant for bipolar 1 disorder, depression/anxiety and hypothyroidism, who presented from assisted living 8/4/2022 with two days of fever vomiting and generalized weakness and found with a left renal pelvis staghorn calculus and also noted positive for COVID.    On initial evaluation 8/4, was febrile up to 101.8; WBC normal; cr normal; CRP 11.5, UA showed 7 WBC, lg blood, negative LE; COVID-19 positive. CT abdomen and pelvis with contrast 8/4 showed left renal pelvis 11 x 6 x 9 mm staghorn calculus with surrounding inflammatory changes, likely an infected or inflamed calculus with mild left hydronephrosis and a few other punctate nonobstructing left renal calculi; no other acute findings in the abdomen and pelvis; mildly prominent main pancreatic duct without an obstructing mass evident by CT. CXR 8/4 showed clear lungs.       Acute febrile illness, suspect secondary to infected left renal pelvis staghorn calculus with acute left pyelonephritis with left hydronephrosis.  * Initial presentation as above. Started on ceftriaxone on admit 8/4. UC 8/4 <10K mixed urogenital rosalba. Urology consulted on admit.  * Seen by Urology and not felt that left renal stones were obstructing.  * Discontinued ceftriaxone and started levofloxacin 8/7.  - Continue levofloxacin (started 8/7) to stop after 8/13.   - Monitor cultures.  - Per Urology, plan for outpatient follow up to discuss elective left renal stone treatment; patient will be contacted to schedule appointment.     AMS, suspect metabolic/infectious encephalopathy related to pyelonephritis.  * Head CT 8/5 negative for acute findings.  * Mental status improved with  treatment of acute medical issues as noted.  - Continue to treat other issues as noted.  - Re-orient as needed.  - Maintain normal day/night, sleep/wake cycles.  - Minimize sedating medications as able.    COVID-19 positive (8/4/2022).  * Initial presentation as above. No respiratory symptoms on admit. On admit, noted that her symptoms seemed more likely due to infected renal stone rather than COVID pneumonia. COVID directed therapeutics not indicated.  * 8/6: Afebrile, sats in 90's; placed on O2, but done empirically and subsequently sats in 90's on RA.  * 8/7: Sats 90's on RA.  Recent Labs   Lab 08/07/22  0904 08/05/22  0633 08/05/22  0529 08/04/22  1353 08/04/22  1155   WBC 2.2* 3.1*  --   --  4.1   CRP 8.0  --  11.5*  --   --    LACT  --   --   --  1.1  --    - Continue special precautions.  - Monitor clinically.     Mild hyponatremia, suspect due to hypovolemia/nuritional, resolved.  * Sodium 131 on admit. Received IVF's.  * Sodium normalized 8/5.    Acute on possibly chronic thrombocytopenia, unclear etiology, possibly from infection, medication effect or chronic from other cause.  * Noted that her platelet count has been in 100s in the past (last from 2017).  * Platelets 68K on admit 8/4.  Recent Labs   Lab 08/07/22  0904 08/05/22  0633 08/04/22  1155   PLT 75* 62* 68*   - Monitor CBC.  - Consider platelet transfusion if needs a procedure and less than 50,000; or if less than 10,000.    Leukopenia, question related to infection.  * WBC normal on admit.  * WBC decreased 8/5.  Recent Labs   Lab 08/07/22  0904 08/05/22  0633 08/04/22  1155   WBC 2.2* 3.1* 4.1   - Monitor CBC.    Weakness and physical deconditioning due to multiple acute and chronic medical issues.  * Lives in assisted living.  * PT and OT consulted -> plan for TCU vs Patient would need to be independent again before able to return to [facility]    Bipolar 1 disorder.  Depression/anxiety.  - Continue escitalopram.     Hypothyroidism.  - Continue  "levothyroxine.     Chronic iron deficiency anemia  * Hgb normal on admit.  - Continue iron supplements.      Clinically Significant Risk Factors Present on Admission                        COVID-19 testing.  COVID-19 PCR Results    COVID-19 PCR Results 8/4/22   SARS CoV2 PCR Positive (A)   (A) Abnormal value       Comments are available for some flowsheets but are not being displayed.         COVID-19 Antibody Results, Testing for Immunity    COVID-19 Antibody Results, Testing for Immunity   No data to display.             Diet: Regular Diet Adult  Diet    Prophylaxis: PCD's, ambulation.   [place] Catheter: Not present  Central Lines: None  Code Status: Full Code    Disposition Plan   Expected discharge: 1-2d recommended to transitional care unit pending safe disposition plan/ TCU bed available.  Entered: Lev Ortiz MD 08/09/2022, 4:00 PM     Communication.    Interval History      No acute complaints overnight  Doing OK.  \"I want to walk\".  Reports she feels capable to go back to assisted living, but seems agreeable to TCU. Overall very poor insight into her current condition.   Still needing assist to ambulate    -Data reviewed today: I reviewed all new labs and imaging over the last 24 hours. I personally reviewed no images or EKG's today.    Physical Exam    , Blood pressure 120/61, pulse 60, temperature 97.8  F (36.6  C), temperature source Axillary, resp. rate 16, height 1.56 m (5' 1.42\"), weight 59.1 kg (130 lb 4.7 oz), SpO2 95 %, not currently breastfeeding. O2 Device: None (Room air)    Vitals:    08/08/22 0920   Weight: 59.1 kg (130 lb 4.7 oz)     Vital Signs with Ranges  Temp:  [97.7  F (36.5  C)-98.9  F (37.2  C)] 97.8  F (36.6  C)  Pulse:  [56-66] 60  Resp:  [16] 16  BP: ()/(61-62) 120/61  SpO2:  [94 %-96 %] 95 %  Patient Vitals for the past 24 hrs:   BP Temp Temp src Pulse Resp SpO2   08/09/22 1453 120/61 97.8  F (36.6  C) Axillary 60 16 95 %   08/09/22 0801 120/61 97.7  F (36.5  C) Axillary 56 16 " 96 %   08/08/22 2334 112/61 98.1  F (36.7  C) Axillary 61 16 94 %   08/08/22 2130 118/62 98.9  F (37.2  C) Axillary 66 16 96 %   08/08/22 1740 130/62 -- -- -- -- --   08/08/22 1652 (!) 86/62 98.1  F (36.7  C) Axillary 62 16 95 %     I/O's Last 24 hours  I/O last 3 completed shifts:  In: 170 [P.O.:170]  Out: 1650 [Urine:1650]    Constitutional: Awake, alert, appropriate, some delay in responses, stable.  Respiratory: Diminished in bases. No crackles or wheezes.  Cardiovascular: RRR, no m/r/g.  GI: Soft, nt, nd, +BS.  Skin/Integumen: no rashes  Other:        Data   Recent Labs   Lab 08/07/22  0904 08/05/22  0633 08/05/22  0529 08/04/22  1155   WBC 2.2* 3.1*  --  4.1   HGB 12.7 11.9  --  12.5   MCV 89 89  --  89   PLT 75* 62*  --  68*     --  137 131*   POTASSIUM 3.4  --  3.6 3.9   CHLORIDE 106  --  106 105   CO2 28  --  24 19*   BUN 5*  --  10 14   CR 0.60  --  0.80 0.88   ANIONGAP 4  --  7 7   BERTA 8.5  --  8.4* 8.5   GLC 96  --  83 98   ALBUMIN  --   --   --  3.1*   PROTTOTAL  --   --   --  6.0*   BILITOTAL  --   --   --  0.8   ALKPHOS  --   --   --  41   ALT  --   --   --  18   AST  --   --   --  49*     Recent Labs   Lab Test 08/07/22  0904 08/05/22  0529 08/04/22  1155 05/08/18  1010 02/16/17  0823   GLC 96 83 98 111.0* 89     Recent Labs   Lab 08/07/22  0904 08/05/22  0633 08/05/22  0529 08/04/22  1353 08/04/22  1155   WBC 2.2* 3.1*  --   --  4.1   CRP 8.0  --  11.5*  --   --    LACT  --   --   --  1.1  --          No results found for this or any previous visit (from the past 24 hour(s)).    Medications   All medications were reviewed.      escitalopram  5 mg Oral Daily     ferrous gluconate  324 mg Oral Daily with breakfast     levofloxacin  500 mg Oral Daily     levothyroxine  75 mcg Oral QAM AC     senna-docusate  2 tablet Oral Daily     sodium chloride (PF)  3 mL Intracatheter Q8H     acetaminophen, lidocaine 4%, lidocaine (buffered or not buffered), loperamide, melatonin, ondansetron **OR**  ondansetron, polyethylene glycol, sodium chloride (PF)

## 2022-08-09 NOTE — PLAN OF CARE
Goal Outcome Evaluation:    Shift note: 1900-0730  COVID precautions maintained. Pt is alert to self only. Very [place]. VSS on RA. Has an infrequent cough, nonproductive. Incontinent, purewick in place. PIV SL. Ax2, lift. Turn & repo q 2hrs. Regular diet, poor intake. Discharge pending, likely TCU vs shelter.

## 2022-08-09 NOTE — CONSULTS
Care Management Initial Consult    General Information  Assessment completed with: Care Team Member, TIN-chart review, Mau RN at [phone], left voicemail for [facility] at phone [phone]       Primary Care Provider verified and updated as needed:     Readmission within the last 30 days:        Reason for Consult: discharge planning  Advance Care Planning: Advance Care Planning Reviewed: present on chart          Communication Assessment  Patient's communication style: spoken language (English or Bilingual)             Cognitive  Cognitive/Neuro/Behavioral: .WDL except, orientation  Level of Consciousness: alert, confused  Arousal Level: opens eyes spontaneously  Orientation: disoriented to, place, time, situation  Mood/Behavior: calm  Best Language: 0 - No aphasia  Speech: slow    Living Environment:   People in home: facility resident     Current living Arrangements: assisted living  Name of Facility: [facility]   Able to return to prior arrangements: other (see comments) (would need to be independent with all mobiity prior to return)       Family/Social Support:  Care provided by: self, other (see comments) (staff)  Provides care for: no one     Facility resident(s)/Staff          Description of Support System:           Current Resources:   Patient receiving home care services:       Community Resources:    Equipment currently used at home: walker, standard, grab bar, tub/shower, raised toilet seat, shower chair, grab bar, toilet  Supplies currently used at home:      Employment/Financial:  Employment Status:          Financial Concerns:             Lifestyle & Psychosocial Needs:  Social Determinants of Health     Tobacco Use: Low Risk      Smoking Tobacco Use: Never Smoker     Smokeless Tobacco Use: Never Used   Alcohol Use: Not on file   Financial Resource Strain: Not on file   Food Insecurity: Not on file   Transportation Needs: Not on file   Physical Activity: Not on file   Stress:  Not on file   Social Connections: Not on file   Intimate Partner Violence: Not on file   Depression: Not at risk     PHQ-2 Score: 1   Housing Stability: Not on file       Functional Status:  Prior to admission patient needed assistance:   Dependent ADLs:: Independent  Dependent IADLs:: Cleaning, Laundry, Meal Preparation, Medication Management, Money Management, Transportation       Mental Health Status:          Chemical Dependency Status:                Values/Beliefs:  Spiritual, Cultural Beliefs, [employer] Practices, Values that affect care:                 Additional Information:  initial consult done. Discussed with GOLD León at [facility] and left voicemail for [facility] for callback to discuss. Per Mau patient normally independent with amublation, grooming, toileting. Patient receives medication management, meals, housekeeping and laundry. They use [facility], the fax for the [facility] is [phone]. Patient would need to be independent again before able to return. COVID is not an issue to return.      Lillie Potts RN   [facility]   Phone [phone]

## 2022-08-09 NOTE — PROGRESS NOTES
Care Management Follow Up    Length of Stay (days): 5    Expected Discharge Date: 08/10/2022     Concerns to be Addressed:       Patient plan of care discussed at interdisciplinary rounds: Yes    Anticipated Discharge Disposition: Transitional Care     Anticipated Discharge Services:    Anticipated Discharge DME:      Patient/family educated on Medicare website which has current facility and service quality ratings:    Education Provided on the Discharge Plan:    Patient/Family in Agreement with the Plan:      Referrals Placed by CM/SW:    Private pay costs discussed: Not applicable    Additional Information:  ADELE working on discharge plan.  ADELE got in contact with Jeannie Unger care coordinator, and she stated that patient's insurance changed and she now has a [facility] care coordinator. This person is Carmen Heath ([phone]). Ute did say that patient has been to [facility] for U in the past. ADELE also left another voicemail for patient's son Delfina who is the next contact in patient's chart. ADELE also paged doctor to see if they feel patient is decisional for discharge planning.  Doctor stated he does not feel she is decisional but she is willing to go to TCU when he has discussed this plan with her.  ADELE to continue to assist for discharge planning.        EUSEBIO Mauro

## 2022-08-09 NOTE — PLAN OF CARE
Problem: Suspected L renal pelvis calculus with acute pyelonephritis & L hydronephrosis. COVID-19 positive  Vitals: VSS on RA, [place]  Orientation/Neuro: A/O to self  Activity Level: A1 GBW, T/R when in bed  Diet: Regular, fair appetite  GI: No BM, PRN Miralax given  : inc, Purewick when in bed  IV Fluids/Drains/Tubes: PIV SL  Pain Management: Denies pain  Resp: infrequent cough, LS diminished  Skin: Mepilex to buttock  Plan: PT recommending TCU pending discharge

## 2022-08-10 ENCOUNTER — APPOINTMENT (OUTPATIENT)
Dept: PHYSICAL THERAPY | Facility: CLINIC | Age: 82
DRG: 689 | End: 2022-08-10
Payer: COMMERCIAL

## 2022-08-10 PROCEDURE — 120N000001 HC R&B MED SURG/OB

## 2022-08-10 PROCEDURE — 250N000013 HC RX MED GY IP 250 OP 250 PS 637: Performed by: HOSPITALIST

## 2022-08-10 PROCEDURE — 97116 GAIT TRAINING THERAPY: CPT | Mod: GP

## 2022-08-10 PROCEDURE — 250N000013 HC RX MED GY IP 250 OP 250 PS 637: Performed by: INTERNAL MEDICINE

## 2022-08-10 PROCEDURE — 97530 THERAPEUTIC ACTIVITIES: CPT | Mod: GP

## 2022-08-10 PROCEDURE — 99232 SBSQ HOSP IP/OBS MODERATE 35: CPT | Performed by: INTERNAL MEDICINE

## 2022-08-10 PROCEDURE — 250N000013 HC RX MED GY IP 250 OP 250 PS 637: Performed by: STUDENT IN AN ORGANIZED HEALTH CARE EDUCATION/TRAINING PROGRAM

## 2022-08-10 RX ADMIN — FERROUS GLUCONATE 324 MG: 324 TABLET ORAL at 08:58

## 2022-08-10 RX ADMIN — LEVOTHYROXINE SODIUM 75 MCG: 75 TABLET ORAL at 06:36

## 2022-08-10 RX ADMIN — SENNOSIDES AND DOCUSATE SODIUM 1 TABLET: 50; 8.6 TABLET ORAL at 08:58

## 2022-08-10 RX ADMIN — LEVOFLOXACIN 500 MG: 500 TABLET, FILM COATED ORAL at 08:58

## 2022-08-10 RX ADMIN — ESCITALOPRAM OXALATE 5 MG: 5 TABLET, FILM COATED ORAL at 09:00

## 2022-08-10 ASSESSMENT — ACTIVITIES OF DAILY LIVING (ADL)
ADLS_ACUITY_SCORE: 53
ADLS_ACUITY_SCORE: 56
ADLS_ACUITY_SCORE: 53
ADLS_ACUITY_SCORE: 56
ADLS_ACUITY_SCORE: 53
ADLS_ACUITY_SCORE: 56
ADLS_ACUITY_SCORE: 53

## 2022-08-10 NOTE — PROGRESS NOTES
[facility]    Internal Medicine Hospitalist Progress Note  08/10/2022   I evaluated patient on the above date.    Sherry Butcher MD       Assessment & Plan New actions/orders today (08/10/2022) are underlined.    Serge Marin is a 82 year old female with PMH significant for bipolar 1 disorder, depression/anxiety and hypothyroidism, who presented from assisted living 8/4/2022 with two days of fever vomiting and generalized weakness and found with a left renal pelvis staghorn calculus and also noted positive for COVID.    On initial evaluation 8/4, was febrile up to 101.8; WBC normal; cr normal; CRP 11.5, UA showed 7 WBC, lg blood, negative LE; COVID-19 positive. CT abdomen and pelvis with contrast 8/4 showed left renal pelvis 11 x 6 x 9 mm staghorn calculus with surrounding inflammatory changes, likely an infected or inflamed calculus with mild left hydronephrosis and a few other punctate nonobstructing left renal calculi; no other acute findings in the abdomen and pelvis; mildly prominent main pancreatic duct without an obstructing mass evident by CT. CXR 8/4 showed clear lungs.       Acute febrile illness, suspect secondary to infected left renal pelvis staghorn calculus with acute left pyelonephritis with left hydronephrosis.  * Initial presentation as above. Started on ceftriaxone on admit 8/4. UC 8/4 <10K mixed urogenital rosalba. Urology consulted on admit.  * Seen by Urology and not felt that left renal stones were obstructing.  * Discontinued ceftriaxone and started levofloxacin 8/7.  - Continue levofloxacin (started 8/7) to stop after 8/13.   - Monitor cultures.  - Per Urology, plan for outpatient follow up to discuss elective left renal stone treatment; patient will be contacted to schedule appointment.     AMS, suspect metabolic/infectious encephalopathy related to pyelonephritis.  * Head CT 8/5 negative for acute findings.  * Mental status improved with treatment of  acute medical issues as noted.  - Continue to treat other issues as noted.  - Re-orient as needed.  - Maintain normal day/night, sleep/wake cycles.  - Minimize sedating medications as able.    COVID-19 positive (8/4/2022).  * Initial presentation as above. No respiratory symptoms on admit. On admit, noted that her symptoms seemed more likely due to infected renal stone rather than COVID pneumonia. COVID directed therapeutics not indicated.  * 8/6: Afebrile, sats in 90's; placed on O2, but done empirically and subsequently sats in 90's on RA.  * 8/10: Sats 96% on RA.  Recent Labs   Lab 08/07/22  0904 08/05/22  0633 08/05/22  0529 08/04/22  1353 08/04/22  1155   WBC 2.2* 3.1*  --   --  4.1   CRP 8.0  --  11.5*  --   --    LACT  --   --   --  1.1  --    - Continue special precautions.  - Monitor clinically.     Mild hyponatremia, suspect due to hypovolemia/nuritional, resolved.  * Sodium 131 on admit. Received IVF's.  * Sodium normalized 8/5.    Acute on possibly chronic thrombocytopenia, unclear etiology, possibly from infection, medication effect or chronic from other cause.  * Noted that her platelet count has been in 100s in the past (last from 2017).  * Platelets 68K on admit 8/4.  Recent Labs   Lab 08/07/22  0904 08/05/22  0633 08/04/22  1155   PLT 75* 62* 68*   - Monitor CBC.  - Consider platelet transfusion if needs a procedure and less than 50,000; or if less than 10,000.    Leukopenia, question related to infection.  * WBC normal on admit.  * WBC decreased 8/5.  Recent Labs   Lab 08/07/22  0904 08/05/22  0633 08/04/22  1155   WBC 2.2* 3.1* 4.1   - Monitor CBC.    Weakness and physical deconditioning due to multiple acute and chronic medical issues.  * Lives in assisted living.  * PT and OT consulted -> plan for TCU vs Patient would need to be independent again before able to return to [facility]    Bipolar 1 disorder.  Depression/anxiety.  - Continue escitalopram.     Hypothyroidism.  - Continue  "levothyroxine.     Chronic iron deficiency anemia  * Hgb normal on admit.  - Continue iron supplements.      Clinically Significant Risk Factors Present on Admission                        COVID-19 testing.  COVID-19 PCR Results    COVID-19 PCR Results 8/4/22   SARS CoV2 PCR Positive (A)   (A) Abnormal value       Comments are available for some flowsheets but are not being displayed.         COVID-19 Antibody Results, Testing for Immunity    COVID-19 Antibody Results, Testing for Immunity   No data to display.             Diet: Regular Diet Adult  Diet    Prophylaxis: PCD's, ambulation.   Sheffield Catheter: Not present  Central Lines: None  Code Status: Full Code    Disposition Plan   Expected discharge: medically ready for discharge, recommended to transitional care unit pending safe disposition plan/ TCU bed available.    Entered: Sherry Butcher MD 08/10/2022, 3:22 PM       Interval History    Chart reviewed  Doing fine, mood seems down  Denies chest pain, no SOB  Denies N/V, no abd pain    -Data reviewed today: I reviewed all new labs and imaging over the last 24 hours. I personally reviewed no images or EKG's today.    Physical Exam    , Blood pressure 111/66, pulse 71, temperature 97.6  F (36.4  C), temperature source Axillary, resp. rate 16, height 1.56 m (5' 1.42\"), weight 59.1 kg (130 lb 4.7 oz), SpO2 95 %, not currently breastfeeding. O2 Device: None (Room air)    Vitals:    08/08/22 0920   Weight: 59.1 kg (130 lb 4.7 oz)     Vital Signs with Ranges  Temp:  [97.6  F (36.4  C)] 97.6  F (36.4  C)  Pulse:  [60-71] 71  Resp:  [16] 16  BP: (111)/(65-66) 111/66  SpO2:  [94 %-95 %] 95 %  Patient Vitals for the past 24 hrs:   BP Temp Temp src Pulse Resp SpO2   08/10/22 0851 111/66 97.6  F (36.4  C) Axillary 71 16 95 %   08/10/22 0013 111/65 -- -- 60 -- 94 %     I/O's Last 24 hours  I/O last 3 completed shifts:  In: 660 [P.O.:660]  Out: 750 [Urine:750]    Constitutional: Awake, alert, mood seems down, some delay " in responses, stable.  Respiratory: Diminished in bases. No crackles or wheezes.  Cardiovascular: RRR, no m/r/g.  GI: Soft, nt, nd, +BS.  Skin/Integumen: no rashes  Other:        Data   Recent Labs   Lab 08/07/22  0904 08/05/22  0633 08/05/22  0529 08/04/22  1155   WBC 2.2* 3.1*  --  4.1   HGB 12.7 11.9  --  12.5   MCV 89 89  --  89   PLT 75* 62*  --  68*     --  137 131*   POTASSIUM 3.4  --  3.6 3.9   CHLORIDE 106  --  106 105   CO2 28  --  24 19*   BUN 5*  --  10 14   CR 0.60  --  0.80 0.88   ANIONGAP 4  --  7 7   BERTA 8.5  --  8.4* 8.5   GLC 96  --  83 98   ALBUMIN  --   --   --  3.1*   PROTTOTAL  --   --   --  6.0*   BILITOTAL  --   --   --  0.8   ALKPHOS  --   --   --  41   ALT  --   --   --  18   AST  --   --   --  49*     Recent Labs   Lab Test 08/07/22  0904 08/05/22  0529 08/04/22  1155 05/08/18  1010 02/16/17  0823   GLC 96 83 98 111.0* 89     Recent Labs   Lab 08/07/22  0904 08/05/22  0633 08/05/22  0529 08/04/22  1353 08/04/22  1155   WBC 2.2* 3.1*  --   --  4.1   CRP 8.0  --  11.5*  --   --    LACT  --   --   --  1.1  --          No results found for this or any previous visit (from the past 24 hour(s)).    Medications   All medications were reviewed.      escitalopram  5 mg Oral Daily     ferrous gluconate  324 mg Oral Daily with breakfast     levofloxacin  500 mg Oral Daily     levothyroxine  75 mcg Oral QAM AC     senna-docusate  2 tablet Oral Daily     sodium chloride (PF)  3 mL Intracatheter Q8H     acetaminophen, lidocaine 4%, lidocaine (buffered or not buffered), loperamide, melatonin, ondansetron **OR** ondansetron, polyethylene glycol, sodium chloride (PF)

## 2022-08-10 NOTE — PLAN OF CARE
[id number]. A&O to self only. COVID precautions maintained. VSS on RA. Patient has flat affect, doesn't always respond. Denies pain and denies nausea. Patient is up with Ax1 gb/wk. Turn and Repo in bed. Purewick in place. Infrequent dry cough. Lung sounds are diminished. Discharge pending TCU placement.

## 2022-08-10 NOTE — PLAN OF CARE
Goal Outcome Evaluation: ongoing    Admitted from shelter on 8/4/22, dx of kidney stone and COVID positive. Advanced respiratory precautions remained. Pt is alert and oriented to self, disoriented to time, situation, and place. Easily re-directed. A1 GBW, otherwise T/R in bed. VSS on RA. Tolerating regular diet, denies NVD. Purwick in place, good UOP. Infrequent dry cough, no productive sputum. LS dim. Denies pain. L PIV SL. PT/OT following. Discharge to TCU pending.

## 2022-08-10 NOTE — PLAN OF CARE
Problem: Admitted 8/4 for suspected L renal pelvis calculus with acute pyelonephritis & L hydronephrosis. COVID-19 positive  Vitals: VSS on RA  Orientation/Neuro: A/O to self, flat affect  Activity Level: A1 GBW, up for meals  Diet: Regular, fair appetite  GI: No BM  : inc, Purewick when in bed, adequate output  IV Fluids/Drains/Tubes: PIV SL  Pain Management: Denies pain  Resp: infrequent cough, LS diminished  Skin: Mepilex to buttock  Plan: TCU pending discharge

## 2022-08-11 ENCOUNTER — APPOINTMENT (OUTPATIENT)
Dept: OCCUPATIONAL THERAPY | Facility: CLINIC | Age: 82
DRG: 689 | End: 2022-08-11
Payer: COMMERCIAL

## 2022-08-11 ENCOUNTER — APPOINTMENT (OUTPATIENT)
Dept: PHYSICAL THERAPY | Facility: CLINIC | Age: 82
DRG: 689 | End: 2022-08-11
Payer: COMMERCIAL

## 2022-08-11 PROCEDURE — 250N000013 HC RX MED GY IP 250 OP 250 PS 637: Performed by: INTERNAL MEDICINE

## 2022-08-11 PROCEDURE — 250N000013 HC RX MED GY IP 250 OP 250 PS 637: Performed by: HOSPITALIST

## 2022-08-11 PROCEDURE — 99232 SBSQ HOSP IP/OBS MODERATE 35: CPT | Performed by: INTERNAL MEDICINE

## 2022-08-11 PROCEDURE — 97530 THERAPEUTIC ACTIVITIES: CPT | Mod: GP

## 2022-08-11 PROCEDURE — 250N000013 HC RX MED GY IP 250 OP 250 PS 637: Performed by: STUDENT IN AN ORGANIZED HEALTH CARE EDUCATION/TRAINING PROGRAM

## 2022-08-11 PROCEDURE — 120N000001 HC R&B MED SURG/OB

## 2022-08-11 PROCEDURE — 97116 GAIT TRAINING THERAPY: CPT | Mod: GP

## 2022-08-11 PROCEDURE — 97535 SELF CARE MNGMENT TRAINING: CPT | Mod: GO

## 2022-08-11 PROCEDURE — 97530 THERAPEUTIC ACTIVITIES: CPT | Mod: GO

## 2022-08-11 RX ADMIN — SENNOSIDES AND DOCUSATE SODIUM 2 TABLET: 50; 8.6 TABLET ORAL at 09:57

## 2022-08-11 RX ADMIN — POLYETHYLENE GLYCOL 3350 17 G: 17 POWDER, FOR SOLUTION ORAL at 09:58

## 2022-08-11 RX ADMIN — FERROUS GLUCONATE 324 MG: 324 TABLET ORAL at 09:57

## 2022-08-11 RX ADMIN — ESCITALOPRAM OXALATE 5 MG: 5 TABLET, FILM COATED ORAL at 09:58

## 2022-08-11 RX ADMIN — LEVOTHYROXINE SODIUM 75 MCG: 75 TABLET ORAL at 06:44

## 2022-08-11 RX ADMIN — LEVOFLOXACIN 500 MG: 500 TABLET, FILM COATED ORAL at 09:58

## 2022-08-11 ASSESSMENT — ACTIVITIES OF DAILY LIVING (ADL)
ADLS_ACUITY_SCORE: 52
ADLS_ACUITY_SCORE: 56
ADLS_ACUITY_SCORE: 56
ADLS_ACUITY_SCORE: 52
ADLS_ACUITY_SCORE: 56
ADLS_ACUITY_SCORE: 52
ADLS_ACUITY_SCORE: 52

## 2022-08-11 NOTE — PLAN OF CARE
Goal Outcome Evaluation: ongoing    Admitted with kidney stone and COVID +. Alert and oriented to self and situation, disoriented x 2. VSS on RA. Did not get oob this evening, T/R Q 2. Mepilex to coccyx. Tolerating regular diet, good appetite for supper. Purwick in place. Denies pain, denies any NVD. Infrequent dry, non-productive cough. LS dim throughout. Taking oral ABx Levaquin. Discharge pending to TCU.

## 2022-08-11 NOTE — PLAN OF CARE
Problem: Admitted 8/4 for suspected L renal pelvis calculus with acute pyelonephritis & L hydronephrosis COVID-19 positive  Vitals: VSS on RA  Orientation/Neuro: A/O to self, flat affect  Activity Level: A1 GBW, up for meals  Diet: Regular, fair appetite  GI: No BM, bowel sounds active, PRN Miralax given  : Inc, Purewick when in bed, adequate output  IV Fluids/Drains/Tubes: PIV SL  Pain Management: Denies pain  Resp: infrequent cough, LS diminished  Skin: New Mepilex to bottom  Plan: TCU pending discharge- Found placement

## 2022-08-11 NOTE — PROGRESS NOTES
Care Management Follow Up    Length of Stay (days): 7    Expected Discharge Date: 08/15/2022     Concerns to be Addressed:       Patient plan of care discussed at interdisciplinary rounds: Yes    Anticipated Discharge Disposition: Transitional Care     Anticipated Discharge Services:    Anticipated Discharge DME:      Patient/family educated on Medicare website which has current facility and service quality ratings:    Education Provided on the Discharge Plan:    Patient/Family in Agreement with the Plan:      Referrals Placed by CM/SW:    Private pay costs discussed: Not applicable    Additional Information:  1152: ADELE reached out to the [facility] liaison to see if there was bed availability. SW was informed by liaison that there is one room. SW called pt's son, to inform him of the bed. SW called pt's son, Delfina, and left a VM at the [phone]. ADELE also called [phone], number listed for Delfina at pt's facility. The second number went straight to VM and it was full. ADELE updated liaison and sent the referral.     1226: ADELE called pt's son again and it went straight to voicemail.     1434: Called both numbers. SW left a VM with the number from the chart, while the other number went straight to a full voicemail box.     1608: Called pt's son again and the chart phone number went straight to VM. ADELE called the additional cell phone for the third time and was again sent to voicemail. The voicemail box was full. Since pt's son is the decision maker, ADELE cannot send pt to TCU without consent.      EUSEBIO Reyna

## 2022-08-11 NOTE — PROGRESS NOTES
[facility]    Internal Medicine Hospitalist Progress Note  08/11/2022   I evaluated patient on the above date.    Sherry Butcher MD       Assessment & Plan New actions/orders today (08/11/2022) are underlined.    Serge Marin is a 82 year old female with PMH significant for bipolar 1 disorder, depression/anxiety and hypothyroidism, who presented from assisted living 8/4/2022 with two days of fever vomiting and generalized weakness and found with a left renal pelvis staghorn calculus and also noted positive for COVID.    On initial evaluation 8/4, was febrile up to 101.8; WBC normal; cr normal; CRP 11.5, UA showed 7 WBC, lg blood, negative LE; COVID-19 positive. CT abdomen and pelvis with contrast 8/4 showed left renal pelvis 11 x 6 x 9 mm staghorn calculus with surrounding inflammatory changes, likely an infected or inflamed calculus with mild left hydronephrosis and a few other punctate nonobstructing left renal calculi; no other acute findings in the abdomen and pelvis; mildly prominent main pancreatic duct without an obstructing mass evident by CT. CXR 8/4 showed clear lungs.    Acute febrile illness, suspect secondary to infected left renal pelvis staghorn calculus with acute left pyelonephritis with left hydronephrosis.  * Initial presentation as above. Started on ceftriaxone on admit 8/4. UC 8/4 <10K mixed urogenital rosalba. Urology consulted on admit.  * Seen by Urology and not felt that left renal stones were obstructing.  * Discontinued ceftriaxone and started levofloxacin 8/7.  - Continue levofloxacin (started 8/7) to stop after 8/13.   - Monitor cultures.  - Per Urology, plan for outpatient follow up to discuss elective left renal stone treatment; patient will be contacted to schedule appointment.     AMS, suspect metabolic/infectious encephalopathy related to pyelonephritis.  * Head CT 8/5 negative for acute findings.  * Mental status improved with treatment of acute  medical issues as noted.  - Continue to treat other issues as noted.  - Re-orient as needed.  - Maintain normal day/night, sleep/wake cycles.  - Minimize sedating medications as able.    COVID-19 positive (8/4/2022).  * Initial presentation as above. No respiratory symptoms on admit. On admit, noted that her symptoms seemed more likely due to infected renal stone rather than COVID pneumonia. COVID directed therapeutics not indicated.  * 8/6: Afebrile, sats in 90's; placed on O2, but done empirically and subsequently sats in 90's on RA.  * 8/10: Sats 96% on RA.  Recent Labs   Lab 08/07/22  0904 08/05/22  0633 08/05/22  0529   WBC 2.2* 3.1*  --    CRP 8.0  --  11.5*   - Continue special precautions.  - Monitor clinically.     Mild hyponatremia, suspect due to hypovolemia/nuritional, resolved.  * Sodium 131 on admit. Received IVF's.  * Sodium normalized 8/5.    Acute on possibly chronic thrombocytopenia, unclear etiology, possibly from infection, medication effect or chronic from other cause.  * Noted that her platelet count has been in 100s in the past (last from 2017).  * Platelets 68K on admit 8/4.  Recent Labs   Lab 08/07/22  0904 08/05/22  0633   PLT 75* 62*   - Monitor CBC.  - Consider platelet transfusion if needs a procedure and less than 50,000; or if less than 10,000.    Leukopenia, question related to infection.  * WBC normal on admit.  * WBC decreased 8/5.  Recent Labs   Lab 08/07/22  0904 08/05/22  0633   WBC 2.2* 3.1*   - Monitor CBC.    Weakness and physical deconditioning due to multiple acute and chronic medical issues.  * Lives in assisted living.  * PT and OT consulted -> plan for TCU vs Patient would need to be independent again before able to return to [facility]  - SW consult appreciated    Bipolar 1 disorder.  Depression/anxiety.  - Continue escitalopram.     Hypothyroidism.  - Continue levothyroxine.     Chronic iron deficiency anemia  * Hgb normal on admit.  - Continue iron  "supplements.      Clinically Significant Risk Factors Present on Admission                        COVID-19 testing.  COVID-19 PCR Results    COVID-19 PCR Results 8/4/22   SARS CoV2 PCR Positive (A)   (A) Abnormal value       Comments are available for some flowsheets but are not being displayed.         COVID-19 Antibody Results, Testing for Immunity    COVID-19 Antibody Results, Testing for Immunity   No data to display.             Diet: Regular Diet Adult  Diet    Prophylaxis: PCD's, ambulation.   Sheffield Catheter: Not present  Central Lines: None  Code Status: Full Code    Disposition Plan   Expected discharge: medically ready for discharge, recommended to transitional care unit pending safe disposition plan/ TCU bed available.    Entered: Sherry Butcher MD 08/11/2022, 5:13 PM       Interval History    Doing fine, up in the chair, NAD  Denies chest pain, no SOB  Denies N/V, no abd pain    -Data reviewed today: I reviewed all new labs and imaging over the last 24 hours. I personally reviewed no images or EKG's today.    Physical Exam    , Blood pressure 115/65, pulse 61, temperature 98.2  F (36.8  C), temperature source Oral, resp. rate 18, height 1.56 m (5' 1.42\"), weight 59.1 kg (130 lb 4.7 oz), SpO2 95 %, not currently breastfeeding. O2 Device: None (Room air)    Vitals:    08/08/22 0920   Weight: 59.1 kg (130 lb 4.7 oz)     Vital Signs with Ranges  Temp:  [97.6  F (36.4  C)-98.2  F (36.8  C)] 98.2  F (36.8  C)  Pulse:  [61-66] 61  Resp:  [18] 18  BP: (112-119)/(63-65) 115/65  SpO2:  [91 %-95 %] 95 %  Patient Vitals for the past 24 hrs:   BP Temp Temp src Pulse Resp SpO2   08/11/22 1631 115/65 98.2  F (36.8  C) Oral 61 18 95 %   08/11/22 0750 112/63 97.6  F (36.4  C) Oral 64 18 91 %   08/11/22 0300 119/63 -- -- 66 18 --     I/O's Last 24 hours  I/O last 3 completed shifts:  In: 240 [P.O.:240]  Out: 950 [Urine:950]    Constitutional: Awake, alert, mood seems down, some delay in responses, " stable.  Respiratory: Diminished in bases. No crackles or wheezes.  Cardiovascular: RRR, no m/r/g.  GI: Soft, nt, nd, +BS.  Skin/Integumen: no rashes  Other:        Data   Recent Labs   Lab 08/07/22 0904 08/05/22 0633 08/05/22  0529   WBC 2.2* 3.1*  --    HGB 12.7 11.9  --    MCV 89 89  --    PLT 75* 62*  --      --  137   POTASSIUM 3.4  --  3.6   CHLORIDE 106  --  106   CO2 28  --  24   BUN 5*  --  10   CR 0.60  --  0.80   ANIONGAP 4  --  7   BERTA 8.5  --  8.4*   GLC 96  --  83     Recent Labs   Lab Test 08/07/22  0904 08/05/22  0529 08/04/22  1155 05/08/18  1010 02/16/17  0823   GLC 96 83 98 111.0* 89     Recent Labs   Lab 08/07/22 0904 08/05/22 0633 08/05/22  0529   WBC 2.2* 3.1*  --    CRP 8.0  --  11.5*         No results found for this or any previous visit (from the past 24 hour(s)).    Medications   All medications were reviewed.      escitalopram  5 mg Oral Daily     ferrous gluconate  324 mg Oral Daily with breakfast     levofloxacin  500 mg Oral Daily     levothyroxine  75 mcg Oral QAM AC     senna-docusate  2 tablet Oral Daily     sodium chloride (PF)  3 mL Intracatheter Q8H     acetaminophen, lidocaine 4%, lidocaine (buffered or not buffered), loperamide, melatonin, ondansetron **OR** ondansetron, polyethylene glycol, sodium chloride (PF)

## 2022-08-11 NOTE — PLAN OF CARE
[id number]. A&O to self and situation. VSS on RA. COVID+. Patient denies pain and denies nausea. Patient is up with Ax1 gb/wk, however, patient refused to get out of bed yesterday. Patient is a Turn and Repo in bed. Purewick in place in bed. Mepilex to coccyx. Patient has infrequent dry, non productive cough. LS diminished. Discharge pending TCU placement.

## 2022-08-11 NOTE — CONSULTS
Original consult completed 8/8/22. Please follow SW and RNCC notes for updates.     EUSEBIO Vargas     [facility]

## 2022-08-12 ENCOUNTER — APPOINTMENT (OUTPATIENT)
Dept: PHYSICAL THERAPY | Facility: CLINIC | Age: 82
DRG: 689 | End: 2022-08-12
Payer: COMMERCIAL

## 2022-08-12 ENCOUNTER — APPOINTMENT (OUTPATIENT)
Dept: OCCUPATIONAL THERAPY | Facility: CLINIC | Age: 82
DRG: 689 | End: 2022-08-12
Payer: COMMERCIAL

## 2022-08-12 PROCEDURE — 250N000013 HC RX MED GY IP 250 OP 250 PS 637: Performed by: HOSPITALIST

## 2022-08-12 PROCEDURE — 250N000013 HC RX MED GY IP 250 OP 250 PS 637: Performed by: INTERNAL MEDICINE

## 2022-08-12 PROCEDURE — 250N000013 HC RX MED GY IP 250 OP 250 PS 637: Performed by: STUDENT IN AN ORGANIZED HEALTH CARE EDUCATION/TRAINING PROGRAM

## 2022-08-12 PROCEDURE — 120N000001 HC R&B MED SURG/OB

## 2022-08-12 PROCEDURE — 97530 THERAPEUTIC ACTIVITIES: CPT | Mod: GP

## 2022-08-12 PROCEDURE — 97530 THERAPEUTIC ACTIVITIES: CPT | Mod: GO | Performed by: OCCUPATIONAL THERAPIST

## 2022-08-12 PROCEDURE — 97116 GAIT TRAINING THERAPY: CPT | Mod: GP

## 2022-08-12 PROCEDURE — 97110 THERAPEUTIC EXERCISES: CPT | Mod: GO | Performed by: OCCUPATIONAL THERAPIST

## 2022-08-12 PROCEDURE — 99232 SBSQ HOSP IP/OBS MODERATE 35: CPT | Performed by: INTERNAL MEDICINE

## 2022-08-12 RX ADMIN — LEVOTHYROXINE SODIUM 75 MCG: 75 TABLET ORAL at 09:06

## 2022-08-12 RX ADMIN — ESCITALOPRAM OXALATE 5 MG: 5 TABLET, FILM COATED ORAL at 09:06

## 2022-08-12 RX ADMIN — LEVOFLOXACIN 500 MG: 500 TABLET, FILM COATED ORAL at 09:06

## 2022-08-12 RX ADMIN — FERROUS GLUCONATE 324 MG: 324 TABLET ORAL at 09:06

## 2022-08-12 RX ADMIN — SENNOSIDES AND DOCUSATE SODIUM 2 TABLET: 50; 8.6 TABLET ORAL at 09:06

## 2022-08-12 ASSESSMENT — ACTIVITIES OF DAILY LIVING (ADL)
ADLS_ACUITY_SCORE: 50
ADLS_ACUITY_SCORE: 52
ADLS_ACUITY_SCORE: 52
ADLS_ACUITY_SCORE: 48
ADLS_ACUITY_SCORE: 52
ADLS_ACUITY_SCORE: 52
ADLS_ACUITY_SCORE: 48
ADLS_ACUITY_SCORE: 48
ADLS_ACUITY_SCORE: 52
ADLS_ACUITY_SCORE: 52
ADLS_ACUITY_SCORE: 48
ADLS_ACUITY_SCORE: 52

## 2022-08-12 NOTE — PROGRESS NOTES
Care Management Follow Up    Length of Stay (days): 8    Expected Discharge Date: 08/12/2022     Concerns to be Addressed:       Patient plan of care discussed at interdisciplinary rounds: Yes    Anticipated Discharge Disposition: Transitional Care     Anticipated Discharge Services:    Anticipated Discharge DME:      Patient/family educated on Medicare website which has current facility and service quality ratings:    Education Provided on the Discharge Plan:    Patient/Family in Agreement with the Plan:      Referrals Placed by CM/SW:    Private pay costs discussed: Not applicable    Additional Information:  0937: Call placed to pt's son to inquire about TCU bed at [facility]. Call went straight to . SW left a . Call placed to Delfina's other number, [phone]. SW was sent to  and was informed the mailbox is full. Call from Shari who is the [facility] liaison for today. Shari reports they have a bed to offer, but cannot offer it to pt until Monday. Shari reports [facility] does not take covid admissions over the weekend. Pt would not be able to discharge today and will be covid recovered after 8/14. Pt will need more referrals sent after she is covid recovered.       EUSEBIO Reyna

## 2022-08-12 NOTE — PLAN OF CARE
Goal Outcome Evaluation: ongoing     Admitted with kidney stoned and COVID +. Alert and oriented to self and situation, disoriented x 2. Pleasant this shift. VSS on RA. Up in chair for evening, T/R Q 2 hrs. Mepilex to coccyx, skin intact. Tolerating regular diet, good appetite. Incontinent. Purwick in place. Denies pain, denies any NVD. Infrequent dry, non-productive cough. LS dim throughout. Taking oral ABx Levaquin. PT/OT following. Discharge pending to TCU.

## 2022-08-12 NOTE — PLAN OF CARE
A&O to self only. Requires much encouragement to participate in cares & take scheduled medications. VSS on RA. Denies pain/nausea. Regular diet, tolerating & fair intake. Incontinent, purewick in place. Last BM unknown, scheduled stool softeners given. LS diminished. Mepilex in place to coccyx. A1 +gb/w, continuing to get stronger with ambulation. Worked with PT & OT today. Continue covid precautions. Discharge pending TCU placement.

## 2022-08-12 NOTE — PLAN OF CARE
Goal Outcome Evaluation:      Pt AO to self and situation only. VSS on RA. COVID+. Pt denies pain and denies nausea. Turned and repositioned q2h. Purewick in place overnight. Mepilex to coccyx. LS diminished. Discharge pending TCU placement. Sleeping between cares. Continue to monitor.

## 2022-08-12 NOTE — PROGRESS NOTES
[facility]    Internal Medicine Hospitalist Progress Note  08/12/2022  I evaluated patient on the above date.    Freedom Nguyen Jr., MD  [phone] (p)  Text Page  Vocera        Assessment & Plan New actions/orders today (08/12/2022) are underlined.    Serge Marin is a 82 year old female with PMH significant for bipolar 1 disorder, depression/anxiety and hypothyroidism, who presented from assisted living 8/4/2022 with two days of fever vomiting and generalized weakness and found with a left renal pelvis staghorn calculus and also noted positive for COVID.    On initial evaluation 8/4, was febrile up to 101.8; WBC normal; cr normal; CRP 11.5, UA showed 7 WBC, lg blood, negative LE; COVID-19 positive. CT abdomen and pelvis with contrast 8/4 showed left renal pelvis 11 x 6 x 9 mm staghorn calculus with surrounding inflammatory changes, likely an infected or inflamed calculus with mild left hydronephrosis and a few other punctate nonobstructing left renal calculi; no other acute findings in the abdomen and pelvis; mildly prominent main pancreatic duct without an obstructing mass evident by CT. CXR 8/4 showed clear lungs.      Acute febrile illness, suspect secondary to infected left renal pelvis staghorn calculus with acute left pyelonephritis with left hydronephrosis.  * Initial presentation as above. Started on ceftriaxone on admit 8/4. UC 8/4 <10K mixed urogenital rosalba. Urology consulted on admit.  * Seen by Urology and not felt that left renal stones were obstructing.  * Discontinued ceftriaxone and started levofloxacin 8/7.  - Continue levofloxacin (started 8/7) to stop after 8/13.   - Monitor cultures.  - Per Urology, plan for outpatient follow up to discuss elective left renal stone treatment; patient will be contacted to schedule appointment.     AMS, suspect metabolic/infectious encephalopathy related to pyelonephritis.  * Head CT 8/5 negative for acute findings.  * Mental status  improved with treatment of acute medical issues as noted.  - Continue to treat other issues as noted.  - Re-orient as needed.  - Maintain normal day/night, sleep/wake cycles.  - Minimize sedating medications as able.    COVID-19 positive (8/4/2022).  * Initial presentation as above. No respiratory symptoms on admit. On admit, noted that her symptoms seemed more likely due to infected renal stone rather than COVID pneumonia. COVID directed therapeutics not indicated.  * 8/6: Afebrile, sats in 90's; placed on O2, but done empirically and subsequently sats in 90's on RA.  * 8/10: Sats 96% on RA.  - Continue special precautions - stop special precautions 8/14.  - Monitor clinically.     Mild hyponatremia, suspect due to hypovolemia/nuritional, resolved.  * Sodium 131 on admit. Received IVF's.  * Sodium normalized 8/5.    Acute on possibly chronic thrombocytopenia, unclear etiology, possibly from infection, medication effect or chronic from other cause.  * Noted that her platelet count has been in 100s in the past (last from 2017).  * Platelets 68K on admit 8/4.  Recent Labs   Lab 08/07/22  0904   PLT 75*   - Monitor CBC.  - Consider platelet transfusion if needs a procedure and less than 50,000; or if less than 10,000.    Leukopenia, question related to infection.  * WBC normal on admit.  * WBC decreased 8/5.  Recent Labs   Lab 08/07/22  0904   WBC 2.2*   - Monitor CBC.    Weakness and physical deconditioning due to multiple acute and chronic medical issues.  * Lives in assisted living.  * PT and OT consulted -> plan for TCU vs Patient would need to be independent again before able to return to [facility]  - SW consult appreciated    Bipolar 1 disorder.  Depression/anxiety.  - Continue escitalopram.     Hypothyroidism.  - Continue levothyroxine.     Chronic iron deficiency anemia  * Hgb normal on admit.  - Continue iron supplements.        Clinically Significant Risk Factors Present on Admission                        COVID-19  "testing.  COVID-19 PCR Results    COVID-19 PCR Results 8/4/22   SARS CoV2 PCR Positive (A)   (A) Abnormal value       Comments are available for some flowsheets but are not being displayed.         COVID-19 Antibody Results, Testing for Immunity    COVID-19 Antibody Results, Testing for Immunity   No data to display.             Diet: Regular Diet Adult  Diet    Prophylaxis: PCD's, ambulation.   Sheffield Catheter: Not present  Central Lines: None  Code Status: Full Code    Disposition Plan   Expected discharge: Recommended to transitional care unit pending bed availability.  Entered: Freedom Nguyen MD 08/12/2022, 5:10 PM         Interval History   Doing OK.  Feels stronger.  No chest pain or dyspnea.    -Data reviewed today: I reviewed all new labs and imaging over the last 24 hours. I personally reviewed no images or EKG's today.    Physical Exam    , Blood pressure 113/62, pulse 66, temperature 98.5  F (36.9  C), temperature source Oral, resp. rate 20, height 1.56 m (5' 1.42\"), weight 59.1 kg (130 lb 4.7 oz), SpO2 96 %, not currently breastfeeding. O2 Device: None (Room air)    Vitals:    08/08/22 0920   Weight: 59.1 kg (130 lb 4.7 oz)     Vital Signs with Ranges  Temp:  [97.3  F (36.3  C)-98.5  F (36.9  C)] 98.5  F (36.9  C)  Pulse:  [66-67] 66  Resp:  [16-20] 20  BP: (100-113)/(53-62) 113/62  SpO2:  [95 %-96 %] 96 %  Patient Vitals for the past 24 hrs:   BP Temp Temp src Pulse Resp SpO2   08/12/22 1542 113/62 98.5  F (36.9  C) Oral 66 20 96 %   08/12/22 0843 100/58 97.3  F (36.3  C) Oral 67 18 95 %   08/12/22 0010 102/53 97.9  F (36.6  C) Axillary 67 16 95 %     I/O's Last 24 hours  I/O last 3 completed shifts:  In: 120 [P.O.:120]  Out: 200 [Urine:200]    Constitutional: Awake, alert, pleasant.  Respiratory:   Cardiovascular:   GI:  Skin/Integumen:   Other:        Data   Recent Labs   Lab 08/07/22  0904   WBC 2.2*   HGB 12.7   MCV 89   PLT 75*      POTASSIUM 3.4   CHLORIDE 106   CO2 28   BUN 5*   CR " 0.60   ANIONGAP 4   BERTA 8.5   GLC 96     Recent Labs   Lab Test 08/07/22  0904 08/05/22  0529 08/04/22  1155 05/08/18  1010 02/16/17  0823   GLC 96 83 98 111.0* 89     Recent Labs   Lab 08/07/22  0904   WBC 2.2*   CRP 8.0         No results found for this or any previous visit (from the past 24 hour(s)).    Medications   All medications were reviewed.      escitalopram  5 mg Oral Daily     ferrous gluconate  324 mg Oral Daily with breakfast     levofloxacin  500 mg Oral Daily     levothyroxine  75 mcg Oral QAM AC     senna-docusate  2 tablet Oral Daily     sodium chloride (PF)  3 mL Intracatheter Q8H     acetaminophen, lidocaine 4%, lidocaine (buffered or not buffered), loperamide, melatonin, ondansetron **OR** ondansetron, polyethylene glycol, sodium chloride (PF)

## 2022-08-13 ENCOUNTER — APPOINTMENT (OUTPATIENT)
Dept: PHYSICAL THERAPY | Facility: CLINIC | Age: 82
DRG: 689 | End: 2022-08-13
Payer: COMMERCIAL

## 2022-08-13 LAB
BASOPHILS # BLD AUTO: 0 10E3/UL (ref 0–0.2)
BASOPHILS NFR BLD AUTO: 0 %
EOSINOPHIL # BLD AUTO: 0.1 10E3/UL (ref 0–0.7)
EOSINOPHIL NFR BLD AUTO: 2 %
ERYTHROCYTE [DISTWIDTH] IN BLOOD BY AUTOMATED COUNT: 12.8 % (ref 10–15)
HCT VFR BLD AUTO: 37.1 % (ref 35–47)
HGB BLD-MCNC: 12.3 G/DL (ref 11.7–15.7)
IMM GRANULOCYTES # BLD: 0 10E3/UL
IMM GRANULOCYTES NFR BLD: 1 %
LYMPHOCYTES # BLD AUTO: 0.9 10E3/UL (ref 0.8–5.3)
LYMPHOCYTES NFR BLD AUTO: 27 %
MCH RBC QN AUTO: 28.8 PG (ref 26.5–33)
MCHC RBC AUTO-ENTMCNC: 33.2 G/DL (ref 31.5–36.5)
MCV RBC AUTO: 87 FL (ref 78–100)
MONOCYTES # BLD AUTO: 0.5 10E3/UL (ref 0–1.3)
MONOCYTES NFR BLD AUTO: 15 %
NEUTROPHILS # BLD AUTO: 1.8 10E3/UL (ref 1.6–8.3)
NEUTROPHILS NFR BLD AUTO: 55 %
NRBC # BLD AUTO: 0 10E3/UL
NRBC BLD AUTO-RTO: 0 /100
PLATELET # BLD AUTO: 186 10E3/UL (ref 150–450)
RBC # BLD AUTO: 4.27 10E6/UL (ref 3.8–5.2)
WBC # BLD AUTO: 3.3 10E3/UL (ref 4–11)

## 2022-08-13 PROCEDURE — 36415 COLL VENOUS BLD VENIPUNCTURE: CPT | Performed by: INTERNAL MEDICINE

## 2022-08-13 PROCEDURE — 250N000013 HC RX MED GY IP 250 OP 250 PS 637: Performed by: STUDENT IN AN ORGANIZED HEALTH CARE EDUCATION/TRAINING PROGRAM

## 2022-08-13 PROCEDURE — 250N000013 HC RX MED GY IP 250 OP 250 PS 637: Performed by: HOSPITALIST

## 2022-08-13 PROCEDURE — 120N000001 HC R&B MED SURG/OB

## 2022-08-13 PROCEDURE — 97530 THERAPEUTIC ACTIVITIES: CPT | Mod: GP

## 2022-08-13 PROCEDURE — 250N000013 HC RX MED GY IP 250 OP 250 PS 637: Performed by: INTERNAL MEDICINE

## 2022-08-13 PROCEDURE — 85025 COMPLETE CBC W/AUTO DIFF WBC: CPT | Performed by: INTERNAL MEDICINE

## 2022-08-13 PROCEDURE — 99232 SBSQ HOSP IP/OBS MODERATE 35: CPT | Performed by: INTERNAL MEDICINE

## 2022-08-13 PROCEDURE — 97116 GAIT TRAINING THERAPY: CPT | Mod: GP

## 2022-08-13 RX ADMIN — SENNOSIDES AND DOCUSATE SODIUM 2 TABLET: 50; 8.6 TABLET ORAL at 08:43

## 2022-08-13 RX ADMIN — LEVOFLOXACIN 500 MG: 500 TABLET, FILM COATED ORAL at 08:43

## 2022-08-13 RX ADMIN — LEVOTHYROXINE SODIUM 75 MCG: 75 TABLET ORAL at 06:36

## 2022-08-13 RX ADMIN — ESCITALOPRAM OXALATE 5 MG: 5 TABLET, FILM COATED ORAL at 08:43

## 2022-08-13 RX ADMIN — FERROUS GLUCONATE 324 MG: 324 TABLET ORAL at 08:43

## 2022-08-13 ASSESSMENT — ACTIVITIES OF DAILY LIVING (ADL)
ADLS_ACUITY_SCORE: 50

## 2022-08-13 NOTE — PLAN OF CARE
A&Ox2, alert to self and situation, slow to respond. VSS. Infrequent dry cough. Denies pain. BS active, passing flatus, last BM unknown. Voiding adequately, purewick in use, inc B/B. Mepilex to coccyx. Up w/ Ax1 GB WK. Tolerating regular diet, encouraged fluid intake. Discharge pending TCU placement.

## 2022-08-13 NOTE — PLAN OF CARE
A&O to self only, flat affect. Requires encouragement to participate in cares & take scheduled medications. VSS on RA. Denies pain/nausea. Regular diet, tolerating but poor intake. Incontinent, purewick in place. Last BM unknown, scheduled stool softeners given. LS diminished. Mepilex in place to coccyx. A1 +gb/w, continuing to get stronger with ambulation. Continue covid precautions until 8/14. Discharge pending TCU placement.

## 2022-08-13 NOTE — PROGRESS NOTES
[facility]    Internal Medicine Hospitalist Progress Note  08/13/2022  I evaluated patient on the above date.    Freedom Nguyen Jr., MD  [phone] (p)  Text Page  Vocera        Assessment & Plan New actions/orders today (08/13/2022) are underlined.    Serge Marin is a 82 year old female with PMH significant for bipolar 1 disorder, depression/anxiety and hypothyroidism, who presented from assisted living 8/4/2022 with two days of fever vomiting and generalized weakness and found with a left renal pelvis staghorn calculus and also noted positive for COVID.    On initial evaluation 8/4, was febrile up to 101.8; WBC normal; cr normal; CRP 11.5, UA showed 7 WBC, lg blood, negative LE; COVID-19 positive. CT abdomen and pelvis with contrast 8/4 showed left renal pelvis 11 x 6 x 9 mm staghorn calculus with surrounding inflammatory changes, likely an infected or inflamed calculus with mild left hydronephrosis and a few other punctate nonobstructing left renal calculi; no other acute findings in the abdomen and pelvis; mildly prominent main pancreatic duct without an obstructing mass evident by CT. CXR 8/4 showed clear lungs.      Acute febrile illness, suspect secondary to infected left renal pelvis staghorn calculus with acute left pyelonephritis with left hydronephrosis, resolved.  * Initial presentation as above. Started on ceftriaxone on admit 8/4. UC 8/4 <10K mixed urogenital rosalba. Urology consulted on admit.  * Seen by Urology and not felt that left renal stones were obstructing.  * Discontinued ceftriaxone and started levofloxacin 8/7.  * Completed levofloxacin 8/13.  - Per Urology, plan for outpatient follow up to discuss elective left renal stone treatment; patient will be contacted to schedule appointment.     AMS, suspect metabolic/infectious encephalopathy related to pyelonephritis, resolved.  * Head CT 8/5 negative for acute findings.  * Mental status improved with treatment of  acute medical issues as noted.  - Continue to treat other issues as noted.  - Re-orient as needed.  - Maintain normal day/night, sleep/wake cycles.  - Minimize sedating medications as able.    COVID-19 positive (8/4/2022).  * Initial presentation as above. No respiratory symptoms on admit. On admit, noted that her symptoms seemed more likely due to infected renal stone rather than COVID pneumonia. COVID directed therapeutics not indicated.  * 8/6: Afebrile, sats in 90's; placed on O2, but done empirically and subsequently sats in 90's on RA.  * 8/10: Sats 96% on RA.  - Continue special precautions - stop special precautions tomorrow 8/14.  - Monitor clinically.     Mild hyponatremia, suspect due to hypovolemia/nuritional, resolved.  * Sodium 131 on admit. Received IVF's.  * Sodium normalized 8/5.    Acute on possibly chronic thrombocytopenia, unclear etiology, possibly from infection, medication effect or chronic from other cause.  * Noted that her platelet count has been in 100s in the past (last from 2017).  * Platelets 68K on admit 8/4.  Recent Labs   Lab 08/07/22  0904   PLT 75*   - Monitor CBC.  - Consider platelet transfusion if needs a procedure and less than 50,000; or if less than 10,000.    Leukopenia, question related to infection.  * WBC normal on admit.  * WBC decreased 8/5.  Recent Labs   Lab 08/07/22  0904   WBC 2.2*   - Monitor CBC.    Weakness and physical deconditioning due to multiple acute and chronic medical issues.  * Lives in assisted living.  * PT and OT consulted --> plan for [facility]CU vs [facility]; however, patient would need to be independent again before able to return to [facility].  - Plan PT and OT re-evaluation 8/14.  - SW consult appreciated    Bipolar 1 disorder.  Depression/anxiety.  - Continue escitalopram.     Hypothyroidism.  - Continue levothyroxine.     Chronic iron deficiency anemia  * Hgb normal on admit.  - Continue iron supplements.        Clinically Significant Risk Factors Present on  "Admission                        COVID-19 testing.  COVID-19 PCR Results    COVID-19 PCR Results 8/4/22   SARS CoV2 PCR Positive (A)   (A) Abnormal value       Comments are available for some flowsheets but are not being displayed.         COVID-19 Antibody Results, Testing for Immunity    COVID-19 Antibody Results, Testing for Immunity   No data to display.             Diet: Regular Diet Adult  Diet    Prophylaxis: PCD's, ambulation.   Sheffield Catheter: Not present  Central Lines: None  Code Status: Full Code    Disposition Plan   Expected discharge: [facility] pending bed availability and therapy re-assessment.  Entered: Freedom Nguyen MD 08/13/2022, 8:53 AM         Interval History   Doing OK.  Feeling stronger.    -Data reviewed today: I reviewed all new labs and imaging over the last 24 hours. I personally reviewed no images or EKG's today.    Physical Exam    , Blood pressure 114/56, pulse 63, temperature 97.6  F (36.4  C), temperature source Axillary, resp. rate 16, height 1.56 m (5' 1.42\"), weight 56.5 kg (124 lb 9 oz), SpO2 96 %, not currently breastfeeding. O2 Device: None (Room air)    Vitals:    08/08/22 0920 08/13/22 0629   Weight: 59.1 kg (130 lb 4.7 oz) 56.5 kg (124 lb 9 oz)     Vital Signs with Ranges  Temp:  [97.6  F (36.4  C)-98.5  F (36.9  C)] 97.6  F (36.4  C)  Pulse:  [63-71] 63  Resp:  [14-20] 16  BP: (102-114)/(56-62) 114/56  SpO2:  [96 %-97 %] 96 %  Patient Vitals for the past 24 hrs:   BP Temp Temp src Pulse Resp SpO2 Weight   08/13/22 0823 114/56 97.6  F (36.4  C) Axillary 63 16 96 % --   08/13/22 0629 -- -- -- -- -- -- 56.5 kg (124 lb 9 oz)   08/13/22 0026 102/61 98.2  F (36.8  C) Oral 71 14 97 % --   08/12/22 1542 113/62 98.5  F (36.9  C) Oral 66 20 96 % --     I/O's Last 24 hours  I/O last 3 completed shifts:  In: 510 [P.O.:510]  Out: 650 [Urine:650]    Constitutional: Awake, alert, pleasant.  Respiratory: Diminished in bases. No crackles or wheezes.  Cardiovascular: " RRR, no m/r/g.  GI:  Skin/Integumen:   Other:        Data   Recent Labs   Lab 08/07/22  0904   WBC 2.2*   HGB 12.7   MCV 89   PLT 75*      POTASSIUM 3.4   CHLORIDE 106   CO2 28   BUN 5*   CR 0.60   ANIONGAP 4   BERTA 8.5   GLC 96     Recent Labs   Lab Test 08/07/22  0904 08/05/22  0529 08/04/22  1155 05/08/18  1010 02/16/17  0823   GLC 96 83 98 111.0* 89     Recent Labs   Lab 08/07/22  0904   WBC 2.2*   CRP 8.0         No results found for this or any previous visit (from the past 24 hour(s)).    Medications   All medications were reviewed.      escitalopram  5 mg Oral Daily     ferrous gluconate  324 mg Oral Daily with breakfast     levothyroxine  75 mcg Oral QAM AC     senna-docusate  2 tablet Oral Daily     sodium chloride (PF)  3 mL Intracatheter Q8H     acetaminophen, lidocaine 4%, lidocaine (buffered or not buffered), loperamide, melatonin, ondansetron **OR** ondansetron, polyethylene glycol, sodium chloride (PF)

## 2022-08-14 PROCEDURE — 99232 SBSQ HOSP IP/OBS MODERATE 35: CPT | Performed by: INTERNAL MEDICINE

## 2022-08-14 PROCEDURE — 250N000013 HC RX MED GY IP 250 OP 250 PS 637: Performed by: STUDENT IN AN ORGANIZED HEALTH CARE EDUCATION/TRAINING PROGRAM

## 2022-08-14 PROCEDURE — 250N000013 HC RX MED GY IP 250 OP 250 PS 637: Performed by: HOSPITALIST

## 2022-08-14 PROCEDURE — 250N000013 HC RX MED GY IP 250 OP 250 PS 637: Performed by: INTERNAL MEDICINE

## 2022-08-14 PROCEDURE — 120N000001 HC R&B MED SURG/OB

## 2022-08-14 RX ORDER — BISACODYL 10 MG
10 SUPPOSITORY, RECTAL RECTAL DAILY PRN
Status: DISCONTINUED | OUTPATIENT
Start: 2022-08-14 | End: 2022-08-18 | Stop reason: HOSPADM

## 2022-08-14 RX ADMIN — POLYETHYLENE GLYCOL 3350 17 G: 17 POWDER, FOR SOLUTION ORAL at 09:23

## 2022-08-14 RX ADMIN — ESCITALOPRAM OXALATE 5 MG: 5 TABLET, FILM COATED ORAL at 09:23

## 2022-08-14 RX ADMIN — SENNOSIDES AND DOCUSATE SODIUM 2 TABLET: 50; 8.6 TABLET ORAL at 09:23

## 2022-08-14 RX ADMIN — LEVOTHYROXINE SODIUM 75 MCG: 75 TABLET ORAL at 06:39

## 2022-08-14 RX ADMIN — FERROUS GLUCONATE 324 MG: 324 TABLET ORAL at 09:23

## 2022-08-14 RX ADMIN — PSYLLIUM HUSK 1 PACKET: 3.4 POWDER ORAL at 16:45

## 2022-08-14 ASSESSMENT — ACTIVITIES OF DAILY LIVING (ADL)
ADLS_ACUITY_SCORE: 54
ADLS_ACUITY_SCORE: 48
ADLS_ACUITY_SCORE: 54
ADLS_ACUITY_SCORE: 48
ADLS_ACUITY_SCORE: 54
ADLS_ACUITY_SCORE: 54
ADLS_ACUITY_SCORE: 48
ADLS_ACUITY_SCORE: 54
ADLS_ACUITY_SCORE: 48
ADLS_ACUITY_SCORE: 54
ADLS_ACUITY_SCORE: 54
ADLS_ACUITY_SCORE: 48

## 2022-08-14 NOTE — PROGRESS NOTES
Care Management Follow Up    Length of Stay (days): 10    Expected Discharge Date: 08/15/2022     Concerns to be Addressed:       Patient plan of care discussed at interdisciplinary rounds: Yes    Anticipated Discharge Disposition: Transitional Care     Anticipated Discharge Services:    Anticipated Discharge DME:      Patient/family educated on Medicare website which has current facility and service quality ratings:    Education Provided on the Discharge Plan:    Patient/Family in Agreement with the Plan:      Referrals Placed by CM/SW:    Private pay costs discussed: Not applicable    Additional Information:  Writer needs to obtain TCU choices for discharge placement. Per chart review, patient is oriented to self and social work team has tried several attempts to get in contact with the son. Writer called Lalitha at [phone] and left voicemail asking for a call back to discuss TCU.     Addendum 1145: Writer discussed with supervisor regarding inability to reach Son and that [facility]. HCD is valid and [facility]. Writer called and left a VM for [facility] to discuss TCU.     Adrienne Luna, NOE, LGSW   Social Work   [facility]

## 2022-08-14 NOTE — PLAN OF CARE
Goal Outcome Evaluation:    A&O only to self w/ flat affect & slow to respond. VSS on RA, tolerating regular diet w/ fair intake. Ax1 w/ GB/W. Denies pain, no nausea. PIV SL. Purewick in place for inctontinence. Blanchable redness to coccyx. Diminished lung sounds. No BM since admission, miralax given x1 & MD notified. Scheduled metamucil given & awaiting results, does have PRN suppository now available. Covid recovered. Discharge pending TCU placement.

## 2022-08-14 NOTE — PROGRESS NOTES
[facility]    Internal Medicine Hospitalist Progress Note  08/14/2022  I evaluated patient on the above date.    Freedom Nguyen Jr., MD  [phone] (p)  Text Page  Vocera        Assessment & Plan New actions/orders today (08/14/2022) are underlined.    Serge Marin is a 82 year old female with PMH significant for bipolar 1 disorder, depression/anxiety and hypothyroidism, who presented from assisted living 8/4/2022 with two days of fever vomiting and generalized weakness and found with a left renal pelvis staghorn calculus and also noted positive for COVID.    On initial evaluation 8/4, was febrile up to 101.8; WBC normal; cr normal; CRP 11.5, UA showed 7 WBC, lg blood, negative LE; COVID-19 positive. CT abdomen and pelvis with contrast 8/4 showed left renal pelvis 11 x 6 x 9 mm staghorn calculus with surrounding inflammatory changes, likely an infected or inflamed calculus with mild left hydronephrosis and a few other punctate nonobstructing left renal calculi; no other acute findings in the abdomen and pelvis; mildly prominent main pancreatic duct without an obstructing mass evident by CT. CXR 8/4 showed clear lungs.      Acute febrile illness, suspect secondary to infected left renal pelvis staghorn calculus with acute left pyelonephritis with left hydronephrosis, resolved.  * Initial presentation as above. Started on ceftriaxone on admit 8/4. UC 8/4 <10K mixed urogenital rosalba. Urology consulted on admit.  * Seen by Urology and not felt that left renal stones were obstructing.  * Discontinued ceftriaxone and started levofloxacin 8/7.  * Completed levofloxacin 8/13.  - Per Urology, plan for outpatient follow up to discuss elective left renal stone treatment; patient will be contacted to schedule appointment.     AMS, suspect metabolic/infectious encephalopathy related to pyelonephritis, resolved.  * Head CT 8/5 negative for acute findings.  * Mental status improved with treatment of  acute medical issues as noted.  - Continue to treat other issues as noted.  - Re-orient as needed.  - Maintain normal day/night, sleep/wake cycles.  - Minimize sedating medications as able.    COVID-19 positive (8/4/2022), recovered.  * Initial presentation as above. No respiratory symptoms on admit. On admit, noted that her symptoms seemed more likely due to infected renal stone rather than COVID pneumonia. COVID directed therapeutics not indicated.  * 8/6: Afebrile, sats in 90's; placed on O2, but done empirically and subsequently sats in 90's on RA.  * 8/10: Sats 96% on RA.  - Continue special precautions - stop special precautions today 8/14.  - Monitor clinically.     Mild hyponatremia, suspect due to hypovolemia/nuritional, resolved.  * Sodium 131 on admit. Received IVF's.  * Sodium normalized 8/5.    Acute on possibly chronic thrombocytopenia, unclear etiology, possibly from infection, medication effect or chronic from other cause.  * Noted that her platelet count has been in 100s in the past (last from 2017).  * Platelets 68K on admit 8/4.  Recent Labs   Lab 08/13/22  0701      - Monitor CBC.  - Consider platelet transfusion if needs a procedure and less than 50,000; or if less than 10,000.    Leukopenia, question related to infection.  * WBC normal on admit.  * WBC decreased 8/5.  Recent Labs   Lab 08/13/22  0701   WBC 3.3*   - Monitor CBC.    Constipation.  * On 8/14, pt noted with constipation.  - Add daily psyllium.  - Continue PRN polyethylene glycol and PRN senna-docusate.    Weakness and physical deconditioning due to multiple acute and chronic medical issues.  * Lives in assisted living.  * PT and OT consulted --> plan for TCU vs [facility]; however, patient would need to be independent again before able to return to [facility].  - Ask PT and OT to re-evaluate need for TCU as pt has improved.  - SW consult appreciated    Bipolar 1 disorder.  Depression/anxiety.  - Continue  "escitalopram.     Hypothyroidism.  - Continue levothyroxine.     Chronic iron deficiency anemia  * Hgb normal on admit.  - Continue iron supplements.        Clinically Significant Risk Factors Present on Admission                        COVID-19 testing.  COVID-19 PCR Results    COVID-19 PCR Results 8/4/22   SARS CoV2 PCR Positive (A)   (A) Abnormal value       Comments are available for some flowsheets but are not being displayed.         COVID-19 Antibody Results, Testing for Immunity    COVID-19 Antibody Results, Testing for Immunity   No data to display.             Diet: Regular Diet Adult  Diet    Prophylaxis: PCD's, ambulation.   Sheffield Catheter: Not present  Central Lines: None  Code Status: Full Code    Disposition Plan   Expected discharge: Recommended to TCU vs [facility] pending bed availability and therapy re-assessment.  Entered: Freedom Nguyen MD 08/14/2022, 1:04 PM         Interval History   \"I'm eating\".  Otherwise doing OK.  Issues with constipation reported.    -Data reviewed today: I reviewed all new labs and imaging over the last 24 hours. I personally reviewed no images or EKG's today.    Physical Exam    , Blood pressure 115/60, pulse 65, temperature 97.7  F (36.5  C), temperature source Axillary, resp. rate 16, height 1.56 m (5' 1.42\"), weight 56.5 kg (124 lb 9 oz), SpO2 94 %, not currently breastfeeding. O2 Device: None (Room air)    Vitals:    08/08/22 0920 08/13/22 0629   Weight: 59.1 kg (130 lb 4.7 oz) 56.5 kg (124 lb 9 oz)     Vital Signs with Ranges  Temp:  [97.6  F (36.4  C)-97.7  F (36.5  C)] 97.7  F (36.5  C)  Pulse:  [64-79] 65  Resp:  [14-16] 16  BP: (102-115)/(57-60) 115/60  SpO2:  [94 %-98 %] 94 %  Patient Vitals for the past 24 hrs:   BP Temp Temp src Pulse Resp SpO2   08/14/22 0752 115/60 97.7  F (36.5  C) Axillary 65 16 94 %   08/14/22 0101 102/57 -- -- 64 14 96 %   08/13/22 1549 103/59 97.6  F (36.4  C) Axillary 79 16 98 %     I/O's Last 24 hours  I/O last 3 completed " shifts:  In: 220 [P.O.:220]  Out: 600 [Urine:600]    Constitutional: Awake, alert.  Respiratory:   Cardiovascular:   GI:  Skin/Integumen:   Other:        Data   Recent Labs   Lab 08/13/22  0701   WBC 3.3*   HGB 12.3   MCV 87        Recent Labs   Lab Test 08/07/22  0904 08/05/22  0529 08/04/22  1155 05/08/18  1010 02/16/17  0823   GLC 96 83 98 111.0* 89     Recent Labs   Lab 08/13/22  0701   WBC 3.3*         No results found for this or any previous visit (from the past 24 hour(s)).    Medications   All medications were reviewed.      escitalopram  5 mg Oral Daily     ferrous gluconate  324 mg Oral Daily with breakfast     levothyroxine  75 mcg Oral QAM AC     senna-docusate  2 tablet Oral Daily     sodium chloride (PF)  3 mL Intracatheter Q8H     acetaminophen, lidocaine 4%, lidocaine (buffered or not buffered), loperamide, melatonin, ondansetron **OR** ondansetron, polyethylene glycol, sodium chloride (PF)

## 2022-08-14 NOTE — PLAN OF CARE
Goal Outcome Evaluation:    Plan of Care Reviewed With: patient     A&O only to self w/ flat affect & slow to respond, VSS on RA, tolerating regular diet w/ poor intake, assist of 1/GB/W. Denies pain, no nausea. PIV SL. Purewick in place for inctontinence. Coccyx w/ mepilex. Diminished lung sounds. Can discontinue covid precautions today. Discharge pending TCU placement.

## 2022-08-15 ENCOUNTER — APPOINTMENT (OUTPATIENT)
Dept: OCCUPATIONAL THERAPY | Facility: CLINIC | Age: 82
DRG: 689 | End: 2022-08-15
Attending: INTERNAL MEDICINE
Payer: COMMERCIAL

## 2022-08-15 ENCOUNTER — APPOINTMENT (OUTPATIENT)
Dept: PHYSICAL THERAPY | Facility: CLINIC | Age: 82
DRG: 689 | End: 2022-08-15
Payer: COMMERCIAL

## 2022-08-15 PROCEDURE — 120N000001 HC R&B MED SURG/OB

## 2022-08-15 PROCEDURE — 97530 THERAPEUTIC ACTIVITIES: CPT | Mod: GP

## 2022-08-15 PROCEDURE — 250N000013 HC RX MED GY IP 250 OP 250 PS 637: Performed by: INTERNAL MEDICINE

## 2022-08-15 PROCEDURE — 97110 THERAPEUTIC EXERCISES: CPT | Mod: GO | Performed by: OCCUPATIONAL THERAPIST

## 2022-08-15 PROCEDURE — 250N000013 HC RX MED GY IP 250 OP 250 PS 637: Performed by: HOSPITALIST

## 2022-08-15 PROCEDURE — 99232 SBSQ HOSP IP/OBS MODERATE 35: CPT | Performed by: PHYSICIAN ASSISTANT

## 2022-08-15 PROCEDURE — 250N000013 HC RX MED GY IP 250 OP 250 PS 637: Performed by: STUDENT IN AN ORGANIZED HEALTH CARE EDUCATION/TRAINING PROGRAM

## 2022-08-15 PROCEDURE — 97116 GAIT TRAINING THERAPY: CPT | Mod: GP

## 2022-08-15 RX ADMIN — PSYLLIUM HUSK 1 PACKET: 3.4 POWDER ORAL at 08:44

## 2022-08-15 RX ADMIN — FERROUS GLUCONATE 324 MG: 324 TABLET ORAL at 08:44

## 2022-08-15 RX ADMIN — LEVOTHYROXINE SODIUM 75 MCG: 75 TABLET ORAL at 06:48

## 2022-08-15 RX ADMIN — SENNOSIDES AND DOCUSATE SODIUM 2 TABLET: 50; 8.6 TABLET ORAL at 08:44

## 2022-08-15 RX ADMIN — ESCITALOPRAM OXALATE 5 MG: 5 TABLET, FILM COATED ORAL at 08:44

## 2022-08-15 ASSESSMENT — ACTIVITIES OF DAILY LIVING (ADL)
ADLS_ACUITY_SCORE: 48

## 2022-08-15 NOTE — PLAN OF CARE
[id number]  Pt is A&Ox2, disoriented to time and place. VSS on RA. Denies pain. Up with 1gb and walker. Mepilex to Coccyx. Reg diet. PIV SL. Pt had a BM this morning while working with PT. TCU placement pending. PT/OT following. Will continue to monitor.

## 2022-08-15 NOTE — PLAN OF CARE
Goal Outcome Evaluation:    Plan of Care Reviewed With: patient     A&O to self only w/ flat affect & slow to respond.  VSS on RA, tolerating regular diet, assist 1/GB/W. Denies pain, no nausea. Purewick w/ adequate UOP, incontinent. Mepilex placed on coccyx. Discharge pending TCU placement.

## 2022-08-15 NOTE — PROGRESS NOTES
Care Management Follow Up    Length of Stay (days): 11    Expected Discharge Date: 08/15/2022     Concerns to be Addressed:       Patient plan of care discussed at interdisciplinary rounds: Yes    Anticipated Discharge Disposition: Transitional Care     Anticipated Discharge Services:    Anticipated Discharge DME:      Patient/family educated on Medicare website which has current facility and service quality ratings:    Education Provided on the Discharge Plan:    Patient/Family in Agreement with the Plan:      Referrals Placed by CM/SW:    Private pay costs discussed: Not applicable    Additional Information:  ADELE called mera Unger's former care coordinator. Ute is reported to be the first health care agent for pt. SW was under the understanding that pt Ute did not want to hold the HCA responsibilities. This information may not be the case. SW left a VM with Ute.    ADELE called pt's current  Carmen Heath at ([phone]). Carmen report pt started on her caseload at the end of July 2022. Carmen reports she had part of an address for pt's son Delfina as [address]. ADELE looked up this number and it is located in [place]. Carmen states SW should try calling pt's previous PCA and manager at [facility], at [phone]. SW left a VM for [facility]. ADELE did call pt's son and left another VM. SW called [facility] and spoke to a Galina. She reports they only have a phone number for the son and do not believe he lives in the state of [place]. Galnia would give SW's number to the nurse to inform SW if she gets a hold of Delfina.     EUSEBIO Reyna

## 2022-08-15 NOTE — PROGRESS NOTES
[facility]    Medicine History and Physical - Hospitalist Service       Date of Admission:  8/4/2022    Assessment & Plan    Serge Marin is a 82 year old female with PMHx bipolar 1 disorder, depression/anxiety, and hypothyroidism, who presented from assisted living 8/4/2022 with two days of fever, vomiting, and generalized weakness found to have a left renal pelvis staghorn calculus and also incidental COVID19 positive PCR.     Acute febrile illness, suspect secondary to infected left renal pelvis staghorn calculus with acute left pyelonephritis with left hydronephrosis, resolved.  Fever 101.8; WBC normal; Cr normal; CRP 11.5, UA showed 7 WBC, lg blood, negative LE; COVID-19 positive. CT abd / pelvis showing staghorn calculus left renal pelvis 11 x 6 x 9 mm with surrounding inflammatory changes and other non-obstructing punctate stones. CXR negative.  - Urine ctx: <10K mixed urogenital rosalba.   - Empirically treated with ceftriaxone and switched to levofloxacin 8/7, completed course 8/13.  - Urology consulted on admit. Did not feel left renal stones were obstructing. Outpatient follow up recommended to discuss elective left renal stone treatment; patient will be contacted to schedule appointment.     AMS, suspect metabolic/infectious encephalopathy related to pyelonephritis, resolved.  * Head CT 8/5 negative for acute findings.  * Mental status improved with treatment of acute medical issues as noted.  - Continue to treat other issues as noted.  - Re-orient as needed.  - Maintain normal day/night, sleep/wake cycles.  - Minimize sedating medications as able.     COVID-19 positive (8/4/2022), recovered.  Initial presentation as above. No respiratory symptoms on admit. On admit, noted that her symptoms seemed more likely due to infected renal stone rather than COVID pneumonia. COVID directed therapeutics not indicated.  * 8/6: Afebrile, sats in 90's; placed on O2, but done empirically  and subsequently sats in 90's on RA.  * 8/10: Sats 96% on RA.  - Special precautions discontinued 8/14.  - Monitor clinically.     Mild hyponatremia, suspect due to hypovolemia/nuritional, resolved.  Sodium 131 on admit. Normalized after IVFs.     Acute on possibly chronic thrombocytopenia, unclear etiology, possibly from infection, medication effect or chronic from other cause.  Noted that her platelet count has been in 100s in the past (last from 2017). Platelets 68K on admit 8/4, plt 186 prior to discharge.  - Consider platelet transfusion if needs a procedure and less than 50,000; or if less than 10,000.     Leukopenia, question related to infection.  * WBC normal on admit, decreased 8/5.  - Monitor CBC outpatient.     Constipation. - resolved.  * On 8/14, pt noted with constipation x several days. Large BM 8/15.  - Add daily psyllium.  - Continue PRN polyethylene glycol and PRN senna-docusate.     Weakness and physical deconditioning due to multiple acute and chronic medical issues.  Lives in assisted living.  - PT and OT consulted --> plan for TCU vs SUN; however, patient would need to be independent again before able to return to SUN.  - PT and OT rec TCU.  - SW attempting to contact family / decision makers.     Bipolar 1 disorder.  Depression/anxiety.  - Continue escitalopram.     Hypothyroidism.  - Continue levothyroxine.     Chronic iron deficiency anemia  Hgb normal on admit.  - Continue iron supplements.    Clinically Significant Risk Factors Present on Admission                    Diet: Regular Diet Adult  Diet    Sheffield Catheter: Not present    DVT Prophylaxis: Pneumatic Compression Devices  Code Status: Full Code      Expected Discharge Date: 08/15/2022,  3:00 PM  Discharge Delays: Placement - TCU  Other (Add Comment)  Destination: inpatient rehabilitation facility  Discharge Comments: placement (SUN/TCU). SW has found placement and cannot get ahold of the son who is the current decision maker.        The patient's care was discussed with the Attending Physician, Dr. Marie, Bedside Nurse and Patient.    JoAnna K. Barthell, PA-C  Hospitalist Service  [facility]    ______________________________________________________________________    Interval History   Oriented x 3. Disappointed to learn TCU recommended as wants to go home. Denies pain. Large BM today. Tolerating PO, but doesn't like the way it tastes.    Data reviewed today: I reviewed all medications, new labs and imaging results over the last 24 hours. I personally reviewed no images or EKG's today.    Physical Exam   Vital Signs: Temp: 98.2  F (36.8  C) Temp src: Oral BP: 92/54 Pulse: 62   Resp: 16 SpO2: 95 % O2 Device: None (Room air)    Weight: 124 lbs 8.96 oz  Constitutional: Appears stated age, no acute distress. [place] with pocket talker in place. Oriented to reason for hospitalization, month / year, and hospital.  Respiratory: Breath sounds CTA. No increased work of breathing.  Cardiovascular: RRR, no rub or murmur. No peripheral edema.  GI: Soft, non-tender, non-distended.  Skin: Warm, dry, no rashes or lesions.    Medications       escitalopram  5 mg Oral Daily     ferrous gluconate  324 mg Oral Daily with breakfast     levothyroxine  75 mcg Oral QAM AC     psyllium  1 packet Oral Daily     senna-docusate  2 tablet Oral Daily     sodium chloride (PF)  3 mL Intracatheter Q8H       Data   Recent Labs   Lab 08/13/22  0701   WBC 3.3*   HGB 12.3   MCV 87          Imaging:  No results found for this or any previous visit (from the past 24 hour(s)).

## 2022-08-16 ENCOUNTER — APPOINTMENT (OUTPATIENT)
Dept: PHYSICAL THERAPY | Facility: CLINIC | Age: 82
DRG: 689 | End: 2022-08-16
Payer: COMMERCIAL

## 2022-08-16 PROCEDURE — 97116 GAIT TRAINING THERAPY: CPT | Mod: GP | Performed by: PHYSICAL THERAPY ASSISTANT

## 2022-08-16 PROCEDURE — 250N000013 HC RX MED GY IP 250 OP 250 PS 637: Performed by: STUDENT IN AN ORGANIZED HEALTH CARE EDUCATION/TRAINING PROGRAM

## 2022-08-16 PROCEDURE — 250N000013 HC RX MED GY IP 250 OP 250 PS 637: Performed by: HOSPITALIST

## 2022-08-16 PROCEDURE — 120N000001 HC R&B MED SURG/OB

## 2022-08-16 PROCEDURE — 97530 THERAPEUTIC ACTIVITIES: CPT | Mod: GP | Performed by: PHYSICAL THERAPY ASSISTANT

## 2022-08-16 PROCEDURE — 99232 SBSQ HOSP IP/OBS MODERATE 35: CPT | Performed by: PHYSICIAN ASSISTANT

## 2022-08-16 PROCEDURE — 250N000013 HC RX MED GY IP 250 OP 250 PS 637: Performed by: INTERNAL MEDICINE

## 2022-08-16 RX ADMIN — SENNOSIDES AND DOCUSATE SODIUM 2 TABLET: 50; 8.6 TABLET ORAL at 08:49

## 2022-08-16 RX ADMIN — LEVOTHYROXINE SODIUM 75 MCG: 75 TABLET ORAL at 05:55

## 2022-08-16 RX ADMIN — FERROUS GLUCONATE 324 MG: 324 TABLET ORAL at 08:49

## 2022-08-16 RX ADMIN — PSYLLIUM HUSK 1 PACKET: 3.4 POWDER ORAL at 08:49

## 2022-08-16 RX ADMIN — ESCITALOPRAM OXALATE 5 MG: 5 TABLET, FILM COATED ORAL at 08:49

## 2022-08-16 ASSESSMENT — ACTIVITIES OF DAILY LIVING (ADL)
ADLS_ACUITY_SCORE: 48

## 2022-08-16 NOTE — PLAN OF CARE
Pt is A&Ox2, disoriented to time and place. [place]. VSS on RA. Denies pain. Up A1 GBW. Reg diet. Poor appetite. PIV SL. Purewick in place. TCU placement pending. Will continue to monitor.

## 2022-08-16 NOTE — PROGRESS NOTES
[facility]    Medicine History and Physical - Hospitalist Service       Date of Admission:  8/4/2022    Assessment & Plan    Serge Marin is a 82 year old female with PMHx bipolar 1 disorder, depression/anxiety, and hypothyroidism, who presented from assisted living 8/4/2022 with two days of fever, vomiting, and generalized weakness found to have a left renal pelvis staghorn calculus and also incidental COVID19 positive PCR.     Acute febrile illness, suspect secondary to infected left renal pelvis staghorn calculus with acute left pyelonephritis with left hydronephrosis.  - resolved.  Fever 101.8; WBC normal; Cr normal; CRP 11.5, UA showed 7 WBC, lg blood, negative LE; COVID-19 positive. CT abd / pelvis showing staghorn calculus left renal pelvis 11 x 6 x 9 mm with surrounding inflammatory changes and other non-obstructing punctate stones. CXR negative.  - Urine ctx: <10K mixed urogenital rosalba.   - Empirically treated with ceftriaxone and switched to levofloxacin 8/7, completed course 8/13.  - Urology consulted on admit. Did not feel left renal stones were obstructing. Outpatient follow up recommended to discuss elective left renal stone treatment; patient will be contacted to schedule appointment.     AMS, suspect metabolic/infectious encephalopathy related to pyelonephritis. - resolved.  * Head CT 8/5 negative for acute findings.  * Mental status improved with treatment of acute medical issues as noted.  - Continue to treat other issues as noted.  - Re-orient as needed.  - Maintain normal day/night, sleep/wake cycles.  - Minimize sedating medications as able.     COVID-19 positive (8/4/2022). - recovered.  Initial presentation as above. No respiratory symptoms on admit. On admit, noted that her symptoms seemed more likely due to infected renal stone rather than COVID pneumonia. COVID directed therapeutics not indicated.  * 8/6: Afebrile, sats in 90's; placed on O2, but done  empirically and subsequently sats in 90's on RA.  * 8/10: Sats 96% on RA.  - Special precautions discontinued 8/14.  - Monitor clinically.     Mild hyponatremia, suspect due to hypovolemia/nuritional. - resolved.  Sodium 131 on admit. Normalized after IVFs.     Acute on possibly chronic thrombocytopenia, unclear etiology, possibly from infection, medication effect or chronic from other cause.  Noted that her platelet count has been in 100s in the past (last from 2017). Platelets 68K on admit 8/4, plt 186 prior to discharge.  - Consider platelet transfusion if needs a procedure and less than 50,000; or if less than 10,000.     Leukopenia, question related to infection.  * WBC normal on admit, decreased 8/5.  - Monitor CBC outpatient.     Constipation. - resolved.  * On 8/14, pt noted with constipation x several days. Large BM 8/15.  - Add daily psyllium.  - Continue PRN polyethylene glycol and PRN senna-docusate.     Weakness and physical deconditioning due to multiple acute and chronic medical issues.  Lives in assisted living.  - PT and OT consulted --> plan for TCU vs senior living; however, patient would need to be independent again before able to return to senior living.  - PT and OT rec TCU.  - ADELE attempting to contact family / decision makers.     Bipolar 1 disorder.  Depression/anxiety.  - Continue escitalopram.     Hypothyroidism.  - Continue levothyroxine.     Chronic iron deficiency anemia  Hgb normal on admit.  - Continue iron supplements.    Clinically Significant Risk Factors Present on Admission                    Diet: Regular Diet Adult  Diet    Sheffield Catheter: Not present    DVT Prophylaxis: Pneumatic Compression Devices  Code Status: Full Code       Expected Discharge Date: 08/16/2022,  3:00 PM  Discharge Delays: Placement - TCU  Other (Add Comment)  Destination: inpatient rehabilitation facility  Discharge Comments: placement (senior living/TCU). SW has found placement and cannot get ahold of the son who is the current  decision maker.       The patient's care was discussed with the Attending Physician, Dr. Marie, Bedside Nurse and Patient.    JoAnna K. Barthell, PA-C  Hospitalist Service  [facility]    ______________________________________________________________________    Interval History   Oriented x 3, misses season. SW with difficulty contacting family. Arranging TCU discharge. Last BM 8/15.    Data reviewed today: I reviewed all medications, new labs and imaging results over the last 24 hours. I personally reviewed no images or EKG's today.    Physical Exam   Vital Signs: Temp: 98  F (36.7  C) Temp src: Axillary BP: 108/52 Pulse: 63   Resp: 18 SpO2: 96 % O2 Device: None (Room air)    Weight: 124 lbs 8.96 oz  Constitutional: Appears stated age, no acute distress. Upright in chair. [place] with pocket talker in place. Oriented to reason for hospitalization, month, pres. Misses season.  Respiratory: No increased work of breathing on room air.  kin: No rashes or lesions on exposed skin.    Medications       escitalopram  5 mg Oral Daily     ferrous gluconate  324 mg Oral Daily with breakfast     levothyroxine  75 mcg Oral QAM AC     psyllium  1 packet Oral Daily     senna-docusate  2 tablet Oral Daily     sodium chloride (PF)  3 mL Intracatheter Q8H       Data   Recent Labs   Lab 08/13/22  0701   WBC 3.3*   HGB 12.3   MCV 87          Imaging:  No results found for this or any previous visit (from the past 24 hour(s)).

## 2022-08-16 NOTE — PROGRESS NOTES
Care Management Follow Up    Length of Stay (days): 12    Expected Discharge Date: 08/15/2022     Concerns to be Addressed:       Patient plan of care discussed at interdisciplinary rounds: Yes    Anticipated Discharge Disposition: Transitional Care     Anticipated Discharge Services:    Anticipated Discharge DME:      Patient/family educated on Medicare website which has current facility and service quality ratings:    Education Provided on the Discharge Plan:    Patient/Family in Agreement with the Plan:      Referrals Placed by CM/SW:    Private pay costs discussed: Not applicable    Additional Information:  Call from Ute Carlos, who is reported as pt's first emergency contact and healthcare agent. Ute reports she has no idea that she was listed as a healthcare agent and is not sure who named her. Ute reports she would not like to to be an emergency contact for pt, nor a healthcare agent. She reports all healthcare agent responsibilities should be given to her son. Ute works for the [employer] and has asked IT if she can be removed from pt's chart. ADELE will continue to call pt's son in hopes that he will respond regarding discharge planning.     ADELE had a VM from someone at [facility] requesting a call back at [phone]. ADELE placed a call to this number and was informed that it was not a number for [facility]. SW attempted to call pt's son and left a VM requesting a call back.     1058: Call from Mau with [facility], reporting that she has a phone number for pt's other son, Leonardo, [phone], and her sister, Suyapa, [phone]. Mau can be reached at [phone]. ADELE placed a call to Leonardo and his phone only ringed. ADELE was unable to leave a VM. ADELE called Suyapa and he number is disconnected.       EUSEBIO Reyna

## 2022-08-16 NOTE — PLAN OF CARE
[id number]  Pt is A&Ox2, disoriented to time and place. VSS on RA. Denies pain. Up with 1gb and walker. Mepilex to Coccyx. Reg diet, pt did not want to eat dinner. PIV SL. TCU placement pending. PT/OT following. Will continue to monitor.

## 2022-08-16 NOTE — PLAN OF CARE
Goal Outcome Evaluation:    Plan of Care Reviewed With: patient     Overall Patient Progress: no change    Outcome Evaluation: regular diet with variable intakes of 0-100%, pt declined ONS/snacks during visit    Chantale El RD, LD

## 2022-08-16 NOTE — PLAN OF CARE
Pt is A&Ox2, disoriented to time and place. VSS on RA. Tolerating regular diet. Poor appetite. PIC at . Turn and repo. Purewick in place. Pt denies pain and nausea.  Waiting on TCU placement.will keep monitoring.

## 2022-08-16 NOTE — PROGRESS NOTES
"CLINICAL NUTRITION SERVICES  -  ASSESSMENT NOTE      Malnutrition:   % Weight Loss:  None noted  % Intake:  <75% for > 7 days (moderate malnutrition) - x12 days, suspected per documented intakes  Subcutaneous Fat Loss:  Orbital region moderate depletion - visual exam   Muscle Loss:  Temporal region moderate depletion, Clavicle bone region moderate depletion and Dorsal hand region moderate depletion - visual exam, suspect some is age-related  Fluid Retention:  Trace generalized edema    Malnutrition Diagnosis: Moderate malnutrition  In Context of:  Acute illness or injury  Chronic illness or disease       REASON FOR ASSESSMENT  Serge Marin is a 82 year old female seen by Registered Dietitian for LOS      NUTRITION HISTORY  - Information obtained from chart review  - PMH of bipolar 1 disorder, depression/anxiety, and hypothyroidism      CURRENT NUTRITION ORDERS  Diet Order:   Regular     Current Intake/Tolerance:  - attempted to visit with pt this afternoon, she was unresponsive to questions. She did declined ONS and snacks.   - per nursing flow sheet, variable intakes of 0-100%  - per health touch, pt receiving 3 meals/day    Barriers: AMS    NUTRITION FOCUSED PHYSICAL ASSESSMENT FOR DIAGNOSING MALNUTRITION)  Yes - visual               Observed:    Muscle wasting (refer to documentation in Malnutrition section) and Subcutaneous fat loss (refer to documentation in Malnutrition section)      ANTHROPOMETRICS  Height: 5' 1.417\"  Weight: 124 lbs 8.96 oz  Body mass index is 23.22 kg/m .  Weight Status:  Normal BMI  IBW: 47.7 kg  % IBW: 118%  Weight History: difficult to assess with inadequate wt hx, suspect stable wt over past year  08/08/22 : 59.1 kg (130 lb 4.7 oz)   08/05/21 : 59.1 kg (130 lb 6.4 oz)   01/24/20 : 71.2 kg (157 lb)   01/16/20 : 71.1 kg (156 lb 12.8 oz)   10/09/19 : 71.8 kg (158 lb 6.4 oz)   08/16/19 : 73.1 kg (161 lb 3.2 oz)       LABS  Labs reviewed: BUN 5 (L)    MEDICATIONS  Medications reviewed: " ferrous gluconate 324 mg, levothyroxine, metamucil, senna-docusate    PER CHART REVIEW:  General: SW has found placement and cannot get ahold of the son who is the current decision maker.       GI: last BM x2 yesterday      ASSESSED NUTRITION NEEDS PER APPROVED PRACTICE GUIDELINES:  Dosing Weight: 56.6 kg (actual)  Estimated Energy Needs: 3948-2195 kcals (25-30 Kcal/Kg)  Justification: maintenance  Estimated Protein Needs: 57-68 grams protein (1-1.2 g pro/Kg)  Justification: preservation of lean body mass and increased  Needs r/t age  Estimated Fluid Needs: (1 mL/Kcal)  Justification: maintenance and per provider pending fluid status    MALNUTRITION:  % Weight Loss:  None noted  % Intake:  <75% for > 7 days (moderate malnutrition) - x12 days, suspected per documented intakes  Subcutaneous Fat Loss:  Orbital region moderate depletion - visual exam   Muscle Loss:  Temporal region moderate depletion, Clavicle bone region moderate depletion and Dorsal hand region moderate depletion - visual exam, suspect some is age-related  Fluid Retention:  Trace generalized edema    Malnutrition Diagnosis: Moderate malnutrition  In Context of:  Acute illness or injury  Chronic illness or disease    NUTRITION DIAGNOSIS:  Inadequate oral intake related to variable intakes since admit, suspected poor appetite secondary to AMS, potential menu fatigue secondary to LOS as evidenced by 0-100% variable intakes since admit, moderate fat and muscle loss      NUTRITION INTERVENTIONS  Recommendations / Nutrition Prescription  - pt declined ONS and snacks      Implementation  Nutrition education: Patient declined      Nutrition Goals  Patient to consume >75% of nutritionally adequate meals TID       MONITORING AND EVALUATION:  Progress towards goals will be monitored and evaluated per protocol and Practice Guidelines      Chantale El, RD, LD

## 2022-08-16 NOTE — PLAN OF CARE
Pt is A&Ox2, disoriented to time and place. [place]. VSS on RA. Denies pain. Up A1 GBW. T/R in bed. Reg diet. Poor appetite. PIV SL. Purewick in place. Adequate output. New Mepilex applied. TCU placement pending. Will continue to monitor.

## 2022-08-17 ENCOUNTER — APPOINTMENT (OUTPATIENT)
Dept: OCCUPATIONAL THERAPY | Facility: CLINIC | Age: 82
DRG: 689 | End: 2022-08-17
Payer: COMMERCIAL

## 2022-08-17 PROCEDURE — 97110 THERAPEUTIC EXERCISES: CPT | Mod: GO | Performed by: OCCUPATIONAL THERAPIST

## 2022-08-17 PROCEDURE — 250N000013 HC RX MED GY IP 250 OP 250 PS 637: Performed by: STUDENT IN AN ORGANIZED HEALTH CARE EDUCATION/TRAINING PROGRAM

## 2022-08-17 PROCEDURE — 99232 SBSQ HOSP IP/OBS MODERATE 35: CPT | Performed by: PHYSICIAN ASSISTANT

## 2022-08-17 PROCEDURE — 250N000013 HC RX MED GY IP 250 OP 250 PS 637: Performed by: INTERNAL MEDICINE

## 2022-08-17 PROCEDURE — 250N000013 HC RX MED GY IP 250 OP 250 PS 637: Performed by: HOSPITALIST

## 2022-08-17 PROCEDURE — 120N000001 HC R&B MED SURG/OB

## 2022-08-17 RX ADMIN — ESCITALOPRAM OXALATE 5 MG: 5 TABLET, FILM COATED ORAL at 09:38

## 2022-08-17 RX ADMIN — PSYLLIUM HUSK 1 PACKET: 3.4 POWDER ORAL at 09:38

## 2022-08-17 RX ADMIN — ACETAMINOPHEN 500 MG: 500 TABLET, FILM COATED ORAL at 20:32

## 2022-08-17 RX ADMIN — FERROUS GLUCONATE 324 MG: 324 TABLET ORAL at 09:38

## 2022-08-17 RX ADMIN — LEVOTHYROXINE SODIUM 75 MCG: 75 TABLET ORAL at 06:50

## 2022-08-17 RX ADMIN — SENNOSIDES AND DOCUSATE SODIUM 2 TABLET: 50; 8.6 TABLET ORAL at 09:38

## 2022-08-17 ASSESSMENT — ACTIVITIES OF DAILY LIVING (ADL)
ADLS_ACUITY_SCORE: 48

## 2022-08-17 NOTE — PROGRESS NOTES
Care Management Follow Up    Length of Stay (days): 13    Expected Discharge Date: 08/18/2022     Concerns to be Addressed:       Patient plan of care discussed at interdisciplinary rounds: Yes    Anticipated Discharge Disposition: Transitional Care    Anticipated Discharge Services:    Anticipated Discharge DME:      Patient/family educated on Medicare website which has current facility and service quality ratings:    Education Provided on the Discharge Plan:    Patient/Family in Agreement with the Plan:      Referrals Placed by CM/SW:    Private pay costs discussed: Not applicable    Additional Information:  SW called patients rosalina Mathis and left VM informing him of discharge plans.  -------------------------------------------------------------------------------------------------------  D: Per DHS regulation, SW completed and submitted PAS-RR to [place] [employer] Direct Connect via the [facility] Line.  PAS-RR confirmation # is : [id number]    I: SW spoke with pt and they are aware a PAS-RR has been submitted.  SW reviewed with pt that they may be contacted for a follow up appointment within 10 days of hospital discharge if their SNF stay is < 30 days.  Contact information for [facility] Line was also provided.    A: pt verbalized understanding.    P: Further questions may be directed to [facility] at #[phone], option #4 for PAS-RR staff.    Veronica Kincaid, MSW, LGSW

## 2022-08-17 NOTE — PLAN OF CARE
Goal Outcome Evaluation:        1530-1930  Pt is A&Ox2, disoriented to time and situation. VSS on RA. Reg diet. A1 GB walker. Purewick. Denies pain. Mepilex on bottom. DIC 8/18 1130 via transport.

## 2022-08-17 NOTE — PROGRESS NOTES
Care Management Follow Up    Length of Stay (days): 13    Expected Discharge Date: 08/18/2022     Concerns to be Addressed:       Patient plan of care discussed at interdisciplinary rounds: Yes    Anticipated Discharge Disposition: Transitional Care     Anticipated Discharge Services:    Anticipated Discharge DME:      Patient/family educated on Medicare website which has current facility and service quality ratings:    Education Provided on the Discharge Plan:    Patient/Family in Agreement with the Plan:      Referrals Placed by CM/SW:    Private pay costs discussed: Not applicable    Additional Information:  ADELE received a VM from pt's son, Delfina. ADELE called Ara back. Delfina reports he did not have his cell phone charged, so he did not return calls. Delfina apologized. Delfina agreed to referrals sent to placements close to her [facility]. Delfina planed to call the bedside RN to discuss pt's current plan of care and overall well being. ADELE sent referrals via DOD.    Call from Kirti in admissions at [facility]. Kirti offered a bed in a VM. ADELE called Kirti back and left a VM accepting the bed. Plan for pt to discharge tomorrow to [facility] upon confirmation from their admissions staff. Kirti can be reached at [phone].    Call back from Kirti confirming the bed. Kirti reports they can take pt anytime after 11 am tomorrow. ADELE called  transport and set pt up for a stretcher transport for 11:30 am on 8/18. Pt in need of stretcher due to orientation, weakness, and inability to transfer without assistance.  ADELE updated provider. PCS form completed, faxed, and placed on chart. ADELE called Mau at [facility] and left a VM regarding the plans for discharge tomorrow.       EUSEBIO Reyna

## 2022-08-17 NOTE — PROGRESS NOTES
[facility]    Medicine History and Physical - Hospitalist Service       Date of Admission:  8/4/2022    Assessment & Plan    Serge Marin is a 82 year old female with PMHx bipolar 1 disorder, depression/anxiety, and hypothyroidism, who presented from assisted living 8/4/2022 with two days of fever, vomiting, and generalized weakness found to have a left renal pelvis staghorn calculus and also incidental COVID19 positive PCR.     Acute febrile illness, suspect secondary to infected left renal pelvis staghorn calculus with acute left pyelonephritis with left hydronephrosis.  - resolved.  Fever 101.8; WBC normal; Cr normal; CRP 11.5, UA showed 7 WBC, lg blood, negative LE; COVID-19 positive. CT abd / pelvis showing staghorn calculus left renal pelvis 11 x 6 x 9 mm with surrounding inflammatory changes and other non-obstructing punctate stones. CXR negative.  - Urine ctx: <10K mixed urogenital rosalba.   - Empirically treated with ceftriaxone and switched to levofloxacin 8/7, completed course 8/13.  - Urology consulted on admit. Did not feel left renal stones were obstructing. Outpatient follow up recommended to discuss elective left renal stone treatment; patient will be contacted to schedule appointment.     AMS, suspect metabolic/infectious encephalopathy related to pyelonephritis. - resolved.  * Head CT 8/5 negative for acute findings.  * Mental status improved with treatment of acute medical issues as noted.  - Continue to treat other issues as noted.  - Re-orient as needed.  - Maintain normal day/night, sleep/wake cycles.  - Minimize sedating medications as able.     COVID-19 positive (8/4/2022). - recovered.  Initial presentation as above. No respiratory symptoms on admit. On admit, noted that her symptoms seemed more likely due to infected renal stone rather than COVID pneumonia. COVID directed therapeutics not indicated.  * 8/6: Afebrile, sats in 90's; placed on O2, but done  empirically and subsequently sats in 90's on RA.  * 8/10: Sats 96% on RA.  - Special precautions discontinued 8/14.  - Monitor clinically.     Mild hyponatremia, suspect due to hypovolemia/nuritional. - resolved.  Sodium 131 on admit. Normalized after IVFs.     Acute on possibly chronic thrombocytopenia, unclear etiology, possibly from infection, medication effect or chronic from other cause.  Noted that her platelet count has been in 100s in the past (last from 2017). Platelets 68K on admit 8/4, plt 186 prior to discharge.  - Consider platelet transfusion if needs a procedure and less than 50,000; or if less than 10,000.     Leukopenia, question related to infection.  * WBC normal on admit, decreased 8/5.  - Monitor CBC outpatient.     Constipation. - resolved.  * On 8/14, pt noted with constipation x several days. Large BM 8/16.  - Add daily psyllium.  - Continue PRN polyethylene glycol and PRN senna-docusate.     Weakness and physical deconditioning due to multiple acute and chronic medical issues.  Lives in assisted living.  - PT and OT consulted --> plan for TCU vs jail; however, patient would need to be independent again before able to return to jail.  - PT and OT rec TCU.  - SW attempting to contact family / decision makers.     Bipolar 1 disorder.  Depression/anxiety.  - Continue escitalopram.     Hypothyroidism.  - Continue levothyroxine.     Chronic iron deficiency anemia  Hgb normal on admit.  - Continue iron supplements.    Clinically Significant Risk Factors Present on Admission                    Diet: Regular Diet Adult  Diet    [place] Catheter: Not present    DVT Prophylaxis: Pneumatic Compression Devices  Code Status: Full Code         The patient's care was discussed with the Attending Physician, Dr. Marie, Bedside Nurse and Patient.    JoAnna K. Barthell, PA-C  Hospitalist Service  [facility]    ______________________________________________________________________    Interval History   No complaints. Needs TCU, SW with difficulty contacting family. Last BM 8/16.    Data reviewed today: I reviewed all medications, new labs and imaging results over the last 24 hours. I personally reviewed no images or EKG's today.    Physical Exam   Vital Signs: Temp: 97.8  F (36.6  C) Temp src: Axillary BP: 113/58 Pulse: 65   Resp: 16 SpO2: 95 % O2 Device: None (Room air)    Weight: 124 lbs 8.96 oz  Constitutional: Appears stated age, no acute distress. Laying in bed. [place] with pocket talker in place. Oriented x 3.  Respiratory: No increased work of breathing on room air.  Skin: No rashes or lesions on exposed skin.    Medications       escitalopram  5 mg Oral Daily     ferrous gluconate  324 mg Oral Daily with breakfast     levothyroxine  75 mcg Oral QAM AC     psyllium  1 packet Oral Daily     senna-docusate  2 tablet Oral Daily     sodium chloride (PF)  3 mL Intracatheter Q8H       Data   Recent Labs   Lab 08/13/22  0701   WBC 3.3*   HGB 12.3   MCV 87          Imaging:  No results found for this or any previous visit (from the past 24 hour(s)).

## 2022-08-17 NOTE — PLAN OF CARE
Goal Outcome Evaluation:    Shift: 1900 - 0730    Patient is alert & oriented x3. Patient is disoriented to time. [place]. VSS on RA except for soft SBP's in the 90's. Patient up with 1, GB, & walker. PIV is saline locked. Regular diet. Purewick is in place. Patient is voiding. Patient denies pain, nausea, & vomiting. Bowel sounds hypoactive. Patient states she hasn't been passing flatus. PRN Senna given. Mepilex to coccyx. Lung sounds diminished. Discharge to TCU pending. Social work is waiting to hear back from patient's family. Continuing with plan of care.

## 2022-08-18 VITALS
WEIGHT: 124.56 LBS | TEMPERATURE: 98.5 F | RESPIRATION RATE: 20 BRPM | HEART RATE: 63 BPM | DIASTOLIC BLOOD PRESSURE: 63 MMHG | BODY MASS INDEX: 23.52 KG/M2 | HEIGHT: 61 IN | SYSTOLIC BLOOD PRESSURE: 113 MMHG | OXYGEN SATURATION: 96 %

## 2022-08-18 PROCEDURE — 250N000013 HC RX MED GY IP 250 OP 250 PS 637: Performed by: INTERNAL MEDICINE

## 2022-08-18 PROCEDURE — 250N000013 HC RX MED GY IP 250 OP 250 PS 637: Performed by: HOSPITALIST

## 2022-08-18 PROCEDURE — 250N000013 HC RX MED GY IP 250 OP 250 PS 637: Performed by: STUDENT IN AN ORGANIZED HEALTH CARE EDUCATION/TRAINING PROGRAM

## 2022-08-18 PROCEDURE — 99239 HOSP IP/OBS DSCHRG MGMT >30: CPT | Performed by: PHYSICIAN ASSISTANT

## 2022-08-18 RX ORDER — CALCIUM CARBONATE 500 MG/1
1000 TABLET, CHEWABLE ORAL DAILY PRN
Status: DISCONTINUED | OUTPATIENT
Start: 2022-08-18 | End: 2022-08-18 | Stop reason: HOSPADM

## 2022-08-18 RX ADMIN — ESCITALOPRAM OXALATE 5 MG: 5 TABLET, FILM COATED ORAL at 08:07

## 2022-08-18 RX ADMIN — PSYLLIUM HUSK 1 PACKET: 3.4 POWDER ORAL at 08:06

## 2022-08-18 RX ADMIN — LEVOTHYROXINE SODIUM 75 MCG: 75 TABLET ORAL at 06:36

## 2022-08-18 RX ADMIN — FERROUS GLUCONATE 324 MG: 324 TABLET ORAL at 08:07

## 2022-08-18 ASSESSMENT — ACTIVITIES OF DAILY LIVING (ADL)
ADLS_ACUITY_SCORE: 48

## 2022-08-18 NOTE — PLAN OF CARE
Goal Outcome Evaluation:  6/17/18: [id number]:  Pt is A&O x2-3, disoriented to time and situation. Very Hard of Hearing. VSS on RA. Denies pain. Ax1 gb/w. Reg diet. Needs assistance with ordering meals. A1 GB walker. Occassionally incontinent, Purewick in place. Adequate output. Passing gas. Mepilex on coccyx. PIV SL. Discharge today to U @ 1130am via transport.

## 2022-08-18 NOTE — PLAN OF CARE
Goal Outcome Evaluation:    Shift: 1900 - 2300    Patient is alert & oriented x3. Patient disoriented to time only. VSS on RA except for low grade temp of 99.5 F. PRN tylenol given. Patient up with 1, GB, & walker. Patient had on episode of urinary incontinence. Purewick in place. Bed alarm on for safety. Patient reposition once during shift. Mepilex to coccyx. CMS intact. Patient denies pain, nausea, & vomiting. Passing flatus. Bowel sounds active. Regular diet. Fall precautions. Patient refused PCD's. PIV is saline locked. Patient will discharge to TCU tomorrow morning at 11:30 AM. Continuing with plan of care.

## 2022-08-18 NOTE — DISCHARGE SUMMARY
[facility]  Hospitalist Discharge Summary      Date of Admission:  8/4/2022  Date of Discharge:  8/18/2022  Discharging Provider: JoAnna K. Barthell, PA-C  Discharge Service: Hospitalist Service    Discharge Diagnoses   Acute infected left renal pelvis staghorn calculus with associated left pyelonephritis and hydronephrosis. - resolved.  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 - resolved.  Recovered COVID-19 (8/14/2022).  Mild hyponatremia, suspect due to hypovolemia/nuritional. - resolved.  Acute on possibly chronic thrombocytopenia, unclear etiology, possibly from infection, medication effect or chronic from other cause.  Leukopenia, suspect related to viral infection.  Constipation. - resolved.  Weakness and physical deconditioning due to multiple acute and chronic medical issues.  Moderate malnutrition.  Hard of hearing.  Bipolar 1 disorder.  Depression/anxiety.  Hypothyroidism.  Chronic iron deficiency anemia.  Follow-ups Needed After Discharge   Follow-up Appointments     Follow Up and recommended labs and tests      Follow up with shelter physician.  The following labs/tests are   recommended: BMP and CBC.    Follow-up with Stevo Aguila on 9/12/2022 at 10:00 AM.           Discharge Disposition   Discharged to short-term care facility  Condition at discharge: Stable    Hospital Course   Serge Marin is a 82 year old female with PMHx bipolar 1 disorder, depression/anxiety, and hypothyroidism, who presented from assisted living 8/4/2022 with two days of fever, vomiting, and generalized weakness found to have acute left pyelonephritis with hydronephrosis associated with renal pelvis staghorn calculus and also incidental COVID19 positive PCR.     Acute left pyelonephritis with hydronephrosis associated with renal pelvis staghorn calculus. - resolved.  Fever 101.8; WBC normal; Cr normal; CRP 11.5, UA showed 7 WBC, lg blood,  negative LE; COVID-19 positive. CT abd / pelvis showing staghorn calculus left renal pelvis 11 x 6 x 9 mm with surrounding inflammatory changes and other non-obstructing punctate stones. CXR negative.  - Urine ctx: <10K mixed urogenital rosalba.   - Empirically treated with ceftriaxone and switched to levofloxacin 8/7, completed course 8/13.  - Urology consulted on admit. Did not feel left renal stones were obstructing. Outpatient follow up with [facility] 9/12 to discuss elective left renal stone treatment.     AMS, suspect metabolic/infectious encephalopathy related to pyelonephritis. - resolved.  * Head CT 8/5 negative for acute findings.  * Mental status improved with treatment of acute medical issues as noted.  - Continue to treat other issues as noted.  - Re-orient as needed.  - Maintain normal day/night, sleep/wake cycles.  - Minimize sedating medications as able.  - Alert and oriented x 2-3 throughout adm.     COVID-19 positive (8/4/2022). - recovered.  Initial presentation as above. No respiratory symptoms on admit. On admit, noted that her symptoms seemed more likely due to infected renal stone rather than COVID pneumonia. COVID directed therapeutics not indicated.  * 8/6: Afebrile, sats in 90's; placed on O2, but done empirically and subsequently sats in 90's on RA.  * 8/10: Sats 96% on RA.  - Special precautions discontinued 8/14.  - No apparent clinical sxs throughout adm aside from fever in above context.     Mild hyponatremia, suspect due to hypovolemia/nuritional. - resolved.  Sodium 131 on admit. Normalized after IVFs.     Acute on possibly chronic thrombocytopenia, unclear etiology, possibly from infection, medication effect or chronic from other cause.  Noted that her platelet count has been in 100s in the past (last from 2017). Platelets 68K on admit 8/4, plt 186 prior to discharge.  - Consider platelet transfusion if needs a procedure and less than 50,000; or if less than  10,000.     Leukopenia, suspect related to viral infection.  * WBC normal on admit, decreased 8/5.  - Monitor CBC outpatient.     Constipation. - resolved.  * On 8/14, pt noted with constipation x several days. Large BM 8/17.  - Cont PTA daily psyllium.     Weakness and physical deconditioning due to multiple acute and chronic medical issues.  Lives in assisted living.  - PT and OT consulted --> recommended TCU.    Moderate malnutrition.  Nutrition consulted. Pt declined ONS and snacks.     Bipolar 1 disorder.  Depression/anxiety.  - Continue escitalopram.     Hypothyroidism.  - Continue levothyroxine.     Chronic iron deficiency anemia.  Hgb normal on admit.  - Continue iron supplements.    Consultations This Hospital Stay   UROLOGY IP CONSULT  PHYSICAL THERAPY ADULT IP CONSULT  OCCUPATIONAL THERAPY ADULT IP CONSULT  CARE MANAGEMENT / SOCIAL WORK IP CONSULT  SOCIAL WORK IP CONSULT  CARE MANAGEMENT / SOCIAL WORK IP CONSULT  PHYSICAL THERAPY ADULT IP CONSULT  OCCUPATIONAL THERAPY ADULT IP CONSULT  PHYSICAL THERAPY ADULT IP CONSULT  OCCUPATIONAL THERAPY ADULT IP CONSULT  NUTRITION SERVICES ADULT IP CONSULT    Code Status   Full Code    Time Spent on this Encounter   I, JoAnna K. Barthell, PA-C, personally saw the patient today and spent greater than 30 minutes discharging this patient.     This patient was discussed with Dr. Marie of the Hospitalist Service who agrees with current plans as outlined above.    JoAnna K. Barthell, PA-C  [address] [address]  Phone: [phone]  Fax: [phone]  ______________________________________________________________________    Physical Exam   Temp: 98.5  F (36.9  C) Temp src: Oral BP: 113/63 Pulse: 63   Resp: 20 SpO2: 96 % O2 Device: None (Room air)    Vitals:    08/08/22 0920 08/13/22 0629   Weight: 59.1 kg (130 lb 4.7 oz) 56.5 kg (124 lb 9 oz)   Constitutional: Appears stated age, no acute distress. Hard  of hearing.  Respiratory: Breath sounds CTA. No increased work of breathing.  Cardiovascular: RRR, no rub or murmur. No peripheral edema.  GI: Soft, non-tender, non-distended.  Skin: Warm, dry, no rashes or lesions.       Primary Care Physician   Kassie Wyatt    Discharge Orders      Activity    Your activity upon discharge: activity as tolerated     Reason for your hospital stay    1. Acute febrile illness, suspect secondary to infected left renal pelvis staghorn calculus with acute left pyelonephritis with left hydronephrosis.  2. AMS, suspect metabolic/infectious encephalopathy related to pyelonephritis.  3. COVID-19 positive (8/4/2022).  4. Mild hyponatremia, suspect due to hypovolemia/nuritional, resolved.  5. Acute on possibly chronic thrombocytopenia, unclear etiology, possibly from infection, medication effect or chronic from other cause.  6. Leukopenia, question related to infection.  7. Weakness and physical deconditioning due to multiple acute and chronic medical issues.     General info for SNF    Length of Stay Estimate: Short Term Care: Estimated # of Days <30  Condition at Discharge: Stable  Level of care:skilled   Rehabilitation Potential: Fair  Admission H&P remains valid and up-to-date: Yes  Recent Chemotherapy: N/A  Use Nursing Home Standing Orders: Yes     Mantoux instructions    Give two-step Mantoux (PPD) Per Facility Policy Yes     Activity - Up with assistive device     Follow Up and recommended labs and tests    Follow up with jail physician.  The following labs/tests are recommended: BMP and CBC.    Follow-up with Stevo Aguila on 9/12/2022 at 10:00 AM.     Physical Therapy Adult Consult    Evaluate and treat as clinically indicated.    Reason:  physical deconditioning     Occupational Therapy Adult Consult    Evaluate and treat as clinically indicated.    Reason:  physical deconditioning     Nutrition Services Adult IP Consult    Reason:  moderate malnutrition     Diet     Follow this diet upon discharge: Orders Placed This Encounter      Regular Diet Adult       Advance Diet as Tolerated    Follow this diet upon discharge: Orders Placed This Encounter      Regular Diet Adult      Diet         Significant Results and Procedures   Most Recent 3 CBC's:Recent Labs   Lab Test 08/13/22  0701 08/07/22  0904 08/05/22  0633   WBC 3.3* 2.2* 3.1*   HGB 12.3 12.7 11.9   MCV 87 89 89    75* 62*     Most Recent 3 BMP's:Recent Labs   Lab Test 08/07/22  0904 08/05/22  0529 08/04/22  1155    137 131*   POTASSIUM 3.4 3.6 3.9   CHLORIDE 106 106 105   CO2 28 24 19*   BUN 5* 10 14   CR 0.60 0.80 0.88   ANIONGAP 4 7 7   BERTA 8.5 8.4* 8.5   GLC 96 83 98     Most Recent 2 LFT's:Recent Labs   Lab Test 08/04/22  1155 02/12/17  2137   AST 49* 32   ALT 18 25   ALKPHOS 41 63   BILITOTAL 0.8 0.6     Most Recent 3 INR's:No lab results found.  Most Recent 3 Troponin's:No lab results found.  Most Recent 6 Bacteria Isolates From Any Culture (See EPIC Reports for Culture Details):Recent Labs   Lab Test 10/09/19  1103 11/28/17  1433 02/12/17  2315 02/12/17  2145 02/12/17  2137   CULT 50,000 to 100,000 colonies/mL  mixed urogenital rosalba  Susceptibility testing not routinely done   <10,000 colonies/mL  urogenital rosalba  Susceptibility testing not routinely done   No growth No growth No growth     Most Recent Urinalysis:Recent Labs   Lab Test 08/04/22  1437 11/28/17  1340 02/12/17  2145   COLOR Yellow  --  Yellow   APPEARANCE Clear  --  Clear   URINEGLC Negative   < > Negative   URINEBILI Negative  --  Small  This is an unconfirmed screening test result. A positive result may be false.  *   URINEKETONE 15 *   < > 15*   SG 1.015   < > >1.030   UBLD Large*  --  Moderate*   URINEPH 6.0   < > 5.5   PROTEIN 30 *  --  30*   UROBILINOGEN  --   --  1.0   NITRITE Negative   < > Negative   LEUKEST Negative  --  Negative   RBCU 77*  --  O - 2   WBCU 7*  --  O - 2    < > = values in this interval not displayed.   ,    Results for orders placed or performed during the hospital encounter of 08/04/22   CT Abdomen Pelvis w Contrast    Narrative    CT ABDOMEN PELVIS W CONTRAST 8/4/2022 1:16 PM    CLINICAL HISTORY: Nausea and vomiting.    TECHNIQUE: CT scan of the abdomen and pelvis was performed following  injection of IV contrast. Multiplanar reformats were obtained. Dose  reduction techniques were used.  CONTRAST: 65 mL Isovue-[id number]    COMPARISON: None.    FINDINGS:   LOWER CHEST: Mild bibasilar dependent atelectasis.    HEPATOBILIARY: Normal liver. Cholecystectomy. Mild dilatation of the  extrahepatic common bile ducts to the level of the pancreatic head,  likely related to postcholecystectomy reservoir effect. No calcified  stones in the duct.    PANCREAS: Evaluation slightly limited by breathing motion. Mild  diffuse dilatation of the main pancreatic duct measuring about 6 m. No  focal inflammatory changes. No definite pancreatic masses.    SPLEEN: Normal.    ADRENAL GLANDS: Normal.    KIDNEYS/BLADDER: Left renal pelvis calculus measuring 11 x 6 x 9 mm  (series 2, image 89) with mild surrounding inflammatory changes and  mild left hydronephrosis. A few other punctate nonobstructing left  renal calculi. Simple cyst at the lower pole of the left kidney  requiring no specific follow-up.  No right renal calculi, significant masses or hydronephrosis.    No focal wall thickening or surrounding inflammation.    BOWEL: No small bowel or colonic obstruction or inflammatory changes.  Normal appendix. Evaluation is slightly limited by breathing motion.    PELVIC ORGANS: Hysterectomy. No pelvic masses.    ADDITIONAL FINDINGS: No lymphadenopathy in the abdomen and pelvis. No  abdominal aortic aneurysm. No free fluid, fluid collections or  extraluminal air.    MUSCULOSKELETAL: Degenerative changes in the spine. No suspicious  lesions in the bones.      Impression    IMPRESSION:   1.  Left renal pelvis 11 x 6 x 9 mm staghorn calculus with  surrounding  inflammatory changes, likely an infected or inflamed calculus. Mild  left hydronephrosis. A few other punctate nonobstructing left renal  calculi.  2.  No other acute findings in the abdomen and pelvis.  3.  Mildly prominent main pancreatic duct without an obstructing mass  evident by CT.    SPIKE BILLINGS MD         SYSTEM ID:  [id number]   XR Chest Port 1 View    Narrative    EXAM: XR CHEST PORT 1 VIEW  LOCATION: [facility]  DATE/TIME: 8/4/2022 5:45 PM    INDICATION: COVID 19 positive.  COMPARISON: 02/12/2017.      Impression    IMPRESSION: Tortuous calcified thoracic aorta. Chest otherwise negative. Lungs clear.   CT Head w/o Contrast    Narrative    CT SCAN OF THE HEAD WITHOUT CONTRAST   8/5/2022 2:29 PM     HISTORY: Altered mental status.    TECHNIQUE:  Axial images of the head and coronal reformations without  IV contrast material. Radiation dose for this scan was reduced using  automated exposure control, adjustment of the mA and/or kV according  to patient size, or iterative reconstruction technique.    COMPARISON: None.    FINDINGS: Hyperattenuation along the floors of the bilateral middle  cranial fossae, probably artifactual. Otherwise, no evidence of  hemorrhage. Volume loss and white matter hypoattenuation likely  represent chronic small vessel ischemic change. No acute osseous  abnormality. Mild paranasal sinus mucosal thickening.      Impression    IMPRESSION:   1. No acute intracranial abnormality.  2. Hyperattenuation along the floors of the bilateral middle cranial  fossae, probably artifactual.     DANIELLE POLANCO MD         SYSTEM ID:  [id number]       Discharge Medications   Current Discharge Medication List      CONTINUE these medications which have NOT CHANGED    Details   butenafine (MENTAX) 1 % CREA cream Apply daily to abdomen rash  Qty: 30 g, Refills: 1    Associated Diagnoses: Tinea corporis; Skin plaque      calcium carbonate-vitamin D (OSCAL W/D)  500-200 MG-UNIT tablet Take 1 tablet by mouth daily      cetirizine (ZYRTEC) 10 MG tablet Take 10 mg by mouth daily as needed for allergies      clotrimazole (LOTRIMIN) 1 % external cream Apply topically 2 times daily as needed To groin rash      escitalopram (LEXAPRO) 10 MG tablet Take 5 mg by mouth daily      ferrous gluconate (FERGON) 324 (38 FE) MG tablet Take 1 tablet (324 mg) by mouth daily (with breakfast)  Qty: 90 tablet, Refills: 3    Associated Diagnoses: Health care maintenance      levothyroxine (SYNTHROID, LEVOTHROID) 75 MCG tablet Take 1 tablet (75 mcg) by mouth daily  Qty: 30 tablet, Refills: 3    Associated Diagnoses: Other specified hypothyroidism      loperamide (IMODIUM A-D) 2 MG tablet Start with 2 tabs (4 mg), then take one tab (2 mg) after each diarrheal stool.  Do not use more than  8 tabs (16 mg) per day.  Qty: 30 tablet, Refills: 0    Associated Diagnoses: Diarrhea, unspecified type      psyllium (METAMUCIL/KONSYL) 58.6 % powder Take 1 teaspoonful by mouth daily as needed for constipation      ACETAMINOPHEN PO Take 500 mg by mouth every 6 hours as needed for pain      Infant Care Products (JOHNSONS BABY WASH) LIQD Use for cleansing eyelids daily PRN for stye  Qty: 444 mL, Refills: 0    Associated Diagnoses: Hordeolum externum, unspecified laterality           Allergies   Allergies   Allergen Reactions     Penicillins      Bactrim [Sulfamethoxazole W/Trimethoprim] Itching     Patient prescribed Bactrim at eye appointment. Assisted living reports eyes were red and itching.

## 2022-08-18 NOTE — PROGRESS NOTES
Care Management Discharge Note    Discharge Date: 08/18/2022       Discharge Disposition: Transitional Care    Discharge Services:      Discharge DME:      Discharge Transportation: public transportation    Private pay costs discussed: transportation costs    PAS Confirmation Code: [id number]  Patient/family educated on Medicare website which has current facility and service quality ratings:      Education Provided on the Discharge Plan:    Persons Notified of Discharge Plans: pt's son, [facility] admissions, provider, bedside nurse  Patient/Family in Agreement with the Plan:      Handoff Referral Completed: Yes    Additional Information:  Pt to discharge today at  11:30 am via  [facility] transport. Waiting on d\c orders. ADELE updated pt's  Carmen via  regarding the discharge planning. Call back from carmen reporting that she was wondering what was the reasoning that pt needed TCU. ADELE reviewed the therapy notes and expressed pt's current level of orientation. Carmen appreciated the follow up call. Carmen requested the discharge summary and ADELE let her know she would need to request it through medical records. ADELE faxed the discharge orders to [facility] admissions.        EUSEBIO Reyna

## 2022-08-19 NOTE — PLAN OF CARE
Physical Therapy Discharge Summary    Reason for therapy discharge:    Discharged to transitional care facility.    Progress towards therapy goal(s). See goals on Care Plan in [facility] electronic health record for goal details.  Goals partially met.  Barriers to achieving goals:   discharge from facility.    Therapy recommendation(s):    Continued therapy is recommended.  Rationale/Recommendations: Pt is below baseline functional independence, fall risk, requires assist of 1 for all mobility, transfers, using walker for ambulation for limited distance at this time. Anticipate pt may return home if assist of 1 and supervision is available at [facility] due to fall risk, decreased strength, impaired balance. Pt would benefit from Home PT. If assist is not able to be provided, pt would benefit from continued skilled therapy to progress functional strength, independence prior to returning home to [facility].  PT Brief overview of current status: min-modA OOB activities. Bed mobility CGA

## 2022-08-19 NOTE — PROGRESS NOTES
Occupational Therapy Discharge Summary    Reason for therapy discharge:    Discharged to transitional care facility.    Progress towards therapy goal(s). See goals on Care Plan in [facility] electronic health record for goal details.  Goals partially met.  Barriers to achieving goals:   discharge from facility.    Therapy recommendation(s):    Continued therapy is recommended.  Rationale/Recommendations:  below baseline function in self care and ADL. requires TCU for safety and functional indp for ADL.

## 2022-09-02 ENCOUNTER — TELEPHONE (OUTPATIENT)
Dept: FAMILY MEDICINE | Facility: CLINIC | Age: 82
End: 2022-09-02

## 2022-09-02 NOTE — TELEPHONE ENCOUNTER
Called and gave verbal order for home care and delaying start of care until 9/4 due to their staffing.   -Kassie Wyatt MD

## 2022-09-02 NOTE — TELEPHONE ENCOUNTER
[facility] phone call message- patient requesting to speak with PCP or provider:    PCP: Kassie Wyatt    Additional Comments: Melissa called and wanted to know if you could follow for home care for this patient. The patient is transitioning from the TCU today (09/02) and they are hoping to get home care set up. Melissa would like a call back today or Tuesday (09/06).     Is a call back needed? Yes    Patient informed that it may take up to 2 business days to hear back from PCP:Yes    OK to leave a message on voice mail? Yes    Primary language: English      needed? No    Call taken on September 2, 2022 at 9:49 AM by Veronica Hamm

## 2022-09-02 NOTE — TELEPHONE ENCOUNTER
[facility] phone call message- patient requesting to speak with PCP or provider:    PCP: Kassie Wyatt    Additional Comments: Also seeking verbal order for delay in starting home nursing care    Is a call back needed? Yes    Patient informed that it may take up to 2 business days to hear back from PCP:Yes    OK to leave a message on voice mail? Yes    Primary language: English      needed? No    Call taken on September 2, 2022 at 12:33 PM by Husam Young

## 2022-09-08 ENCOUNTER — TELEPHONE (OUTPATIENT)
Dept: FAMILY MEDICINE | Facility: CLINIC | Age: 82
End: 2022-09-08

## 2022-09-08 NOTE — TELEPHONE ENCOUNTER
Called and left detailed message on identified, confidential voicemail with verbal order for OT per the request.    Nargis Muñoz RN

## 2022-09-08 NOTE — TELEPHONE ENCOUNTER
[facility] phone call message - order or referral request for patient:     Order or referral being requested: Verbal order for occupational therapy  Week 1 x1  Week 2 x2  Week  3 x1      Additional Comments: Trista @ [phone]    OK to leave a message on voice mail? Yes    Primary language: English      needed? No    Call taken on September 8, 2022 at 10:35 AM by Debbi Campbell

## 2022-09-08 NOTE — TELEPHONE ENCOUNTER
[facility] phone call message - order or referral request for patient:     Order or referral being requested: Continuation of home PT 2x a week for 3 weeks      Additional Comments: Callum from [employer]    OK to leave a message on voice mail? Yes    Primary language: English      needed? No    Call taken on September 8, 2022 at 1:36 PM by Husam Young

## 2022-09-09 ENCOUNTER — DOCUMENTATION ONLY (OUTPATIENT)
Dept: FAMILY MEDICINE | Facility: CLINIC | Age: 82
End: 2022-09-09

## 2022-09-09 NOTE — PROGRESS NOTES
"When opening a documentation only encounter, be sure to enter in \"Chief Complaint\" Forms and in \" Comments\" Title of form, description if needed.    Serge is a 82 year old  female  Form received via: Fax  Form now resides in: Provider Ready    Sebastián Phillips MA                  "

## 2022-09-12 ENCOUNTER — OFFICE VISIT (OUTPATIENT)
Dept: UROLOGY | Facility: CLINIC | Age: 82
End: 2022-09-12
Payer: COMMERCIAL

## 2022-09-12 VITALS
WEIGHT: 124 LBS | SYSTOLIC BLOOD PRESSURE: 120 MMHG | HEART RATE: 81 BPM | OXYGEN SATURATION: 97 % | HEIGHT: 61 IN | DIASTOLIC BLOOD PRESSURE: 58 MMHG | BODY MASS INDEX: 23.41 KG/M2

## 2022-09-12 DIAGNOSIS — N20.0 KIDNEY STONE: ICD-10-CM

## 2022-09-12 DIAGNOSIS — N20.0 KIDNEY STONE: Primary | ICD-10-CM

## 2022-09-12 PROCEDURE — 99214 OFFICE O/P EST MOD 30 MIN: CPT | Performed by: UROLOGY

## 2022-09-12 ASSESSMENT — PAIN SCALES - GENERAL: PAINLEVEL: NO PAIN (0)

## 2022-09-12 NOTE — LETTER
9/12/2022       RE: Serge Marin  [address]  [address]  [place] [address]     Dear Colleague,    Thank you for referring your patient, Serge Marin, to the [facility] at [facility]. Please see a copy of my visit note below.    Name: Serge Marin   MRN: [id number]  YOB: 1940    Assessment and Plan:  82 year old female with an 11 mm left renal pelvis stone    1. Kidney stone  We discussed observation, shock wave lithotripsy, ureteroscopy and percutaneous nephrolithotomy as the main surgical approaches to upper urinary tract stones.  We reviewed risks and benefits including but not limited to the following: bleeding, infection, damage to adjacent organs, ureteral stricture, need for staged treatment, incomplete stone removal.    Discussed this stone cannot be dissolved.    Ultimately the patient has elected to proceed with ___ because of ___    Plan:  - 90 min left ureteroscopy at Juju Talbot MD  September 12, 2022         Chief Complaint: left renal stone    History of Present Illness:  Serge Marin is a 82 year old female seen in consultation for discussion of a left renal stone.      The current episode was first found due to recent hospitalization for UTI and Covid.  CT performed on 8/4/2022 (images personally reviewed) demonstrated:  Right Kidney: no stones  Left Kidney: 11 x 6 x 9 mm renal pelvis stone, large left renal simple cyst  Additional relevant findings: none    Currently, they are experiencing no flank pain, no nausea, no vomiting, no hematuria, or no dysuria.      Patient was a poor historian.    Pertinent stone history:  -- Personal stone history  -- Family stone history    Pertinent stone medical history  -- Diabetes  -- Neurologic disease limiting mobility   -- Osteoporosis  -- Gout  -- Gastric bypass surgery  -- Sarcoidosis  -- Inflammatory bowel disease  -- History of  non-stone  surgery     Pertinent medications:  -- Antiplatelet  -- Anticoagulant  -- Topiramate   -- Thiazaide  -- Potassium supplement      I reviewed internal labs, of which pertinent ones include:   Hemoglobin   Date Value Ref Range Status   08/13/2022 12.3 11.7 - 15.7 g/dL Final   02/16/2017 14.2 11.7 - 15.7 g/dL Final     Potassium   Date Value Ref Range Status   08/07/2022 3.4 3.4 - 5.3 mmol/L Final   02/16/2017 3.5 3.4 - 5.3 mmol/L Final     Creatinine   Date Value Ref Range Status   08/07/2022 0.60 0.52 - 1.04 mg/dL Final   02/16/2017 0.79 0.52 - 1.04 mg/dL Final     pH Urine   Date Value Ref Range Status   08/04/2022 6.0 5.0 - 7.0 Final   10/09/2019 5.5 4.5 - 8.0 Final       I reviewed internal records which in summarized above.         Past Medical History:  Past Medical History:   Diagnosis Date     Hernia, abdominal             Past Surgical History:  Past Surgical History:   Procedure Laterality Date     CHOLECYSTECTOMY       COLONOSCOPY Left 03/18/2015    Procedure: COMBINED COLONOSCOPY, SINGLE OR MULTIPLE BIOPSY/POLYPECTOMY BY BIOPSY;  Surgeon: Stone Lauren MD;  Location:  GI     GYN SURGERY       HERNIA REPAIR              Social History:  Social History     Tobacco Use     Smoking status: Never Smoker     Smokeless tobacco: Never Used   Substance Use Topics     Alcohol use: No     Drug use: No            Family History:  History reviewed. No pertinent family history.         Allergies:  Allergies   Allergen Reactions     Penicillins      Bactrim [Sulfamethoxazole W/Trimethoprim] Itching     Patient prescribed Bactrim at eye appointment. Assisted living reports eyes were red and itching.             Medications:  Current Outpatient Medications   Medication Sig     ACETAMINOPHEN PO Take 500 mg by mouth every 6 hours as needed for pain     butenafine (MENTAX) 1 % CREA cream Apply daily to abdomen rash (Patient taking differently: Apply topically every morning Apply daily to abdomen rash)  "    calcium carbonate-vitamin D (OSCAL W/D) 500-200 MG-UNIT tablet Take 1 tablet by mouth daily     cetirizine (ZYRTEC) 10 MG tablet Take 10 mg by mouth daily as needed for allergies     clotrimazole (LOTRIMIN) 1 % external cream Apply topically 2 times daily as needed To groin rash     escitalopram (LEXAPRO) 10 MG tablet Take 5 mg by mouth daily     ferrous gluconate (FERGON) 324 (38 FE) MG tablet Take 1 tablet (324 mg) by mouth daily (with breakfast)     Infant Care Products (JOHNSONS BABY WASH) LIQD Use for cleansing eyelids daily PRN for stye     levothyroxine (SYNTHROID, LEVOTHROID) 75 MCG tablet Take 1 tablet (75 mcg) by mouth daily (Patient taking differently: Take 75 mcg by mouth daily before breakfast)     loperamide (IMODIUM A-D) 2 MG tablet Start with 2 tabs (4 mg), then take one tab (2 mg) after each diarrheal stool.  Do not use more than  8 tabs (16 mg) per day. (Patient taking differently: Take 2 mg by mouth daily as needed Start with 2 tabs (4 mg), then take one tab (2 mg) after each diarrheal stool.  Do not use more than  8 tabs (16 mg) per day.)     psyllium (METAMUCIL/KONSYL) 58.6 % powder Take 1 teaspoonful by mouth daily as needed for constipation     No current facility-administered medications for this visit.       Review of Systems:   ROS: 10 point ROS neg other than the symptoms noted above in the HPI.    Physical Exam:  B/P: 120/58, T: Data Unavailable, P: 81, R: Data Unavailable  Estimated body mass index is 23.43 kg/m  as calculated from the following:    Height as of this encounter: 1.549 m (5' 1\").    Weight as of this encounter: 56.2 kg (124 lb).  General Appearance Adult: Alert, no acute distress, oriented  Lungs: no respiratory distress, or pursed lip breathing  Neuro: Alert, oriented, speech and mentation normal  Psych: affect and mood normal    Outside records:   I spent 0 minutes reviewing outside records.    "

## 2022-09-12 NOTE — PROGRESS NOTES
Name: Serge Marin   MRN: [id number]  YOB: 1940    Assessment and Plan:  82 year old female with an 11 mm left renal pelvis stone    1. Kidney stone  We discussed observation, shock wave lithotripsy, ureteroscopy and percutaneous nephrolithotomy as the main surgical approaches to upper urinary tract stones.  We reviewed risks and benefits including but not limited to the following: bleeding, infection, damage to adjacent organs, ureteral stricture, need for staged treatment, incomplete stone removal.    Discussed this stone cannot be dissolved.    Ultimately the patient has elected to proceed with left ureteroscopy because of less invasive procedure.    Will attempt to contact her son to discuss plan.    Plan:  - 90 min left ureteroscopy at [facility]    ADDENDUM 10/5/2022: I spoke with patient's son Rogers.  After rediscussing options, felt best option was a PCNL due to much higher chance of being stone free.  He will discuss this with the patient and we will get this scheduled.    Stevo Talbot MD  September 12, 2022         Chief Complaint: left renal stone    History of Present Illness:  Serge Marin is a 82 year old female seen in consultation for discussion of a left renal stone.      The current episode was first found due to recent hospitalization for UTI and Covid.  CT performed on 8/4/2022 (images personally reviewed) demonstrated:  Right Kidney: no stones  Left Kidney: 11 x 6 x 9 mm renal pelvis stone, large left renal simple cyst  Additional relevant findings: none    Currently, they are experiencing no flank pain, no nausea, no vomiting, no hematuria, or no dysuria.      Patient was a poor historian.    Pertinent stone history:  -- Personal stone history  -- Family stone history    Pertinent stone medical history  -- Diabetes  -- Neurologic disease limiting mobility   -- Osteoporosis  -- Gout  -- Gastric bypass surgery  -- Sarcoidosis  -- Inflammatory bowel disease  -- History  of non-stone  surgery     Pertinent medications:  -- Antiplatelet  -- Anticoagulant  -- Topiramate   -- Thiazaide  -- Potassium supplement      I reviewed internal labs, of which pertinent ones include:   Hemoglobin   Date Value Ref Range Status   08/13/2022 12.3 11.7 - 15.7 g/dL Final   02/16/2017 14.2 11.7 - 15.7 g/dL Final     Potassium   Date Value Ref Range Status   08/07/2022 3.4 3.4 - 5.3 mmol/L Final   02/16/2017 3.5 3.4 - 5.3 mmol/L Final     Creatinine   Date Value Ref Range Status   08/07/2022 0.60 0.52 - 1.04 mg/dL Final   02/16/2017 0.79 0.52 - 1.04 mg/dL Final     pH Urine   Date Value Ref Range Status   08/04/2022 6.0 5.0 - 7.0 Final   10/09/2019 5.5 4.5 - 8.0 Final       I reviewed internal records which in summarized above.         Past Medical History:  Past Medical History:   Diagnosis Date     Hernia, abdominal             Past Surgical History:  Past Surgical History:   Procedure Laterality Date     CHOLECYSTECTOMY       COLONOSCOPY Left 03/18/2015    Procedure: COMBINED COLONOSCOPY, SINGLE OR MULTIPLE BIOPSY/POLYPECTOMY BY BIOPSY;  Surgeon: Stone Lauren MD;  Location:  GI     GYN SURGERY       HERNIA REPAIR              Social History:  Social History     Tobacco Use     Smoking status: Never Smoker     Smokeless tobacco: Never Used   Substance Use Topics     Alcohol use: No     Drug use: No            Family History:  History reviewed. No pertinent family history.         Allergies:  Allergies   Allergen Reactions     Penicillins      Bactrim [Sulfamethoxazole W/Trimethoprim] Itching     Patient prescribed Bactrim at eye appointment. Assisted living reports eyes were red and itching.             Medications:  Current Outpatient Medications   Medication Sig     ACETAMINOPHEN PO Take 500 mg by mouth every 6 hours as needed for pain     butenafine (MENTAX) 1 % CREA cream Apply daily to abdomen rash (Patient taking differently: Apply topically every morning Apply daily to abdomen  "rash)     calcium carbonate-vitamin D (OSCAL W/D) 500-200 MG-UNIT tablet Take 1 tablet by mouth daily     cetirizine (ZYRTEC) 10 MG tablet Take 10 mg by mouth daily as needed for allergies     clotrimazole (LOTRIMIN) 1 % external cream Apply topically 2 times daily as needed To groin rash     escitalopram (LEXAPRO) 10 MG tablet Take 5 mg by mouth daily     ferrous gluconate (FERGON) 324 (38 FE) MG tablet Take 1 tablet (324 mg) by mouth daily (with breakfast)     Infant Care Products (JOHNSONS BABY WASH) LIQD Use for cleansing eyelids daily PRN for stye     levothyroxine (SYNTHROID, LEVOTHROID) 75 MCG tablet Take 1 tablet (75 mcg) by mouth daily (Patient taking differently: Take 75 mcg by mouth daily before breakfast)     loperamide (IMODIUM A-D) 2 MG tablet Start with 2 tabs (4 mg), then take one tab (2 mg) after each diarrheal stool.  Do not use more than  8 tabs (16 mg) per day. (Patient taking differently: Take 2 mg by mouth daily as needed Start with 2 tabs (4 mg), then take one tab (2 mg) after each diarrheal stool.  Do not use more than  8 tabs (16 mg) per day.)     psyllium (METAMUCIL/KONSYL) 58.6 % powder Take 1 teaspoonful by mouth daily as needed for constipation     No current facility-administered medications for this visit.       Review of Systems:   ROS: 10 point ROS neg other than the symptoms noted above in the HPI.    Physical Exam:  B/P: 120/58, T: Data Unavailable, P: 81, R: Data Unavailable  Estimated body mass index is 23.43 kg/m  as calculated from the following:    Height as of this encounter: 1.549 m (5' 1\").    Weight as of this encounter: 56.2 kg (124 lb).  General Appearance Adult: Alert, no acute distress, oriented  Lungs: no respiratory distress, or pursed lip breathing  Neuro: Alert, oriented, speech and mentation normal  Psych: affect and mood normal    Outside records:   I spent 0 minutes reviewing outside records.    "

## 2022-09-12 NOTE — NURSING NOTE
Chief Complaint   Patient presents with     Kidney Problem     Stone follow up   Patient has trouble hearing. Most of the history she does not hear/ understand due to no hearing aides. Patient could not leave a urine specimen.  Lillie Garcia LPN

## 2022-09-13 ENCOUNTER — TELEPHONE (OUTPATIENT)
Dept: FAMILY MEDICINE | Facility: CLINIC | Age: 82
End: 2022-09-13

## 2022-09-13 DIAGNOSIS — R53.81 PHYSICAL DECONDITIONING: Primary | ICD-10-CM

## 2022-09-13 NOTE — TELEPHONE ENCOUNTER
[facility] phone call message- general phone call:    Reason for call: Trista from [facility] called to request an order to be placed for a bed assist rail. She wants the order to be sent to [facility], fax number: [phone]. Trista can be reached at [phone]    Return call needed: No    OK to leave a message on voice mail? Yes    Primary language: English      needed? No    Call taken on September 13, 2022 at 9:25 AM by Renzo Carroll

## 2022-09-15 ENCOUNTER — DOCUMENTATION ONLY (OUTPATIENT)
Dept: FAMILY MEDICINE | Facility: CLINIC | Age: 82
End: 2022-09-15

## 2022-09-15 NOTE — PROGRESS NOTES
"When opening a documentation only encounter, be sure to enter in \"Chief Complaint\" Forms and in \" Comments\" Title of form, description if needed.    Serge is a 82 year old  female  Form received via: Fax  Form now resides in: Provider Yanni Varghese MA                  "

## 2022-09-16 ENCOUNTER — MEDICAL CORRESPONDENCE (OUTPATIENT)
Dept: HEALTH INFORMATION MANAGEMENT | Facility: CLINIC | Age: 82
End: 2022-09-16

## 2022-09-19 ENCOUNTER — MEDICAL CORRESPONDENCE (OUTPATIENT)
Dept: HEALTH INFORMATION MANAGEMENT | Facility: CLINIC | Age: 82
End: 2022-09-19

## 2022-09-20 ENCOUNTER — DOCUMENTATION ONLY (OUTPATIENT)
Dept: FAMILY MEDICINE | Facility: CLINIC | Age: 82
End: 2022-09-20

## 2022-09-20 NOTE — TELEPHONE ENCOUNTER
Bed assist rails (half rails) ordered.   Please fax to: [facility], fax number: [phone].

## 2022-09-20 NOTE — TELEPHONE ENCOUNTER
9/20/22 Order for bed rails faxed to [facility] at [phone]. Successful send.      Veronica Monique  Care Coordinator  [facility]  [phone]

## 2022-09-23 ENCOUNTER — DOCUMENTATION ONLY (OUTPATIENT)
Dept: FAMILY MEDICINE | Facility: CLINIC | Age: 82
End: 2022-09-23

## 2022-09-23 NOTE — PROGRESS NOTES
Form has been completed by provider.     Form sent out via: Fax to [employer] at Fax Number: [phone]  Patient informed: N/A  Output date: September 23, 2022    Lashonda Varghese MA      **Please close the encounter**

## 2022-09-23 NOTE — PROGRESS NOTES
Form has been completed by provider.     Form sent out via: Fax to [facility] at Fax Number: [phone]  Patient informed: N/A  Output date: September 23, 2022    Lashonda Varghese MA      **Please close the encounter**

## 2022-09-23 NOTE — PROGRESS NOTES
Form has been completed by provider.     Form sent out via: Fax to [employer] at Fax Number: [phone]  Patient informed: N/A  Output date: September 23, 2022    Lashonda Varghese MA      **Please close the encounter**

## 2022-09-23 NOTE — PROGRESS NOTES
Form has been completed by provider.     Form sent out via: Fax to [facility] at Fax Number: [phone]  Patient informed: N/A  Output date: September 23, 2022    Lashonda Varghese MA      **Please close the encounter**

## 2022-09-23 NOTE — PROGRESS NOTES
"When opening a documentation only encounter, be sure to enter in \"Chief Complaint\" Forms and in \" Comments\" Title of form, description if needed.    Serge is a 82 year old  female  Form received via: Fax  Form now resides in: Provider Ready    Lsahonda Varghese MA          Form has been completed by provider.     Form sent out via: Fax to [employer] at Fax Number: [phone]  Patient informed: N/A  Output date: September 23, 2022    Lashonda Varghese MA      **Please close the encounter**            "

## 2022-09-23 NOTE — PROGRESS NOTES
"When opening a documentation only encounter, be sure to enter in \"Chief Complaint\" Forms and in \" Comments\" Title of form, description if needed.    Serge is a 82 year old  female  Form received via: Fax  Form now resides in: Provider Ready    Lashonda Varghese MA                Form has been completed by provider.     Form sent out via: Fax to [employer] at Fax Number: [phone]  Patient informed: N/A  Output date: September 23, 2022    Lashonda Varghese MA      **Please close the encounter**      "

## 2022-09-27 ENCOUNTER — TELEPHONE (OUTPATIENT)
Dept: FAMILY MEDICINE | Facility: CLINIC | Age: 82
End: 2022-09-27

## 2022-09-27 DIAGNOSIS — R53.81 PHYSICAL DECONDITIONING: ICD-10-CM

## 2022-09-27 DIAGNOSIS — N20.0 KIDNEY STONE: ICD-10-CM

## 2022-09-27 DIAGNOSIS — K52.9 CHRONIC DIARRHEA: Primary | ICD-10-CM

## 2022-09-27 NOTE — TELEPHONE ENCOUNTER
[facility] phone call message- general phone call:    Reason for call: Carmen from [facility] called to request an order for Pt. The orders are: 4 wheel rocker, Pull ups, Rx refill from  (last refill is from 2021). Orders faxed to [facility] at [phone].     Return call needed: Yes - for prescription clarification.     OK to leave a message on voice mail? Yes    Primary language: English      needed? No    Call taken on September 27, 2022 at 9:29 AM by Renzo Carroll

## 2022-09-28 ENCOUNTER — DOCUMENTATION ONLY (OUTPATIENT)
Dept: FAMILY MEDICINE | Facility: CLINIC | Age: 82
End: 2022-09-28

## 2022-09-28 NOTE — TELEPHONE ENCOUNTER
1:28p.m both DME orders faxed to [facility] [phone]. Successful send.      Veronica Monique  Care Coordinator  [facility]  [phone]

## 2022-09-28 NOTE — TELEPHONE ENCOUNTER
Team,   Please fax to [facility]      Diagnoses and all orders for this visit:    Chronic diarrhea  Kidney stone  -     Incontinence Supplies Order for DME - ONLY FOR DME    Physical deconditioning  -     Incontinence Supplies Order for DME - ONLY FOR DME  -     Walker Order for DME - ONLY FOR DME    -Kassie Wyatt MD

## 2022-09-29 ENCOUNTER — DOCUMENTATION ONLY (OUTPATIENT)
Dept: FAMILY MEDICINE | Facility: CLINIC | Age: 82
End: 2022-09-29

## 2022-09-29 NOTE — PROGRESS NOTES
"When opening a documentation only encounter, be sure to enter in \"Chief Complaint\" Forms and in \" Comments\" Title of form, description if needed.    Serge is a 82 year old  female  Form received via: Fax  Form now resides in: Provider Ready    Lashonda Varghese MA          Form has been completed by provider.     [facility] Number[facility] [phone]  Patient informed: N/A  Output date: September 29, 2022    Lashonda Varghese MA      **Please close the encounter**            "

## 2022-10-03 ENCOUNTER — TELEPHONE (OUTPATIENT)
Dept: UROLOGY | Facility: CLINIC | Age: 82
End: 2022-10-03

## 2022-10-03 NOTE — PROGRESS NOTES
Form has been completed by provider.     Form sent out via: Fax to [facility] at Fax Number: [phone]  Patient informed: N/A  Output date: October 3, 2022    Lashonda Varghese MA      **Please close the encounter**

## 2022-10-03 NOTE — TELEPHONE ENCOUNTER
At the request of provider I reached out to patient's son, Delfina.    Delfina states his mom is her own medical decision maker at this time, but they do make healthcare decisions together. He further stated that she is very hard of hearing.    Pt would be available for a call from Dr. Talbot 10/5/22 at 1130, after Dr. Talbot's morning case.     Message routed to provider and RN DEVEN.    Keyonna Jiménez CMA  10/03/22  10:29 AM

## 2022-10-04 ENCOUNTER — DOCUMENTATION ONLY (OUTPATIENT)
Dept: FAMILY MEDICINE | Facility: CLINIC | Age: 82
End: 2022-10-04

## 2022-10-04 NOTE — PROGRESS NOTES
"When opening a documentation only encounter, be sure to enter in \"Chief Complaint\" Forms and in \" Comments\" Title of form, description if needed.    Serge is a 82 year old  female  Form received via: Fax  Form now resides in: Provider Ready    Nargis Muñoz RN                  "

## 2022-10-19 ENCOUNTER — TELEPHONE (OUTPATIENT)
Dept: CARE COORDINATION | Facility: CLINIC | Age: 82
End: 2022-10-19

## 2022-10-19 ENCOUNTER — APPOINTMENT (OUTPATIENT)
Dept: CT IMAGING | Facility: CLINIC | Age: 82
DRG: 542 | End: 2022-10-19
Attending: EMERGENCY MEDICINE
Payer: COMMERCIAL

## 2022-10-19 ENCOUNTER — HOSPITAL ENCOUNTER (INPATIENT)
Facility: CLINIC | Age: 82
LOS: 2 days | Discharge: HOME HEALTH | DRG: 542 | End: 2022-10-21
Attending: EMERGENCY MEDICINE | Admitting: SURGERY
Payer: COMMERCIAL

## 2022-10-19 ENCOUNTER — APPOINTMENT (OUTPATIENT)
Dept: GENERAL RADIOLOGY | Facility: CLINIC | Age: 82
DRG: 542 | End: 2022-10-19
Attending: EMERGENCY MEDICINE
Payer: COMMERCIAL

## 2022-10-19 ENCOUNTER — OFFICE VISIT (OUTPATIENT)
Dept: FAMILY MEDICINE | Facility: CLINIC | Age: 82
End: 2022-10-19
Payer: COMMERCIAL

## 2022-10-19 VITALS
SYSTOLIC BLOOD PRESSURE: 144 MMHG | HEART RATE: 67 BPM | OXYGEN SATURATION: 96 % | TEMPERATURE: 97.9 F | WEIGHT: 110.2 LBS | DIASTOLIC BLOOD PRESSURE: 79 MMHG | BODY MASS INDEX: 20.82 KG/M2

## 2022-10-19 DIAGNOSIS — S22.080A COMPRESSION FRACTURE OF T11 VERTEBRA, INITIAL ENCOUNTER (H): ICD-10-CM

## 2022-10-19 DIAGNOSIS — R41.82 ACUTE ALTERATION IN MENTAL STATUS: Primary | ICD-10-CM

## 2022-10-19 DIAGNOSIS — R41.0 CONFUSION: ICD-10-CM

## 2022-10-19 DIAGNOSIS — S02.113A: ICD-10-CM
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
## 2022-10-19 DIAGNOSIS — S06.5XAA SDH (SUBDURAL HEMATOMA) (H): Primary | ICD-10-CM

## 2022-10-19 DIAGNOSIS — S22.080A COMPRESSION FRACTURE OF T12 VERTEBRA, INITIAL ENCOUNTER (H): ICD-10-CM

## 2022-10-19 DIAGNOSIS — R41.3 MEMORY LOSS: ICD-10-CM

## 2022-10-19 DIAGNOSIS — S32.010A COMPRESSION FRACTURE OF L1 LUMBAR VERTEBRA, CLOSED, INITIAL ENCOUNTER (H): ICD-10-CM

## 2022-10-19 LAB
ALBUMIN SERPL-MCNC: 3.6 G/DL (ref 3.4–5)
ALP SERPL-CCNC: 79 U/L (ref 40–150)
ALT SERPL W P-5'-P-CCNC: 12 U/L (ref 0–50)
ANION GAP SERPL CALCULATED.3IONS-SCNC: 8 MMOL/L (ref 3–14)
APTT PPP: 23 SECONDS (ref 22–38)
AST SERPL W P-5'-P-CCNC: 17 U/L (ref 0–45)
BASOPHILS # BLD AUTO: 0 10E3/UL (ref 0–0.2)
BASOPHILS NFR BLD AUTO: 1 %
BILIRUB SERPL-MCNC: 0.5 MG/DL (ref 0.2–1.3)
BUN SERPL-MCNC: 13 MG/DL (ref 7–30)
CALCIUM SERPL-MCNC: 9.7 MG/DL (ref 8.5–10.1)
CHLORIDE BLD-SCNC: 109 MMOL/L (ref 94–109)
CK SERPL-CCNC: 84 U/L (ref 30–225)
CO2 SERPL-SCNC: 24 MMOL/L (ref 20–32)
CREAT SERPL-MCNC: 0.8 MG/DL (ref 0.52–1.04)
EOSINOPHIL # BLD AUTO: 0 10E3/UL (ref 0–0.7)
EOSINOPHIL NFR BLD AUTO: 1 %
ERYTHROCYTE [DISTWIDTH] IN BLOOD BY AUTOMATED COUNT: 12.6 % (ref 10–15)
GFR SERPL CREATININE-BSD FRML MDRD: 73 ML/MIN/1.73M2
GLUCOSE BLD-MCNC: 93 MG/DL (ref 70–99)
GLUCOSE BLDC GLUCOMTR-MCNC: 107 MG/DL (ref 70–99)
HCT VFR BLD AUTO: 36.3 % (ref 35–47)
HGB BLD-MCNC: 12.2 G/DL (ref 11.7–15.7)
HOLD SPECIMEN: NORMAL
IMM GRANULOCYTES # BLD: 0 10E3/UL
IMM GRANULOCYTES NFR BLD: 0 %
INR PPP: 1.04 (ref 0.85–1.15)
LYMPHOCYTES # BLD AUTO: 0.9 10E3/UL (ref 0.8–5.3)
LYMPHOCYTES NFR BLD AUTO: 20 %
MCH RBC QN AUTO: 29.8 PG (ref 26.5–33)
MCHC RBC AUTO-ENTMCNC: 33.6 G/DL (ref 31.5–36.5)
MCV RBC AUTO: 89 FL (ref 78–100)
MONOCYTES # BLD AUTO: 0.3 10E3/UL (ref 0–1.3)
MONOCYTES NFR BLD AUTO: 7 %
NEUTROPHILS # BLD AUTO: 3.3 10E3/UL (ref 1.6–8.3)
NEUTROPHILS NFR BLD AUTO: 71 %
NRBC # BLD AUTO: 0 10E3/UL
NRBC BLD AUTO-RTO: 0 /100
PLATELET # BLD AUTO: 135 10E3/UL (ref 150–450)
POTASSIUM BLD-SCNC: 4.1 MMOL/L (ref 3.4–5.3)
PROT SERPL-MCNC: 7.2 G/DL (ref 6.8–8.8)
RBC # BLD AUTO: 4.09 10E6/UL (ref 3.8–5.2)
SARS-COV-2 RNA RESP QL NAA+PROBE: NEGATIVE
SODIUM SERPL-SCNC: 141 MMOL/L (ref 133–144)
TROPONIN I SERPL HS-MCNC: 6 NG/L
TROPONIN I SERPL HS-MCNC: 7 NG/L
TSH SERPL DL<=0.005 MIU/L-ACNC: 2.83 MU/L (ref 0.4–4)
WBC # BLD AUTO: 4.6 10E3/UL (ref 4–11)

## 2022-10-19 PROCEDURE — 99285 EMERGENCY DEPT VISIT HI MDM: CPT | Mod: 25 | Performed by: EMERGENCY MEDICINE

## 2022-10-19 PROCEDURE — 85610 PROTHROMBIN TIME: CPT | Performed by: EMERGENCY MEDICINE

## 2022-10-19 PROCEDURE — 36415 COLL VENOUS BLD VENIPUNCTURE: CPT | Performed by: EMERGENCY MEDICINE

## 2022-10-19 PROCEDURE — 72125 CT NECK SPINE W/O DYE: CPT

## 2022-10-19 PROCEDURE — 70450 CT HEAD/BRAIN W/O DYE: CPT

## 2022-10-19 PROCEDURE — 93005 ELECTROCARDIOGRAM TRACING: CPT | Performed by: EMERGENCY MEDICINE

## 2022-10-19 PROCEDURE — 200N000002 HC R&B ICU UMMC

## 2022-10-19 PROCEDURE — 84484 ASSAY OF TROPONIN QUANT: CPT | Performed by: EMERGENCY MEDICINE

## 2022-10-19 PROCEDURE — C9803 HOPD COVID-19 SPEC COLLECT: HCPCS | Performed by: EMERGENCY MEDICINE

## 2022-10-19 PROCEDURE — 99215 OFFICE O/P EST HI 40 MIN: CPT | Mod: GC | Performed by: STUDENT IN AN ORGANIZED HEALTH CARE EDUCATION/TRAINING PROGRAM

## 2022-10-19 PROCEDURE — 84443 ASSAY THYROID STIM HORMONE: CPT

## 2022-10-19 PROCEDURE — 82550 ASSAY OF CK (CPK): CPT | Performed by: EMERGENCY MEDICINE

## 2022-10-19 PROCEDURE — 93010 ELECTROCARDIOGRAM REPORT: CPT | Performed by: EMERGENCY MEDICINE

## 2022-10-19 PROCEDURE — U0003 INFECTIOUS AGENT DETECTION BY NUCLEIC ACID (DNA OR RNA); SEVERE ACUTE RESPIRATORY SYNDROME CORONAVIRUS 2 (SARS-COV-2) (CORONAVIRUS DISEASE [COVID-19]), AMPLIFIED PROBE TECHNIQUE, MAKING USE OF HIGH THROUGHPUT TECHNOLOGIES AS DESCRIBED BY CMS-2020-01-R: HCPCS | Performed by: EMERGENCY MEDICINE

## 2022-10-19 PROCEDURE — 85025 COMPLETE CBC W/AUTO DIFF WBC: CPT | Performed by: EMERGENCY MEDICINE

## 2022-10-19 PROCEDURE — 71046 X-RAY EXAM CHEST 2 VIEWS: CPT

## 2022-10-19 PROCEDURE — 99233 SBSQ HOSP IP/OBS HIGH 50: CPT | Performed by: INTERNAL MEDICINE

## 2022-10-19 PROCEDURE — 80053 COMPREHEN METABOLIC PANEL: CPT | Performed by: EMERGENCY MEDICINE

## 2022-10-19 PROCEDURE — 85730 THROMBOPLASTIN TIME PARTIAL: CPT | Performed by: EMERGENCY MEDICINE

## 2022-10-19 RX ORDER — DEXTROSE MONOHYDRATE 25 G/50ML
25-50 INJECTION, SOLUTION INTRAVENOUS
Status: DISCONTINUED | OUTPATIENT
Start: 2022-10-19 | End: 2022-10-21 | Stop reason: HOSPADM

## 2022-10-19 RX ORDER — ONDANSETRON 4 MG/1
4 TABLET, ORALLY DISINTEGRATING ORAL EVERY 6 HOURS PRN
Status: DISCONTINUED | OUTPATIENT
Start: 2022-10-19 | End: 2022-10-21 | Stop reason: HOSPADM

## 2022-10-19 RX ORDER — POLYETHYLENE GLYCOL 3350 17 G/17G
17 POWDER, FOR SOLUTION ORAL DAILY PRN
Status: DISCONTINUED | OUTPATIENT
Start: 2022-10-19 | End: 2022-10-21 | Stop reason: HOSPADM

## 2022-10-19 RX ORDER — ONDANSETRON 2 MG/ML
4 INJECTION INTRAMUSCULAR; INTRAVENOUS EVERY 6 HOURS PRN
Status: DISCONTINUED | OUTPATIENT
Start: 2022-10-19 | End: 2022-10-21 | Stop reason: HOSPADM

## 2022-10-19 RX ORDER — ACETAMINOPHEN 325 MG/1
650 TABLET ORAL EVERY 4 HOURS PRN
Status: DISCONTINUED | OUTPATIENT
Start: 2022-10-19 | End: 2022-10-21 | Stop reason: HOSPADM

## 2022-10-19 RX ORDER — NICOTINE POLACRILEX 4 MG
15-30 LOZENGE BUCCAL
Status: DISCONTINUED | OUTPATIENT
Start: 2022-10-19 | End: 2022-10-21 | Stop reason: HOSPADM

## 2022-10-19 RX ORDER — AMOXICILLIN 250 MG
1 CAPSULE ORAL 2 TIMES DAILY PRN
Status: DISCONTINUED | OUTPATIENT
Start: 2022-10-19 | End: 2022-10-21 | Stop reason: HOSPADM

## 2022-10-19 RX ORDER — CETIRIZINE HYDROCHLORIDE 10 MG/1
10 TABLET ORAL DAILY PRN
Status: DISCONTINUED | OUTPATIENT
Start: 2022-10-19 | End: 2022-10-21 | Stop reason: HOSPADM

## 2022-10-19 RX ORDER — AMOXICILLIN 250 MG
2 CAPSULE ORAL 2 TIMES DAILY PRN
Status: DISCONTINUED | OUTPATIENT
Start: 2022-10-19 | End: 2022-10-21 | Stop reason: HOSPADM

## 2022-10-19 ASSESSMENT — ENCOUNTER SYMPTOMS
NECK PAIN: 0
DYSURIA: 0
ARTHRALGIAS: 1
CONFUSION: 0
HEMATURIA: 0
CHILLS: 0
FEVER: 0
HEADACHES: 1
NAUSEA: 0
DIARRHEA: 0
FATIGUE: 0
MYALGIAS: 0
HEADACHES: 1
HEARTBURN: 0
CONSTIPATION: 0
DIARRHEA: 0
WEAKNESS: 0
NERVOUS/ANXIOUS: 0
BACK PAIN: 0
WEAKNESS: 1
FLANK PAIN: 0
DIZZINESS: 0
ABDOMINAL PAIN: 0
VOMITING: 0
RHINORRHEA: 0
NUMBNESS: 0
FREQUENCY: 0
BRUISES/BLEEDS EASILY: 0
EYE PAIN: 1
COLOR CHANGE: 0
FEVER: 0
FREQUENCY: 0
DYSURIA: 0
COUGH: 0
HEMATURIA: 0
SHORTNESS OF BREATH: 0
ABDOMINAL PAIN: 0
WOUND: 0
PALPITATIONS: 0
PALPITATIONS: 0
NAUSEA: 0
CHILLS: 0
SORE THROAT: 0
PARESTHESIAS: 0
SHORTNESS OF BREATH: 0
HEMATOCHEZIA: 0
LIGHT-HEADEDNESS: 0
COUGH: 0
JOINT SWELLING: 0
CONSTIPATION: 1

## 2022-10-19 ASSESSMENT — ACTIVITIES OF DAILY LIVING (ADL)
ADLS_ACUITY_SCORE: 35
CURRENT_FUNCTION: PREPARING MEALS REQUIRES ASSISTANCE
ADLS_ACUITY_SCORE: 35

## 2022-10-19 NOTE — ED NOTES
"[facility]   ED Nurse to Floor Handoff     Serge Marin is a 82 year old female who speaks English and lives alone,  in a home  They arrived in the ED by unknown from home    ED Chief Complaint: Fall (Per pt she fell Tuesday and is sent here by her PMD. Pt very [place], pt unable to give details on her falls. Pt has an old bruise R temporal area. Denies taking blood thinners, \"only Tylenol and it dos not help\")    Serge Marin is a 82 year old female who presented from her primary care physician's office for concerns for recurrent falls.  Last fall was 3 days ago, patient notes she is unsure how she fell, felt \"woozy\" and then fell forward hitting her head on the ground.  She does note she had loss of consciousness.  Is not on anticoagulation.  Other than her headache at this time, notes some mild posterior right rib pain.  She denies any other focal injuries or pain.  Denies any chest pain or shortness of breath.  Denies any changes in her vision.  She notes she has baseline hardness of hearing.  Denies any neck or back pain.  Denies any abdominal pain, nausea, or vomiting. The patient denies any other physical concerns today.       ED Dx;   Final diagnoses:   SDH (subdural hematoma)   Closed fracture of left occipital condyle, initial encounter (H)         Needed?: No    Allergies:   Allergies   Allergen Reactions    Penicillins     Bactrim [Sulfamethoxazole W/Trimethoprim] Itching     Patient prescribed Bactrim at eye appointment. Assisted living reports eyes were red and itching.    .  Past Medical Hx:   Past Medical History:   Diagnosis Date    Hernia, abdominal       Baseline Mental status: WDL  Current Mental Status changes: at basesline    Infection present or suspected this encounter: no  Sepsis suspected: No  Isolation type: No active isolations  Patient tested for COVID 19 prior to admission: YES     Activity level - Baseline/Home:  " Walker  Activity Level - Current:   Unknown    Bariatric equipment needed?: No    In the ED these meds were given: Medications - No data to display    Drips running?  No    Home pump  No    Current LDAs  Peripheral IV 10/19/22 Left Hand (Active)   Site Assessment WDL 10/19/22 1738   Line Status Saline locked 10/19/22 1738   Dressing Intervention New dressing  10/19/22 1738   Phlebitis Scale 0-->no symptoms 10/19/22 1738   Infiltration Scale 0 10/19/22 1738   Infiltration Site Treatment Method  None 10/19/22 1738   If infiltrated, was a vesicant infusing? No 10/19/22 1738   Number of days: 0       Labs results:   Labs Ordered and Resulted from Time of ED Arrival to Time of ED Departure   COMPREHENSIVE METABOLIC PANEL - Normal       Result Value    Sodium 141      Potassium 4.1      Chloride 109      Carbon Dioxide (CO2) 24      Anion Gap 8      Urea Nitrogen 13      Creatinine 0.80      Calcium 9.7      Glucose 93      Alkaline Phosphatase 79      AST 17      ALT 12      Protein Total 7.2      Albumin 3.6      Bilirubin Total 0.5      GFR Estimate 73     TROPONIN I - Normal    Troponin I High Sensitivity 7     CK TOTAL - Normal    CK 84     INR - Normal    INR 1.04     PARTIAL THROMBOPLASTIN TIME - Normal    aPTT 23     URINE MACROSCOPIC WITH REFLEX TO MICRO   CBC WITH PLATELETS AND DIFFERENTIAL   COVID-19 VIRUS (CORONAVIRUS) BY PCR   TROPONIN I       Imaging Studies:   Recent Results (from the past 24 hour(s))   Cervical spine CT w/o contrast    Narrative    EXAM: CT HEAD W/O CONTRAST, CT CERVICAL SPINE W/O CONTRAST  LOCATION: [facility]  DATE/TIME: 10/19/2022 5:03 PM    INDICATION: Head and neck injury, fall.  COMPARISON: CT head 8/5/2022  TECHNIQUE:   1) Routine CT Head without IV contrast. Multiplanar reformats. Dose reduction techniques were used.  2) Routine CT Cervical Spine without IV contrast. Multiplanar reformats. Dose reduction techniques were  used.    FINDINGS:   HEAD CT:   INTRACRANIAL CONTENTS: Left frontal convexity extra-axial collection with hypodense components only, measures up to 11 mm in thickness with mild underlying mass effect. No hyperdense components. The collection is new from prior CT of 08/05/2022.   Right-to-left midline shift 3-4 mm. No mass or intraparenchymal hemorrhage. No CT evidence of acute infarct. Mild presumed chronic small vessel ischemic changes. Mild generalized volume loss. No hydrocephalus.     VISUALIZED ORBITS/SINUSES/MASTOIDS: No intraorbital abnormality. No paranasal sinus mucosal disease. No middle ear or mastoid effusion.    BONES/SOFT TISSUES: Slight right frontal scalp soft tissue swelling. No fracture.    CERVICAL SPINE CT:   VERTEBRA: Acute avulsion fracture tip of left occipital condyle. Occipital C1-C2 alignment is preserved. Slight anterolisthesis C4-C5, C5-C6 and retrolisthesis C3-C4. Chronic spondylitic changes C5-C7. Chronic inferior endplate concavity T1.    CANAL/FORAMINA: Moderate degenerative changes. At C3-C4 there is moderate left foraminal stenosis. At C5-C6 there is moderate bilateral foraminal stenosis. At C6-C7 there is moderate left foraminal stenosis.    PARASPINAL: No extraspinal abnormality. Visualized lung fields are clear.      Impression    IMPRESSION:  HEAD CT:  1.  Left frontal convexity extra-axial fluid collection that is hypodense without any hyperdense components to suggest acute process, therefore likely subacute or chronic process. However the collection is new from prior CT of 08/05/2022.  2.  Right-to-left midline shift 3 to 4 mm.    CERVICAL SPINE CT:  1.  Acute non comminuted fracture tip of left occipital condyle (Hubert and Rajeev type 3), isolated fracture.  2.  Umstcrwjl-S8-W6 alignment is preserved.  3.  No additional fractures.   Head CT w/o contrast    Narrative    EXAM: CT HEAD W/O CONTRAST, CT CERVICAL SPINE W/O CONTRAST  LOCATION: [facility]  [facility]  DATE/TIME: 10/19/2022 5:03 PM    INDICATION: Head and neck injury, fall.  COMPARISON: CT head 8/5/2022  TECHNIQUE:   1) Routine CT Head without IV contrast. Multiplanar reformats. Dose reduction techniques were used.  2) Routine CT Cervical Spine without IV contrast. Multiplanar reformats. Dose reduction techniques were used.    FINDINGS:   HEAD CT:   INTRACRANIAL CONTENTS: Left frontal convexity extra-axial collection with hypodense components only, measures up to 11 mm in thickness with mild underlying mass effect. No hyperdense components. The collection is new from prior CT of 08/05/2022.   Right-to-left midline shift 3-4 mm. No mass or intraparenchymal hemorrhage. No CT evidence of acute infarct. Mild presumed chronic small vessel ischemic changes. Mild generalized volume loss. No hydrocephalus.     VISUALIZED ORBITS/SINUSES/MASTOIDS: No intraorbital abnormality. No paranasal sinus mucosal disease. No middle ear or mastoid effusion.    BONES/SOFT TISSUES: Slight right frontal scalp soft tissue swelling. No fracture.    CERVICAL SPINE CT:   VERTEBRA: Acute avulsion fracture tip of left occipital condyle. Occipital C1-C2 alignment is preserved. Slight anterolisthesis C4-C5, C5-C6 and retrolisthesis C3-C4. Chronic spondylitic changes C5-C7. Chronic inferior endplate concavity T1.    CANAL/FORAMINA: Moderate degenerative changes. At C3-C4 there is moderate left foraminal stenosis. At C5-C6 there is moderate bilateral foraminal stenosis. At C6-C7 there is moderate left foraminal stenosis.    PARASPINAL: No extraspinal abnormality. Visualized lung fields are clear.      Impression    IMPRESSION:  HEAD CT:  1.  Left frontal convexity extra-axial fluid collection that is hypodense without any hyperdense components to suggest acute process, therefore likely subacute or chronic process. However the collection is new from prior CT of 08/05/2022.  2.  Right-to-left midline shift 3 to 4  "mm.    CERVICAL SPINE CT:  1.  Acute non comminuted fracture tip of left occipital condyle (Hubert and Rajeev type 3), isolated fracture.  2.  Pkacolrph-D2-T0 alignment is preserved.  3.  No additional fractures.   Chest XR,  PA & LAT    Narrative    EXAM: XR CHEST 2 VIEWS  LOCATION: [facility]  DATE/TIME: 10/19/2022 5:08 PM    INDICATION: Fall, altered mental status  COMPARISON: Chest radiograph 08/04/2022.      Impression    IMPRESSION: Negative chest. No displaced fractures visualized.       Recent vital signs:   /81   Pulse 74   Temp 97.6  F (36.4  C) (Oral)   Resp 16   Ht 1.626 m (5' 4\")   Wt 50.8 kg (112 lb)   LMP  (LMP Unknown)   SpO2 97%   BMI 19.22 kg/m      Sugar Coma Scale Score: 15 (10/19/22 1540)       Cardiac Rhythm: Normal Sinus  Pt needs tele? Yes  Skin/wound Issues:  bruise to the R temporal in various stages of healing color    Code Status: Full Code    Pain control: fair    Nausea control: pt had none    Abnormal labs/tests/findings requiring intervention: see epic    Family present during ED course? No   Family Comments/Social Situation comments: NA  Tasks needing completion:  UA and Trop at 1945    Sally Harkins, RN    [phone] [facility]        "

## 2022-10-19 NOTE — TELEPHONE ENCOUNTER
"Care Coordinator contacted Carmen Heath, thru [facility] ([phone]) to verify what services patient currently has. Per Carmen, patient is living at [facility] and has lived there for many years. [facility] has 24 hour supervision and are suppose to assist in giving patient her medications. The Unit Coordinator is Sherin Quintana, she can be reached at [phone]  Patient also has an ILS worker Geneva Willams (cell # [phone]), thru [facility]. \"Geneva is budgeted for 8 hours a month, but goes to patient apartment more often than that\". Per Carmen, \"Geneva is probably the best person to call as she knows patient very well\". \"Serge does a son, Delfina ([phone]), I don't think he has anything to do with his Mother\". Sherin from [facility] has not been able to reach him\". Carmen stated, that patient has been declining and is not doing well, her last assessment was in July\". Carmen did say that she could \"do another assessment with patient if that is needed, or if something else is needed please just contact me\". \"Patient also has a Representative Payee for her bills, this is a contracted service\". Forwarding to  and  as DAY.      Veronica Monique  Care Coordinator  [facility]  [phone]    "

## 2022-10-19 NOTE — PROGRESS NOTES
"  Assessment & Plan     Acute alteration in mental status  Fall, initial encounter  Memory loss  Confusion  Patient had an unwitnessed fall Tuesday afternoon and was down for an unknown amount of time before using life alert necklace.  Landed on her right side striking her ribs and the right side of her face.    Medical aides came and assessed her at home and recommended that she visit the emergency room.  Patient declined at that time, saying she felt fine.    Presents today with acute change in mental status, including memory loss (unsure if she has family, differing histories to MA vs providers), confusion (unsure how she got to clinic today), and significant bruising on the right temple and forehead area with healing abrasion. Noted change in baseline confirmed by speaking with providers that have seen patient in the past. Also noted on discharge from TCU is an AOx3.     Lives at home by herself since discharge from TCU ([facility] inpatient stay). Patient does have home services, though at this point it is not clear if she is safe to return home.    When asked about family, she initially said she didn't have any. Then when I asked specifically about son, she did admit that she had a son and that I could contact him.     Concern for bleed vs new inability to care for herself at home.     Patient sent to emergency department on [facility] for evaluation, probably head CT, and possible admission onto [facility] team. Called ED provider through intake (Dr. Thorne) and and gave patient summary. Called inpatient [facility] team and brief them on patient in the event that she was put on our service.       No follow-ups on file.    Lizette Oro DO  [facility]    Michelle Valentine is a 82 year old, presenting for the following health issues:  RECHECK (Pt present for an annual wellness visit and a \"follow up on therapy\")       HPI     Fell Tuesday, hit head and ribs on right side. MA's came and " assessed, recommended ED.     Not sure how she got here.     Failed mini-cog.     Has services at home ([facility])    Used to have hearing aids, has not for a long time.     Review of Systems   Constitutional, HEENT, cardiovascular, pulmonary, gi and gu systems are negative, except as otherwise noted.      Objective    BP (!) 144/79   Pulse 67   Temp 97.9  F (36.6  C) (Oral)   Wt 50 kg (110 lb 3.2 oz)   LMP  (LMP Unknown)   SpO2 96%   BMI 20.82 kg/m    Body mass index is 20.82 kg/m .  Physical Exam   Constitutional: No acute distress, sitting comfortably, requires walker at baseline.  Eyes: Difficult eye exam due to problems following instructions  Ears: Pearly, intact TM. No occlusive cerumen or foreign body visible bilaterally.   Mouth: Poor dentition  Neck: Normal range of motion  CV: Loud S2, regular rate and ryhthm  Respiratory: CTAB  GI: Nondistended abdomen  MSK: Normal digits, moving extremeties well, symmetric strength visible throughout. Tenderness to moderate palpation on lateral chest wall, R5-10 right side. Requiring walker at baseline. Tenderness to light palpation on forehead and temple on right.  Skin: 3x4cm skin abrasion on right forehead surrounding by healing ecchymosis on temple/lateral forehead region on right side  Neuro: Oriented to place and self only. Moving all extremities equally, no acute deficits in sensation or movement. Brief exam.

## 2022-10-19 NOTE — ED PROVIDER NOTES
"ED Provider Note  [facility]      History     Chief Complaint   Patient presents with     Fall     Per pt she fell Tuesday and is sent here by her PMD. Pt very [place], pt unable to give details on her falls. Pt has an old bruise R temporal area. Denies taking blood thinners, \"only Tylenol and it dos not help\"     HPI  Serge Marin is a 82 year old female who presented from her primary care physician's office for concerns for recurrent falls.  Last fall was 3 days ago, patient notes she is unsure how she fell, felt \"woozy\" and then fell forward hitting her head on the ground.  She does note she had loss of consciousness.  Is not on anticoagulation.  Other than her headache at this time, notes some mild posterior right rib pain.  She denies any other focal injuries or pain.  Denies any chest pain or shortness of breath.  Denies any changes in her vision.  She notes she has baseline hardness of hearing.  Denies any neck or back pain.  Denies any abdominal pain, nausea, or vomiting. The patient denies any other physical concerns today.     Past Medical History  Past Medical History:   Diagnosis Date     Hernia, abdominal      Past Surgical History:   Procedure Laterality Date     CHOLECYSTECTOMY       COLONOSCOPY Left 03/18/2015    Procedure: COMBINED COLONOSCOPY, SINGLE OR MULTIPLE BIOPSY/POLYPECTOMY BY BIOPSY;  Surgeon: Stone Lauren MD;  Location:  GI     GYN SURGERY       HERNIA REPAIR       No current outpatient medications on file.    Allergies   Allergen Reactions     Penicillins      Bactrim [Sulfamethoxazole W/Trimethoprim] Itching     Patient prescribed Bactrim at eye appointment. Assisted living reports eyes were red and itching.      Family History  No family history on file.  Social History   Social History     Tobacco Use     Smoking status: Never     Smokeless tobacco: Never   Substance Use Topics     Alcohol use: No     Drug use: No      Past medical history, past " "surgical history, medications, allergies, family history, and social history were reviewed with the patient. No additional pertinent items.       Review of Systems   Constitutional: Negative for chills, fatigue and fever.   HENT: Negative for congestion, ear pain and rhinorrhea.    Eyes: Negative for visual disturbance.   Respiratory: Negative for cough and shortness of breath.    Cardiovascular: Negative for chest pain (No carmen chest pain) and palpitations.        Positive for chest wall pain.   Gastrointestinal: Negative for abdominal pain, constipation, diarrhea, nausea and vomiting.   Genitourinary: Negative for dysuria, flank pain, frequency, hematuria, vaginal bleeding and vaginal discharge.   Musculoskeletal: Negative for back pain and neck pain.   Skin: Negative for color change, rash and wound.   Allergic/Immunologic: Negative for immunocompromised state.   Neurological: Positive for syncope and headaches. Negative for weakness, light-headedness and numbness.   Hematological: Does not bruise/bleed easily.   Psychiatric/Behavioral: Negative for confusion.   All other systems reviewed and are negative.    A complete review of systems was performed with pertinent positives and negatives noted in the HPI, and all other systems negative.    Physical Exam   BP: 132/81  Pulse: 74  Temp: 97.6  F (36.4  C)  Resp: 16  Height: 162.6 cm (5' 4\")  Weight: 50.8 kg (112 lb)  SpO2: 97 %     Physical Exam  Vitals and nursing note reviewed.   Constitutional:       General: She is not in acute distress.     Appearance: She is normal weight. She is not ill-appearing, toxic-appearing or diaphoretic.   HENT:      Head: Normocephalic.      Comments: Healing ecchymosis to the right forehead.     Right Ear: External ear normal.      Left Ear: External ear normal.      Nose: Nose normal.      Mouth/Throat:      Mouth: Mucous membranes are moist.   Eyes:      Extraocular Movements: Extraocular movements intact.      " Conjunctiva/sclera: Conjunctivae normal.      Pupils: Pupils are equal, round, and reactive to light.   Neck:      Comments: No midline tenderness to palpation of the cervical spine.  Cardiovascular:      Rate and Rhythm: Normal rate and regular rhythm.      Pulses: Normal pulses.      Heart sounds: Normal heart sounds. No murmur heard.    No friction rub. No gallop.   Pulmonary:      Effort: Pulmonary effort is normal. No respiratory distress.      Breath sounds: Normal breath sounds. No stridor. No wheezing, rhonchi or rales.   Chest:      Chest wall: Tenderness (Mid to posterior right chest wall tenderness to palpation, without external evidence of trauma) present.   Abdominal:      General: Bowel sounds are normal. There is no distension.      Palpations: Abdomen is soft.      Tenderness: There is no abdominal tenderness. There is no right CVA tenderness, left CVA tenderness, guarding or rebound.   Musculoskeletal:         General: Normal range of motion.      Cervical back: Normal range of motion and neck supple. No rigidity or tenderness.      Right lower leg: No edema.      Left lower leg: No edema.      Comments: No midline tenderness to palpation of the thoracic or lumbar spine.  Moves all 4 extremities without any focal pain.   Skin:     General: Skin is warm.      Capillary Refill: Capillary refill takes less than 2 seconds.   Neurological:      General: No focal deficit present.      Mental Status: She is alert.      Comments: Hard of hearing, which she notes is at her baseline.  GCS 15.  Cranial nerves II through XII intact.  Strength 4+/5 in all all distributions of the upper and lower extremities.  Intact sensation in all distributions of the bilateral upper and lower extremities.    Psychiatric:         Mood and Affect: Mood normal.         Behavior: Behavior normal.         ED Course     ED Course as of 10/19/22 2115   Wed Oct 19, 2022   1728 EKG 12 lead  Junctional rhythm without acute ST elevation  or depression. Last EKG from 2017 with NSR.    1734 Chest XR,  PA & LAT  Negative chest. No displaced fractures visualized.   1735 Head CT w/o contrast  1.  Left frontal convexity extra-axial fluid collection that is hypodense without any hyperdense components to suggest acute process, therefore likely subacute or chronic process. However the collection is new from prior CT of 08/05/2022.  2.  Right-to-left midline shift 3 to 4 mm.   1736 Cervical spine CT w/o contrast  1.  Acute non comminuted fracture tip of left occipital condyle (Hubert and [place] type 3), isolated fracture.  2.  Grfbohfqw-G4-J4 alignment is preserved.  3.  No additional fractures.   1737 Place cervical collar on the patient.  Updated patient on CT findings.  Paged neurosurgery and trauma.   1747 Spoke with neurosurgery, on their way to the [place] and will evaluate the patient.   1800 INR: 1.04   1800 PTT: 23   1808 Sodium: 141   1808 Potassium: 4.1   1817 Creatinine: 0.80   1817 Glucose: 93   1817 CK Total: 84   1817 Troponin I High Sensitivity: 7   1859 WBC: 4.6   1859 Hemoglobin: 12.2   1859 Platelet Count(!): 135  On chart review, this is at baseline.    1911 SARS CoV2 PCR: Negative   2115 Troponin I High Sensitivity: 6  No acute change.      Procedures: none.              EKG Interpretation:      Interpreted by Amarilis Gonzalez MD  Time reviewed: 17:20   Symptoms at time of EKG: None   Rhythm: Junctional  Rate: Normal  Axis: Normal  Ectopy: None  Conduction: Normal  ST Segments/ T Waves: No ST-T wave changes  Q Waves: None and Nonspecific  Comparison to prior: EKG from 2/12/2017 shows NSR.     Clinical Impression: junctional rhythm  _______________________________________________     The medical record was reviewed and interpreted.  Current labs reviewed and interpreted.  Previous labs reviewed and interpreted.  Current images reviewed and interpreted: as above.  EKG reviewed and interpreted: as above.  Medications - No data to display      Assessments & Plan (with Medical Decision Making)   Nursing notes and vital signs reviewed.     Presentation, exam, and workup is most consistent with subacute subdural hematoma and closed, nondisplaced fracture of the left occipital condyle.    Please see HPI, ROS, exam, and ED course above for pertinent history and findings. In short, the patient presented to the ED for evaluation of recurrent falls with ecchymosis to the right forehead on exam.      Imaging revealed likely subacute right frontal convexity extra-axial fluid collection that is hypodense without any hyperdense components to suggest acute process, with 2-3 mm midline shift. CT cervical spine with acute non comminuted fracture tip of left occipital condyle; patient in cervical collar to be cleared by neurosurgery. Neurosurgery consulted and recommended keeping patient in cervical collar, no antiepileptics needed at this time. Also spoke with Trauma Surgery, they will admit the patient on the [facility] to their service to the ICU. Spoke with ED team at [facility], they are aware of the patient needing ED to ED transfer for admission to the [facility].       In regard to her posterior right rib pain, no external evidence of trauma on exam, chest x-ray without displaced rib fractures.  At this time, no indication for CT imaging.  No other focal findings on head to toe exam to indicate need for further imaging at this time.  Remainder of her work-up with normal labs.  EKG shows junctional rhythm, although does have baseline with artifact making full interpretation somewhat difficult, without acute ST elevation or depression, high-sensitivity troponin 7, repeat without acute change at 6. We will keep her on telemetry at this time, no indication for acute further work-up at this time.      Discussed the patient with Trauma Surgery, who will admit the patient for further monitoring, evaluation, and treatment. Rechecked the patient, findings and plan  explained to the patient, who consents to admission.     Disposition: The patient was transferred over to the [facility] in stable condition.      Final diagnoses:   SDH (subdural hematoma)   Closed fracture of left occipital condyle, initial encounter (H)       --  Amarilis Gonzalez MD   [facility] EMERGENCY DEPARTMENT  10/19/2022     Amarilis Gonzalez MD  10/19/22 2110

## 2022-10-19 NOTE — CONSULTS
"[facility]       NEUROSURGERY CONSULTATION    This consultation was requested by Dr. Gonzalez from the [place] [facility].    Reason for Consultation: SDH    HPI: Serge Marin is a 82 year old female PMHx Bipolar I disorder, hypothyroidism who was seen for SDH identified on imaging in ED. She presented to her PCP this morning and was observed to have altered mental status and a large ecchymosis on her right forehead; she was advised to present to the ED for evaluation. Imaging at that time identified a chronic-appearing ~11mm SDH on the right frontal convexity with 3-4mm midline shift as well as a left occipital condyle fracture. She reports that she fell about 3 days ago because she felt \"woozy,\" lost consciousness, and woke up on the floor. She denies neck pain but is uncomfortable in her C-collar. Family was not available for discussion of pt's baseline mental status-- pt did give permission to discuss her hospital admission with family. Patient is being admitted to trauma service at [place].    She does not take any anticoagulation. No history of seizure. She denies vision changes, weakness, chest pain, shortness of breath.       PAST MEDICAL HISTORY:   Past Medical History:   Diagnosis Date     Hernia, abdominal        PAST SURGICAL HISTORY:   Past Surgical History:   Procedure Laterality Date     CHOLECYSTECTOMY       COLONOSCOPY Left 03/18/2015    Procedure: COMBINED COLONOSCOPY, SINGLE OR MULTIPLE BIOPSY/POLYPECTOMY BY BIOPSY;  Surgeon: Stone Lauren MD;  Location:  GI     GYN SURGERY       HERNIA REPAIR         FAMILY HISTORY: No family history on file.    SOCIAL HISTORY:   Social History     Tobacco Use     Smoking status: Never     Smokeless tobacco: Never   Substance Use Topics     Alcohol use: No       MEDICATIONS:  Current Outpatient Medications   Medication Instructions     ACETAMINOPHEN  mg, Oral, EVERY 6 HOURS PRN     " "butenafine (MENTAX) 1 % CREA cream Apply daily to abdomen rash     calcium carbonate-vitamin D (OSCAL W/D) 500-200 MG-UNIT tablet 1 tablet, DAILY     cetirizine (ZYRTEC) 10 mg, Oral, DAILY PRN     clotrimazole (LOTRIMIN) 1 % external cream 2 TIMES DAILY PRN     escitalopram (LEXAPRO) 5 mg, DAILY     ferrous gluconate (FERGON) 324 mg, Oral, DAILY WITH BREAKFAST     Infant Care Products (JOHNSONS BABY WASH) LIQD Use for cleansing eyelids daily PRN for stye     levothyroxine (SYNTHROID/LEVOTHROID) 75 mcg, Oral, DAILY     loperamide (IMODIUM A-D) 2 MG tablet Start with 2 tabs (4 mg), then take one tab (2 mg) after each diarrheal stool.  Do not use more than  8 tabs (16 mg) per day.     psyllium (METAMUCIL/KONSYL) 58.6 % powder 1 teaspoonful, DAILY PRN         Allergies:  Allergies   Allergen Reactions     Penicillins      Bactrim [Sulfamethoxazole W/Trimethoprim] Itching     Patient prescribed Bactrim at eye appointment. Assisted living reports eyes were red and itching.        ROS: 10 point ROS of systems including Constitutional, Eyes, Respiratory, Cardiovascular, Gastroenterology, Genitourinary, Integumentary, Muscularskeletal, Psychiatric were all negative except for pertinent positives noted in my HPI.    Physical exam:   Blood pressure 132/81, pulse 74, temperature 97.6  F (36.4  C), temperature source Oral, resp. rate 16, height 1.626 m (5' 4\"), weight 50.8 kg (112 lb), SpO2 97 %, not currently breastfeeding.  CV: RRR, peripheral pulses intact  PULM: breathing comfortably on room air  ABD: soft, non-distended  NEUROLOGIC:  -- Awake; Alert; oriented x 1  -- Follows commands with significant prompting  -- +repetition and naming  -- Speech fluent, spontaneous. No aphasia.  -- no gaze preference. No apparent hemineglect.  Cranial Nerves:  -- PERRL 3-2mm bilat and brisk, extraocular movements intact  -- face symmetrical, tongue midline  -- sensory V1-V3 intact bilaterally  -- palate elevates symmetrically, uvula " midline  -- extremely hard of hearing  -- Trapezii 5/5 strength bilat symmetric    Motor:  Normal bulk / tone; no tremor, rigidity.  Diffuse muscle wasting.  No Pronator Drift     Delt Bi Tri Hand Flexion/  Extension Iliopsoas Quadriceps Hamstrings Tibialis Anterior Gastroc    C5 C6 C7 C8/T1 L2 L3 L4-S1 L4 S1   R 4 4 4 4 3+ 3+ 3+ 3+ 3+   L 4 4 4 4 3+ 3+ 3+ 3+ 3+   Sensory:  intact to LT x 4 extremities     Reflexes:     Bi Tri BR Melodie Pat Ach Bab    C5-6 C7-8 C6 UMN L2-4 S1 UMN   R 2+ 2+ 2+ Norm 2+ 2+ Norm   L 2+ 2+ 2+ Norm 2+ 2+ Norm         LABS:  Last Comprehensive Metabolic Panel:  Sodium   Date Value Ref Range Status   10/19/2022 141 133 - 144 mmol/L Final   02/16/2017 143 133 - 144 mmol/L Final     Potassium   Date Value Ref Range Status   10/19/2022 4.1 3.4 - 5.3 mmol/L Final   02/16/2017 3.5 3.4 - 5.3 mmol/L Final     Chloride   Date Value Ref Range Status   10/19/2022 109 94 - 109 mmol/L Final   02/16/2017 109 94 - 109 mmol/L Final     Carbon Dioxide   Date Value Ref Range Status   02/16/2017 27 20 - 32 mmol/L Final     Carbon Dioxide (CO2)   Date Value Ref Range Status   10/19/2022 24 20 - 32 mmol/L Final     Anion Gap   Date Value Ref Range Status   10/19/2022 8 3 - 14 mmol/L Final   02/16/2017 7 3 - 14 mmol/L Final     Glucose   Date Value Ref Range Status   10/19/2022 93 70 - 99 mg/dL Final   02/16/2017 89 70 - 99 mg/dL Final     Glucose Fasting   Date Value Ref Range Status   05/08/2018 111.0 (H) 51.0 - 110.0 mg/dL Final     Urea Nitrogen   Date Value Ref Range Status   10/19/2022 13 7 - 30 mg/dL Final   02/16/2017 10 7 - 30 mg/dL Final     Creatinine   Date Value Ref Range Status   10/19/2022 0.80 0.52 - 1.04 mg/dL Final   02/16/2017 0.79 0.52 - 1.04 mg/dL Final     GFR Estimate   Date Value Ref Range Status   10/19/2022 73 >60 mL/min/1.73m2 Final     Comment:     Effective December 21, 2021 eGFRcr in adults is calculated using the 2021 CKD-EPI creatinine equation which includes age and gender Lindsey  et al., [facility], DOI: 10.1056/BGDHvr5524280)   02/16/2017 71 >60 mL/min/1.7m2 Final     Comment:     Non  GFR Calc     Calcium   Date Value Ref Range Status   10/19/2022 9.7 8.5 - 10.1 mg/dL Final   02/16/2017 8.8 8.5 - 10.1 mg/dL Final     Lab Results   Component Value Date    WBC 3.3 08/13/2022    WBC 2.1 02/16/2017     Lab Results   Component Value Date    RBC 4.27 08/13/2022    RBC 4.74 02/16/2017     Lab Results   Component Value Date    HGB 12.3 08/13/2022    HGB 14.2 02/16/2017     Lab Results   Component Value Date    HCT 37.1 08/13/2022    HCT 41.9 02/16/2017     Lab Results   Component Value Date    MCV 87 08/13/2022    MCV 88 02/16/2017     Lab Results   Component Value Date    MCH 28.8 08/13/2022    MCH 30.0 02/16/2017     Lab Results   Component Value Date    MCHC 33.2 08/13/2022    MCHC 33.9 02/16/2017     Lab Results   Component Value Date    RDW 12.8 08/13/2022    RDW 12.7 02/16/2017     Lab Results   Component Value Date     08/13/2022     02/16/2017           IMAGING:  Recent Results (from the past 24 hour(s))   Cervical spine CT w/o contrast    Narrative    EXAM: CT HEAD W/O CONTRAST, CT CERVICAL SPINE W/O CONTRAST  LOCATION: [facility]  DATE/TIME: 10/19/2022 5:03 PM    INDICATION: Head and neck injury, fall.  COMPARISON: CT head 8/5/2022  TECHNIQUE:   1) Routine CT Head without IV contrast. Multiplanar reformats. Dose reduction techniques were used.  2) Routine CT Cervical Spine without IV contrast. Multiplanar reformats. Dose reduction techniques were used.    FINDINGS:   HEAD CT:   INTRACRANIAL CONTENTS: Left frontal convexity extra-axial collection with hypodense components only, measures up to 11 mm in thickness with mild underlying mass effect. No hyperdense components. The collection is new from prior CT of 08/05/2022.   Right-to-left midline shift 3-4 mm. No mass or intraparenchymal hemorrhage. No CT evidence of acute  infarct. Mild presumed chronic small vessel ischemic changes. Mild generalized volume loss. No hydrocephalus.     VISUALIZED ORBITS/SINUSES/MASTOIDS: No intraorbital abnormality. No paranasal sinus mucosal disease. No middle ear or mastoid effusion.    BONES/SOFT TISSUES: Slight right frontal scalp soft tissue swelling. No fracture.    CERVICAL SPINE CT:   VERTEBRA: Acute avulsion fracture tip of left occipital condyle. Occipital C1-C2 alignment is preserved. Slight anterolisthesis C4-C5, C5-C6 and retrolisthesis C3-C4. Chronic spondylitic changes C5-C7. Chronic inferior endplate concavity T1.    CANAL/FORAMINA: Moderate degenerative changes. At C3-C4 there is moderate left foraminal stenosis. At C5-C6 there is moderate bilateral foraminal stenosis. At C6-C7 there is moderate left foraminal stenosis.    PARASPINAL: No extraspinal abnormality. Visualized lung fields are clear.      Impression    IMPRESSION:  HEAD CT:  1.  Left frontal convexity extra-axial fluid collection that is hypodense without any hyperdense components to suggest acute process, therefore likely subacute or chronic process. However the collection is new from prior CT of 08/05/2022.  2.  Right-to-left midline shift 3 to 4 mm.    CERVICAL SPINE CT:  1.  Acute non comminuted fracture tip of left occipital condyle (Hubert and Baird type 3), isolated fracture.  2.  Oopcwnwza-K3-O2 alignment is preserved.  3.  No additional fractures.   Head CT w/o contrast    Narrative    EXAM: CT HEAD W/O CONTRAST, CT CERVICAL SPINE W/O CONTRAST  LOCATION: [facility]  DATE/TIME: 10/19/2022 5:03 PM    INDICATION: Head and neck injury, fall.  COMPARISON: CT head 8/5/2022  TECHNIQUE:   1) Routine CT Head without IV contrast. Multiplanar reformats. Dose reduction techniques were used.  2) Routine CT Cervical Spine without IV contrast. Multiplanar reformats. Dose reduction techniques were used.    FINDINGS:   HEAD CT:    INTRACRANIAL CONTENTS: Left frontal convexity extra-axial collection with hypodense components only, measures up to 11 mm in thickness with mild underlying mass effect. No hyperdense components. The collection is new from prior CT of 08/05/2022.   Right-to-left midline shift 3-4 mm. No mass or intraparenchymal hemorrhage. No CT evidence of acute infarct. Mild presumed chronic small vessel ischemic changes. Mild generalized volume loss. No hydrocephalus.     VISUALIZED ORBITS/SINUSES/MASTOIDS: No intraorbital abnormality. No paranasal sinus mucosal disease. No middle ear or mastoid effusion.    BONES/SOFT TISSUES: Slight right frontal scalp soft tissue swelling. No fracture.    CERVICAL SPINE CT:   VERTEBRA: Acute avulsion fracture tip of left occipital condyle. Occipital C1-C2 alignment is preserved. Slight anterolisthesis C4-C5, C5-C6 and retrolisthesis C3-C4. Chronic spondylitic changes C5-C7. Chronic inferior endplate concavity T1.    CANAL/FORAMINA: Moderate degenerative changes. At C3-C4 there is moderate left foraminal stenosis. At C5-C6 there is moderate bilateral foraminal stenosis. At C6-C7 there is moderate left foraminal stenosis.    PARASPINAL: No extraspinal abnormality. Visualized lung fields are clear.      Impression    IMPRESSION:  HEAD CT:  1.  Left frontal convexity extra-axial fluid collection that is hypodense without any hyperdense components to suggest acute process, therefore likely subacute or chronic process. However the collection is new from prior CT of 08/05/2022.  2.  Right-to-left midline shift 3 to 4 mm.    CERVICAL SPINE CT:  1.  Acute non comminuted fracture tip of left occipital condyle (Hubert and Rajeev type 3), isolated fracture.  2.  Duktgirry-A9-H5 alignment is preserved.  3.  No additional fractures.   Chest XR,  PA & LAT    Narrative    EXAM: XR CHEST 2 VIEWS  LOCATION: [facility]  DATE/TIME: 10/19/2022 5:08  PM    INDICATION: Fall, altered mental status  COMPARISON: Chest radiograph 08/04/2022.      Impression    IMPRESSION: Negative chest. No displaced fractures visualized.         ASSESSMENT:  This is an 82 year old female patient presenting with chronic-appearing SDH and L occipital condyle fracture likely due to fall 3 days ago. She is difficult to prompt for adequate examination although she is awake and alert. It is difficult to ascertain whether this is her baseline mental status or related to her SDH.    In addition to the assessment of diagnoses detailed above, this 82 year old female  patient admitted from the Emergency Department has the following conditions contributing to the complexity of their medical care:    Hypothyroidism  Bipolar I disorder    RECOMMENDATIONS:    -No acute neurosurgical intervention indicated  -No indication for anti-epileptic medications  -[place] C-collar at all times; unable to clinically clear her collar due to poor participation  -No additional imaging necessary at this time  -Neurosurgery will continue to follow    The patient was discussed with Dr. Juarez, neurosurgery chief resident, and Dr. Avila, neurosurgery staff, and they agree with the above.    Vera Weber MD, PhD  PGY-1 Neurosurgery

## 2022-10-19 NOTE — ED TRIAGE NOTES
Per pt she fell Tuesday and is sent here by her PMD. Pt very [place], pt unable to give details on her falls. Pt has an old bruise R temporal area     Triage Assessment     Row Name 10/19/22 1541       Triage Assessment (Adult)    Airway WDL WDL       Respiratory WDL    Respiratory WDL WDL       Skin Circulation/Temperature WDL    Skin Circulation/Temperature WDL all  R temporal area is bruised    Skin Circulation no mottling;no pallor;no cyanosis       Cardiac WDL    Cardiac WDL WDL       Peripheral/Neurovascular WDL    Peripheral Neurovascular WDL WDL       Cognitive/Neuro/Behavioral WDL    Cognitive/Neuro/Behavioral WDL WDL

## 2022-10-19 NOTE — PROGRESS NOTES
Preceptor Attestation:    I discussed the patient with the resident and evaluated the patient in person. I have verified the content of the note, which accurately reflects my assessment of the patient and the plan of care.   Supervising Physician:  Kassie Wyatt MD.

## 2022-10-20 ENCOUNTER — APPOINTMENT (OUTPATIENT)
Dept: PHYSICAL THERAPY | Facility: CLINIC | Age: 82
DRG: 542 | End: 2022-10-20
Payer: COMMERCIAL

## 2022-10-20 ENCOUNTER — APPOINTMENT (OUTPATIENT)
Dept: CT IMAGING | Facility: CLINIC | Age: 82
DRG: 542 | End: 2022-10-20
Payer: COMMERCIAL

## 2022-10-20 ENCOUNTER — APPOINTMENT (OUTPATIENT)
Dept: CT IMAGING | Facility: CLINIC | Age: 82
DRG: 542 | End: 2022-10-20
Attending: STUDENT IN AN ORGANIZED HEALTH CARE EDUCATION/TRAINING PROGRAM
Payer: COMMERCIAL

## 2022-10-20 ENCOUNTER — TELEPHONE (OUTPATIENT)
Dept: FAMILY MEDICINE | Facility: CLINIC | Age: 82
End: 2022-10-20

## 2022-10-20 ENCOUNTER — APPOINTMENT (OUTPATIENT)
Dept: OCCUPATIONAL THERAPY | Facility: CLINIC | Age: 82
DRG: 542 | End: 2022-10-20
Payer: COMMERCIAL

## 2022-10-20 LAB
ATRIAL RATE - MUSE: 66 BPM
DIASTOLIC BLOOD PRESSURE - MUSE: NORMAL MMHG
ERYTHROCYTE [DISTWIDTH] IN BLOOD BY AUTOMATED COUNT: 12.6 % (ref 10–15)
HCT VFR BLD AUTO: 34.9 % (ref 35–47)
HGB BLD-MCNC: 11.5 G/DL (ref 11.7–15.7)
INTERPRETATION ECG - MUSE: NORMAL
MAGNESIUM SERPL-MCNC: 1.9 MG/DL (ref 1.7–2.3)
MCH RBC QN AUTO: 29.2 PG (ref 26.5–33)
MCHC RBC AUTO-ENTMCNC: 33 G/DL (ref 31.5–36.5)
MCV RBC AUTO: 89 FL (ref 78–100)
P AXIS - MUSE: NORMAL DEGREES
PHOSPHATE SERPL-MCNC: 2.9 MG/DL (ref 2.5–4.5)
PLATELET # BLD AUTO: 146 10E3/UL (ref 150–450)
PR INTERVAL - MUSE: NORMAL MS
QRS DURATION - MUSE: 90 MS
QT - MUSE: 426 MS
QTC - MUSE: 443 MS
R AXIS - MUSE: -27 DEGREES
RBC # BLD AUTO: 3.94 10E6/UL (ref 3.8–5.2)
SYSTOLIC BLOOD PRESSURE - MUSE: NORMAL MMHG
T AXIS - MUSE: 16 DEGREES
VENTRICULAR RATE- MUSE: 65 BPM
WBC # BLD AUTO: 3.6 10E3/UL (ref 4–11)

## 2022-10-20 PROCEDURE — 72131 CT LUMBAR SPINE W/O DYE: CPT | Mod: 26 | Performed by: STUDENT IN AN ORGANIZED HEALTH CARE EDUCATION/TRAINING PROGRAM

## 2022-10-20 PROCEDURE — 85014 HEMATOCRIT: CPT

## 2022-10-20 PROCEDURE — 97161 PT EVAL LOW COMPLEX 20 MIN: CPT | Mod: GP | Performed by: PHYSICAL THERAPIST

## 2022-10-20 PROCEDURE — 74176 CT ABD & PELVIS W/O CONTRAST: CPT | Mod: 26 | Performed by: RADIOLOGY

## 2022-10-20 PROCEDURE — 71250 CT THORAX DX C-: CPT | Mod: 26 | Performed by: RADIOLOGY

## 2022-10-20 PROCEDURE — 84100 ASSAY OF PHOSPHORUS: CPT

## 2022-10-20 PROCEDURE — 71250 CT THORAX DX C-: CPT

## 2022-10-20 PROCEDURE — 70450 CT HEAD/BRAIN W/O DYE: CPT

## 2022-10-20 PROCEDURE — 97530 THERAPEUTIC ACTIVITIES: CPT | Mod: GP | Performed by: PHYSICAL THERAPIST

## 2022-10-20 PROCEDURE — 97535 SELF CARE MNGMENT TRAINING: CPT | Mod: GO

## 2022-10-20 PROCEDURE — 97116 GAIT TRAINING THERAPY: CPT | Mod: GP | Performed by: PHYSICAL THERAPIST

## 2022-10-20 PROCEDURE — 83735 ASSAY OF MAGNESIUM: CPT

## 2022-10-20 PROCEDURE — 250N000013 HC RX MED GY IP 250 OP 250 PS 637: Performed by: SURGERY

## 2022-10-20 PROCEDURE — 97165 OT EVAL LOW COMPLEX 30 MIN: CPT | Mod: GO

## 2022-10-20 PROCEDURE — 99232 SBSQ HOSP IP/OBS MODERATE 35: CPT | Performed by: PHYSICIAN ASSISTANT

## 2022-10-20 PROCEDURE — 36415 COLL VENOUS BLD VENIPUNCTURE: CPT

## 2022-10-20 PROCEDURE — 72128 CT CHEST SPINE W/O DYE: CPT

## 2022-10-20 PROCEDURE — 72128 CT CHEST SPINE W/O DYE: CPT | Mod: 26 | Performed by: STUDENT IN AN ORGANIZED HEALTH CARE EDUCATION/TRAINING PROGRAM

## 2022-10-20 PROCEDURE — 97530 THERAPEUTIC ACTIVITIES: CPT | Mod: GO

## 2022-10-20 PROCEDURE — 250N000013 HC RX MED GY IP 250 OP 250 PS 637

## 2022-10-20 PROCEDURE — 200N000002 HC R&B ICU UMMC

## 2022-10-20 PROCEDURE — 72131 CT LUMBAR SPINE W/O DYE: CPT

## 2022-10-20 PROCEDURE — 70450 CT HEAD/BRAIN W/O DYE: CPT | Mod: 26 | Performed by: STUDENT IN AN ORGANIZED HEALTH CARE EDUCATION/TRAINING PROGRAM

## 2022-10-20 RX ORDER — MAGNESIUM OXIDE 400 MG/1
400 TABLET ORAL EVERY 4 HOURS
Status: DISPENSED | OUTPATIENT
Start: 2022-10-20 | End: 2022-10-20

## 2022-10-20 RX ORDER — SODIUM CHLORIDE, SODIUM LACTATE, POTASSIUM CHLORIDE, CALCIUM CHLORIDE 600; 310; 30; 20 MG/100ML; MG/100ML; MG/100ML; MG/100ML
INJECTION, SOLUTION INTRAVENOUS CONTINUOUS
Status: DISCONTINUED | OUTPATIENT
Start: 2022-10-20 | End: 2022-10-20

## 2022-10-20 RX ORDER — ESCITALOPRAM OXALATE 5 MG/1
5 TABLET ORAL DAILY
Status: DISCONTINUED | OUTPATIENT
Start: 2022-10-21 | End: 2022-10-21 | Stop reason: HOSPADM

## 2022-10-20 RX ORDER — ACETAMINOPHEN 500 MG
500 TABLET ORAL EVERY 6 HOURS PRN
COMMUNITY

## 2022-10-20 RX ADMIN — ACETAMINOPHEN 650 MG: 325 TABLET, FILM COATED ORAL at 05:07

## 2022-10-20 RX ADMIN — LEVOTHYROXINE SODIUM 75 MCG: 0.05 TABLET ORAL at 09:17

## 2022-10-20 RX ADMIN — ACETAMINOPHEN 650 MG: 325 TABLET, FILM COATED ORAL at 16:16

## 2022-10-20 RX ADMIN — Medication 400 MG: at 10:27

## 2022-10-20 ASSESSMENT — ACTIVITIES OF DAILY LIVING (ADL)
ADLS_ACUITY_SCORE: 36
ADLS_ACUITY_SCORE: 35
ADLS_ACUITY_SCORE: 34
ADLS_ACUITY_SCORE: 36
ADLS_ACUITY_SCORE: 36
ADLS_ACUITY_SCORE: 35
ADLS_ACUITY_SCORE: 36
ADLS_ACUITY_SCORE: 35
ADLS_ACUITY_SCORE: 36
ADLS_ACUITY_SCORE: 35
ADLS_ACUITY_SCORE: 36
ADLS_ACUITY_SCORE: 35

## 2022-10-20 NOTE — PROGRESS NOTES
"   10/20/22 0900   Appointment Info   Signing Clinician's Name / Credentials (PT) ana laura weiner,pt   Living Environment   People in Home alone   Current Living Arrangements assisted living   Home Accessibility no concerns   Living Environment Comments lives small 1br apt in [facility] facility   Self-Care   Usual Activity Tolerance moderate   Current Activity Tolerance fair   Regular Exercise No   Equipment Currently Used at Home walker, rolling   Fall history within last six months yes   Number of times patient has fallen within last six months 2  (reports syncopal episode and falling out of bed during \"bad dream\")   Activity/Exercise/Self-Care Comment patient reports she usually gets some assistance with all ADLs from [facility] staff.  she gets meals provided at [facility].  reports mod IND with 4WW for household ambulation.  reports she rarely leaves her apt. reports currently has home PT.   General Information   Onset of Illness/Injury or Date of Surgery 10/19/22   Referring Physician Jyoti Martin MD   Patient/Family Therapy Goals Statement (PT) return home today   Pertinent History of Current Problem (include personal factors and/or comorbidities that impact the POC) Serge Marin is a 82 year old female patient presenting with chronic-appearing SDH and L occipital condyle fracture likely due to fall 3 days ago. She is difficult to prompt for adequate examination although she is awake and alert. It is difficult to ascertain whether this is her baseline mental status or related to her SDH   General Observations [place]   Cognition   Affect/Mental Status (Cognition) WFL   Orientation Status (Cognition) oriented x 4   Follows Commands (Cognition) WNL   Posture    Posture Forward head position;Protracted shoulders;Kyphosis   Range of Motion (ROM)   ROM Comment hamstring tightness   Strength (Manual Muscle Testing)   Strength Comments demonstrated at least 4-/5 strength B LE   Bed Mobility   Comment, (Bed Mobility) supine > sit " SBA   Transfers   Comment, (Transfers) sit > stand min assist   Gait/Stairs (Locomotion)   Comment, (Gait/Stairs) ambulated in room with 4WW and min assist   Balance   Balance Comments required UE support to maintain standing balance   Sensory Examination   Sensory Perception Comments light touch/proprioception intact B LE   Muscle Tone   Muscle Tone no deficits were identified   Clinical Impression   Criteria for Skilled Therapeutic Intervention Yes, treatment indicated   PT Diagnosis (PT) impaired functional mobility s/p fall and subsequent SDH   Influenced by the following impairments decreased strength, impaired balance, decreased activity tolerance   Functional limitations due to impairments impaired bed mobility, impaired transfers, impaired gait   Clinical Presentation (PT Evaluation Complexity) Stable/Uncomplicated   Clinical Presentation Rationale clinical judgement   Clinical Decision Making (Complexity) low complexity   Planned Therapy Interventions (PT) balance training;bed mobility training;gait training;strengthening;transfer training;progressive activity/exercise   Risk & Benefits of therapy have been explained evaluation/treatment results reviewed;care plan/treatment goals reviewed   PT Total Evaluation Time   PT Eval, Low Complexity Minutes (64089) 9   Physical Therapy Goals   PT Frequency 5x/week   PT Predicted Duration/Target Date for Goal Attainment 10/24/22   PT Goals Bed Mobility;Transfers;Gait   PT: Bed Mobility Independent;Supine to/from sit   PT: Transfers Modified independent;Sit to/from stand;Assistive device  (4WW)   PT: Gait Modified independent;Rolling walker;150 feet   PT Discharge Planning   PT Discharge Recommendation (DC Rec) home with assist;home with home care physical therapy   PT Rationale for DC Rec patient reports living in [facility] and receiving assistance with most ADLs and household mobility. appears to be at/near baseline regarding functional mobility and would benefit from  resumption of home PT upon discharge.   PT Brief overview of current status transferred OOB with min assist.  ambulated 30' with 4WW and SBA.   Total Session Time   Total Session Time (sum of timed and untimed services) 9

## 2022-10-20 NOTE — TELEPHONE ENCOUNTER
Called and spoke with Eusebia she said the delay is due to staffing issues, hospital aware. Gave VO to delay SOC to 10/25/22.    Nargis Muñoz RN

## 2022-10-20 NOTE — PROGRESS NOTES
INSURANCE: [facility]  (ph:[phone] fx:[phone]) (HP - takes all types): have accepted patient will fax over information. They have accepted this patient for start of care Tuesday, October 25th.     Home Care Agency Referrals  [facility] (ph:[phone] fx:[phone])   [facility] (ph:[phone] fx:[phone])        [facility] (ph:[phone] fx:[phone]):   [facility]  (ph:[phone] fx:[phone]) ([place], [facility] & PM[facility] only RN)  [facility] (ph:[phone] fx:[phone])    [facility] (aka: [facility]) (ph:[phone] fx:[phone])  [facility] (ph:[phone] fx:[phone])  [facility] (ph:[phone] fx:[phone])  [facility] (ph:[phone] fx:[phone]) - fax updated, but not updated in Epic as of 10/6/22     [facility] (ph:[phone]  fx:[phone])   [facility] (ph:[phone] fx:[phone]) (HP Journey only)    [facility] (ph:[phone] fx: [phone])  [facility]  (ph:[phone] fx:[phone]) -as of 4/21 only taking [facility] referrals  [facility] (ph:[phone] fx:[phone]) (HP commercial & medicare yes; [place] no)    [facility]  (ph:[phone] fx: [phone] (RN only, no labs through rest of year)  [facility] (ph:[phone] fx:[phone])  [facility](ph:[phone] fx:[phone]) ([place], PMAP & [facility] no)  [facility] (ph:[phone] fx:[phone]) (RN & PT, no OT)  [facility] (ph:[phone] fx:[phone]) (HP Journey only)  [facility] (ph: [phone] fx:[phone])    REFERRALS SENT:   [facility] (ph:[phone]:[phone]): referral sent.  [facility] (ph:[phone] fx:[phone]) referral faxed.   [facility]  (ph:[phone] fx: [phone]): faxed referral information        Home Care Agencies Declined  [facility] -don't take insurance  [facility] -don't take insurance  [facility] ([phone] fx:[phone]) - Only take [facility] PCP  [facility]-don't take insurance  [facility] -don't take insurance  [facility] -don't take insurance  [facility] -don't take insurance  [facility]  -don't take insurance  [facility] -don't take insurance  [facility]  -don't take insurance  [facility] (ph:[phone]:[phone]) - fax updated, but not updated in Epic as of 10/6/22: unable to accept d/t capacity  [facility]  (ph:[phone] fx:[phone]) : Left message, referred to Darrell Carcam[facility] (ph:[phone] fx:[phone])   [facility] (ph:[phone] fx: [phone]): unable to accept d/t capacity    [facility] (ph:[phone] fx:[phone]): unable to accept d/t location  [facility] (ph:[phone] fx: [phone]) :unable to accept d/t staffing.    [facility] (ph:[phone]  fx: [phone]) : unable to accept d/t staffing shortages.   [facility] (ph:[phone] fx:[phone]) (HP Medicare Adv only; MA, PMAP& [facility] no)

## 2022-10-20 NOTE — ED PROVIDER NOTES
Did not see or evaluate this patient.  Patient had been seen on the [facility], and already seen and evaluated by the Neurosurgery team.  Itad been discussed with the Trauma attending for the patient to be admitted to to ICU here on  with Trauma as primary. Given there were no beds/open spaces or even hallway space available at the [place] ED, that the patient had already been seen by Neurosurgery on the [facility], plan was for the patient to go directly to 4A and Trauma to see there, as was going to need to admit to that level of care anyway and they actually had a bed/space available for the patient, so as to not unnecessarily slow care. Appreciate everyone's assistance in caring for this patient.      Bernarda Harris MD  10/19/22 2030

## 2022-10-20 NOTE — TELEPHONE ENCOUNTER
[facility] phone call message - order or referral request for patient:     Order or referral being requested: Verbal Orders      Additional Comments: Delay start of care:  -Until 10/25/2022  -Hospital is aware of delay date    OK to leave a message on voice mail? Yes    Primary language: English      needed? No    Call taken on October 20, 2022 at 3:05 PM by Ashleigh Peguero

## 2022-10-20 NOTE — H&P
[facility]    History and Physical / Consult note: Trauma Service    Date of Admission:  10/19/2022    Time of Admission/Consult Request (page/call): 8:30 PM  Time of my evaluation: 9:00 PM  Consulting services:  Neurosurgery - Emergent consult (within 30 mins): Called by ED    Assessment & Plan   Trauma mechanism: fall out of bed  Time/date of injury: last 1-2 days  Known Injuries:  1. SDH  2. Left occipital condyle fracture   3. Contusion to forehead    Plan:  1. Admit to SICU  2. Appreciate Neurosurgery  3. Follow-up 6 hour interval head CT  4. Aspen collar at all times  5. Q2 hour neuro checks  6. Follow-up CT CAP  7. PT/OT  8. Med rec  9. Tertiary in AM  10. Follow-up UA/UC    Code status: Full confirmed with patient.     ETOH: This patient was asked if in the last 3-6 months there has been a time when she had 4 or more drinks in a single day/outing.. Patient answer to the screening question was in the negative. No intervention needed.  Primary Care Physician   Kassie Wyatt    Chief Complaint   GLF    History is obtained from the patient    History of Present Illness   Serge Marin is a 82 year old female with hypothyroidism who presented to her primary a day after falling out of bed during a dream. She hit her head on the floor and is unsure of LOC. She also fell on her right ribs. She denies SOB/CP. She denies current headache. She denies numbness or tingling. She denies neck pain.    Past Medical History    I have reviewed this patient's medical history and updated it with pertinent information if needed.   Past Medical History:   Diagnosis Date     Hernia, abdominal      Past Surgical History   I have reviewed this patient's surgical history and updated it with pertinent information if needed.  Past Surgical History:   Procedure Laterality Date     CHOLECYSTECTOMY       COLONOSCOPY Left 03/18/2015    Procedure: COMBINED COLONOSCOPY, SINGLE OR MULTIPLE  BIOPSY/POLYPECTOMY BY BIOPSY;  Surgeon: Stone Lauren MD;  Location:  GI     GYN SURGERY       HERNIA REPAIR       Prior to Admission Medications   Prior to Admission Medications   Prescriptions Last Dose Informant Patient Reported? Taking?   ACETAMINOPHEN PO Unknown Nursing Home Yes Yes   Sig: Take 500 mg by mouth every 6 hours as needed for pain   Infant Care Products (JOHNSONS BABY WASH) LIQD  Nursing Home No No   Sig: Use for cleansing eyelids daily PRN for stye   Patient not taking: Reported on 10/19/2022   butenafine (MENTAX) 1 % CREA cream   No No   Sig: Apply daily to abdomen rash   Patient not taking: Reported on 10/19/2022   calcium carbonate-vitamin D (OSCAL W/D) 500-200 MG-UNIT tablet  Nursing Home Yes No   Sig: Take 1 tablet by mouth daily   Patient not taking: Reported on 10/19/2022   cetirizine (ZYRTEC) 10 MG tablet  Nursing Home Yes No   Sig: Take 10 mg by mouth daily as needed for allergies   clotrimazole (LOTRIMIN) 1 % external cream  Nursing Home Yes No   Sig: Apply topically 2 times daily as needed To groin rash   Patient not taking: Reported on 10/19/2022   escitalopram (LEXAPRO) 10 MG tablet  Nursing Home Yes No   Sig: Take 5 mg by mouth daily   Patient not taking: Reported on 10/19/2022   ferrous gluconate (FERGON) 324 (38 FE) MG tablet  Nursing Home No No   Sig: Take 1 tablet (324 mg) by mouth daily (with breakfast)   Patient not taking: Reported on 10/19/2022   levothyroxine (SYNTHROID, LEVOTHROID) 75 MCG tablet   No No   Sig: Take 1 tablet (75 mcg) by mouth daily   Patient not taking: Reported on 10/19/2022   loperamide (IMODIUM A-D) 2 MG tablet   No No   Sig: Start with 2 tabs (4 mg), then take one tab (2 mg) after each diarrheal stool.  Do not use more than  8 tabs (16 mg) per day.   Patient not taking: Reported on 10/19/2022   psyllium (METAMUCIL/KONSYL) 58.6 % powder  Nursing Home Yes No   Sig: Take 1 teaspoonful by mouth daily as needed for constipation   Patient not taking:  Reported on 10/19/2022      Facility-Administered Medications: None     Allergies   Allergies   Allergen Reactions     Penicillins      Bactrim [Sulfamethoxazole W/Trimethoprim] Itching     Patient prescribed Bactrim at eye appointment. Assisted living reports eyes were red and itching.        Social History   Social History     Socioeconomic History     Marital status:      Spouse name: Not on file     Number of children: Not on file     Years of education: Not on file     Highest education level: Not on file   Occupational History     Not on file   Tobacco Use     Smoking status: Never     Smokeless tobacco: Never   Substance and Sexual Activity     Alcohol use: No     Drug use: No     Sexual activity: Not on file   Other Topics Concern     Parent/sibling w/ CABG, MI or angioplasty before 65F 55M? Not Asked   Social History Narrative     Not on file     Social Determinants of Health     Financial Resource Strain: Not on file   Food Insecurity: Not on file   Transportation Needs: Not on file   Physical Activity: Not on file   Stress: Not on file   Social Connections: Not on file   Intimate Partner Violence: Not on file   Housing Stability: Not on file       Family History   Family history reviewed with patient and is noncontributory.    Review of Systems   CONSTITUTIONAL: No fever, chills, sweats, fatigue   EYES: no visual blurring, no double vision or visual loss  ENT: no decrease in hearing, no tinnitus, no vertigo, no hoarseness  RESPIRATORY: no shortness of breath, no cough, no sputum   CARDIOVASCULAR: no palpitations, no chest  pain, no exertional chest pain or pressure  GASTROINTESTINAL: no nausea or vomiting, or abd pain  GENITOURINARY: no dysuria, no frequency or hesitancy, no hematuria  MUSCULOSKELETAL: no weakness, no redness, no swelling, no joint pain,   SKIN: 4cm contusion to right forehead.   NEUROLOGIC: no numbness or tingling of hands, no numbness or tingling  of feet, no syncope, no tremors  or weakness  PSYCHIATRIC: no sleep disturbances, no anxiety or depression    Physical Exam   Temp: 97.6  F (36.4  C) Temp src: Oral BP: 132/71 Pulse: 85   Resp: 16 SpO2: 96 % O2 Device: None (Room air)    Vital Signs with Ranges  Temp:  [97.6  F (36.4  C)-97.9  F (36.6  C)] 97.6  F (36.4  C)  Pulse:  [67-85] 85  Resp:  [16] 16  BP: (132-144)/(71-81) 132/71  SpO2:  [96 %-97 %] 96 % 112 lbs 0 oz    Primary Survey:  Airway: patient talking  Breathing: symmetric respiratory effort bilaterally  Circulation: central pulses present and peripheral pulses present  Disability: Pupils - left 4 mm and brisk, right 4 mm and brisk     Sugar Coma Scale - Total 15/15  Eye Response (E): 4  4= spontaneous,  3= to verbal/voice, 2=  to pain, 1= No response   Verbal Response (V): 5   5= Orientated, converses,  4= Confused, converses, 3= Inappropriate words,  2= Incomprehensible sounds,  1=No response   Motor Response (M): 6   6= Obeys commands, 5= Localizes to pain, 4= Withdrawal to pain, 3=Fexion to pain, 2= Extension to pain, 1= No response    Secondary Survey:  General: alert, oriented to person, place, time  Head: 4 cm area of contusion to right forehead   Eyes: PERRLA, pupils 3 mm, EOMI, corneas and conjunctivae clear  Ears: pearly grey bilateral TMs and non-inflamed external ear canals  Nose: nares patent, no drainage, nasal septum non-tender  Mouth/Throat: no exudates or erythema,  no dental tenderness or malocclusions, no tongue lacerations  Neck:  Aspen cervical collar present.  Chest/Pulmonary: normal respiratory rate and rhythm,  bilateral clear breath sounds, no wheezes, rales or rhonchi, no chest wall tenderness or deformities,   Cardiovascular: S1, S2,  normal and regular rate and rhythm, no murmurs  Abdomen: soft, non-tender, no guarding, no rebound tenderness and no tenderness to palpation  :pelvis stable to lateral compression,  Back/Spine: no deformity, no midline tenderness, no sacral tenderness,  no step-offs and  no abrasions or contusions  Musculoskel/Extremities: normal extremities, full AROM of major joints without tenderness, edema, erythema, ecchymosis, or abrasions.  Hand: no gross deformities of hands or fingers. Full AROM of hand and fingers in flexion and extension.  strength equal and symmetric.   Skin: no rashes, laceration, ecchymosis, skin warm and dry.   Neuro: PERRLA, alert, oriented x 3. CN II-XII grossly intact. No focal deficits. Strength 5/5 x 4 extremities.  Sensation intact.  Psychiatric: affect/mood normal, cooperative, normal judgement/insight and memory intact  # Pain Assessment:  Current Pain Score 10/19/2022   Patient currently in pain? denies   Pain score (0-10) -   Pain location -   Serge wyman pain level was assessed and she currently denies pain.      Data   UA RESULTS:  Recent Labs   Lab Test 08/04/22  1437 11/28/17  1340 02/12/17  2145   COLOR Yellow  --  Yellow   APPEARANCE Clear  --  Clear   URINEGLC Negative   < > Negative   URINEBILI Negative  --  Small  This is an unconfirmed screening test result. A positive result may be false.  *   URINEKETONE 15*   < > 15*   SG 1.015   < > >1.030   UBLD Large*  --  Moderate*   URINEPH 6.0   < > 5.5   PROTEIN 30*  --  30*   UROBILINOGEN  --   --  1.0   NITRITE Negative   < > Negative   LEUKEST Negative  --  Negative   RBCU 77*  --  O - 2   WBCU 7*  --  O - 2    < > = values in this interval not displayed.      Results for orders placed or performed during the hospital encounter of 10/19/22 (from the past 24 hour(s))   Cervical spine CT w/o contrast    Narrative    EXAM: CT HEAD W/O CONTRAST, CT CERVICAL SPINE W/O CONTRAST  LOCATION: [facility]  DATE/TIME: 10/19/2022 5:03 PM    INDICATION: Head and neck injury, fall.  COMPARISON: CT head 8/5/2022  TECHNIQUE:   1) Routine CT Head without IV contrast. Multiplanar reformats. Dose reduction techniques were used.  2) Routine CT Cervical Spine without IV  contrast. Multiplanar reformats. Dose reduction techniques were used.    FINDINGS:   HEAD CT:   INTRACRANIAL CONTENTS: Left frontal convexity extra-axial collection with hypodense components only, measures up to 11 mm in thickness with mild underlying mass effect. No hyperdense components. The collection is new from prior CT of 08/05/2022.   Right-to-left midline shift 3-4 mm. No mass or intraparenchymal hemorrhage. No CT evidence of acute infarct. Mild presumed chronic small vessel ischemic changes. Mild generalized volume loss. No hydrocephalus.     VISUALIZED ORBITS/SINUSES/MASTOIDS: No intraorbital abnormality. No paranasal sinus mucosal disease. No middle ear or mastoid effusion.    BONES/SOFT TISSUES: Slight right frontal scalp soft tissue swelling. No fracture.    CERVICAL SPINE CT:   VERTEBRA: Acute avulsion fracture tip of left occipital condyle. Occipital C1-C2 alignment is preserved. Slight anterolisthesis C4-C5, C5-C6 and retrolisthesis C3-C4. Chronic spondylitic changes C5-C7. Chronic inferior endplate concavity T1.    CANAL/FORAMINA: Moderate degenerative changes. At C3-C4 there is moderate left foraminal stenosis. At C5-C6 there is moderate bilateral foraminal stenosis. At C6-C7 there is moderate left foraminal stenosis.    PARASPINAL: No extraspinal abnormality. Visualized lung fields are clear.      Impression    IMPRESSION:  HEAD CT:  1.  Left frontal convexity extra-axial fluid collection that is hypodense without any hyperdense components to suggest acute process, therefore likely subacute or chronic process. However the collection is new from prior CT of 08/05/2022.  2.  Right-to-left midline shift 3 to 4 mm.    CERVICAL SPINE CT:  1.  Acute non comminuted fracture tip of left occipital condyle (Hubert and Rajeev type 3), isolated fracture.  2.  Zouluipvq-L6-X2 alignment is preserved.  3.  No additional fractures.   Head CT w/o contrast    Narrative    EXAM: CT HEAD W/O CONTRAST, CT  CERVICAL SPINE W/O CONTRAST  LOCATION: [facility]  DATE/TIME: 10/19/2022 5:03 PM    INDICATION: Head and neck injury, fall.  COMPARISON: CT head 8/5/2022  TECHNIQUE:   1) Routine CT Head without IV contrast. Multiplanar reformats. Dose reduction techniques were used.  2) Routine CT Cervical Spine without IV contrast. Multiplanar reformats. Dose reduction techniques were used.    FINDINGS:   HEAD CT:   INTRACRANIAL CONTENTS: Left frontal convexity extra-axial collection with hypodense components only, measures up to 11 mm in thickness with mild underlying mass effect. No hyperdense components. The collection is new from prior CT of 08/05/2022.   Right-to-left midline shift 3-4 mm. No mass or intraparenchymal hemorrhage. No CT evidence of acute infarct. Mild presumed chronic small vessel ischemic changes. Mild generalized volume loss. No hydrocephalus.     VISUALIZED ORBITS/SINUSES/MASTOIDS: No intraorbital abnormality. No paranasal sinus mucosal disease. No middle ear or mastoid effusion.    BONES/SOFT TISSUES: Slight right frontal scalp soft tissue swelling. No fracture.    CERVICAL SPINE CT:   VERTEBRA: Acute avulsion fracture tip of left occipital condyle. Occipital C1-C2 alignment is preserved. Slight anterolisthesis C4-C5, C5-C6 and retrolisthesis C3-C4. Chronic spondylitic changes C5-C7. Chronic inferior endplate concavity T1.    CANAL/FORAMINA: Moderate degenerative changes. At C3-C4 there is moderate left foraminal stenosis. At C5-C6 there is moderate bilateral foraminal stenosis. At C6-C7 there is moderate left foraminal stenosis.    PARASPINAL: No extraspinal abnormality. Visualized lung fields are clear.      Impression    IMPRESSION:  HEAD CT:  1.  Left frontal convexity extra-axial fluid collection that is hypodense without any hyperdense components to suggest acute process, therefore likely subacute or chronic process. However the collection is new from  prior CT of 08/05/2022.  2.  Right-to-left midline shift 3 to 4 mm.    CERVICAL SPINE CT:  1.  Acute non comminuted fracture tip of left occipital condyle (Hubert and Rajeev type 3), isolated fracture.  2.  Yyrkqcdit-X6-D7 alignment is preserved.  3.  No additional fractures.   Chest XR,  PA & LAT    Narrative    EXAM: XR CHEST 2 VIEWS  LOCATION: [facility]  DATE/TIME: 10/19/2022 5:08 PM    INDICATION: Fall, altered mental status  COMPARISON: Chest radiograph 08/04/2022.      Impression    IMPRESSION: Negative chest. No displaced fractures visualized.   EKG 12 lead   Result Value Ref Range    Systolic Blood Pressure  mmHg    Diastolic Blood Pressure  mmHg    Ventricular Rate 65 BPM    Atrial Rate 66 BPM    MA Interval  ms    QRS Duration 90 ms     ms    QTc 443 ms    P Axis  degrees    R AXIS -27 degrees    T Axis 16 degrees    Interpretation ECG       Junctional rhythm  Junctional ST depression, probably normal  Abnormal ECG     [place] Draw    Narrative    The following orders were created for panel order [place] Draw.  Procedure                               Abnormality         Status                     ---------                               -----------         ------                     Extra Red Top Tube[[id number]]                               Final result               Extra Green Top (Lithium...[[id number]]                      Final result               Extra Purple Top Tube[[id number]]                            Final result                 Please view results for these tests on the individual orders.   Comprehensive metabolic panel   Result Value Ref Range    Sodium 141 133 - 144 mmol/L    Potassium 4.1 3.4 - 5.3 mmol/L    Chloride 109 94 - 109 mmol/L    Carbon Dioxide (CO2) 24 20 - 32 mmol/L    Anion Gap 8 3 - 14 mmol/L    Urea Nitrogen 13 7 - 30 mg/dL    Creatinine 0.80 0.52 - 1.04 mg/dL    Calcium 9.7 8.5 - 10.1 mg/dL    Glucose 93 70 - 99 mg/dL     Alkaline Phosphatase 79 40 - 150 U/L    AST 17 0 - 45 U/L    ALT 12 0 - 50 U/L    Protein Total 7.2 6.8 - 8.8 g/dL    Albumin 3.6 3.4 - 5.0 g/dL    Bilirubin Total 0.5 0.2 - 1.3 mg/dL    GFR Estimate 73 >60 mL/min/1.73m2   CBC with platelets differential    Narrative    The following orders were created for panel order CBC with platelets differential.  Procedure                               Abnormality         Status                     ---------                               -----------         ------                     CBC with platelets and d...[[id number]]  Abnormal            Final result                 Please view results for these tests on the individual orders.   Troponin I   Result Value Ref Range    Troponin I High Sensitivity 7 <54 ng/L   CK total   Result Value Ref Range    CK 84 30 - 225 U/L   Extra Red Top Tube   Result Value Ref Range    Hold Specimen JIC    Extra Green Top (Lithium Heparin) Tube   Result Value Ref Range    Hold Specimen JIC    Extra Purple Top Tube   Result Value Ref Range    Hold Specimen JIC    CBC with platelets and differential   Result Value Ref Range    WBC Count 4.6 4.0 - 11.0 10e3/uL    RBC Count 4.09 3.80 - 5.20 10e6/uL    Hemoglobin 12.2 11.7 - 15.7 g/dL    Hematocrit 36.3 35.0 - 47.0 %    MCV 89 78 - 100 fL    MCH 29.8 26.5 - 33.0 pg    MCHC 33.6 31.5 - 36.5 g/dL    RDW 12.6 10.0 - 15.0 %    Platelet Count 135 (L) 150 - 450 10e3/uL    % Neutrophils 71 %    % Lymphocytes 20 %    % Monocytes 7 %    % Eosinophils 1 %    % Basophils 1 %    % Immature Granulocytes 0 %    NRBCs per 100 WBC 0 <1 /100    Absolute Neutrophils 3.3 1.6 - 8.3 10e3/uL    Absolute Lymphocytes 0.9 0.8 - 5.3 10e3/uL    Absolute Monocytes 0.3 0.0 - 1.3 10e3/uL    Absolute Eosinophils 0.0 0.0 - 0.7 10e3/uL    Absolute Basophils 0.0 0.0 - 0.2 10e3/uL    Absolute Immature Granulocytes 0.0 <=0.4 10e3/uL    Absolute NRBCs 0.0 10e3/uL   INR   Result Value Ref Range    INR 1.04 0.85 - 1.15   Partial  thromboplastin time   Result Value Ref Range    aPTT 23 22 - 38 Seconds   Asymptomatic COVID-19 Virus (Coronavirus) by PCR Nasopharyngeal    Specimen: Nasopharyngeal; Swab   Result Value Ref Range    SARS CoV2 PCR Negative Negative    Narrative    Testing was performed using the Xpert Xpress SARS-CoV-2 Assay on the   Cepheid Gene-Xpert Instrument Systems. Additional information about   this Emergency Use Authorization (EUA) assay can be found via the Lab   Guide. This test should be ordered for the detection of SARS-CoV-2 in   individuals who meet SARS-CoV-2 clinical and/or epidemiological   criteria. Test performance is unknown in asymptomatic patients. This   test is for in vitro diagnostic use under the FDA EUA for   laboratories certified under CLIA to perform high complexity testing.   This test has not been FDA cleared or approved. A negative result   does not rule out the presence of PCR inhibitors in the specimen or   target RNA in concentration below the limit of detection for the   assay. The possibility of a false negative should be considered if   the patient's recent exposure or clinical presentation suggests   COVID-19. This test was validated by the [facility]. This laboratory is certified under the Clinical Laboratory Improvement Amendments of 1988 (CLIA-88) as qualified to perform high complexity laboratory testing.     Troponin I   Result Value Ref Range    Troponin I High Sensitivity 6 <54 ng/L   TSH with free T4 reflex   Result Value Ref Range    TSH 2.83 0.40 - 4.00 mU/L   Glucose by meter   Result Value Ref Range    GLUCOSE BY METER POCT 107 (H) 70 - 99 mg/dL   CT Chest Abdomen Pelvis w/o Contrast    Impression    RESIDENT PRELIMINARY INTERPRETATION  IMPRESSION:   1. No traumatic findings in the chest, abdomen, or pelvis.  2. 9 mm kidney stone in the left renal pelvis causing mild dilatation  of the superior aspect of the collecting system with mild thinning  of  the cortex.  3. Trace bilateral pleural effusions.  4. Please see same day thoracic and lumbar spine for further details  regarding this region.   CT Head w/o Contrast    Impression    RESIDENT PRELIMINARY INTERPRETATION  Impression:  1. Chronic appearing subdural collection overlying the right frontal  convexity with scattered slightly hyperdense areas concerning for  interval rebleed, similar to CT dated 10/19/2022. Continued mass  effect on the adjacent cerebrum with 4 mm midline shift and minimal  mass effect on the right lateral ventricle. No new hemorrhage  identified.  2. Previously seen occipital condyle fracture is not well-visualized  on this exam due to motion artifact.       Studies:  Chest XR,  PA & LAT   Final Result   IMPRESSION: Negative chest. No displaced fractures visualized.      Head CT w/o contrast   Final Result   IMPRESSION:   HEAD CT:   1.  Left frontal convexity extra-axial fluid collection that is hypodense without any hyperdense components to suggest acute process, therefore likely subacute or chronic process. However the collection is new from prior CT of 08/05/2022.   2.  Right-to-left midline shift 3 to 4 mm.      CERVICAL SPINE CT:   1.  Acute non comminuted fracture tip of left occipital condyle (Hubert and Rajeev type 3), isolated fracture.   2.  Ynuonsozl-A6-P0 alignment is preserved.   3.  No additional fractures.      Cervical spine CT w/o contrast   Final Result   IMPRESSION:   HEAD CT:   1.  Left frontal convexity extra-axial fluid collection that is hypodense without any hyperdense components to suggest acute process, therefore likely subacute or chronic process. However the collection is new from prior CT of 08/05/2022.   2.  Right-to-left midline shift 3 to 4 mm.      CERVICAL SPINE CT:   1.  Acute non comminuted fracture tip of left occipital condyle (Hubert and Rajeev type 3), isolated fracture.   2.  Epwmvbtgh-Q8-V0 alignment is preserved.   3.  No  additional fractures.        Discussed with trauma staff on call, Dr. Schneider.    Domingo Tellez MD  Surgical moonlighter

## 2022-10-20 NOTE — PHARMACY-ADMISSION MEDICATION HISTORY
Admission Medication History Completed by Pharmacy    See [facility] Admission Navigator for allergy information, preferred outpatient pharmacy, prior to admission medications and immunization status.     Medication History Sources:     Patient, Dispense Report    Changes made to PTA medication list (reason):    Added: None    Deleted:     Mentax 1% cream daily (patient reports no topicals)    Cetirizine 10 mg daily prn (patient did not report taking)    Clotrimazole 1% cream BID (patient reports no topicals)    Aec's baby wash prn (patient reports no topicals)    Loperamide 2-4 mg prn (old rx)    Psyllium 58.6% powder daily prn (old rx)    Changed:     Acetaminophen - added formulation    Additional Information:    Patient is hard of hearing and has some confusion which may have made interview unreliable, however she states she is taking her levothyroxine and vitamins. She did not endorse taking escitalopram, but it has been filled consistently so it was left on the list.    Prior to Admission medications    Medication Sig Last Dose Taking? Auth Provider Long Term End Date   acetaminophen (TYLENOL) 500 MG tablet Take 500 mg by mouth every 6 hours as needed for mild pain 10/19/2022 Yes Unknown, Entered By History     calcium carbonate-vitamin D (OSCAL W/D) 500-200 MG-UNIT tablet Take 1 tablet by mouth daily 10/19/2022 Yes Unknown, Entered By History     escitalopram (LEXAPRO) 10 MG tablet Take 5 mg by mouth daily 10/19/2022 Yes Unknown, Entered By History Yes    ferrous gluconate (FERGON) 324 (38 FE) MG tablet Take 1 tablet (324 mg) by mouth daily (with breakfast) 10/19/2022 Yes Kassie Wyatt MD     levothyroxine (SYNTHROID, LEVOTHROID) 75 MCG tablet Take 1 tablet (75 mcg) by mouth daily 10/19/2022 Yes Kassie Wyatt MD Yes        Date completed: 10/20/22    Medication history completed by: Pino Cruz

## 2022-10-20 NOTE — PROGRESS NOTES
Admitted/transferred from: Arrived on 4A from [facility] at 2040 last night.    Reason for admission/transfer:  Frequent monitoring post fall at home    Patient status upon admission/transfer:   Neurologically intact, except very [place] bilaterally.  Patient used call light appropriately.  Pain in posterior neck when palpated, but not at rest per patient.  Pain in right ribs and PRN tylenol given x1.  Patient had urgency of which is baseline and used bedpan to void.  Patient helped with turns and getting on bed pan.  See flowsheets for details.    Interventions:   Neuro checks performed q2hrs  Went to scheduled CT scan  C-Collar on at all times (C-collar changed due to size)  Labs drawn this am by .    Bed alarm kept on all night.    MIVF to be started this am per SICU.    Plan:   2 RN skin assessment: completed by this RN, Keara Gold, and circ RN (Miranda).    Result of skin assessment and interventions/actions:  See flowsheets for details.    Weight:  Done    Patient belongings:   Coat, shoes, white plastic bag x1, and walker are in patient's room at bedside.    See flowsheets for details.

## 2022-10-20 NOTE — PLAN OF CARE
Major Shift Events: A&OX4, extremely hard of hearing, flat affect. Pleasant. Denies pain. Standby assist with a walker. Equal strength throughout. Denies numbness and tingling. Perrla. Off tele with 6A orders, normotensive. Afebrile. Sating adequately on room air. Tolerating a regular diet. Voids in bathroom. No BM today. Saline locked PIV.   Plan: Transfer orders to 6A, Discharge tomorrow at 1230.   For vital signs and complete assessments, please see documentation flowsheets.

## 2022-10-20 NOTE — PROGRESS NOTES
[facility], [place]   10/20/2022  Neurosurgery Progress Note:    Assessment:  Serge Marin is a 82 year old female patient presenting with chronic-appearing SDH and L occipital condyle fracture likely due to fall 3 days ago. She is difficult to prompt for adequate examination although she is awake and alert. It is difficult to ascertain whether this is her baseline mental status or related to her SDH.    Plan:  - Please clear the C collar after clinical examination   - No acute neurosurgical intervention indicated  -No indication for anti-epileptic medications  - Follow up in clinic in 2 weeks after CT head- will arrange for you  - Neurosurgery will follow periphaerally  -----------------------------------  Raul Haley  Neurosurgery Resident PGY2    Interval History: Improved mental status after arrival from [facility]. Complains of pain in right chest region.    Objective:   Temp:  [97.6  F (36.4  C)-98  F (36.7  C)] 97.9  F (36.6  C)  Pulse:  [50-85] 61  Resp:  [14-20] 14  BP: ()/(47-81) 116/56  SpO2:  [95 %-98 %] 96 %  I/O last 3 completed shifts:  In: 100 [P.O.:100]  Out: 575 [Urine:575]    NEUROLOGIC:  -- Awake; Alert; oriented x 3  -- Follows commands with significant prompting  -- +repetition and naming  -- Speech fluent, spontaneous. No aphasia.  -- no gaze preference. No apparent hemineglect.  Cranial Nerves:  -- PERRL 3-2mm bilat and brisk, extraocular movements intact  -- face symmetrical, tongue midline  -- sensory V1-V3 intact bilaterally  -- palate elevates symmetrically, uvula midline  -- extremely hard of hearing  -- Trapezii 5/5 strength bilat symmetric     Motor:  Normal bulk / tone; no tremor, rigidity.  Diffuse muscle wasting.  No Pronator Drift       Delt Bi Tri Hand Flexion/  Extension Iliopsoas Quadriceps Hamstrings Tibialis Anterior Gastroc     C5 C6 C7 C8/T1 L2 L3 L4-S1 L4 S1   R 4 4 4 4 3+ 3+ 3+ 3+ 3+   L 4 4 4 4 3+ 3+ 3+ 3+ 3+   Sensory:  intact to  LT x 4 extremities      Reflexes:       Bi Tri BR Melodie Pat Ach Bab     C5-6 C7-8 C6 UMN L2-4 S1 UMN   R 2+ 2+ 2+ Norm 2+ 2+ Norm   L 2+ 2+ 2+ Norm 2+ 2+ Norm     LABS:  Recent Labs   Lab 10/19/22  2037 10/19/22  1737   NA  --  141   POTASSIUM  --  4.1   CHLORIDE  --  109   CO2  --  24   ANIONGAP  --  8   * 93   BUN  --  13   CR  --  0.80   BERTA  --  9.7       Recent Labs   Lab 10/20/22  0658   WBC 3.6*   RBC 3.94   HGB 11.5*   HCT 34.9*   MCV 89   MCH 29.2   MCHC 33.0   RDW 12.6   *       IMAGING:  Recent Results (from the past 24 hour(s))   Cervical spine CT w/o contrast    Narrative    EXAM: CT HEAD W/O CONTRAST, CT CERVICAL SPINE W/O CONTRAST  LOCATION: [facility]  DATE/TIME: 10/19/2022 5:03 PM    INDICATION: Head and neck injury, fall.  COMPARISON: CT head 8/5/2022  TECHNIQUE:   1) Routine CT Head without IV contrast. Multiplanar reformats. Dose reduction techniques were used.  2) Routine CT Cervical Spine without IV contrast. Multiplanar reformats. Dose reduction techniques were used.    FINDINGS:   HEAD CT:   INTRACRANIAL CONTENTS: Left frontal convexity extra-axial collection with hypodense components only, measures up to 11 mm in thickness with mild underlying mass effect. No hyperdense components. The collection is new from prior CT of 08/05/2022.   Right-to-left midline shift 3-4 mm. No mass or intraparenchymal hemorrhage. No CT evidence of acute infarct. Mild presumed chronic small vessel ischemic changes. Mild generalized volume loss. No hydrocephalus.     VISUALIZED ORBITS/SINUSES/MASTOIDS: No intraorbital abnormality. No paranasal sinus mucosal disease. No middle ear or mastoid effusion.    BONES/SOFT TISSUES: Slight right frontal scalp soft tissue swelling. No fracture.    CERVICAL SPINE CT:   VERTEBRA: Acute avulsion fracture tip of left occipital condyle. Occipital C1-C2 alignment is preserved. Slight anterolisthesis C4-C5, C5-C6 and  retrolisthesis C3-C4. Chronic spondylitic changes C5-C7. Chronic inferior endplate concavity T1.    CANAL/FORAMINA: Moderate degenerative changes. At C3-C4 there is moderate left foraminal stenosis. At C5-C6 there is moderate bilateral foraminal stenosis. At C6-C7 there is moderate left foraminal stenosis.    PARASPINAL: No extraspinal abnormality. Visualized lung fields are clear.      Impression    IMPRESSION:  HEAD CT:  1.  Left frontal convexity extra-axial fluid collection that is hypodense without any hyperdense components to suggest acute process, therefore likely subacute or chronic process. However the collection is new from prior CT of 08/05/2022.  2.  Right-to-left midline shift 3 to 4 mm.    CERVICAL SPINE CT:  1.  Acute non comminuted fracture tip of left occipital condyle (Hubert and Rajeev type 3), isolated fracture.  2.  [id number] alignment is preserved.  3.  No additional fractures.   Head CT w/o contrast    Narrative    EXAM: CT HEAD W/O CONTRAST, CT CERVICAL SPINE W/O CONTRAST  LOCATION: [facility]  DATE/TIME: 10/19/2022 5:03 PM    INDICATION: Head and neck injury, fall.  COMPARISON: CT head 8/5/2022  TECHNIQUE:   1) Routine CT Head without IV contrast. Multiplanar reformats. Dose reduction techniques were used.  2) Routine CT Cervical Spine without IV contrast. Multiplanar reformats. Dose reduction techniques were used.    FINDINGS:   HEAD CT:   INTRACRANIAL CONTENTS: Left frontal convexity extra-axial collection with hypodense components only, measures up to 11 mm in thickness with mild underlying mass effect. No hyperdense components. The collection is new from prior CT of 08/05/2022.   Right-to-left midline shift 3-4 mm. No mass or intraparenchymal hemorrhage. No CT evidence of acute infarct. Mild presumed chronic small vessel ischemic changes. Mild generalized volume loss. No hydrocephalus.     VISUALIZED ORBITS/SINUSES/MASTOIDS: No  intraorbital abnormality. No paranasal sinus mucosal disease. No middle ear or mastoid effusion.    BONES/SOFT TISSUES: Slight right frontal scalp soft tissue swelling. No fracture.    CERVICAL SPINE CT:   VERTEBRA: Acute avulsion fracture tip of left occipital condyle. Occipital C1-C2 alignment is preserved. Slight anterolisthesis C4-C5, C5-C6 and retrolisthesis C3-C4. Chronic spondylitic changes C5-C7. Chronic inferior endplate concavity T1.    CANAL/FORAMINA: Moderate degenerative changes. At C3-C4 there is moderate left foraminal stenosis. At C5-C6 there is moderate bilateral foraminal stenosis. At C6-C7 there is moderate left foraminal stenosis.    PARASPINAL: No extraspinal abnormality. Visualized lung fields are clear.      Impression    IMPRESSION:  HEAD CT:  1.  Left frontal convexity extra-axial fluid collection that is hypodense without any hyperdense components to suggest acute process, therefore likely subacute or chronic process. However the collection is new from prior CT of 08/05/2022.  2.  Right-to-left midline shift 3 to 4 mm.    CERVICAL SPINE CT:  1.  Acute non comminuted fracture tip of left occipital condyle (Hubert and Rajeev type 3), isolated fracture.  2.  [id number]-N2 alignment is preserved.  3.  No additional fractures.   Chest XR,  PA & LAT    Narrative    EXAM: XR CHEST 2 VIEWS  LOCATION: [facility]  DATE/TIME: 10/19/2022 5:08 PM    INDICATION: Fall, altered mental status  COMPARISON: Chest radiograph 08/04/2022.      Impression    IMPRESSION: Negative chest. No displaced fractures visualized.   CT Chest Abdomen Pelvis w/o Contrast    Narrative    EXAMINATION: CT CHEST ABDOMEN PELVIS W/O CONTRAST, 10/20/2022 1:18 AM    TECHNIQUE:  Helical CT images from the thoracic inlet through the  symphysis pubis were obtained without IV contrast.     COMPARISON: X-ray 10/19/2022.Same-day thoracic and lumbar spine CT.    HISTORY: Fall with rib  tenderness.     FINDINGS:  CHEST:  LUNGS: Calcified granulomas. Bibasilar scarring. No mass or  consolidation. No pneumothorax. Trace bilateral pleural effusions. The  airway is patent.    MEDIASTINUM: Normal heart size, no pericardial effusion. Normal  caliber thoracic aorta and main pulmonary artery. Normal thyroid. No  suspicious mediastinal lymphadenopathy. Normal caliber esophagus.     ABDOMEN/PELVIS:  Images obscured by motion artifact.  LIVER: Within normal limits.    BILIARY: Cholecystectomy clips. No obvious intra or extrahepatic  biliary dilatation.    PANCREAS: Within normal limits.    SPLEEN: Within normal limits.    ADRENAL GLANDS: Within normal limits.    URINARY TRACT: Simple cyst arising from the inferior pole of the left  kidney. 0.9 x 0.6 cm kidney stone in the left renal pelvis causing  mild dilatation of the upper aspect of the renal pelvis and mild  cortical thinning.     REPRODUCTIVE ORGANS: Within normal limits.    STOMACH: Within normal limits.    BOWEL: Normal caliber of the small and large bowel.  Appendix is  within normal limits. Diverticulosis without diverticulitis.    PERITONEUM/FLUID: No free fluid. No free air.    VESSELS: No aneurysmal dilatation of the abdominal aorta.      LYMPH NODES: No lymphadenopathy.    BONES/SOFT TISSUES: Degenerative changes appreciated along the  sacroiliac joints. Osteopenic appearance of the bones. Please see same  day thoracic and lumbar spine CT for further details regarding this  region. No acute fracture identified.      Impression    IMPRESSION:   1. No traumatic findings in the chest, abdomen, or pelvis.  2. 9 mm kidney stone in the left renal pelvis causing mild dilatation  of the superior aspect of the collecting system with mild thinning of  the cortex.  3. Trace bilateral pleural effusions.  4. Please see same day thoracic and lumbar spine for further details  regarding this region.    I have personally reviewed the examination and initial  interpretation  and I agree with the findings.    ORVILLE CELESTE MD         SYSTEM ID:  [id number]   CT Thoracic Spine w/o Contrast    Narrative    Thoracic and Lumbar spine CT without contrast    History: thoracic spine recon.    Comparison: CT abdomen pelvis 8/4/2022    Technique: Axial, coronal, and sagittal multiplanar reconstructions  obtained from acquisition of thoracic and lumbar spine CT scan     Findings: Diffuse osteoporotic appearance of the spine. Acute  appearing compression fracture of the T11 vertebral body with  approximately 25% height loss new since August.  Age-indeterminate  compression deformity of the T7 vertebral body with approximately 25%  height loss. Additionally, there is a superior endplate deformity of  the T12 vertebral body new compared with August. There is no  prevertebral edema. There is no spinal canal or foraminal stenosis at  any level. No soft tissue abnormality in the visualized paraspinous  tissues anteriorly.    Lumbar Spine: Increased compression deformity of the L1 vertebral body  with approximately 50% height loss compared with CT from August. There  is no acute fracture or subluxation. There are 5 type lumbar vertebra,  used for the purposes of this dictation .   There is no disc space narrowing at any level.  On a level by level basis, the findings are as follows:  T12-L1:  There is no focal abnormality.  L1-2:  There is no focal abnormality.  L2-3:  There is no focal abnormality.  L3-4:  There is no focal abnormality.  L4-5: Mild right neural foraminal stenosis secondary to facet  arthropathy and mild disc bulge.  L5-S1: Mild right neural foraminal stenosis secondary to facet  arthropathy and mild disc bulge.    Kyphotic curvature of the thoracic spine with compensatory lumbar  spine lordosis. Degenerative changes along the sacroiliac joint.      Please see same day CT CAP for further details regarding the  intrathoracic and intra-abdominal findings.      Impression     Impression:   1. Acute appearing compression fracture of the T11 vertebral body with  approximately 25% height loss and acute superior endplate deformity of  the T12 vertebral body new since August.  2. Worsening compression fracture of the L1 vertebral body since  August with approximately 50% height loss (previously less than 25%).  3. Age indeterminant compression deformity of the T7 vertebral body.  Correlate with prior imaging to document stability.  4. Please see same day CT CAP for further details regarding the  intrathoracic and intra-abdominal findings.    I have personally reviewed the examination and initial interpretation  and I agree with the findings.    WILVER HAMILTON MD         SYSTEM ID:  [id number]   CT Lumbar Spine w/o Contrast    Narrative    Thoracic and Lumbar spine CT without contrast    History: thoracic spine recon.    Comparison: CT abdomen pelvis 8/4/2022    Technique: Axial, coronal, and sagittal multiplanar reconstructions  obtained from acquisition of thoracic and lumbar spine CT scan     Findings: Diffuse osteoporotic appearance of the spine. Acute  appearing compression fracture of the T11 vertebral body with  approximately 25% height loss new since August.  Age-indeterminate  compression deformity of the T7 vertebral body with approximately 25%  height loss. Additionally, there is a superior endplate deformity of  the T12 vertebral body new compared with August. There is no  prevertebral edema. There is no spinal canal or foraminal stenosis at  any level. No soft tissue abnormality in the visualized paraspinous  tissues anteriorly.    Lumbar Spine: Increased compression deformity of the L1 vertebral body  with approximately 50% height loss compared with CT from August. There  is no acute fracture or subluxation. There are 5 type lumbar vertebra,  used for the purposes of this dictation .   There is no disc space narrowing at any level.  On a level by level basis, the findings are as  follows:  T12-L1:  There is no focal abnormality.  L1-2:  There is no focal abnormality.  L2-3:  There is no focal abnormality.  L3-4:  There is no focal abnormality.  L4-5: Mild right neural foraminal stenosis secondary to facet  arthropathy and mild disc bulge.  L5-S1: Mild right neural foraminal stenosis secondary to facet  arthropathy and mild disc bulge.    Kyphotic curvature of the thoracic spine with compensatory lumbar  spine lordosis. Degenerative changes along the sacroiliac joint.      Please see same day CT CAP for further details regarding the  intrathoracic and intra-abdominal findings.      Impression    Impression:   1. Acute appearing compression fracture of the T11 vertebral body with  approximately 25% height loss and acute superior endplate deformity of  the T12 vertebral body new since August.  2. Worsening compression fracture of the L1 vertebral body since  August with approximately 50% height loss (previously less than 25%).  3. Age indeterminant compression deformity of the T7 vertebral body.  Correlate with prior imaging to document stability.  4. Please see same day CT CAP for further details regarding the  intrathoracic and intra-abdominal findings.    I have personally reviewed the examination and initial interpretation  and I agree with the findings.    WILVER HAMILTON MD         SYSTEM ID:  [id number]   CT Head w/o Contrast    Narrative    CT HEAD W/O CONTRAST 10/20/2022 1:27 AM    Provided History: FU SDH  ICD-10:    Comparison: CT 10/19/2022.    Technique: Using multidetector thin collimation helical acquisition  technique, axial, coronal and sagittal CT images from the skull base  to the vertex were obtained without intravenous contrast.     Findings:    Chronic appearing 1.2 cm subdural collection overlying the right  frontal convexity with scattered slightly hyperdense areas, unchanged.  There is mass effect on the adjacent cerebrum with 4 mm leftward  midline shift, similar to prior.  Additionally, there is minimal mass  effect on the right lateral ventricle. No new hemorrhage identified.  The gray to white matter differentiation of the cerebral hemispheres  is preserved. The basal cisterns are patent. Age-related volume loss.    The visualized paranasal sinuses are clear. The mastoid air cells are  clear. Evaluation of the osseous structures is limited due to motion  artifacts. Hematoma of the right frontoparietal soft tissues.       Impression    Impression:  1. Chronic appearing subdural collection overlying the right frontal  convexity with scattered slightly hyperdense areas unchanged compared  with CT dated 10/19/2022. Continued mass effect on the adjacent  cerebrum with 4 mm midline shift and minimal mass effect on the right  lateral ventricle. No new hemorrhage identified.  2. Previously seen occipital condyle fracture is not well-visualized  on this exam due to motion artifact.    I have personally reviewed the examination and initial interpretation  and I agree with the findings.    WILVER HAMILTON MD         SYSTEM ID:  [id number]

## 2022-10-20 NOTE — PROGRESS NOTES
10/20/22 4312   Appointment Info   Signing Clinician's Name / Credentials (OT) Seema Brito OTR/l   Living Environment   People in Home alone   Current Living Arrangements assisted living   Home Accessibility no concerns   Living Environment Comments Per chart review, pt lives in a small apartment [facility].   Self-Care   Usual Activity Tolerance fair   Current Activity Tolerance fair   Regular Exercise No   Equipment Currently Used at Home walker, rolling   Fall history within last six months yes   Number of times patient has fallen within last six months 2   Activity/Exercise/Self-Care Comment Per chart review, pt has home PT, uses a 4WW, and gets assistance from [facility] staff for cares, meals, laundry, cleaning.   Instrumental Activities of Daily Living (IADL)   IADL Comments [facility] staff assist with IADL at baseline.   General Information   Onset of Illness/Injury or Date of Surgery 10/19/22   Referring Physician Jyoti Martin MD   Patient/Family Therapy Goal Statement (OT) return to [facility]   Additional Occupational Profile Info/Pertinent History of Current Problem Serge Marin is a 82 year old female patient presenting with chronic-appearing SDH and L occipital condyle fracture likely due to fall 3 days ago. She is difficult to prompt for adequate examination although she is awake and alert. It is difficult to ascertain whether this is her baseline mental status or related to her SDH.   General Observations and Info Pt sitting in bedside chair upon arrival, agreeable to therapy.   Cognitive Status Examination   Orientation Status place;person   Affect/Mental Status (Cognitive) flat/blunted affect;low arousal/lethargic   Follows Commands repetition of directions required   Cognitive Status Comments Very [place], making screening cognition difficult. Pt needs multiple repititions prior to initiating movemet.   Visual Perception   Impact of Vision Impairment on Function (Vision) Difficulty answering vision questions  due to hearing impairment   Sensory   Sensory Comments very [place]   Range of Motion Comprehensive   Comment, General Range of Motion UE appear WFL   Strength Comprehensive (MMT)   Comment, General Manual Muscle Testing (MMT) Assessment generzlized weakness   Clinical Impression   Criteria for Skilled Therapeutic Interventions Met (OT) Yes, treatment indicated   OT Diagnosis OT order for recent falls   Influenced by the following impairments hearing, activity tolerance, fatigue   OT Problem List-Impairments impacting ADL problems related to;activity tolerance impaired;cognition;strength   Assessment of Occupational Performance 1-3 Performance Deficits   Identified Performance Deficits home mgmt   Planned Therapy Interventions (OT) ADL retraining;cognition;strengthening;home program guidelines;progressive activity/exercise;risk factor education   Clinical Decision Making Complexity (OT) low complexity   Anticipated Equipment Needs Upon Discharge (OT)   (TBD with further therapy)   Risk & Benefits of therapy have been explained evaluation/treatment results reviewed;patient   OT Total Evaluation Time   OT Eval, Low Complexity Minutes (53365) 6   OT Goals   Therapy Frequency (OT) 5 times/wk   OT Predicted Duration/Target Date for Goal Attainment 10/28/22   OT Goals Lower Body Dressing;Toilet Transfer/Toileting;Cognition   OT: Lower Body Dressing Modified independent   OT: Toilet Transfer/Toileting Supervision/stand-by assist;toilet transfer;cleaning and garment management   OT: Cognitive Patient/caregiver will verbalize understanding of cognitive assessment results/recommendations as needed for safe discharge planning   OT Discharge Planning   OT Discharge Recommendation (DC Rec) Long term care facility;home with home care occupational therapy   OT Rationale for DC Rec Anticipate pt is near functional baseline and would be able to return to detention with increased assist and home OT as well as PT.   OT Brief overview of current  status Ax1 + 4WW in room. SBA to don/doff socks   Total Session Time   Total Session Time (sum of timed and untimed services) 6

## 2022-10-20 NOTE — H&P
SURGICAL ICU ADMISSION NOTE  10/19/2022    PRIMARY TEAM: Trauma  PRIMARY PHYSICIAN: Dr. Schneider  REASON FOR CRITICAL CARE ADMISSION: Monitoring  ADMITTING PHYSICIAN: Dr. Paige  Date of Service: 10/19/2022    ASSESSMENT:  Serge Marin is a 82 year old female who was admitted on 10/19/2022 after a bed-level fall on 10/16. Injuries include subacute right frontal SDH with 3-4mm midline shift, L occipital condyle fx, and right forehead contusion.    PLAN:    Neuro/ pain/ sedation:  # Right frontal SDH 11mm, subacute  # Midline shift 3-4mm  # L occipital condyle fracture   # Cspine tenderness  # Right forehead contusion  # Hx of Bipolar disorder  - Neurosurgery consulted   -- Q2 neuro checks    -- Unable to clear cspine clinically. Keep current C collar in place   -- Repeat head CT   -- Okay for activity per trauma  - Trauma primary   -- CT CAP   -- T and L spine reconstructions   -- Tertiary in AM   -- Okay for up with assist  - Pain: melo tylenol  - Monitor neurological status. Notify the MD/DO for any acute changes in exam.     Pulmonary care:   # Right lateral chest wall pain  - On room air  - Supplemental oxygen to keep saturation above 92 %.  - Incentive spirometer every 15- 30 minutes when awake.    Cardiovascular:    - Monitor hemodynamic status.     GI /Nutrition:   - NPO except ice chips and medications until imaging complete  - Bowel regimen     Fluids/ Electrolytes  - LR @ 75 while NPO  - ICU electrolyte replacement protocol    Renal/ Fluid Balance:   - Voiding independently  - Will continue to monitor intake and output.    Endocrine:    # Hypothyroidism  - No hx of diabetes    ID/ Antibiotics:  - No indication for antibiotics.     Heme:     # Thrombocytopenia, chronic  - Hemoglobin 12.2  - Plt 135  - Monitor, AM labs    MSK:  # Recent falls  - PT/OT consulted     Prophylaxis:  - Mechanical prophylaxis for DVT.   - chemical DVT ppx pending imaging    Lines/ tubes/ drains:  - PIV    Disposition:  - Surgical  ICU    Patient seen, findings and plan discussed with surgical ICU staff Dr. Royal Martin MD  PGY-2 Surgery    See [facility] for on-call pager information.      - - - - - - - - - - - - - - - - - - - - - - - - - - - - - - - - - - - - - - - - - - - - - - - - - - - - - - - - - - - - - - - - - - - - - - - -     HISTORY PRESENTING ILLNESS:     Serge Marin is a 82 year old female who was admitted on 10/19/2022 after a bed-level fall on 10/16. Injuries include subacute right frontal SDH with 3-4mm midline shift, L occipital condyle fx, and right forehead contusion. Reports she rolled out of bed and hit her head on a chair near the bed. No LOC or use of blood thinners. Reports having right sided rib pain but denies other chest pain or shortness of breath. C spine midline tenderness.     REVIEW OF SYSTEMS: 10 point ROS neg other than the symptoms noted above in the HPI.    PAST MEDICAL HISTORY:    has a past medical history of Hernia, abdominal.    She has no past medical history of Complication of anesthesia or History of thrombophlebitis.    SURGICAL HISTORY:    has a past surgical history that includes GYN surgery; Cholecystectomy; Colonoscopy (Left, 03/18/2015); and hernia repair.    SOCIAL HISTORY:    reports that she has never smoked. She has never used smokeless tobacco. She reports that she does not drink alcohol and does not use drugs.    FAMILY HISTORY: No bleeding/clotting disorders nor problems with anesthesia.     ALLERGIES:      Allergies   Allergen Reactions     Penicillins      Bactrim [Sulfamethoxazole W/Trimethoprim] Itching     Patient prescribed Bactrim at eye appointment. Assisted living reports eyes were red and itching.        MEDICATIONS:  No current facility-administered medications on file prior to encounter.  ACETAMINOPHEN PO, Take 500 mg by mouth every 6 hours as needed for pain  butenafine (MENTAX) 1 % CREA cream, Apply daily to abdomen rash (Patient not taking: Reported on  10/19/2022)  calcium carbonate-vitamin D (OSCAL W/D) 500-200 MG-UNIT tablet, Take 1 tablet by mouth daily (Patient not taking: Reported on 10/19/2022)  cetirizine (ZYRTEC) 10 MG tablet, Take 10 mg by mouth daily as needed for allergies  clotrimazole (LOTRIMIN) 1 % external cream, Apply topically 2 times daily as needed To groin rash (Patient not taking: Reported on 10/19/2022)  escitalopram (LEXAPRO) 10 MG tablet, Take 5 mg by mouth daily (Patient not taking: Reported on 10/19/2022)  ferrous gluconate (FERGON) 324 (38 FE) MG tablet, Take 1 tablet (324 mg) by mouth daily (with breakfast) (Patient not taking: Reported on 10/19/2022)  Infant Care Products (JOHNSONS BABY WASH) LIQD, Use for cleansing eyelids daily PRN for stye (Patient not taking: Reported on 10/19/2022)  levothyroxine (SYNTHROID, LEVOTHROID) 75 MCG tablet, Take 1 tablet (75 mcg) by mouth daily (Patient not taking: Reported on 10/19/2022)  loperamide (IMODIUM A-D) 2 MG tablet, Start with 2 tabs (4 mg), then take one tab (2 mg) after each diarrheal stool.  Do not use more than  8 tabs (16 mg) per day. (Patient not taking: Reported on 10/19/2022)  psyllium (METAMUCIL/KONSYL) 58.6 % powder, Take 1 teaspoonful by mouth daily as needed for constipation (Patient not taking: Reported on 10/19/2022)        PHYSICAL EXAMINATION:  Temp:  [97.6  F (36.4  C)-97.9  F (36.6  C)] 97.6  F (36.4  C)  Pulse:  [67-85] 85  Resp:  [16] 16  BP: (132-144)/(71-81) 132/71  SpO2:  [96 %-97 %] 96 %  General: Alert, in no acute distress.  Neuro: A&Ox2  HEENT: Right forehead contusion. Hard of hearing.   Neck: No cervical lymphadenopathy.  Back/shouler: ecchymosis over right shoulder  Respiratory: Non-labored breathing on room air.   Cardiovascular: Regular rate and rhythm on monitor. No murmurs.  Gastrointestinal: Abdomen soft, non-distended, non-tender to palpation.  Genitourinary: Normal external female genitalia.  MSK/Extremities: Moving all four extremities. No limb  deformities. No peripheral edema. peripheral pulses intact. Overall mildly weak due to frailty 4/5 strength bilateral UE and LE.   Incisions/Skin: As noted above. No rashes or lesions appreciated.      LABS: Reviewed.   Arterial Blood Gases   No lab results found in last 7 days.  Complete Blood Count   Recent Labs   Lab 10/19/22  1737   WBC 4.6   HGB 12.2   *     Basic Metabolic Panel  Recent Labs   Lab 10/19/22  2037 10/19/22  1737   NA  --  141   POTASSIUM  --  4.1   CHLORIDE  --  109   CO2  --  24   BUN  --  13   CR  --  0.80   * 93     Liver Function Tests  Recent Labs   Lab 10/19/22  1740 10/19/22  1737   AST  --  17   ALT  --  12   ALKPHOS  --  79   BILITOTAL  --  0.5   ALBUMIN  --  3.6   INR 1.04  --      Pancreatic Enzymes  No lab results found in last 7 days.  Coagulation Profile  Recent Labs   Lab 10/19/22  1740   INR 1.04   PTT 23     Lactate  Invalid input(s): LACTATE    IMAGING:  Recent Results (from the past 24 hour(s))   Cervical spine CT w/o contrast    Narrative    EXAM: CT HEAD W/O CONTRAST, CT CERVICAL SPINE W/O CONTRAST  LOCATION: [facility]  DATE/TIME: 10/19/2022 5:03 PM    INDICATION: Head and neck injury, fall.  COMPARISON: CT head 8/5/2022  TECHNIQUE:   1) Routine CT Head without IV contrast. Multiplanar reformats. Dose reduction techniques were used.  2) Routine CT Cervical Spine without IV contrast. Multiplanar reformats. Dose reduction techniques were used.    FINDINGS:   HEAD CT:   INTRACRANIAL CONTENTS: Left frontal convexity extra-axial collection with hypodense components only, measures up to 11 mm in thickness with mild underlying mass effect. No hyperdense components. The collection is new from prior CT of 08/05/2022.   Right-to-left midline shift 3-4 mm. No mass or intraparenchymal hemorrhage. No CT evidence of acute infarct. Mild presumed chronic small vessel ischemic changes. Mild generalized volume loss. No  hydrocephalus.     VISUALIZED ORBITS/SINUSES/MASTOIDS: No intraorbital abnormality. No paranasal sinus mucosal disease. No middle ear or mastoid effusion.    BONES/SOFT TISSUES: Slight right frontal scalp soft tissue swelling. No fracture.    CERVICAL SPINE CT:   VERTEBRA: Acute avulsion fracture tip of left occipital condyle. Occipital C1-C2 alignment is preserved. Slight anterolisthesis C4-C5, C5-C6 and retrolisthesis C3-C4. Chronic spondylitic changes C5-C7. Chronic inferior endplate concavity T1.    CANAL/FORAMINA: Moderate degenerative changes. At C3-C4 there is moderate left foraminal stenosis. At C5-C6 there is moderate bilateral foraminal stenosis. At C6-C7 there is moderate left foraminal stenosis.    PARASPINAL: No extraspinal abnormality. Visualized lung fields are clear.      Impression    IMPRESSION:  HEAD CT:  1.  Left frontal convexity extra-axial fluid collection that is hypodense without any hyperdense components to suggest acute process, therefore likely subacute or chronic process. However the collection is new from prior CT of 08/05/2022.  2.  Right-to-left midline shift 3 to 4 mm.    CERVICAL SPINE CT:  1.  Acute non comminuted fracture tip of left occipital condyle (Hubert and Somerset type 3), isolated fracture.  2.  [id number]-I6-W3 alignment is preserved.  3.  No additional fractures.   Head CT w/o contrast    Narrative    EXAM: CT HEAD W/O CONTRAST, CT CERVICAL SPINE W/O CONTRAST  LOCATION: [facility]  DATE/TIME: 10/19/2022 5:03 PM    INDICATION: Head and neck injury, fall.  COMPARISON: CT head 8/5/2022  TECHNIQUE:   1) Routine CT Head without IV contrast. Multiplanar reformats. Dose reduction techniques were used.  2) Routine CT Cervical Spine without IV contrast. Multiplanar reformats. Dose reduction techniques were used.    FINDINGS:   HEAD CT:   INTRACRANIAL CONTENTS: Left frontal convexity extra-axial collection with hypodense components  only, measures up to 11 mm in thickness with mild underlying mass effect. No hyperdense components. The collection is new from prior CT of 08/05/2022.   Right-to-left midline shift 3-4 mm. No mass or intraparenchymal hemorrhage. No CT evidence of acute infarct. Mild presumed chronic small vessel ischemic changes. Mild generalized volume loss. No hydrocephalus.     VISUALIZED ORBITS/SINUSES/MASTOIDS: No intraorbital abnormality. No paranasal sinus mucosal disease. No middle ear or mastoid effusion.    BONES/SOFT TISSUES: Slight right frontal scalp soft tissue swelling. No fracture.    CERVICAL SPINE CT:   VERTEBRA: Acute avulsion fracture tip of left occipital condyle. Occipital C1-C2 alignment is preserved. Slight anterolisthesis C4-C5, C5-C6 and retrolisthesis C3-C4. Chronic spondylitic changes C5-C7. Chronic inferior endplate concavity T1.    CANAL/FORAMINA: Moderate degenerative changes. At C3-C4 there is moderate left foraminal stenosis. At C5-C6 there is moderate bilateral foraminal stenosis. At C6-C7 there is moderate left foraminal stenosis.    PARASPINAL: No extraspinal abnormality. Visualized lung fields are clear.      Impression    IMPRESSION:  HEAD CT:  1.  Left frontal convexity extra-axial fluid collection that is hypodense without any hyperdense components to suggest acute process, therefore likely subacute or chronic process. However the collection is new from prior CT of 08/05/2022.  2.  Right-to-left midline shift 3 to 4 mm.    CERVICAL SPINE CT:  1.  Acute non comminuted fracture tip of left occipital condyle (Hubert and Rajeev type 3), isolated fracture.  2.  Mkflkikzd-I5-V4 alignment is preserved.  3.  No additional fractures.   Chest XR,  PA & LAT    Narrative    EXAM: XR CHEST 2 VIEWS  LOCATION: [facility]  DATE/TIME: 10/19/2022 5:08 PM    INDICATION: Fall, altered mental status  COMPARISON: Chest radiograph 08/04/2022.      Impression     IMPRESSION: Negative chest. No displaced fractures visualized.

## 2022-10-20 NOTE — PROGRESS NOTES
[facility]   Tertiary Survey Progress Note     Date of Service: 10/20/2022    Trauma Mechanism: Fall from bed  Date of Injury: 10/19/22  Known Injuries:  1. Acute T11 vertebral body with approx 25% height loss   2. T12 endplate deformity   3. Worsening compression fx L1,      Assessment & Plan   Neuro/Pain/Psych:  # Fall from bed  # Chronic SDH with mass effect  # Hx of condyler fx  - Follow-up Head CT: 1. Chronic appearing subdural collection overlying the right frontal convexity with scattered slightly hyperdense areas unchanged compared with CT dated 10/19/2022. Continued mass effect on the adjacent cerebrum with 4 mm midline shift and minimal mass effect on the right lateral ventricle. No new hemorrhage identified. 2. Previously seen occipital condyle fracture is not well-visualized on this exam due to motion artifact.  - Cleared C-spine this morning  - Neurosurgery consulted: No acute neurosurgical intervention indicated, No indication for anti-epileptic medications, Follow up in clinic in 2 weeks after CT head- will arrange for you    # Acute on chronic pain   - prn: Tylenol,   - Maintain circadian rhythm.  Lights on during the day.  Off at night, minimize cares at night.  OOB during the day.    # Depression  - Continue PTA: escitalopram     Pulmonary:  - Supplemental oxygen to keep saturation above 92 %.  - Incentive spirometer while awake     Cardiovascular:    - Monitor hemodynamic status.     GI/Nutrition:    - Regular diet     Renal/ Fluids/Electrolytes:  # Kidney stones  CT: 9 mm kidney stone in the left renal pelvis causing mild dilatation of the superior aspect of the collecting system with mild thinning of the cortex.  - Keep appt with Urology on 11/4/22  - electrolyte replacement protocol in place.     Endocrine:  # Hypothyroidism   - continue PTA: levothyroxine     Infectious disease:   -  No indications for antibiotics.     Hematology:    - Hgb 11.5.  Monitor and trend.   - Threshold for transfusion if hgb <7.0 or signs/symptoms of hypoperfusion.       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  # Weakness and deconditioning of chronic illness   # Acute T11 vertebral body with approx 25% height loss   # T12 endplate deformity   # Worsening compression fx L1,   - Spine CTs:   1. Acute appearing compression fracture of the T11 vertebral body with approximately 25% height loss and acute superior endplate deformity of the T12 vertebral body new since August.  2. Worsening compression fracture of the L1 vertebral body since August with approximately 50% height loss (previously less than 25%).  3. Age indeterminant compression deformity of the T7 vertebral body. Correlate with prior imaging to document stability.  - Physical and occupational therapy consults.    Skin:  - dilgent cares to prevent skin breakdown and wound formation.      Lines/ tubes/ drains:  - PIV     General Cares:    PPI/H2 blocker:  NA   DVT prophylaxis: pcd   Bowel Regimen/Date of last stool: in place   Pulmonary toilet: IS    Code status:  Full Code     Discharge goals:     Adequate pain management: yes    VSS x24 hours: yes    Hemoglobin stable x 48 hours: NA    Ambulating safely and/or therapy evals complete: yes    Drains/lines removed or plan in place to manage: yes    Teaching done: yes     Other: 10/25 start [facility]   Expected D/C date: Tomorrow    Bella Sinha PA-C  To contact the trauma service use job code pager [phone],   Numeric texts or alpha text through [facility]      Interval History   Review of Systems   Skin: positive for bruising, lumps or bumps  Eyes: negative  Ears/Nose/Throat: positive for hearing loss  Respiratory: No shortness of breath, dyspnea on exertion, cough, or hemoptysis  Cardiovascular: negative  Gastrointestinal: negative  Genitourinary: negative  Musculoskeletal: positive for back  pain  Neurologic: negative  Psychiatric: negative  Hematologic/Lymphatic/Immunologic: negative  Endocrine: positive for thyroid disorder     Physical Exam   Sugar Coma Scale - Total 15/15  Eye Response (E): 4   4= spontaneous, 3= to verbal/voice, 2= to pain, 1= No response   Verbal Response (V): 5   5= Orientated, converses, 4= Confused, converses, 3= Inappropriate words, 2= Incomprehensible sounds, 1=No response   Motor Response (M): 6   6= Obeys commands, 5= Localizes to pain, 4= Withdrawal to pain, 3=Fexion to pain, 2= Extension to pain, 1= No response     Frailty Questionnaire: To be done for all patients age 60+  F (Fatigue): Is the patient easily fatigued? YES = 1  R (Resistance): Is the patient unable to walk one flight of stairs? YES = 1  A (Ambulation): Is the patient unable to walk one block? YES = 1  I  (Illness): Does the patient have more than five illnesses? NO = 0  L (Loss of weight): Has the patient lost more than 5% of weight in the past 6 months. NO = 0  Lost five pounds or more in the last 3 months without trying? AND/OR Unintended weight loss?  Does the patient have difficulty performing housework such as washing windows or scrubbing floors? AND Activity in a typical 24-hour day- No moderate or vigorous activity    Score: 3    Score:3-5: PLAN: Palliative Care Consult, PT/OT Consult, consider PM&R, Delirium Prevention Strategies                           Physical Exam  Constitutional: Awake, alert, cooperative, no apparent distress.  Eyes: Lids and lashes normal, pupils equal, round and reactive to light, extra ocular muscles intact, sclera clear, conjunctiva normal.  HENT: Normocephalic, atraumatic  Respiratory: No increased work of breathing, good air exchange, clear to auscultation bilaterally, no crackles or wheezing.  Cardiovascular:  regular rate and rhythm,   GI: Abdomen soft, non-distended, non-tender,  Genitourinary:  Voids independently   Skin:  Warm & dry  Musculoskeletal: There is no  redness, warmth, or swelling of the joints.  Pedal pulse palpated.  Neurologic: Awake, alert, oriented. Strength and sensory is intact. No focal deficits.  Neuropsychiatric: Calm, normal eye contact, alert, affect appropriate to situation, oriented, thought process normal.    Temp: 98  F (36.7  C) Temp src: Oral BP: 110/50 Pulse: 61   Resp: 14 SpO2: 97 % O2 Device: None (Room air)    Vitals:    10/19/22 1540   Weight: 50.8 kg (112 lb)     Vital Signs with Ranges  Temp:  [97.6  F (36.4  C)-98  F (36.7  C)] 98  F (36.7  C)  Pulse:  [50-85] 61  Resp:  [14-20] 14  BP: ()/(47-81) 110/50  SpO2:  [95 %-98 %] 97 %  I/O last 3 completed shifts:  In: 100 [P.O.:100]  Out: 575 [Urine:575]

## 2022-10-20 NOTE — PROGRESS NOTES
[facility], [place]  Neurosurgery Progress Note:  10/20/2022      Interval History: Patient's mental status at baseline. CT head stable. Denies neck pain.    Assessment:  This is an 82 year old female patient presenting with chronic-appearing SDH and L occipital condyle fracture likely due to fall 3 days ago, with repeat CT demonstrating stable chronic SDH and neurologically intact on exam.     Clinically Significant Risk Factors Present on Admission                   # Compression of brain       Plan:  Serial neuro exams  Pain control  HOB > 30 degrees  Advance diet as tolerated  Bowel regimen  PRN antiemetics  IVF until taking adequate PO  PT/OT  SCDs for DVT proph  Follow up with CT in 2 weeks in Neurosurgery clinic; arranged for you    -----------------------------------  Alhaji Carrion MD  Neurosurgery resident, PGY-2  -----------------------------------  Gen: Appears comfortable, NAD  Neurologic:  Alert & Oriented to person, place, time, and situation  Follows commands   Speech fluent, spontaneous, a little delayed. No aphasia or dysarthria.  No gaze preference. No apparent hemineglect.  PERRL, EOMI  Face symmetric with sensation intact to light touch; hard of hearing at baseline  Palate elevates symmetrically, uvula midline, tongue protrudes midline  Trapezii muscles 5/5 bilaterally  No pronator drift    Globally weak, due to deconditioning. Ambulates with walker, at least 4/5 in all extremities.    Sensation intact and symmetric to light touch throughout    Objective:   Temp:  [97.6  F (36.4  C)-98  F (36.7  C)] 97.9  F (36.6  C)  Pulse:  [50-85] 61  Resp:  [14-20] 14  BP: ()/(47-81) 116/56  SpO2:  [95 %-98 %] 96 %  I/O last 3 completed shifts:  In: 100 [P.O.:100]  Out: 575 [Urine:575]        LABS:  Recent Labs   Lab 10/19/22  2037 10/19/22  1737   NA  --  141   POTASSIUM  --  4.1   CHLORIDE  --  109   CO2  --  24   ANIONGAP  --  8   * 93   BUN  --  13   CR  --   0.80   BERTA  --  9.7       Recent Labs   Lab 10/20/22  0658   WBC 3.6*   RBC 3.94   HGB 11.5*   HCT 34.9*   MCV 89   MCH 29.2   MCHC 33.0   RDW 12.6   *       IMAGING:  Recent Results (from the past 24 hour(s))   Cervical spine CT w/o contrast    Narrative    EXAM: CT HEAD W/O CONTRAST, CT CERVICAL SPINE W/O CONTRAST  LOCATION: [facility], [place]  DATE/TIME: 10/19/2022 5:03 PM    INDICATION: Head and neck injury, fall.  COMPARISON: CT head 8/5/2022  TECHNIQUE:   1) Routine CT Head without IV contrast. Multiplanar reformats. Dose reduction techniques were used.  2) Routine CT Cervical Spine without IV contrast. Multiplanar reformats. Dose reduction techniques were used.    FINDINGS:   HEAD CT:   INTRACRANIAL CONTENTS: Left frontal convexity extra-axial collection with hypodense components only, measures up to 11 mm in thickness with mild underlying mass effect. No hyperdense components. The collection is new from prior CT of 08/05/2022.   Right-to-left midline shift 3-4 mm. No mass or intraparenchymal hemorrhage. No CT evidence of acute infarct. Mild presumed chronic small vessel ischemic changes. Mild generalized volume loss. No hydrocephalus.     VISUALIZED ORBITS/SINUSES/MASTOIDS: No intraorbital abnormality. No paranasal sinus mucosal disease. No middle ear or mastoid effusion.    BONES/SOFT TISSUES: Slight right frontal scalp soft tissue swelling. No fracture.    CERVICAL SPINE CT:   VERTEBRA: Acute avulsion fracture tip of left occipital condyle. Occipital C1-C2 alignment is preserved. Slight anterolisthesis C4-C5, C5-C6 and retrolisthesis C3-C4. Chronic spondylitic changes C5-C7. Chronic inferior endplate concavity T1.    CANAL/FORAMINA: Moderate degenerative changes. At C3-C4 there is moderate left foraminal stenosis. At C5-C6 there is moderate bilateral foraminal stenosis. At C6-C7 there is moderate left foraminal stenosis.    PARASPINAL: No extraspinal  abnormality. Visualized lung fields are clear.      Impression    IMPRESSION:  HEAD CT:  1.  Left frontal convexity extra-axial fluid collection that is hypodense without any hyperdense components to suggest acute process, therefore likely subacute or chronic process. However the collection is new from prior CT of 08/05/2022.  2.  Right-to-left midline shift 3 to 4 mm.    CERVICAL SPINE CT:  1.  Acute non comminuted fracture tip of left occipital condyle (Hubert and Rajeev type 3), isolated fracture.  2.  Geopjmwav-G1-D1 alignment is preserved.  3.  No additional fractures.   Head CT w/o contrast    Narrative    EXAM: CT HEAD W/O CONTRAST, CT CERVICAL SPINE W/O CONTRAST  LOCATION: [facility]  DATE/TIME: 10/19/2022 5:03 PM    INDICATION: Head and neck injury, fall.  COMPARISON: CT head 8/5/2022  TECHNIQUE:   1) Routine CT Head without IV contrast. Multiplanar reformats. Dose reduction techniques were used.  2) Routine CT Cervical Spine without IV contrast. Multiplanar reformats. Dose reduction techniques were used.    FINDINGS:   HEAD CT:   INTRACRANIAL CONTENTS: Left frontal convexity extra-axial collection with hypodense components only, measures up to 11 mm in thickness with mild underlying mass effect. No hyperdense components. The collection is new from prior CT of 08/05/2022.   Right-to-left midline shift 3-4 mm. No mass or intraparenchymal hemorrhage. No CT evidence of acute infarct. Mild presumed chronic small vessel ischemic changes. Mild generalized volume loss. No hydrocephalus.     VISUALIZED ORBITS/SINUSES/MASTOIDS: No intraorbital abnormality. No paranasal sinus mucosal disease. No middle ear or mastoid effusion.    BONES/SOFT TISSUES: Slight right frontal scalp soft tissue swelling. No fracture.    CERVICAL SPINE CT:   VERTEBRA: Acute avulsion fracture tip of left occipital condyle. Occipital C1-C2 alignment is preserved. Slight anterolisthesis C4-C5,  C5-C6 and retrolisthesis C3-C4. Chronic spondylitic changes C5-C7. Chronic inferior endplate concavity T1.    CANAL/FORAMINA: Moderate degenerative changes. At C3-C4 there is moderate left foraminal stenosis. At C5-C6 there is moderate bilateral foraminal stenosis. At C6-C7 there is moderate left foraminal stenosis.    PARASPINAL: No extraspinal abnormality. Visualized lung fields are clear.      Impression    IMPRESSION:  HEAD CT:  1.  Left frontal convexity extra-axial fluid collection that is hypodense without any hyperdense components to suggest acute process, therefore likely subacute or chronic process. However the collection is new from prior CT of 08/05/2022.  2.  Right-to-left midline shift 3 to 4 mm.    CERVICAL SPINE CT:  1.  Acute non comminuted fracture tip of left occipital condyle (Hubert and Helton type 3), isolated fracture.  2.  Jqmqtcxbl-D3-V1 alignment is preserved.  3.  No additional fractures.   Chest XR,  PA & LAT    Narrative    EXAM: XR CHEST 2 VIEWS  LOCATION: [facility]  DATE/TIME: 10/19/2022 5:08 PM    INDICATION: Fall, altered mental status  COMPARISON: Chest radiograph 08/04/2022.      Impression    IMPRESSION: Negative chest. No displaced fractures visualized.   CT Chest Abdomen Pelvis w/o Contrast    Narrative    EXAMINATION: CT CHEST ABDOMEN PELVIS W/O CONTRAST, 10/20/2022 1:18 AM    TECHNIQUE:  Helical CT images from the thoracic inlet through the  symphysis pubis were obtained without IV contrast.     COMPARISON: X-ray 10/19/2022.Same-day thoracic and lumbar spine CT.    HISTORY: Fall with rib tenderness.     FINDINGS:  CHEST:  LUNGS: Calcified granulomas. Bibasilar scarring. No mass or  consolidation. No pneumothorax. Trace bilateral pleural effusions. The  airway is patent.    MEDIASTINUM: Normal heart size, no pericardial effusion. Normal  caliber thoracic aorta and main pulmonary artery. Normal thyroid. No  suspicious  mediastinal lymphadenopathy. Normal caliber esophagus.     ABDOMEN/PELVIS:  Images obscured by motion artifact.  LIVER: Within normal limits.    BILIARY: Cholecystectomy clips. No obvious intra or extrahepatic  biliary dilatation.    PANCREAS: Within normal limits.    SPLEEN: Within normal limits.    ADRENAL GLANDS: Within normal limits.    URINARY TRACT: Simple cyst arising from the inferior pole of the left  kidney. 0.9 x 0.6 cm kidney stone in the left renal pelvis causing  mild dilatation of the upper aspect of the renal pelvis and mild  cortical thinning.     REPRODUCTIVE ORGANS: Within normal limits.    STOMACH: Within normal limits.    BOWEL: Normal caliber of the small and large bowel.  Appendix is  within normal limits. Diverticulosis without diverticulitis.    PERITONEUM/FLUID: No free fluid. No free air.    VESSELS: No aneurysmal dilatation of the abdominal aorta.      LYMPH NODES: No lymphadenopathy.    BONES/SOFT TISSUES: Degenerative changes appreciated along the  sacroiliac joints. Osteopenic appearance of the bones. Please see same  day thoracic and lumbar spine CT for further details regarding this  region. No acute fracture identified.      Impression    IMPRESSION:   1. No traumatic findings in the chest, abdomen, or pelvis.  2. 9 mm kidney stone in the left renal pelvis causing mild dilatation  of the superior aspect of the collecting system with mild thinning of  the cortex.  3. Trace bilateral pleural effusions.  4. Please see same day thoracic and lumbar spine for further details  regarding this region.    I have personally reviewed the examination and initial interpretation  and I agree with the findings.    ORVILLE CELESTE MD         SYSTEM ID:  [id number]   CT Thoracic Spine w/o Contrast    Narrative    Thoracic and Lumbar spine CT without contrast    History: thoracic spine recon.    Comparison: CT abdomen pelvis 8/4/2022    Technique: Axial, coronal, and sagittal multiplanar  reconstructions  obtained from acquisition of thoracic and lumbar spine CT scan     Findings: Diffuse osteoporotic appearance of the spine. Acute  appearing compression fracture of the T11 vertebral body with  approximately 25% height loss new since August.  Age-indeterminate  compression deformity of the T7 vertebral body with approximately 25%  height loss. Additionally, there is a superior endplate deformity of  the T12 vertebral body new compared with August. There is no  prevertebral edema. There is no spinal canal or foraminal stenosis at  any level. No soft tissue abnormality in the visualized paraspinous  tissues anteriorly.    Lumbar Spine: Increased compression deformity of the L1 vertebral body  with approximately 50% height loss compared with CT from August. There  is no acute fracture or subluxation. There are 5 type lumbar vertebra,  used for the purposes of this dictation .   There is no disc space narrowing at any level.  On a level by level basis, the findings are as follows:  T12-L1:  There is no focal abnormality.  L1-2:  There is no focal abnormality.  L2-3:  There is no focal abnormality.  L3-4:  There is no focal abnormality.  L4-5: Mild right neural foraminal stenosis secondary to facet  arthropathy and mild disc bulge.  L5-S1: Mild right neural foraminal stenosis secondary to facet  arthropathy and mild disc bulge.    Kyphotic curvature of the thoracic spine with compensatory lumbar  spine lordosis. Degenerative changes along the sacroiliac joint.      Please see same day CT CAP for further details regarding the  intrathoracic and intra-abdominal findings.      Impression    Impression:   1. Acute appearing compression fracture of the T11 vertebral body with  approximately 25% height loss and acute superior endplate deformity of  the T12 vertebral body new since August.  2. Worsening compression fracture of the L1 vertebral body since  August with approximately 50% height loss (previously  less than 25%).  3. Age indeterminant compression deformity of the T7 vertebral body.  Correlate with prior imaging to document stability.  4. Please see same day CT CAP for further details regarding the  intrathoracic and intra-abdominal findings.    I have personally reviewed the examination and initial interpretation  and I agree with the findings.    WILVER HAMILTON MD         SYSTEM ID:  [id number]   CT Lumbar Spine w/o Contrast    Narrative    Thoracic and Lumbar spine CT without contrast    History: thoracic spine recon.    Comparison: CT abdomen pelvis 8/4/2022    Technique: Axial, coronal, and sagittal multiplanar reconstructions  obtained from acquisition of thoracic and lumbar spine CT scan     Findings: Diffuse osteoporotic appearance of the spine. Acute  appearing compression fracture of the T11 vertebral body with  approximately 25% height loss new since August.  Age-indeterminate  compression deformity of the T7 vertebral body with approximately 25%  height loss. Additionally, there is a superior endplate deformity of  the T12 vertebral body new compared with August. There is no  prevertebral edema. There is no spinal canal or foraminal stenosis at  any level. No soft tissue abnormality in the visualized paraspinous  tissues anteriorly.    Lumbar Spine: Increased compression deformity of the L1 vertebral body  with approximately 50% height loss compared with CT from August. There  is no acute fracture or subluxation. There are 5 type lumbar vertebra,  used for the purposes of this dictation .   There is no disc space narrowing at any level.  On a level by level basis, the findings are as follows:  T12-L1:  There is no focal abnormality.  L1-2:  There is no focal abnormality.  L2-3:  There is no focal abnormality.  L3-4:  There is no focal abnormality.  L4-5: Mild right neural foraminal stenosis secondary to facet  arthropathy and mild disc bulge.  L5-S1: Mild right neural foraminal stenosis secondary to  facet  arthropathy and mild disc bulge.    Kyphotic curvature of the thoracic spine with compensatory lumbar  spine lordosis. Degenerative changes along the sacroiliac joint.      Please see same day CT CAP for further details regarding the  intrathoracic and intra-abdominal findings.      Impression    Impression:   1. Acute appearing compression fracture of the T11 vertebral body with  approximately 25% height loss and acute superior endplate deformity of  the T12 vertebral body new since August.  2. Worsening compression fracture of the L1 vertebral body since  August with approximately 50% height loss (previously less than 25%).  3. Age indeterminant compression deformity of the T7 vertebral body.  Correlate with prior imaging to document stability.  4. Please see same day CT CAP for further details regarding the  intrathoracic and intra-abdominal findings.    I have personally reviewed the examination and initial interpretation  and I agree with the findings.    WILVER HAMILTON MD         SYSTEM ID:  [id number]   CT Head w/o Contrast    Narrative    CT HEAD W/O CONTRAST 10/20/2022 1:27 AM    Provided History: FU SDH  ICD-10:    Comparison: CT 10/19/2022.    Technique: Using multidetector thin collimation helical acquisition  technique, axial, coronal and sagittal CT images from the skull base  to the vertex were obtained without intravenous contrast.     Findings:    Chronic appearing 1.2 cm subdural collection overlying the right  frontal convexity with scattered slightly hyperdense areas, unchanged.  There is mass effect on the adjacent cerebrum with 4 mm leftward  midline shift, similar to prior. Additionally, there is minimal mass  effect on the right lateral ventricle. No new hemorrhage identified.  The gray to white matter differentiation of the cerebral hemispheres  is preserved. The basal cisterns are patent. Age-related volume loss.    The visualized paranasal sinuses are clear. The mastoid air cells  are  clear. Evaluation of the osseous structures is limited due to motion  artifacts. Hematoma of the right frontoparietal soft tissues.       Impression    Impression:  1. Chronic appearing subdural collection overlying the right frontal  convexity with scattered slightly hyperdense areas unchanged compared  with CT dated 10/19/2022. Continued mass effect on the adjacent  cerebrum with 4 mm midline shift and minimal mass effect on the right  lateral ventricle. No new hemorrhage identified.  2. Previously seen occipital condyle fracture is not well-visualized  on this exam due to motion artifact.    I have personally reviewed the examination and initial interpretation  and I agree with the findings.    WILVER HAMILTON MD         SYSTEM ID:  [id number]

## 2022-10-21 VITALS
SYSTOLIC BLOOD PRESSURE: 123 MMHG | OXYGEN SATURATION: 97 % | RESPIRATION RATE: 14 BRPM | HEIGHT: 64 IN | WEIGHT: 112 LBS | HEART RATE: 60 BPM | DIASTOLIC BLOOD PRESSURE: 61 MMHG | BODY MASS INDEX: 19.12 KG/M2 | TEMPERATURE: 98 F

## 2022-10-21 PROCEDURE — 250N000011 HC RX IP 250 OP 636: Performed by: SURGERY

## 2022-10-21 PROCEDURE — G0008 ADMIN INFLUENZA VIRUS VAC: HCPCS | Performed by: SURGERY

## 2022-10-21 PROCEDURE — 250N000013 HC RX MED GY IP 250 OP 250 PS 637: Performed by: PHYSICIAN ASSISTANT

## 2022-10-21 PROCEDURE — 99239 HOSP IP/OBS DSCHRG MGMT >30: CPT | Performed by: PHYSICIAN ASSISTANT

## 2022-10-21 PROCEDURE — 90662 IIV NO PRSV INCREASED AG IM: CPT | Performed by: SURGERY

## 2022-10-21 PROCEDURE — 250N000013 HC RX MED GY IP 250 OP 250 PS 637

## 2022-10-21 RX ORDER — LOPERAMIDE HCL 2 MG
2 CAPSULE ORAL 4 TIMES DAILY PRN
Status: DISCONTINUED | OUTPATIENT
Start: 2022-10-21 | End: 2022-10-21 | Stop reason: HOSPADM

## 2022-10-21 RX ADMIN — ESCITALOPRAM 5 MG: 5 TABLET, FILM COATED ORAL at 08:48

## 2022-10-21 RX ADMIN — LOPERAMIDE HYDROCHLORIDE 2 MG: 2 CAPSULE ORAL at 11:54

## 2022-10-21 RX ADMIN — INFLUENZA A VIRUS A/VICTORIA/2570/2019 IVR-215 (H1N1) ANTIGEN (FORMALDEHYDE INACTIVATED), INFLUENZA A VIRUS A/DARWIN/9/2021 SAN-010 (H3N2) ANTIGEN (FORMALDEHYDE INACTIVATED), INFLUENZA B VIRUS B/PHUKET/3073/2013 ANTIGEN (FORMALDEHYDE INACTIVATED), AND INFLUENZA B VIRUS B/MICHIGAN/01/2021 ANTIGEN (FORMALDEHYDE INACTIVATED) 0.7 ML: 60; 60; 60; 60 INJECTION, SUSPENSION INTRAMUSCULAR at 11:07

## 2022-10-21 RX ADMIN — ACETAMINOPHEN 650 MG: 325 TABLET, FILM COATED ORAL at 04:08

## 2022-10-21 RX ADMIN — LEVOTHYROXINE SODIUM 75 MCG: 0.05 TABLET ORAL at 08:48

## 2022-10-21 RX ADMIN — ACETAMINOPHEN 650 MG: 325 TABLET, FILM COATED ORAL at 08:48

## 2022-10-21 ASSESSMENT — VISUAL ACUITY: OU: NORMAL ACUITY

## 2022-10-21 ASSESSMENT — ACTIVITIES OF DAILY LIVING (ADL)
ADLS_ACUITY_SCORE: 36

## 2022-10-21 NOTE — DISCHARGE SUMMARY
[facility]    Discharge Summary  Trauma Service     Date of Admission:  10/19/2022  Date of Discharge:  10/21/2022  Attending Physician: Dr. Stone Schneider  Discharging Provider: Bella ABDULLAHI  Date of Service (when I saw the patient): 10/21/22    Primary Provider: Kassie Wyatt  Primary Care clinic: [address]  [facility] [place] [address]  Phone: [phone]  Fax number: [phone]     Discharge Diagnoses   Principal Problem:    Closed fracture of left occipital condyle, initial encounter (H)  Active Problems:    SDH (subdural hematoma)      Hospital Course     Compression Fracture  # Fall from bed  # Acute T11 vertebral body with approx 25% height loss   # T12 endplate deformity   # Worsening compression fx Serge NOBLE was evaluated by Neurosurgery and managed non-operatively for her fractures.   - Spine CTs:   1. Acute appearing compression fracture of the T11 vertebral body with approximately 25% height loss and acute superior endplate deformity of the T12 vertebral body new since August.  2. Worsening compression fracture of the L1 vertebral body since August with approximately 50% height loss (previously less than 25%).  3. Age indeterminant compression deformity of the T7 vertebral body. Correlate with prior imaging to document stability.  She is recommended to Weight bearing as tolerated.     # Chronic SDH with mass effect  # Hx of condyler fx  - Follow-up Head CT: 1. Chronic appearing subdural collection overlying the right frontal convexity with scattered slightly hyperdense areas unchanged compared with CT dated 10/19/2022. Continued mass effect on the adjacent cerebrum with 4 mm midline shift and minimal mass effect on the right lateral ventricle. No new hemorrhage identified. 2. Previously seen occipital condyle fracture is not well-visualized on this exam due to motion artifact.    Neurosurgery recommendations are as follows:  "No acute neurosurgical intervention indicated, No indication for anti-epileptic medications, Follow up in clinic in 2 weeks after CT head    She was evaluated by physical and occupational therapies during his/her hospitalization.  Please see summary of most current therapy notes below.       Other major problems and complications:  Additional issues that were addressed during this hospitalization include:    # Acute on chronic pain   - prn: Tylenol,     Other Comobidities:  were not active issues during this hospitalization. Her home medications were resumed during their hospitalization and no changes have been made.    Renal/ Fluids/Electrolytes:  # Kidney stones  - Chest/Abdomen/Pelvic CT: 9 mm kidney stone in the left renal pelvis causing mild dilatation of the superior aspect of the collecting system with mild thinning of the cortex.  - Patient has recently been followed and undergone procedures with Urology. Keep appt with Urology on 11/4/22    Therapy Recommendations:   Current status of physical therapies on discharge:     Current status of occupational therapies on discharge:    Code Status   Full Code    SUBJECTIVE: Serge Marin is a 82 year old female PMHx Bipolar I disorder, hypothyroidism who was seen for SDH identified on imaging in ED on 10/19. She presented to her PCP that morning and was observed to have altered mental status and a large ecchymosis on her right forehead; she was advised to present to the ED for evaluation. Imaging at that time identified a chronic-appearing ~11mm SDH on the right frontal convexity with 3-4mm midline shift as well as a left occipital condyle fracture. She reports that she fell about 3 days prior because she felt \"woozy,\" lost consciousness, and woke up on the floor. She denies neck pain but is uncomfortable in her C-collar. Family was not available for discussion of pt's baseline mental status-- pt did give permission to discuss her hospital admission with " "family.   Patient was alert and orientated the following morning during my evaluation. I was able to clear her c-spine clinically. Images were completed the previous evening. She has had little pain during her admission and was at or near her baseline requesting to return home as soon as possible.     Physical Exam   Temp: 98  F (36.7  C) Temp src: Oral BP: 123/61 Pulse: 60   Resp: 14 SpO2: 97 % O2 Device: None (Room air)    Vitals:    10/19/22 1540   Weight: 50.8 kg (112 lb)     Vital Signs with Ranges  Temp:  [97.5  F (36.4  C)-98  F (36.7  C)] 98  F (36.7  C)  Pulse:  [57-60] 60  Resp:  [14-16] 14  BP: (102-125)/(44-61) 123/61  SpO2:  [96 %-98 %] 97 %  I/O last 3 completed shifts:  In: 450 [P.O.:450]  Out: -       Constitutional: Awake, alert, cooperative, no apparent distress. Sitting up in a chair.   Eyes: Lids and lashes normal, pupils equal, round and reactive to light, extra ocular muscles intact, sclera clear, conjunctiva normal.  HENT: Normocephalic, atraumatic  Respiratory: No increased work of breathing, good air exchange, clear to auscultation bilaterally,   Cardiovascular:  regular rate and rhythm,   GI: Abdomen soft, non-distended, non-tender,  Genitourinary:  Voids independently   Skin:  Warm & dry  Musculoskeletal: There is no redness, warmth, or swelling of the joints.  Neurologic: Awake, alert, oriented. Strength and sensory is intact. No focal deficits.  Neuropsychiatric: Calm, normal eye contact, alert, affect appropriate to situation, oriented, thought process normal.    Discharge Disposition   Discharged to home  Condition at discharge: Stable  Discharge VS: Blood pressure 123/61, pulse 60, temperature 98  F (36.7  C), temperature source Oral, resp. rate 14, height 1.626 m (5' 4\"), weight 50.8 kg (112 lb), SpO2 97 %, not currently breastfeeding.    Consultations This Hospital Stay   PHYSICAL THERAPY ADULT IP CONSULT  OCCUPATIONAL THERAPY ADULT IP CONSULT    Discharge Orders      CT Head w/o " "contrast*     CT Head w/o contrast*     Home Care Referral      Reason for your hospital stay    Fall, vertebral fractures     Activity    Your activity upon discharge: activity as tolerated     Follow Up ([facility])    Appointments on [place] and/or [facility] (with [facility] or [facility] provider or service). Call [phone] if you haven't heard regarding these appointments within 7 days of discharge.    Keep appt with Urology on 11/4/22    Follow up with your primary care provider for continued medical care and hospital follow up in 5-10 days.      Follow up in clinic in 2 weeks after CT head-  Neurosurgery Clinic   [facility]  [address]   [address] [place] [address]  Appointments: [phone]    Follow up if you have persistent headache, nausea, dizziness, or thinking problems.  Concussion Clinic  [facility]  [address] [place] [id number]   Appointments/Questions: [phone]      You have been involved in a recent trauma incident resulting in an injury.  Studies show us that people affected by trauma have higher levels of post-traumatic stress disorder (PTSD) and/or depressive symptoms during the year following an injury.     Please consider the following.  Have you:  Had migraines about the event(s) or thought about the event(s) when you didn't want to?  Tried hard not to think about the event(s) or went out of your way to avoid situations that reminded you of the event(s)?  Been constantly on guard, watchful, or easily startled?  Felt numb or detached from people, activities, or your surroundings?   Felt guilty or unable to stop blaming yourself or others for the event(s) or any problems the event (s) may have caused?    If you answered \"yes\" to 3 or more of these questions, or if you simply want to discuss any of your feelings further, we recommend that you talk with your Primary Care Provider or a mental health professional.     Diet    " Follow this diet upon discharge: Orders Placed This Encounter      Room Service      Regular Diet Adult     Discharge Medications   Current Discharge Medication List      CONTINUE these medications which have NOT CHANGED    Details   acetaminophen (TYLENOL) 500 MG tablet Take 500 mg by mouth every 6 hours as needed for mild pain      calcium carbonate-vitamin D (OSCAL W/D) 500-200 MG-UNIT tablet Take 1 tablet by mouth daily      escitalopram (LEXAPRO) 10 MG tablet Take 5 mg by mouth daily      ferrous gluconate (FERGON) 324 (38 FE) MG tablet Take 1 tablet (324 mg) by mouth daily (with breakfast)  Qty: 90 tablet, Refills: 3    Associated Diagnoses: Health care maintenance      levothyroxine (SYNTHROID, LEVOTHROID) 75 MCG tablet Take 1 tablet (75 mcg) by mouth daily  Qty: 30 tablet, Refills: 3    Associated Diagnoses: Other specified hypothyroidism         STOP taking these medications       cetirizine (ZYRTEC) 10 MG tablet Comments:   Reason for Stopping:             Allergies   Allergies   Allergen Reactions     Penicillins      Bactrim [Sulfamethoxazole W/Trimethoprim] Itching     Patient prescribed Bactrim at eye appointment. Assisted living reports eyes were red and itching.      Data   Most Recent 3 CBC's:  Recent Labs   Lab Test 10/20/22  0658 10/19/22  1737 08/13/22  0701   WBC 3.6* 4.6 3.3*   HGB 11.5* 12.2 12.3   MCV 89 89 87   * 135* 186      Most Recent 3 BMP's:  Recent Labs   Lab Test 10/19/22  2037 10/19/22  1737 08/07/22  0904 08/05/22  0529   NA  --  141 138 137   POTASSIUM  --  4.1 3.4 3.6   CHLORIDE  --  109 106 106   CO2  --  24 28 24   BUN  --  13 5* 10   CR  --  0.80 0.60 0.80   ANIONGAP  --  8 4 7   BERTA  --  9.7 8.5 8.4*   * 93 96 83     Most Recent 2 LFT's:  Recent Labs   Lab Test 10/19/22  1737 08/04/22  1155   AST 17 49*   ALT 12 18   ALKPHOS 79 41   BILITOTAL 0.5 0.8     Most Recent INR's and Anticoagulation Dosing History:  Anticoagulation Dose History     Recent Dosing and  Labs Latest Ref Rng & Units 10/19/2022    INR 0.85 - 1.15 1.04        Most Recent 3 Troponin's:No lab results found.  Most Recent 6 Bacteria Isolates From Any Culture (See EPIC Reports for Culture Details):  Recent Labs   Lab Test 10/09/19  1103 11/28/17  1433 02/12/17  2315 02/12/17  2145 02/12/17  2137   CULT 50,000 to 100,000 colonies/mL  mixed urogenital rosalba  Susceptibility testing not routinely done   <10,000 colonies/mL  urogenital rosalba  Susceptibility testing not routinely done   No growth No growth No growth     Most Recent TSH, T4 and A1c Labs:  Recent Labs   Lab Test 10/19/22  1948 03/31/16  0930 02/01/16  1600   TSH 2.83   < > 8.25*   T4  --   --  0.72*    < > = values in this interval not displayed.     Results for orders placed or performed during the hospital encounter of 10/19/22   Head CT w/o contrast    Narrative    EXAM: CT HEAD W/O CONTRAST, CT CERVICAL SPINE W/O CONTRAST  LOCATION: [facility] [place]  DATE/TIME: 10/19/2022 5:03 PM    INDICATION: Head and neck injury, fall.  COMPARISON: CT head 8/5/2022  TECHNIQUE:   1) Routine CT Head without IV contrast. Multiplanar reformats. Dose reduction techniques were used.  2) Routine CT Cervical Spine without IV contrast. Multiplanar reformats. Dose reduction techniques were used.    FINDINGS:   HEAD CT:   INTRACRANIAL CONTENTS: Left frontal convexity extra-axial collection with hypodense components only, measures up to 11 mm in thickness with mild underlying mass effect. No hyperdense components. The collection is new from prior CT of 08/05/2022.   Right-to-left midline shift 3-4 mm. No mass or intraparenchymal hemorrhage. No CT evidence of acute infarct. Mild presumed chronic small vessel ischemic changes. Mild generalized volume loss. No hydrocephalus.     VISUALIZED ORBITS/SINUSES/MASTOIDS: No intraorbital abnormality. No paranasal sinus mucosal disease. No middle ear or mastoid effusion.    BONES/SOFT  TISSUES: Slight right frontal scalp soft tissue swelling. No fracture.    CERVICAL SPINE CT:   VERTEBRA: Acute avulsion fracture tip of left occipital condyle. Occipital C1-C2 alignment is preserved. Slight anterolisthesis C4-C5, C5-C6 and retrolisthesis C3-C4. Chronic spondylitic changes C5-C7. Chronic inferior endplate concavity T1.    CANAL/FORAMINA: Moderate degenerative changes. At C3-C4 there is moderate left foraminal stenosis. At C5-C6 there is moderate bilateral foraminal stenosis. At C6-C7 there is moderate left foraminal stenosis.    PARASPINAL: No extraspinal abnormality. Visualized lung fields are clear.      Impression    IMPRESSION:  HEAD CT:  1.  Left frontal convexity extra-axial fluid collection that is hypodense without any hyperdense components to suggest acute process, therefore likely subacute or chronic process. However the collection is new from prior CT of 08/05/2022.  2.  Right-to-left midline shift 3 to 4 mm.    CERVICAL SPINE CT:  1.  Acute non comminuted fracture tip of left occipital condyle (Hubert and Rosebud type 3), isolated fracture.  2.  Lwcsccgyz-C2-F4 alignment is preserved.  3.  No additional fractures.   Cervical spine CT w/o contrast    Narrative    EXAM: CT HEAD W/O CONTRAST, CT CERVICAL SPINE W/O CONTRAST  LOCATION: [facility]  DATE/TIME: 10/19/2022 5:03 PM    INDICATION: Head and neck injury, fall.  COMPARISON: CT head 8/5/2022  TECHNIQUE:   1) Routine CT Head without IV contrast. Multiplanar reformats. Dose reduction techniques were used.  2) Routine CT Cervical Spine without IV contrast. Multiplanar reformats. Dose reduction techniques were used.    FINDINGS:   HEAD CT:   INTRACRANIAL CONTENTS: Left frontal convexity extra-axial collection with hypodense components only, measures up to 11 mm in thickness with mild underlying mass effect. No hyperdense components. The collection is new from prior CT of 08/05/2022.    Right-to-left midline shift 3-4 mm. No mass or intraparenchymal hemorrhage. No CT evidence of acute infarct. Mild presumed chronic small vessel ischemic changes. Mild generalized volume loss. No hydrocephalus.     VISUALIZED ORBITS/SINUSES/MASTOIDS: No intraorbital abnormality. No paranasal sinus mucosal disease. No middle ear or mastoid effusion.    BONES/SOFT TISSUES: Slight right frontal scalp soft tissue swelling. No fracture.    CERVICAL SPINE CT:   VERTEBRA: Acute avulsion fracture tip of left occipital condyle. Occipital C1-C2 alignment is preserved. Slight anterolisthesis C4-C5, C5-C6 and retrolisthesis C3-C4. Chronic spondylitic changes C5-C7. Chronic inferior endplate concavity T1.    CANAL/FORAMINA: Moderate degenerative changes. At C3-C4 there is moderate left foraminal stenosis. At C5-C6 there is moderate bilateral foraminal stenosis. At C6-C7 there is moderate left foraminal stenosis.    PARASPINAL: No extraspinal abnormality. Visualized lung fields are clear.      Impression    IMPRESSION:  HEAD CT:  1.  Left frontal convexity extra-axial fluid collection that is hypodense without any hyperdense components to suggest acute process, therefore likely subacute or chronic process. However the collection is new from prior CT of 08/05/2022.  2.  Right-to-left midline shift 3 to 4 mm.    CERVICAL SPINE CT:  1.  Acute non comminuted fracture tip of left occipital condyle (Hubert and Rajeev type 3), isolated fracture.  2.  Ppnppocep-H3-K0 alignment is preserved.  3.  No additional fractures.   Chest XR,  PA & LAT    Narrative    EXAM: XR CHEST 2 VIEWS  LOCATION: [facility]  DATE/TIME: 10/19/2022 5:08 PM    INDICATION: Fall, altered mental status  COMPARISON: Chest radiograph 08/04/2022.      Impression    IMPRESSION: Negative chest. No displaced fractures visualized.   CT Head w/o Contrast    Narrative    CT HEAD W/O CONTRAST 10/20/2022 1:27 AM    Provided  History: FU SDH  ICD-10:    Comparison: CT 10/19/2022.    Technique: Using multidetector thin collimation helical acquisition  technique, axial, coronal and sagittal CT images from the skull base  to the vertex were obtained without intravenous contrast.     Findings:    Chronic appearing 1.2 cm subdural collection overlying the right  frontal convexity with scattered slightly hyperdense areas, unchanged.  There is mass effect on the adjacent cerebrum with 4 mm leftward  midline shift, similar to prior. Additionally, there is minimal mass  effect on the right lateral ventricle. No new hemorrhage identified.  The gray to white matter differentiation of the cerebral hemispheres  is preserved. The basal cisterns are patent. Age-related volume loss.    The visualized paranasal sinuses are clear. The mastoid air cells are  clear. Evaluation of the osseous structures is limited due to motion  artifacts. Hematoma of the right frontoparietal soft tissues.       Impression    Impression:  1. Chronic appearing subdural collection overlying the right frontal  convexity with scattered slightly hyperdense areas unchanged compared  with CT dated 10/19/2022. Continued mass effect on the adjacent  cerebrum with 4 mm midline shift and minimal mass effect on the right  lateral ventricle. No new hemorrhage identified.  2. Previously seen occipital condyle fracture is not well-visualized  on this exam due to motion artifact.    I have personally reviewed the examination and initial interpretation  and I agree with the findings.    WILVER HAMILTON MD         SYSTEM ID:  [id number]   CT Chest Abdomen Pelvis w/o Contrast    Narrative    EXAMINATION: CT CHEST ABDOMEN PELVIS W/O CONTRAST, 10/20/2022 1:18 AM    TECHNIQUE:  Helical CT images from the thoracic inlet through the  symphysis pubis were obtained without IV contrast.     COMPARISON: X-ray 10/19/2022.Same-day thoracic and lumbar spine CT.    HISTORY: Fall with rib tenderness.      FINDINGS:  CHEST:  LUNGS: Calcified granulomas. Bibasilar scarring. No mass or  consolidation. No pneumothorax. Trace bilateral pleural effusions. The  airway is patent.    MEDIASTINUM: Normal heart size, no pericardial effusion. Normal  caliber thoracic aorta and main pulmonary artery. Normal thyroid. No  suspicious mediastinal lymphadenopathy. Normal caliber esophagus.     ABDOMEN/PELVIS:  Images obscured by motion artifact.  LIVER: Within normal limits.    BILIARY: Cholecystectomy clips. No obvious intra or extrahepatic  biliary dilatation.    PANCREAS: Within normal limits.    SPLEEN: Within normal limits.    ADRENAL GLANDS: Within normal limits.    URINARY TRACT: Simple cyst arising from the inferior pole of the left  kidney. 0.9 x 0.6 cm kidney stone in the left renal pelvis causing  mild dilatation of the upper aspect of the renal pelvis and mild  cortical thinning.     REPRODUCTIVE ORGANS: Within normal limits.    STOMACH: Within normal limits.    BOWEL: Normal caliber of the small and large bowel.  Appendix is  within normal limits. Diverticulosis without diverticulitis.    PERITONEUM/FLUID: No free fluid. No free air.    VESSELS: No aneurysmal dilatation of the abdominal aorta.      LYMPH NODES: No lymphadenopathy.    BONES/SOFT TISSUES: Degenerative changes appreciated along the  sacroiliac joints. Osteopenic appearance of the bones. Please see same  day thoracic and lumbar spine CT for further details regarding this  region. No acute fracture identified.      Impression    IMPRESSION:   1. No traumatic findings in the chest, abdomen, or pelvis.  2. 9 mm kidney stone in the left renal pelvis causing mild dilatation  of the superior aspect of the collecting system with mild thinning of  the cortex.  3. Trace bilateral pleural effusions.  4. Please see same day thoracic and lumbar spine for further details  regarding this region.    I have personally reviewed the examination and initial  interpretation  and I agree with the findings.    ORVILLE CELESTE MD         SYSTEM ID:  [id number]   CT Thoracic Spine w/o Contrast    Narrative    Thoracic and Lumbar spine CT without contrast    History: thoracic spine recon.    Comparison: CT abdomen pelvis 8/4/2022    Technique: Axial, coronal, and sagittal multiplanar reconstructions  obtained from acquisition of thoracic and lumbar spine CT scan     Findings: Diffuse osteoporotic appearance of the spine. Acute  appearing compression fracture of the T11 vertebral body with  approximately 25% height loss new since August.  Age-indeterminate  compression deformity of the T7 vertebral body with approximately 25%  height loss. Additionally, there is a superior endplate deformity of  the T12 vertebral body new compared with August. There is no  prevertebral edema. There is no spinal canal or foraminal stenosis at  any level. No soft tissue abnormality in the visualized paraspinous  tissues anteriorly.    Lumbar Spine: Increased compression deformity of the L1 vertebral body  with approximately 50% height loss compared with CT from August. There  is no acute fracture or subluxation. There are 5 type lumbar vertebra,  used for the purposes of this dictation .   There is no disc space narrowing at any level.  On a level by level basis, the findings are as follows:  T12-L1:  There is no focal abnormality.  L1-2:  There is no focal abnormality.  L2-3:  There is no focal abnormality.  L3-4:  There is no focal abnormality.  L4-5: Mild right neural foraminal stenosis secondary to facet  arthropathy and mild disc bulge.  L5-S1: Mild right neural foraminal stenosis secondary to facet  arthropathy and mild disc bulge.    Kyphotic curvature of the thoracic spine with compensatory lumbar  spine lordosis. Degenerative changes along the sacroiliac joint.      Please see same day CT CAP for further details regarding the  intrathoracic and intra-abdominal findings.      Impression     Impression:   1. Acute appearing compression fracture of the T11 vertebral body with  approximately 25% height loss and acute superior endplate deformity of  the T12 vertebral body new since August.  2. Worsening compression fracture of the L1 vertebral body since  August with approximately 50% height loss (previously less than 25%).  3. Age indeterminant compression deformity of the T7 vertebral body.  Correlate with prior imaging to document stability.  4. Please see same day CT CAP for further details regarding the  intrathoracic and intra-abdominal findings.    I have personally reviewed the examination and initial interpretation  and I agree with the findings.    WILVER HAMILTON MD         SYSTEM ID:  [id number]   CT Lumbar Spine w/o Contrast    Narrative    Thoracic and Lumbar spine CT without contrast    History: thoracic spine recon.    Comparison: CT abdomen pelvis 8/4/2022    Technique: Axial, coronal, and sagittal multiplanar reconstructions  obtained from acquisition of thoracic and lumbar spine CT scan     Findings: Diffuse osteoporotic appearance of the spine. Acute  appearing compression fracture of the T11 vertebral body with  approximately 25% height loss new since August.  Age-indeterminate  compression deformity of the T7 vertebral body with approximately 25%  height loss. Additionally, there is a superior endplate deformity of  the T12 vertebral body new compared with August. There is no  prevertebral edema. There is no spinal canal or foraminal stenosis at  any level. No soft tissue abnormality in the visualized paraspinous  tissues anteriorly.    Lumbar Spine: Increased compression deformity of the L1 vertebral body  with approximately 50% height loss compared with CT from August. There  is no acute fracture or subluxation. There are 5 type lumbar vertebra,  used for the purposes of this dictation .   There is no disc space narrowing at any level.  On a level by level basis, the findings are as  follows:  T12-L1:  There is no focal abnormality.  L1-2:  There is no focal abnormality.  L2-3:  There is no focal abnormality.  L3-4:  There is no focal abnormality.  L4-5: Mild right neural foraminal stenosis secondary to facet  arthropathy and mild disc bulge.  L5-S1: Mild right neural foraminal stenosis secondary to facet  arthropathy and mild disc bulge.    Kyphotic curvature of the thoracic spine with compensatory lumbar  spine lordosis. Degenerative changes along the sacroiliac joint.      Please see same day CT CAP for further details regarding the  intrathoracic and intra-abdominal findings.      Impression    Impression:   1. Acute appearing compression fracture of the T11 vertebral body with  approximately 25% height loss and acute superior endplate deformity of  the T12 vertebral body new since August.  2. Worsening compression fracture of the L1 vertebral body since  August with approximately 50% height loss (previously less than 25%).  3. Age indeterminant compression deformity of the T7 vertebral body.  Correlate with prior imaging to document stability.  4. Please see same day CT CAP for further details regarding the  intrathoracic and intra-abdominal findings.    I have personally reviewed the examination and initial interpretation  and I agree with the findings.    WILVER HAMILTON MD         SYSTEM ID:  [id number]       Time Spent on this Encounter   I, Bella Sinha PA-C, personally saw the patient today and spent greater than 30 minutes discharging this patient.    We appreciate the opportunity to care for your patient while in the hospital.  Should you have any questions about their injuries or this discharge summary our contact information is below.    Trauma Service   [facility]   [address] [place] [address]  [phone]

## 2022-10-21 NOTE — PLAN OF CARE
Discharged to: Assisted Living home at 1230  Belongings: Sent with pt to [facility]  AVS (After Visit Summary) discussed with:   at [facility] home       Goal Outcome Evaluation:    Plan of Care Reviewed With: patient  Overall Patient Progress: improvingOverall Patient Progress: improving

## 2022-10-21 NOTE — PLAN OF CARE
Occupational Therapy Discharge Summary    Reason for therapy discharge:    Discharged to senior care    Progress towards therapy goal(s). See goals on Care Plan in [facility] electronic health record for goal details.  Goals partially met.  Barriers to achieving goals:   discharge from facility.    Therapy recommendation(s):    Continued therapy is recommended.  Rationale/Recommendations:  home OT and PT recommended upon return to [facility] as well as increased assist as needed for ADL.

## 2022-10-21 NOTE — PLAN OF CARE
Major Shift Events:  Neurologically intact. Very [place]. SR-SB and normotensive. On RA with clear lung sounds with diminished bilateral lower lobes. Up to bathroom throughout the night as standby assist with walker. One episode of incontinence r/t urgency. No BM. Skin remains grossly intact.     Plan: Discharge back to assisted living at 1230 today.     For vital signs and complete assessments, please see documentation flowsheets.

## 2022-10-24 ENCOUNTER — TELEPHONE (OUTPATIENT)
Dept: FAMILY MEDICINE | Facility: CLINIC | Age: 82
End: 2022-10-24

## 2022-10-24 ENCOUNTER — DOCUMENTATION ONLY (OUTPATIENT)
Dept: FAMILY MEDICINE | Facility: CLINIC | Age: 82
End: 2022-10-24

## 2022-10-24 NOTE — TELEPHONE ENCOUNTER
[facility] phone call message - order or referral request from patient:     Order or referral being requested: Requested order     Additional Details: verbal order delay  For patient star of physical therapy  Thursday 10/27/22    Referral only -Specialty  Location     OK to leave a message on voice mail? Yes    Primary language: English      needed? No    Call taken on October 24, 2022 at 4:47 PM by EUSEBIO Wilson    Order request route to [facility] TRIAGE   Referrals Route to [facility] (Green/[place]/Purple) CARE COORDINATOR

## 2022-10-25 ENCOUNTER — TELEPHONE (OUTPATIENT)
Dept: FAMILY MEDICINE | Facility: CLINIC | Age: 82
End: 2022-10-25

## 2022-10-25 ENCOUNTER — TELEPHONE (OUTPATIENT)
Dept: NEUROSURGERY | Facility: CLINIC | Age: 82
End: 2022-10-25

## 2022-10-25 DIAGNOSIS — N20.0 LEFT RENAL STONE: Primary | ICD-10-CM

## 2022-10-25 NOTE — TELEPHONE ENCOUNTER
[facility] Call Center    Phone Message    May a detailed message be left on voicemail: yes     Reason for Call: Other: Annabel from assisted living called to schedule a hospital f/u with neurosurgery per discharge notes.  Writer was unable to see referral, but did see in chart under discharge notes to have Pt see Neurosurgery in 2 week.    Please call Annabel back at [phone] to schedule.    Action Taken: Message routed to:  [facility] Neurosurgery    Travel Screening: Not Applicable

## 2022-10-25 NOTE — TELEPHONE ENCOUNTER
Returned call to home care PT, unable to reach. Left VM with verbal orders per protocol as requested. Left callback number for questions.    Farrah Sesay RN

## 2022-10-25 NOTE — TELEPHONE ENCOUNTER
[facility] phone call message - order or referral request from patient:     Order or referral being requested: Requested order     Additional Details: continue home physical therapy 2 times a week for 3 weeks for  Balance  traninig    Referral only -Specialty  Location    OK to leave a message on voice mail? Yes    Primary language: English      needed? No    Call taken on October 25, 2022 at 12:08 PM by EUSEBIO Wilson    Order request route to [facility] TRIAGE   Referrals Route to [facility] (Green/[place]/Purple) CARE COORDINATOR

## 2022-10-28 ENCOUNTER — DOCUMENTATION ONLY (OUTPATIENT)
Dept: FAMILY MEDICINE | Facility: CLINIC | Age: 82
End: 2022-10-28

## 2022-10-28 NOTE — PROGRESS NOTES
"Form has been completed by provider.     Form sent out via: Fax to [employer]  at Fax Number: [phone]  Patient informed: No  Output date: November 10, 2022    Nargis Muñoz RN      **Please close the encounter**        When opening a documentation only encounter, be sure to enter in \"Chief Complaint\" Forms and in \" Comments\" Title of form, description if needed.    Serge is a 82 year old  female  Form received via: Fax  Form now resides in: Provider Ready    Nargis Muñoz RN                  "

## 2022-10-28 NOTE — CONFIDENTIAL NOTE
Melissa from "[email or web address] called again to check the status of form. She have informed me their out of compliance because this form is not completed. Melissa states she's faxed this form 3 times starting on 9/16/2022.    Melissa  [facility]  [phone]

## 2022-11-02 ENCOUNTER — DOCUMENTATION ONLY (OUTPATIENT)
Dept: FAMILY MEDICINE | Facility: CLINIC | Age: 82
End: 2022-11-02

## 2022-11-02 ENCOUNTER — TELEPHONE (OUTPATIENT)
Dept: UROLOGY | Facility: CLINIC | Age: 82
End: 2022-11-02

## 2022-11-02 NOTE — PROGRESS NOTES
Neurosurgery Clinic Note    Chief Complaint: hospital follow-up     DATE OF VISIT: 11/3/2022    HPI:   Serge Marin is a 82 year old female PMHx Bipolar I disorder, hypothyroidism who was seen for SDH identified on imaging in ED on 10/19/22. She presented to her PCP this morning and was observed to have altered mental status and a large ecchymosis on her right forehead; she was advised to present to the ED for evaluation. Imaging at that time identified a chronic-appearing ~11mm SDH on the right frontal convexity with 3-4mm midline shift as well as a left occipital condyle fracture. Patient reported falling 3 days prior to presentation and lost consciousness, eventually walking up on the floor. She denied neck pain and her C-spine was cleared.  Repeat head CT was obtained during hospitalization and was found to be stable.  Patient was discharged home on 10/21/22.     Currently, denies headache but states she intermittently notes pain on the right in the back of her head.  Has noted intermittent dizziness where she feels the world is spinning for the past couple of days, she states these episodes last roughly 2 minutes and resolve spontaneously.  During these episodes she has not fallen or lost consciousness.   Denies nausea, vomiting, weakness, word finding difficulties, or vision changes.  Patient denies prior use of anti-platelet or anti-coagulant medications.     She is accompanied today by her , Geneva.  Patient currently lives in an independent living facility where she receives services and is checked on daily.              Review of Systems   Negative except in HPI    Past Medical History:   Diagnosis Date     Hernia, abdominal        Past Surgical History:   Procedure Laterality Date     CHOLECYSTECTOMY       COLONOSCOPY Left 03/18/2015    Procedure: COMBINED COLONOSCOPY, SINGLE OR MULTIPLE BIOPSY/POLYPECTOMY BY BIOPSY;  Surgeon: Stone Lauren MD;  Location: [facility]     GYN  SURGERY       HERNIA REPAIR         Social History     Socioeconomic History     Marital status:    Tobacco Use     Smoking status: Never     Smokeless tobacco: Never   Substance and Sexual Activity     Alcohol use: No     Drug use: No       family history is not on file.              Physical Exam   LMP  (LMP Unknown)   Constitutional: Oriented to person, place, and time. Appears well-developed and well-nourished. Cooperative. No distress.   HENT: Head normocephalic and atraumatic.   Neurological: alert and oriented to person, place, and time. CN II-XII grossly  intact      STRENGTH LEFT RIGHT   Deltoid 5 5   Bicep 5 5   Wrist Extensor 5 5   Tricep 5 5   Finger flexion 5 5   Finger abduction 5 5    5 5       Hip Flexion     5     5   Knee Extension 5 5   Ankle Dorsiflexion 5 5   Extensor Hallucis Longus 5 5   Plantar Flexion 5 5   Foot eversion 5 5   Foot inversion 5 5       Skin: Skin is warm, dry and intact.   Psychiatric: Normal mood and affect. Speech is normal and behavior is normal.      IMAGING:  Head CT obtained on 11/3/2022 personally reviewed, right subdural hematoma stable in size and midline shift.     ASSESSMENT:  Serge Marin is a 82 year old female recently hospitalized after sustaining a fall and found to have a chronic appearing subdural hematoma    PLAN:  Given that patient is clinically doing well we will continue to treat conservatively and follow closely.  Will have her follow-up in clinic with repeat head CT in 2 weeks with Neurosurgery OREN.    Ok for tylenol for pain, do not take aspirin.     If you should develop worsening headache, intractable nausea/vomiting, weakness, facial asymmetry, or speech difficulties please seek immediate medical attention.       If collection continues to expand, may need to explore surgical intervention.  If collection continues to remain the same, may consider middle meningeal artery embolization.        I spent 25 minutes in patient care with  greater than 50% spent in counseling and/or coordination of care.     I performed independent visualization of radiographic imaging and entered my own interpretation, reviewed and/or ordered tests in radiology        JASSON Bullock, CNP  Department of Neurosurgery  Pager: [phone]

## 2022-11-02 NOTE — PROGRESS NOTES
"Form has been completed by provider.     Form sent out via: Fax to [employer] at Fax Number: [phone]  Patient informed: No  Output date: November 10, 2022    Nargis Muñoz RN      **Please close the encounter**        When opening a documentation only encounter, be sure to enter in \"Chief Complaint\" Forms and in \" Comments\" Title of form, description if needed.    Serge is a 82 year old  female  Form received via: Fax  Form now resides in: Provider Ready    Nargis Muñoz RN                  "

## 2022-11-03 ENCOUNTER — ANCILLARY PROCEDURE (OUTPATIENT)
Dept: CT IMAGING | Facility: CLINIC | Age: 82
End: 2022-11-03
Attending: STUDENT IN AN ORGANIZED HEALTH CARE EDUCATION/TRAINING PROGRAM
Payer: COMMERCIAL

## 2022-11-03 ENCOUNTER — OFFICE VISIT (OUTPATIENT)
Dept: NEUROSURGERY | Facility: CLINIC | Age: 82
End: 2022-11-03
Payer: COMMERCIAL

## 2022-11-03 VITALS
DIASTOLIC BLOOD PRESSURE: 69 MMHG | OXYGEN SATURATION: 94 % | HEART RATE: 78 BPM | SYSTOLIC BLOOD PRESSURE: 106 MMHG | RESPIRATION RATE: 16 BRPM

## 2022-11-03 DIAGNOSIS — S06.5XAA SUBDURAL HEMATOMA (H): Primary | ICD-10-CM

## 2022-11-03 DIAGNOSIS — S06.5XAA SDH (SUBDURAL HEMATOMA) (H): ICD-10-CM

## 2022-11-03 PROCEDURE — 99203 OFFICE O/P NEW LOW 30 MIN: CPT | Performed by: NURSE PRACTITIONER

## 2022-11-03 PROCEDURE — 70450 CT HEAD/BRAIN W/O DYE: CPT | Mod: GC | Performed by: RADIOLOGY

## 2022-11-03 RX ORDER — LOPERAMIDE HCL 2 MG
2 CAPSULE ORAL 4 TIMES DAILY PRN
COMMUNITY
Start: 2022-09-02

## 2022-11-03 RX ORDER — CLOTRIMAZOLE 1 %
CREAM (GRAM) TOPICAL 2 TIMES DAILY PRN
Status: ON HOLD | COMMUNITY
End: 2022-12-15

## 2022-11-03 ASSESSMENT — PAIN SCALES - GENERAL: PAINLEVEL: MILD PAIN (3)

## 2022-11-03 NOTE — LETTER
11/3/2022       RE: Serge Marin  [address]  [address]  [facility] [place] [facility] [address]     Dear Colleague,    Thank you for referring your patient, Serge Marin, to the [facility] NEUROSURGERY [facility]INIC [place] [facility]. Please see a copy of my visit note below.        Neurosurgery Clinic Note    Chief Complaint: hospital follow-up     DATE OF VISIT: 11/3/2022    HPI:   Serge Marin is a 82 year old female PMHx Bipolar I disorder, hypothyroidism who was seen for SDH identified on imaging in ED on 10/19/22. She presented to her PCP this morning and was observed to have altered mental status and a large ecchymosis on her right forehead; she was advised to present to the ED for evaluation. Imaging at that time identified a chronic-appearing ~11mm SDH on the right frontal convexity with 3-4mm midline shift as well as a left occipital condyle fracture. Patient reported falling 3 days prior to presentation and lost consciousness, eventually walking up on the floor. She denied neck pain and her C-spine was cleared.  Repeat head CT was obtained during hospitalization and was found to be stable.  Patient was discharged home on 10/21/22.     Currently, denies headache but states she intermittently notes pain on the right in the back of her head.  Has noted intermittent dizziness where she feels the world is spinning for the past couple of days, she states these episodes last roughly 2 minutes and resolve spontaneously.  During these episodes she has not fallen or lost consciousness.   Denies nausea, vomiting, weakness, word finding difficulties, or vision changes.  Patient denies prior use of anti-platelet or anti-coagulant medications.     She is accompanied today by her , Geneva.  Patient currently lives in an independent living facility where she receives services and is checked on daily.              Review of Systems   Negative except in  HPI    Past Medical History:   Diagnosis Date     Hernia, abdominal        Past Surgical History:   Procedure Laterality Date     CHOLECYSTECTOMY       COLONOSCOPY Left 03/18/2015    Procedure: COMBINED COLONOSCOPY, SINGLE OR MULTIPLE BIOPSY/POLYPECTOMY BY BIOPSY;  Surgeon: Stone Lauren MD;  Location:  GI     GYN SURGERY       HERNIA REPAIR         Social History     Socioeconomic History     Marital status:    Tobacco Use     Smoking status: Never     Smokeless tobacco: Never   Substance and Sexual Activity     Alcohol use: No     Drug use: No       family history is not on file.              Physical Exam   LMP  (LMP Unknown)   Constitutional: Oriented to person, place, and time. Appears well-developed and well-nourished. Cooperative. No distress.   HENT: Head normocephalic and atraumatic.   Neurological: alert and oriented to person, place, and time. CN II-XII grossly  intact      STRENGTH LEFT RIGHT   Deltoid 5 5   Bicep 5 5   Wrist Extensor 5 5   Tricep 5 5   Finger flexion 5 5   Finger abduction 5 5    5 5       Hip Flexion     5     5   Knee Extension 5 5   Ankle Dorsiflexion 5 5   Extensor Hallucis Longus 5 5   Plantar Flexion 5 5   Foot eversion 5 5   Foot inversion 5 5       Skin: Skin is warm, dry and intact.   Psychiatric: Normal mood and affect. Speech is normal and behavior is normal.      IMAGING:  Head CT obtained on 11/3/2022 personally reviewed, right subdural hematoma stable in size and midline shift.     ASSESSMENT:  Serge Marin is a 82 year old female recently hospitalized after sustaining a fall and found to have a chronic appearing subdural hematoma    PLAN:  Given that patient is clinically doing well we will continue to treat conservatively and follow closely.  Will have her follow-up in clinic with repeat head CT in 2 weeks with Neurosurgery OREN.    Ok for tylenol for pain, do not take aspirin.     If you should develop worsening headache, intractable  nausea/vomiting, weakness, facial asymmetry, or speech difficulties please seek immediate medical attention.       If collection continues to expand, may need to explore surgical intervention.  If collection continues to remain the same, may consider middle meningeal artery embolization.        I spent 25 minutes in patient care with greater than 50% spent in counseling and/or coordination of care.     I performed independent visualization of radiographic imaging and entered my own interpretation, reviewed and/or ordered tests in radiology        JASSON Bullock, CNP  Department of Neurosurgery  Pager: [phone]

## 2022-11-03 NOTE — PATIENT INSTRUCTIONS
Follow-up in clinic with repeat head CT in 2 weeks with Enma Loja NP    Ok for tylenol for pain, do not take aspirin.     If you should develop worsening headache, intractable nausea/vomiting, weakness, facial asymmetry, or speech difficulties please seek immediate medical attention.       If collection continues to expand, may need to explore surgical intervention.  If collection continues to remain the same, may consider middle meningeal artery embolization.

## 2022-11-04 ENCOUNTER — DOCUMENTATION ONLY (OUTPATIENT)
Dept: FAMILY MEDICINE | Facility: CLINIC | Age: 82
End: 2022-11-04

## 2022-11-04 NOTE — PROGRESS NOTES
"Form has been completed by provider.     Form sent out via: Fax to [employer] at Fax Number: [phone]  Patient informed: No  Output date: November 10, 2022    Nargis Muñoz RN      **Please close the encounter**        When opening a documentation only encounter, be sure to enter in \"Chief Complaint\" Forms and in \" Comments\" Title of form, description if needed.    Serge is a 82 year old  female  Form received via: Fax  Form now resides in: Provider Ready    Nargis Muñoz RN                  "

## 2022-11-04 NOTE — PROGRESS NOTES
Called and spoke with Melissa, Dr. Wyatt will be in clinic on 11/10 as preceptor. I will get forms signed then and fax back.    Nargis Muñoz RN

## 2022-11-04 NOTE — PROGRESS NOTES
Melissa with [facility] is still awaiting an order that was sent over on 09/16. The provider is out of clinic until 11/18. Can we have another provider sign off on this with NPI number and provider name.     Melissa's call back number is [phone]

## 2022-11-07 ENCOUNTER — TELEPHONE (OUTPATIENT)
Dept: FAMILY MEDICINE | Facility: CLINIC | Age: 82
End: 2022-11-07

## 2022-11-07 NOTE — TELEPHONE ENCOUNTER
RN called nikita back to relay neurosurgery clinic is managing. Gave number to clinic. Nikita will let patient know      Farrah Sesay RN

## 2022-11-07 NOTE — TELEPHONE ENCOUNTER
[facility] phone call message- patient requesting results:    Test:CT scan    Date of test: 11/03/2022      Additional Comments: Patient is wanting to talk to a nurse or her provider to review the results from her CT scan on 11/03. Shari with [facility] said that patient is anxious about the results and would like a call back as soon as possible.     Patient can be reached at [phone]    OK to leave a message on voice mail? Yes       Primary language: English      needed? No    Call taken on November 7, 2022 at 11:35 AM by Veronica Hamm

## 2022-11-08 ENCOUNTER — TELEPHONE (OUTPATIENT)
Dept: FAMILY MEDICINE | Facility: CLINIC | Age: 82
End: 2022-11-08

## 2022-11-08 NOTE — TELEPHONE ENCOUNTER
[facility] phone call message- general phone call:    Reason for call: Pt called inquire about her CAT scan results. Please give a call back.    Return call needed: Yes    OK to leave a message on voice mail? Yes    Primary language: English      needed? No    Call taken on November 8, 2022 at 9:38 AM by Renzo Carroll

## 2022-11-08 NOTE — TELEPHONE ENCOUNTER
RN called pt back and relayed she will have to discuss with neurosurgery who ordered the CT. RN direct transferred to [facility] neurosurgery clinic    Will also routed message to jake galan who patient saw    Farrah Sesay RN

## 2022-11-10 ENCOUNTER — DOCUMENTATION ONLY (OUTPATIENT)
Dept: FAMILY MEDICINE | Facility: CLINIC | Age: 82
End: 2022-11-10

## 2022-11-10 DIAGNOSIS — Z53.9 DIAGNOSIS NOT YET DEFINED: Primary | ICD-10-CM

## 2022-11-10 PROCEDURE — G0180 MD CERTIFICATION HHA PATIENT: HCPCS | Performed by: FAMILY MEDICINE

## 2022-11-10 NOTE — PROGRESS NOTES
"Form has been completed by provider.     Form sent out via: Fax to [employer]  at Fax Number: [phone]  Patient informed: No  Output date: November 10, 2022    Nargis Muñoz RN      **Please close the encounter**        When opening a documentation only encounter, be sure to enter in \"Chief Complaint\" Forms and in \" Comments\" Title of form, description if needed.    Serge is a 82 year old  female  Form received via: Fax  Form now resides in: Provider Ready    Nargis Muñoz RN                  "

## 2022-11-10 NOTE — PROGRESS NOTES
Form has been completed by provider.     Form sent out via: Fax to [employer] at Fax Number: [phone]  Patient informed: N/A  Output date: November 10, 2022    Lashonda Varghese MA      **Please close the encounter**

## 2022-11-15 NOTE — PROGRESS NOTES
"Faxed signed [facility] - POC 10/25-12/23/22 \"urgent/outstanding order Multiple requests\" received 11/04/2022. Fax # [phone]  Will scan copy into chart.    Debbi Campbell  Care Coordinator  [facility]  Phone:[phone]    "

## 2022-11-16 ENCOUNTER — PATIENT OUTREACH (OUTPATIENT)
Dept: UROLOGY | Facility: CLINIC | Age: 82
End: 2022-11-16

## 2022-11-16 DIAGNOSIS — N39.0 URINARY TRACT INFECTION: Primary | ICD-10-CM

## 2022-11-16 NOTE — PROGRESS NOTES
Voicemail left for both sons and care coordinator on file with regards to needing UC ASAP for surgery early next week. Will wait for return call.     Pt phoned and she could not understand nurse on the phone or direct nurse who to speak with. Pt disconnected call.

## 2022-11-17 RX ORDER — NITROFURANTOIN 25; 75 MG/1; MG/1
100 CAPSULE ORAL 2 TIMES DAILY
Qty: 12 CAPSULE | Refills: 0 | Status: ON HOLD | OUTPATIENT
Start: 2022-11-17 | End: 2022-11-23

## 2022-11-17 NOTE — PROGRESS NOTES
[facility]  [address] [address]  Phone: [phone]  Fax: [phone]  Primary Provider: Kassie Wyatt  Pre-op Performing Provider: JEFF CALDERON    PREOPERATIVE EVALUATION:  Today's date: 11/18/2022    Serge Marin is a 82 year old female who presents for a preoperative evaluation.    Surgical Information:  Surgery/Procedure: Left Percutaneous Nephrolithotomy with possible holmium laser lithotripsy  Surgery Location:  OR  Surgeon: Stevo Talbot MD  Surgery Date: 11/22/2022  Time of Surgery: 8:00 AM  Where patient plans to recover: At a nursing home  Fax number for surgical facility: Note does not need to be faxed, will be available electronically in Epic.    Type of Anesthesia Anticipated: General    Assessment & Plan     The proposed surgical procedure is considered INTERMEDIATE risk.    Pre-op exam  VSS, pt in NAD. Pt AOx3 with no focal neuro deficits noted. EKG NSR today without ST changes c/w ischemia. Addenda 11/22/22: Unfortunately the CBC that was ordered on the day of this encounter was not collected. Advise checking CBC on the morning of the procedure.  - Asymptomatic COVID-19 Virus (Coronavirus) by PCR Nasopharyngeal; Future  - EKG 12-lead complete w/read - Clinics  - CBC with platelets; Future           Risks and Recommendations:  The patient has the following additional risks and recommendations for perioperative complications:   - Consult Hospitalist / IM to assist with post-op medical management      Medication Instructions:  Patient is to take all scheduled medications on the day of surgery EXCEPT for modifications listed below:   - SSRIs, SNRIs, TCAs, Antipsychotics: Continue without modification.     RECOMMENDATION:  APPROVAL GIVEN to proceed with proposed procedure without further diagnostic evaluation.    Review of external notes as documented above     Subjective     HPI related to upcoming procedure:     82-year-old female with PMHx Bipolar I  "disorder, hypothyroidism, Subdural hematoma (recent admission 10/19/22 due to fall with AMS. Imaging at the time showed chronic-appearing 11mm SDH on R frontal convexity with 3-4 mm midline shift. Repeat head CT was found to be stable and the patient was discharged home on 10/21/22.) Today, she endorses chronic lightheadedness from her October hospitalization, otherwise, she\" feels fine.\" She denies headaches. Chart review shows that the patient has a 11mm Left renal pelvis stone. She is scheduled for a Percutaneous Nephrolithotomy with possible holmium laser lithotripsy on 11/22/22 with Dr. Talbot. No other acute concerns/symptoms at time of exam.        Preop Questions 11/18/2022   1. Have you ever had a heart attack or stroke? No   2. Have you ever had surgery on your heart or blood vessels, such as a stent placement, a coronary artery bypass, or surgery on an artery in your head, neck, heart, or legs? No   3. Do you have chest pain with activity? No   4. Do you have a history of  heart failure? No   5. Do you currently have a cold, bronchitis or symptoms of other infection? No   6. Do you have a cough, shortness of breath, or wheezing? No   7. Do you or anyone in your family have previous history of blood clots? No   8. Do you or does anyone in your family have a serious bleeding problem such as prolonged bleeding following surgeries or cuts? No   9. Have you ever had problems with anemia or been told to take iron pills? No   10. Have you had any abnormal blood loss such as black, tarry or bloody stools, or abnormal vaginal bleeding? No   11. Have you ever had a blood transfusion? No   12. Are you willing to have a blood transfusion if it is medically needed before, during, or after your surgery? Yes   13. Have you or any of your relatives ever had problems with anesthesia? No   14. Do you have sleep apnea, excessive snoring or daytime drowsiness? No   15. Do you have any artifical heart valves or other " implanted medical devices like a pacemaker, defibrillator, or continuous glucose monitor? No   16. Do you have artificial joints? No   17. Are you allergic to latex? No     Health Care Directive:  Patient has a Health Care Directive on file      Preoperative Review of :   reviewed - no record of controlled substances prescribed.      Status of Chronic Conditions:  ANEMIA - Patient has a recent history of moderate-severe anemia, which has not been symptomatic. Work up to date has revealed H/H 11.5/34.9. Treatment has been Fe tablet.     HYPOTHYROIDISM - Patient has a longstanding history of chronic Hypothyroidism. Patient has been doing well, noting no tremor, insomnia, hair loss or changes in skin texture. Continues to take medications as directed, without adverse reactions or side effects. Last TSH   Lab Results   Component Value Date    TSH 2.83 10/19/2022           Review of Systems  CONSTITUTIONAL: NEGATIVE for fever, chills, change in weight  INTEGUMENTARY/SKIN: NEGATIVE for worrisome rashes, moles or lesions  EYES: NEGATIVE for vision changes or irritation  ENT/MOUTH: NEGATIVE for ear, mouth and throat problems  RESP: NEGATIVE for significant cough or SOB  CV: NEGATIVE for chest pain, palpitations or peripheral edema  GI: NEGATIVE for abdominal pain, heartburn, or change in bowel habits. Endorses chronic intermittent nausea.  : NEGATIVE for frequency, dysuria, or hematuria  MUSCULOSKELETAL: NEGATIVE for significant arthralgias or myalgia  NEURO: NEGATIVE for headaches, weakness and  Paresthesias. Endorses chronic lightedheadedness since October fall.  ENDOCRINE: NEGATIVE for temperature intolerance, skin/hair changes  HEME: NEGATIVE for bleeding problems  PSYCHIATRIC: NEGATIVE for changes in mood or affect        Patient Active Problem List    Diagnosis Date Noted     SDH (subdural hematoma) 10/19/2022     Priority: Medium     Closed fracture of left occipital condyle, initial encounter (H) 10/19/2022      Priority: Medium     Kidney stone 08/04/2022     Priority: Medium     Nonimmune to hepatitis B virus 05/17/2018     Priority: Medium     Elevated fasting glucose 05/17/2018     Priority: Medium     Tinea corporis 03/28/2018     Priority: Medium     Other specified hypothyroidism 10/18/2016     Priority: Medium     Thyroid function test abnormal 11/15/2013     Priority: Medium     History of elevated TSH, has not been on medication.  Further f/up with PMD is advised.       Bipolar 1 disorder (H) 04/19/2013     Priority: Medium     Chronic diarrhea 04/19/2013     Priority: Medium      Past Medical History:   Diagnosis Date     Hernia, abdominal      Past Surgical History:   Procedure Laterality Date     CHOLECYSTECTOMY       COLONOSCOPY Left 03/18/2015    Procedure: COMBINED COLONOSCOPY, SINGLE OR MULTIPLE BIOPSY/POLYPECTOMY BY BIOPSY;  Surgeon: Stone Lauren MD;  Location:  GI     GYN SURGERY       HERNIA REPAIR       Current Outpatient Medications   Medication Sig Dispense Refill     acetaminophen (TYLENOL) 500 MG tablet Take 500 mg by mouth every 6 hours as needed for mild pain       butenafine (MENTAX) 1 % CREA cream Apply topically daily       calcium carbonate-vitamin D (OS-BERTA WITH D) 500-200 MG-UNIT tablet        calcium carbonate-vitamin D (OSCAL W/D) 500-200 MG-UNIT tablet Take 1 tablet by mouth daily       clotrimazole (LOTRIMIN) 1 % external cream Apply topically 2 times daily       escitalopram (LEXAPRO) 10 MG tablet Take 5 mg by mouth daily       ferrous gluconate (FERGON) 324 (38 FE) MG tablet Take 1 tablet (324 mg) by mouth daily (with breakfast) 90 tablet 3     levothyroxine (SYNTHROID, LEVOTHROID) 75 MCG tablet Take 1 tablet (75 mcg) by mouth daily 30 tablet 3     loperamide (IMODIUM) 2 MG capsule        nitroFURantoin macrocrystal-monohydrate (MACROBID) 100 MG capsule Take 1 capsule (100 mg) by mouth 2 times daily for 6 days 12 capsule 0       Allergies   Allergen Reactions      Penicillins      Bactrim [Sulfamethoxazole W/Trimethoprim] Itching     Patient prescribed Bactrim at eye appointment. Assisted living reports eyes were red and itching.         Social History     Tobacco Use     Smoking status: Never     Smokeless tobacco: Never   Substance Use Topics     Alcohol use: No     History reviewed. No pertinent family history.  History   Drug Use No         Objective     LMP  (LMP Unknown)     /81   Pulse 75   Temp 98.5  F (36.9  C) (Oral)   Ht 1.524 m (5')   Wt 52.2 kg (115 lb)   LMP  (LMP Unknown)   SpO2 93%   BMI 22.46 kg/m        Physical Exam  Constitutional:       General: She is not in acute distress.     Appearance: She is not ill-appearing.      Comments: Ambulates with walker   HENT:      Head: Normocephalic.      Right Ear: Tympanic membrane normal.      Left Ear: Tympanic membrane normal.      Ears:      Comments: [place].     Nose: Nose normal.      Mouth/Throat:      Mouth: Mucous membranes are moist.      Pharynx: No oropharyngeal exudate or posterior oropharyngeal erythema.   Eyes:      Extraocular Movements: Extraocular movements intact.      Pupils: Pupils are equal, round, and reactive to light.   Cardiovascular:      Rate and Rhythm: Normal rate and regular rhythm.      Heart sounds: Normal heart sounds.   Pulmonary:      Effort: Pulmonary effort is normal. No respiratory distress.      Breath sounds: Normal breath sounds. No wheezing, rhonchi or rales.   Abdominal:      General: Abdomen is flat. Bowel sounds are normal.      Palpations: Abdomen is soft.      Tenderness: There is no abdominal tenderness.   Musculoskeletal:      Cervical back: Neck supple.      Right lower leg: No edema.      Left lower leg: No edema.   Lymphadenopathy:      Cervical: No cervical adenopathy.   Skin:     General: Skin is warm and dry.   Neurological:      General: No focal deficit present.      Mental Status: She is alert and oriented to person, place, and time.      Comments:  CN II-XII grossly intact   Psychiatric:         Thought Content: Thought content normal.         Judgment: Judgment normal.               Recent Labs   Lab Test 10/20/22  0658 10/19/22  1740 10/19/22  1737 08/13/22  0701 08/07/22  0904   HGB 11.5*  --  12.2   < > 12.7   *  --  135*   < > 75*   INR  --  1.04  --   --   --    NA  --   --  141  --  138   POTASSIUM  --   --  4.1  --  3.4   CR  --   --  0.80  --  0.60    < > = values in this interval not displayed.        Diagnostics:  Labs pending at this time.  Results will be reviewed when available.   EKG: some artifact noted, otherwise appears normal, NSR, no acute ST/T changes c/w ischemia, no LVH by voltage criteria,  Horizontal axis, normal variant of EKG.    Unable to calculate METS- Hx Impaired functional mobility       Revised Cardiac Risk Index (RCRI):  The patient has the following serious cardiovascular risks for perioperative complications:   - No serious cardiac risks = 0 points     RCRI Interpretation: 0 points: Class I (very low risk - 0.4% complication rate)        All questions/concerns addressed. Patient stated understanding/agreement to plan of care.      Signed Electronically by: JASSON Coyle CNP  Copy of this evaluation report is provided to requesting physician.

## 2022-11-17 NOTE — H&P (VIEW-ONLY)
[facility]  [address] [address]  Phone: [phone]  Fax: [phone]  Primary Provider: Kassie Wyatt  Pre-op Performing Provider: JEFF CALDERON    PREOPERATIVE EVALUATION:  Today's date: 11/18/2022    Serge Marin is a 82 year old female who presents for a preoperative evaluation.    Surgical Information:  Surgery/Procedure: Left Percutaneous Nephrolithotomy with possible holmium laser lithotripsy  Surgery Location:  OR  Surgeon: Stevo Talbot MD  Surgery Date: 11/22/2022  Time of Surgery: 8:00 AM  Where patient plans to recover: At a nursing home  Fax number for surgical facility: Note does not need to be faxed, will be available electronically in Epic.    Type of Anesthesia Anticipated: General    Assessment & Plan     The proposed surgical procedure is considered INTERMEDIATE risk.    Pre-op exam  VSS, pt in NAD. Pt AOx3 with no focal neuro deficits noted. EKG NSR today without ST changes c/w ischemia. Will update CBC.  - Asymptomatic COVID-19 Virus (Coronavirus) by PCR Nasopharyngeal; Future  - EKG 12-lead complete w/read - Clinics  - CBC with platelets; Future           Risks and Recommendations:  The patient has the following additional risks and recommendations for perioperative complications:   - Consult Hospitalist / IM to assist with post-op medical management      Medication Instructions:  Patient is to take all scheduled medications on the day of surgery EXCEPT for modifications listed below:   - SSRIs, SNRIs, TCAs, Antipsychotics: Continue without modification.     RECOMMENDATION:  APPROVAL GIVEN to proceed with proposed procedure pending review of diagnostic evaluation (CBC lab._    Review of external notes as documented above     Subjective     HPI related to upcoming procedure:     82-year-old female with PMHx Bipolar I disorder, hypothyroidism, Subdural hematoma (recent admission 10/19/22 due to fall with AMS. Imaging at the time showed  "chronic-appearing 11mm SDH on R frontal convexity with 3-4 mm midline shift. Repeat head CT was found to be stable and the patient was discharged home on 10/21/22.) Today, she endorses chronic lightheadedness from her October hospitalization, otherwise, she\" feels fine.\" She denies headaches. Chart review shows that the patient has a 11mm Left renal pelvis stone. She is scheduled for a Percutaneous Nephrolithotomy with possible holmium laser lithotripsy on 11/22/22 with Dr. Talbot. No other acute concerns/symptoms at time of exam.        Preop Questions 11/18/2022   1. Have you ever had a heart attack or stroke? No   2. Have you ever had surgery on your heart or blood vessels, such as a stent placement, a coronary artery bypass, or surgery on an artery in your head, neck, heart, or legs? No   3. Do you have chest pain with activity? No   4. Do you have a history of  heart failure? No   5. Do you currently have a cold, bronchitis or symptoms of other infection? No   6. Do you have a cough, shortness of breath, or wheezing? No   7. Do you or anyone in your family have previous history of blood clots? No   8. Do you or does anyone in your family have a serious bleeding problem such as prolonged bleeding following surgeries or cuts? No   9. Have you ever had problems with anemia or been told to take iron pills? No   10. Have you had any abnormal blood loss such as black, tarry or bloody stools, or abnormal vaginal bleeding? No   11. Have you ever had a blood transfusion? No   12. Are you willing to have a blood transfusion if it is medically needed before, during, or after your surgery? Yes   13. Have you or any of your relatives ever had problems with anesthesia? No   14. Do you have sleep apnea, excessive snoring or daytime drowsiness? No   15. Do you have any artifical heart valves or other implanted medical devices like a pacemaker, defibrillator, or continuous glucose monitor? No   16. Do you have artificial " joints? No   17. Are you allergic to latex? No     Health Care Directive:  Patient has a Health Care Directive on file      Preoperative Review of :   reviewed - no record of controlled substances prescribed.      Status of Chronic Conditions:  ANEMIA - Patient has a recent history of moderate-severe anemia, which has not been symptomatic. Work up to date has revealed H/H 11.5/34.9. Treatment has been Fe tablet.     HYPOTHYROIDISM - Patient has a longstanding history of chronic Hypothyroidism. Patient has been doing well, noting no tremor, insomnia, hair loss or changes in skin texture. Continues to take medications as directed, without adverse reactions or side effects. Last TSH   Lab Results   Component Value Date    TSH 2.83 10/19/2022           Review of Systems  CONSTITUTIONAL: NEGATIVE for fever, chills, change in weight  INTEGUMENTARY/SKIN: NEGATIVE for worrisome rashes, moles or lesions  EYES: NEGATIVE for vision changes or irritation  ENT/MOUTH: NEGATIVE for ear, mouth and throat problems  RESP: NEGATIVE for significant cough or SOB  CV: NEGATIVE for chest pain, palpitations or peripheral edema  GI: NEGATIVE for abdominal pain, heartburn, or change in bowel habits. Endorses chronic intermittent nausea.  : NEGATIVE for frequency, dysuria, or hematuria  MUSCULOSKELETAL: NEGATIVE for significant arthralgias or myalgia  NEURO: NEGATIVE for headaches, weakness and  Paresthesias. Endorses chronic lightedheadedness since October fall.  ENDOCRINE: NEGATIVE for temperature intolerance, skin/hair changes  HEME: NEGATIVE for bleeding problems  PSYCHIATRIC: NEGATIVE for changes in mood or affect        Patient Active Problem List    Diagnosis Date Noted     SDH (subdural hematoma) 10/19/2022     Priority: Medium     Closed fracture of left occipital condyle, initial encounter (H) 10/19/2022     Priority: Medium     Kidney stone 08/04/2022     Priority: Medium     Nonimmune to hepatitis B virus 05/17/2018      Priority: Medium     Elevated fasting glucose 05/17/2018     Priority: Medium     Tinea corporis 03/28/2018     Priority: Medium     Other specified hypothyroidism 10/18/2016     Priority: Medium     Thyroid function test abnormal 11/15/2013     Priority: Medium     History of elevated TSH, has not been on medication.  Further f/up with PMD is advised.       Bipolar 1 disorder (H) 04/19/2013     Priority: Medium     Chronic diarrhea 04/19/2013     Priority: Medium      Past Medical History:   Diagnosis Date     Hernia, abdominal      Past Surgical History:   Procedure Laterality Date     CHOLECYSTECTOMY       COLONOSCOPY Left 03/18/2015    Procedure: COMBINED COLONOSCOPY, SINGLE OR MULTIPLE BIOPSY/POLYPECTOMY BY BIOPSY;  Surgeon: Stone Lauren MD;  Location:  GI     GYN SURGERY       HERNIA REPAIR       Current Outpatient Medications   Medication Sig Dispense Refill     acetaminophen (TYLENOL) 500 MG tablet Take 500 mg by mouth every 6 hours as needed for mild pain       butenafine (MENTAX) 1 % CREA cream Apply topically daily       calcium carbonate-vitamin D (OS-BERTA WITH D) 500-200 MG-UNIT tablet        calcium carbonate-vitamin D (OSCAL W/D) 500-200 MG-UNIT tablet Take 1 tablet by mouth daily       clotrimazole (LOTRIMIN) 1 % external cream Apply topically 2 times daily       escitalopram (LEXAPRO) 10 MG tablet Take 5 mg by mouth daily       ferrous gluconate (FERGON) 324 (38 FE) MG tablet Take 1 tablet (324 mg) by mouth daily (with breakfast) 90 tablet 3     levothyroxine (SYNTHROID, LEVOTHROID) 75 MCG tablet Take 1 tablet (75 mcg) by mouth daily 30 tablet 3     loperamide (IMODIUM) 2 MG capsule        nitroFURantoin macrocrystal-monohydrate (MACROBID) 100 MG capsule Take 1 capsule (100 mg) by mouth 2 times daily for 6 days 12 capsule 0       Allergies   Allergen Reactions     Penicillins      Bactrim [Sulfamethoxazole W/Trimethoprim] Itching     Patient prescribed Bactrim at eye appointment. Assisted  living reports eyes were red and itching.         Social History     Tobacco Use     Smoking status: Never     Smokeless tobacco: Never   Substance Use Topics     Alcohol use: No     History reviewed. No pertinent family history.  History   Drug Use No         Objective     LMP  (LMP Unknown)     /81   Pulse 75   Temp 98.5  F (36.9  C) (Oral)   Ht 1.524 m (5')   Wt 52.2 kg (115 lb)   LMP  (LMP Unknown)   SpO2 93%   BMI 22.46 kg/m        Physical Exam  Constitutional:       General: She is not in acute distress.     Appearance: She is not ill-appearing.      Comments: Ambulates with walker   HENT:      Head: Normocephalic.      Right Ear: Tympanic membrane normal.      Left Ear: Tympanic membrane normal.      Ears:      Comments: [place].     Nose: Nose normal.      Mouth/Throat:      Mouth: Mucous membranes are moist.      Pharynx: No oropharyngeal exudate or posterior oropharyngeal erythema.   Eyes:      Extraocular Movements: Extraocular movements intact.      Pupils: Pupils are equal, round, and reactive to light.   Cardiovascular:      Rate and Rhythm: Normal rate and regular rhythm.      Heart sounds: Normal heart sounds.   Pulmonary:      Effort: Pulmonary effort is normal. No respiratory distress.      Breath sounds: Normal breath sounds. No wheezing, rhonchi or rales.   Abdominal:      General: Abdomen is flat. Bowel sounds are normal.      Palpations: Abdomen is soft.      Tenderness: There is no abdominal tenderness.   Musculoskeletal:      Cervical back: Neck supple.      Right lower leg: No edema.      Left lower leg: No edema.   Lymphadenopathy:      Cervical: No cervical adenopathy.   Skin:     General: Skin is warm and dry.   Neurological:      General: No focal deficit present.      Mental Status: She is alert and oriented to person, place, and time.      Comments: CN II-XII grossly intact   Psychiatric:         Thought Content: Thought content normal.         Judgment: Judgment normal.                Recent Labs   Lab Test 10/20/22  0658 10/19/22  1740 10/19/22  1737 08/13/22  0701 08/07/22  0904   HGB 11.5*  --  12.2   < > 12.7   *  --  135*   < > 75*   INR  --  1.04  --   --   --    NA  --   --  141  --  138   POTASSIUM  --   --  4.1  --  3.4   CR  --   --  0.80  --  0.60    < > = values in this interval not displayed.        Diagnostics:  Labs pending at this time.  Results will be reviewed when available.   EKG: some artifact noted, otherwise appears normal, NSR, no acute ST/T changes c/w ischemia, no LVH by voltage criteria,  Horizontal axis, normal variant of EKG.    Unable to calculate METS- Hx Impaired functional mobility       Revised Cardiac Risk Index (RCRI):  The patient has the following serious cardiovascular risks for perioperative complications:   - No serious cardiac risks = 0 points     RCRI Interpretation: 0 points: Class I (very low risk - 0.4% complication rate)        All questions/concerns addressed. Patient stated understanding/agreement to plan of care.      Signed Electronically by: JASSON Coyle CNP  Copy of this evaluation report is provided to requesting physician.

## 2022-11-18 ENCOUNTER — OFFICE VISIT (OUTPATIENT)
Dept: FAMILY MEDICINE | Facility: CLINIC | Age: 82
End: 2022-11-18

## 2022-11-18 ENCOUNTER — TRANSFERRED RECORDS (OUTPATIENT)
Dept: HEALTH INFORMATION MANAGEMENT | Facility: CLINIC | Age: 82
End: 2022-11-18

## 2022-11-18 VITALS
HEIGHT: 60 IN | OXYGEN SATURATION: 93 % | WEIGHT: 115 LBS | TEMPERATURE: 98.5 F | DIASTOLIC BLOOD PRESSURE: 81 MMHG | BODY MASS INDEX: 22.58 KG/M2 | HEART RATE: 75 BPM | SYSTOLIC BLOOD PRESSURE: 124 MMHG

## 2022-11-18 DIAGNOSIS — Z01.818 PRE-OP EXAM: Primary | ICD-10-CM

## 2022-11-18 PROCEDURE — U0005 INFEC AGEN DETEC AMPLI PROBE: HCPCS | Performed by: NURSE PRACTITIONER

## 2022-11-18 NOTE — PROGRESS NOTES
Melissa from [facility] called to say not the documents came through on the fax. She is asking to re-fax the documents as soon as possible.    Melissa  [facility]  [phone]

## 2022-11-18 NOTE — NURSING NOTE
ROOM:  [facility]    Preferred Name: Serge NGO AGREED:              HM DECLINED:        Flu    82 year old  Chief Complaint   Patient presents with     Pre-Op Exam       Blood pressure 124/81, pulse 75, temperature 98.5  F (36.9  C), temperature source Oral, height 1.524 m (5'), weight 52.2 kg (115 lb), SpO2 93 %, not currently breastfeeding. Body mass index is 22.46 kg/m .  BP completed using cuff size:        Patient Active Problem List   Diagnosis     Bipolar 1 disorder (H)     Chronic diarrhea     Thyroid function test abnormal     Other specified hypothyroidism     Tinea corporis     Nonimmune to hepatitis B virus     Elevated fasting glucose     Kidney stone     SDH (subdural hematoma)     Closed fracture of left occipital condyle, initial encounter (H)       Wt Readings from Last 2 Encounters:   11/18/22 52.2 kg (115 lb)   10/19/22 50.8 kg (112 lb)     BP Readings from Last 3 Encounters:   11/18/22 124/81   11/03/22 106/69   10/21/22 123/61       Allergies   Allergen Reactions     Penicillins      Bactrim [Sulfamethoxazole W/Trimethoprim] Itching     Patient prescribed Bactrim at eye appointment. Assisted living reports eyes were red and itching.        Current Outpatient Medications   Medication     acetaminophen (TYLENOL) 500 MG tablet     butenafine (MENTAX) 1 % CREA cream     calcium carbonate-vitamin D (OS-BERTA WITH D) 500-200 MG-UNIT tablet     calcium carbonate-vitamin D (OSCAL W/D) 500-200 MG-UNIT tablet     clotrimazole (LOTRIMIN) 1 % external cream     escitalopram (LEXAPRO) 10 MG tablet     ferrous gluconate (FERGON) 324 (38 FE) MG tablet     levothyroxine (SYNTHROID, LEVOTHROID) 75 MCG tablet     loperamide (IMODIUM) 2 MG capsule     nitroFURantoin macrocrystal-monohydrate (MACROBID) 100 MG capsule     No current facility-administered medications for this visit.       Social History     Tobacco Use     Smoking status: Never     Smokeless tobacco: Never   Substance Use Topics     Alcohol  use: No     Drug use: No       Honoring Choices - Health Care Directive Guide offered to patient at time of visit.    Health Maintenance Due   Topic Date Due     ZOSTER IMMUNIZATION (1 of 2) Never done     DTAP/TDAP/TD IMMUNIZATION (2 - Td or Tdap) 01/19/2015     COVID-19 Vaccine (3 - Booster for Moderna series) 04/23/2021     MEDICARE ANNUAL WELLNESS VISIT  08/05/2022       Immunization History   Administered Date(s) Administered     COVID-19,PF,Moderna 01/29/2021, 02/26/2021     Flu 65+ Years 09/25/2019     Influenza (High Dose) 3 valent vaccine 10/13/2016, 09/04/2018, 10/01/2019     Influenza (IIV3) PF 12/03/2001, 10/14/2008     Influenza Vaccine, 6+MO IM (QUADRIVALENT W/PRESERVATIVES) 09/18/2015, 09/15/2017     Influenza, Quad, High Dose, Pf, 65yr+ (Fluzone HD) 10/21/2022     Mantoux Tuberculin Skin Test 08/18/2022, 08/28/2022     Pneumo Conj 13-V (2010&after) 02/01/2016     Pneumococcal 23 valent 05/08/2018     Tdap (Adacel,Boostrix) 01/19/2005       No results found for: PAP    Recent Labs   Lab Test 10/19/22  1948 10/19/22  1737 08/07/22  0904 08/05/22  0529 08/04/22  1155 08/05/21  1441 10/09/19  1025 05/08/18  0900 03/28/18  1113 02/16/17  0823 02/15/17  0755 02/13/17  0735 02/12/17  2137   LDL  --   --   --   --   --   --   --  117  --   --   --   --   --    HDL  --   --   --   --   --   --   --  70.1  --   --   --   --   --    TRIG  --   --   --   --   --   --   --  77.6  --   --   --   --   --    ALT  --  12  --   --  18  --   --   --   --   --   --   --  25   CR  --  0.80 0.60   < > 0.88  --   --   --   --  0.79 0.74   < > 0.98   GFRESTIMATED  --  73 89   < > 65  --   --   --   --  71 76   < > 55*   GFRESTBLACK  --   --   --   --   --   --   --   --   --  86 >90   GFR Calc     < > 66   ALBUMIN  --  3.6  --   --  3.1*  --   --   --   --   --   --   --  3.6   POTASSIUM  --  4.1 3.4   < > 3.9  --   --   --   --  3.5 3.8   < > 3.6   TSH 2.83  --   --   --   --  1.43   < >  --    < >  --   --    --   --     < > = values in this interval not displayed.       PHQ-2 ( 1999 Pfizer) 10/19/2022 9/12/2022   Q1: Little interest or pleasure in doing things 1 0   Q2: Feeling down, depressed or hopeless 0 0   PHQ-2 Score 1 0   PHQ-2 Total Score (12-17 Years)- Positive if 3 or more points; Administer PHQ-A if positive - -   Q1: Little interest or pleasure in doing things Several days -   Q2: Feeling down, depressed or hopeless Not at all -   PHQ-2 Score 1 -       No flowsheet data found.    PAVAN-7 SCORE 10/13/2016   Total Score 1       No flowsheet data found.    Zackary Rg    November 18, 2022 8:24 AM

## 2022-11-18 NOTE — PROGRESS NOTES
"Refaxed signed [facility] - POC 10/25-12/23/22 \"urgent/outstanding order Multiple requests\"  Fax # [phone] per request.    Debbi Campbell  Care Coordinator  [facility]  Phone:[phone]    "

## 2022-11-19 LAB — SARS-COV-2 RNA RESP QL NAA+PROBE: NEGATIVE

## 2022-11-21 ENCOUNTER — ANESTHESIA EVENT (OUTPATIENT)
Dept: SURGERY | Facility: CLINIC | Age: 82
DRG: 660 | End: 2022-11-21
Payer: COMMERCIAL

## 2022-11-22 ENCOUNTER — APPOINTMENT (OUTPATIENT)
Dept: CT IMAGING | Facility: CLINIC | Age: 82
DRG: 660 | End: 2022-11-22
Attending: UROLOGY
Payer: COMMERCIAL

## 2022-11-22 ENCOUNTER — APPOINTMENT (OUTPATIENT)
Dept: GENERAL RADIOLOGY | Facility: CLINIC | Age: 82
DRG: 660 | End: 2022-11-22
Attending: UROLOGY
Payer: COMMERCIAL

## 2022-11-22 ENCOUNTER — APPOINTMENT (OUTPATIENT)
Dept: INTERVENTIONAL RADIOLOGY/VASCULAR | Facility: CLINIC | Age: 82
DRG: 660 | End: 2022-11-22
Attending: UROLOGY
Payer: COMMERCIAL

## 2022-11-22 ENCOUNTER — HOSPITAL ENCOUNTER (INPATIENT)
Facility: CLINIC | Age: 82
LOS: 3 days | Discharge: NURSING, REHAB OR LONG-TERM CARE | DRG: 660 | End: 2022-11-28
Attending: UROLOGY | Admitting: UROLOGY
Payer: COMMERCIAL

## 2022-11-22 ENCOUNTER — MEDICAL CORRESPONDENCE (OUTPATIENT)
Dept: HEALTH INFORMATION MANAGEMENT | Facility: CLINIC | Age: 82
End: 2022-11-22

## 2022-11-22 ENCOUNTER — ANESTHESIA (OUTPATIENT)
Dept: SURGERY | Facility: CLINIC | Age: 82
DRG: 660 | End: 2022-11-22
Payer: COMMERCIAL

## 2022-11-22 DIAGNOSIS — N20.0 KIDNEY STONE: Primary | ICD-10-CM

## 2022-11-22 DIAGNOSIS — N39.0 URINARY TRACT INFECTION: ICD-10-CM

## 2022-11-22 DIAGNOSIS — N20.0 LEFT RENAL STONE: ICD-10-CM

## 2022-11-22 LAB
CREAT SERPL-MCNC: 0.79 MG/DL (ref 0.52–1.04)
ERYTHROCYTE [DISTWIDTH] IN BLOOD BY AUTOMATED COUNT: 13 % (ref 10–15)
GFR SERPL CREATININE-BSD FRML MDRD: 74 ML/MIN/1.73M2
HCT VFR BLD AUTO: 40.3 % (ref 35–47)
HGB BLD-MCNC: 12.7 G/DL (ref 11.7–15.7)
HGB BLD-MCNC: 13.4 G/DL (ref 11.7–15.7)
MCH RBC QN AUTO: 29.8 PG (ref 26.5–33)
MCHC RBC AUTO-ENTMCNC: 33.3 G/DL (ref 31.5–36.5)
MCV RBC AUTO: 90 FL (ref 78–100)
PLATELET # BLD AUTO: 105 10E3/UL (ref 150–450)
RBC # BLD AUTO: 4.49 10E6/UL (ref 3.8–5.2)
WBC # BLD AUTO: 3.4 10E3/UL (ref 4–11)

## 2022-11-22 PROCEDURE — 258N000003 HC RX IP 258 OP 636: Performed by: STUDENT IN AN ORGANIZED HEALTH CARE EDUCATION/TRAINING PROGRAM

## 2022-11-22 PROCEDURE — C1894 INTRO/SHEATH, NON-LASER: HCPCS | Performed by: UROLOGY

## 2022-11-22 PROCEDURE — 250N000009 HC RX 250: Performed by: UROLOGY

## 2022-11-22 PROCEDURE — 0TC78ZZ EXTIRPATION OF MATTER FROM LEFT URETER, VIA NATURAL OR ARTIFICIAL OPENING ENDOSCOPIC: ICD-10-PCS | Performed by: UROLOGY

## 2022-11-22 PROCEDURE — 85027 COMPLETE CBC AUTOMATED: CPT | Performed by: ANESTHESIOLOGY

## 2022-11-22 PROCEDURE — 52005 CYSTO W/URTRL CATHJ: CPT | Mod: GC | Performed by: UROLOGY

## 2022-11-22 PROCEDURE — 250N000011 HC RX IP 250 OP 636: Performed by: ANESTHESIOLOGY

## 2022-11-22 PROCEDURE — 0TP930Z REMOVAL OF DRAINAGE DEVICE FROM URETER, PERCUTANEOUS APPROACH: ICD-10-PCS | Performed by: UROLOGY

## 2022-11-22 PROCEDURE — 50431 NJX PX NFROSGRM &/URTRGRM: CPT | Mod: LT | Performed by: UROLOGY

## 2022-11-22 PROCEDURE — 999N000083 IR NEPHROLITHOTOMY

## 2022-11-22 PROCEDURE — 0TC43ZZ EXTIRPATION OF MATTER FROM LEFT KIDNEY PELVIS, PERCUTANEOUS APPROACH: ICD-10-PCS | Performed by: UROLOGY

## 2022-11-22 PROCEDURE — 250N000025 HC SEVOFLURANE, PER MIN: Performed by: UROLOGY

## 2022-11-22 PROCEDURE — 360N000084 HC SURGERY LEVEL 4 W/ FLUORO, PER MIN: Performed by: UROLOGY

## 2022-11-22 PROCEDURE — 250N000011 HC RX IP 250 OP 636: Performed by: NURSE ANESTHETIST, CERTIFIED REGISTERED

## 2022-11-22 PROCEDURE — C1769 GUIDE WIRE: HCPCS | Performed by: UROLOGY

## 2022-11-22 PROCEDURE — 250N000013 HC RX MED GY IP 250 OP 250 PS 637: Performed by: STUDENT IN AN ORGANIZED HEALTH CARE EDUCATION/TRAINING PROGRAM

## 2022-11-22 PROCEDURE — 999N000063 XR CHEST PORT 1 VIEW

## 2022-11-22 PROCEDURE — C1729 CATH, DRAINAGE: HCPCS

## 2022-11-22 PROCEDURE — 258N000003 HC RX IP 258 OP 636: Performed by: UROLOGY

## 2022-11-22 PROCEDURE — 87077 CULTURE AEROBIC IDENTIFY: CPT | Performed by: UROLOGY

## 2022-11-22 PROCEDURE — BT1F1ZZ FLUOROSCOPY OF LEFT KIDNEY, URETER AND BLADDER USING LOW OSMOLAR CONTRAST: ICD-10-PCS | Performed by: RADIOLOGY

## 2022-11-22 PROCEDURE — 255N000002 HC RX 255 OP 636: Performed by: UROLOGY

## 2022-11-22 PROCEDURE — 50080 PERQ NL/PL LITHOTRP SMPL<2CM: CPT | Mod: LT | Performed by: UROLOGY

## 2022-11-22 PROCEDURE — 87070 CULTURE OTHR SPECIMN AEROBIC: CPT | Performed by: UROLOGY

## 2022-11-22 PROCEDURE — 36415 COLL VENOUS BLD VENIPUNCTURE: CPT | Performed by: STUDENT IN AN ORGANIZED HEALTH CARE EDUCATION/TRAINING PROGRAM

## 2022-11-22 PROCEDURE — 0T9730Z DRAINAGE OF LEFT URETER WITH DRAINAGE DEVICE, PERCUTANEOUS APPROACH: ICD-10-PCS | Performed by: UROLOGY

## 2022-11-22 PROCEDURE — 250N000009 HC RX 250: Performed by: NURSE ANESTHETIST, CERTIFIED REGISTERED

## 2022-11-22 PROCEDURE — 370N000017 HC ANESTHESIA TECHNICAL FEE, PER MIN: Performed by: UROLOGY

## 2022-11-22 PROCEDURE — 250N000011 HC RX IP 250 OP 636: Performed by: UROLOGY

## 2022-11-22 PROCEDURE — 710N000009 HC RECOVERY PHASE 1, LEVEL 1, PER MIN: Performed by: UROLOGY

## 2022-11-22 PROCEDURE — 999N000181 XR SURGERY CARM FLUORO GREATER THAN 5 MIN W STILLS

## 2022-11-22 PROCEDURE — 74176 CT ABD & PELVIS W/O CONTRAST: CPT

## 2022-11-22 PROCEDURE — 85018 HEMOGLOBIN: CPT | Performed by: STUDENT IN AN ORGANIZED HEALTH CARE EDUCATION/TRAINING PROGRAM

## 2022-11-22 PROCEDURE — C1725 CATH, TRANSLUMIN NON-LASER: HCPCS | Performed by: UROLOGY

## 2022-11-22 PROCEDURE — C1729 CATH, DRAINAGE: HCPCS | Performed by: UROLOGY

## 2022-11-22 PROCEDURE — 999N000141 HC STATISTIC PRE-PROCEDURE NURSING ASSESSMENT: Performed by: UROLOGY

## 2022-11-22 PROCEDURE — 258N000003 HC RX IP 258 OP 636: Performed by: NURSE ANESTHETIST, CERTIFIED REGISTERED

## 2022-11-22 PROCEDURE — 258N000003 HC RX IP 258 OP 636: Performed by: ANESTHESIOLOGY

## 2022-11-22 PROCEDURE — 258N000001 HC RX 258: Performed by: UROLOGY

## 2022-11-22 PROCEDURE — 272N000001 HC OR GENERAL SUPPLY STERILE: Performed by: UROLOGY

## 2022-11-22 PROCEDURE — 82365 CALCULUS SPECTROSCOPY: CPT | Performed by: UROLOGY

## 2022-11-22 PROCEDURE — 74420 UROGRAPHY RTRGR +-KUB: CPT | Mod: 26 | Performed by: UROLOGY

## 2022-11-22 PROCEDURE — 36415 COLL VENOUS BLD VENIPUNCTURE: CPT | Performed by: ANESTHESIOLOGY

## 2022-11-22 PROCEDURE — 82565 ASSAY OF CREATININE: CPT | Performed by: STUDENT IN AN ORGANIZED HEALTH CARE EDUCATION/TRAINING PROGRAM

## 2022-11-22 PROCEDURE — C1887 CATHETER, GUIDING: HCPCS | Performed by: UROLOGY

## 2022-11-22 RX ORDER — DEXAMETHASONE SODIUM PHOSPHATE 4 MG/ML
INJECTION, SOLUTION INTRA-ARTICULAR; INTRALESIONAL; INTRAMUSCULAR; INTRAVENOUS; SOFT TISSUE PRN
Status: DISCONTINUED | OUTPATIENT
Start: 2022-11-22 | End: 2022-11-22

## 2022-11-22 RX ORDER — BUPIVACAINE HYDROCHLORIDE 5 MG/ML
INJECTION, SOLUTION PERINEURAL PRN
Status: DISCONTINUED | OUTPATIENT
Start: 2022-11-22 | End: 2022-11-22 | Stop reason: HOSPADM

## 2022-11-22 RX ORDER — LIDOCAINE 40 MG/G
CREAM TOPICAL
Status: DISCONTINUED | OUTPATIENT
Start: 2022-11-22 | End: 2022-11-28 | Stop reason: HOSPADM

## 2022-11-22 RX ORDER — SODIUM CHLORIDE, SODIUM LACTATE, POTASSIUM CHLORIDE, CALCIUM CHLORIDE 600; 310; 30; 20 MG/100ML; MG/100ML; MG/100ML; MG/100ML
INJECTION, SOLUTION INTRAVENOUS CONTINUOUS
Status: DISCONTINUED | OUTPATIENT
Start: 2022-11-22 | End: 2022-11-22 | Stop reason: HOSPADM

## 2022-11-22 RX ORDER — LIDOCAINE 40 MG/G
CREAM TOPICAL
Status: DISCONTINUED | OUTPATIENT
Start: 2022-11-22 | End: 2022-11-22 | Stop reason: HOSPADM

## 2022-11-22 RX ORDER — MEPERIDINE HYDROCHLORIDE 25 MG/ML
12.5 INJECTION INTRAMUSCULAR; INTRAVENOUS; SUBCUTANEOUS
Status: DISCONTINUED | OUTPATIENT
Start: 2022-11-22 | End: 2022-11-22

## 2022-11-22 RX ORDER — ACETAMINOPHEN 325 MG/1
650 TABLET ORAL EVERY 4 HOURS PRN
Status: DISCONTINUED | OUTPATIENT
Start: 2022-11-25 | End: 2022-11-28 | Stop reason: HOSPADM

## 2022-11-22 RX ORDER — FENTANYL CITRATE 0.05 MG/ML
50 INJECTION, SOLUTION INTRAMUSCULAR; INTRAVENOUS EVERY 5 MIN PRN
Status: DISCONTINUED | OUTPATIENT
Start: 2022-11-22 | End: 2022-11-22

## 2022-11-22 RX ORDER — PROCHLORPERAZINE MALEATE 5 MG
5 TABLET ORAL EVERY 6 HOURS PRN
Status: DISCONTINUED | OUTPATIENT
Start: 2022-11-22 | End: 2022-11-28 | Stop reason: HOSPADM

## 2022-11-22 RX ORDER — AMOXICILLIN 250 MG
1 CAPSULE ORAL 2 TIMES DAILY
Status: DISCONTINUED | OUTPATIENT
Start: 2022-11-22 | End: 2022-11-28 | Stop reason: HOSPADM

## 2022-11-22 RX ORDER — GLYCOPYRROLATE 0.2 MG/ML
INJECTION, SOLUTION INTRAMUSCULAR; INTRAVENOUS PRN
Status: DISCONTINUED | OUTPATIENT
Start: 2022-11-22 | End: 2022-11-22

## 2022-11-22 RX ORDER — OXYCODONE HYDROCHLORIDE 5 MG/1
5 TABLET ORAL EVERY 4 HOURS PRN
Status: DISCONTINUED | OUTPATIENT
Start: 2022-11-22 | End: 2022-11-28

## 2022-11-22 RX ORDER — ESCITALOPRAM OXALATE 5 MG/1
5 TABLET ORAL DAILY
Status: DISCONTINUED | OUTPATIENT
Start: 2022-11-22 | End: 2022-11-28 | Stop reason: HOSPADM

## 2022-11-22 RX ORDER — POLYETHYLENE GLYCOL 3350 17 G/17G
17 POWDER, FOR SOLUTION ORAL DAILY
Status: DISCONTINUED | OUTPATIENT
Start: 2022-11-23 | End: 2022-11-28 | Stop reason: HOSPADM

## 2022-11-22 RX ORDER — ACETAMINOPHEN 325 MG/1
975 TABLET ORAL EVERY 8 HOURS
Status: COMPLETED | OUTPATIENT
Start: 2022-11-22 | End: 2022-11-25

## 2022-11-22 RX ORDER — NALOXONE HYDROCHLORIDE 0.4 MG/ML
0.4 INJECTION, SOLUTION INTRAMUSCULAR; INTRAVENOUS; SUBCUTANEOUS
Status: DISCONTINUED | OUTPATIENT
Start: 2022-11-22 | End: 2022-11-28 | Stop reason: HOSPADM

## 2022-11-22 RX ORDER — ONDANSETRON 4 MG/1
4 TABLET, ORALLY DISINTEGRATING ORAL EVERY 6 HOURS PRN
Status: DISCONTINUED | OUTPATIENT
Start: 2022-11-22 | End: 2022-11-28 | Stop reason: HOSPADM

## 2022-11-22 RX ORDER — CETIRIZINE HYDROCHLORIDE 10 MG/1
10 TABLET ORAL DAILY PRN
COMMUNITY

## 2022-11-22 RX ORDER — PROPOFOL 10 MG/ML
INJECTION, EMULSION INTRAVENOUS CONTINUOUS PRN
Status: DISCONTINUED | OUTPATIENT
Start: 2022-11-22 | End: 2022-11-22

## 2022-11-22 RX ORDER — NALOXONE HYDROCHLORIDE 0.4 MG/ML
0.2 INJECTION, SOLUTION INTRAMUSCULAR; INTRAVENOUS; SUBCUTANEOUS
Status: DISCONTINUED | OUTPATIENT
Start: 2022-11-22 | End: 2022-11-28 | Stop reason: HOSPADM

## 2022-11-22 RX ORDER — SODIUM CHLORIDE, SODIUM LACTATE, POTASSIUM CHLORIDE, CALCIUM CHLORIDE 600; 310; 30; 20 MG/100ML; MG/100ML; MG/100ML; MG/100ML
INJECTION, SOLUTION INTRAVENOUS CONTINUOUS
Status: DISCONTINUED | OUTPATIENT
Start: 2022-11-22 | End: 2022-11-22

## 2022-11-22 RX ORDER — CALCIUM CARBONATE 500 MG/1
500 TABLET, CHEWABLE ORAL 4 TIMES DAILY PRN
Status: DISCONTINUED | OUTPATIENT
Start: 2022-11-22 | End: 2022-11-28 | Stop reason: HOSPADM

## 2022-11-22 RX ORDER — NEOSTIGMINE METHYLSULFATE 1 MG/ML
VIAL (ML) INJECTION PRN
Status: DISCONTINUED | OUTPATIENT
Start: 2022-11-22 | End: 2022-11-22

## 2022-11-22 RX ORDER — FENTANYL CITRATE 50 UG/ML
INJECTION, SOLUTION INTRAMUSCULAR; INTRAVENOUS PRN
Status: DISCONTINUED | OUTPATIENT
Start: 2022-11-22 | End: 2022-11-22

## 2022-11-22 RX ORDER — LEVOTHYROXINE SODIUM 75 UG/1
75 TABLET ORAL DAILY
Status: DISCONTINUED | OUTPATIENT
Start: 2022-11-22 | End: 2022-11-28 | Stop reason: HOSPADM

## 2022-11-22 RX ORDER — CLINDAMYCIN PHOSPHATE 900 MG/50ML
900 INJECTION, SOLUTION INTRAVENOUS
Status: COMPLETED | OUTPATIENT
Start: 2022-11-22 | End: 2022-11-22

## 2022-11-22 RX ORDER — PROPOFOL 10 MG/ML
INJECTION, EMULSION INTRAVENOUS PRN
Status: DISCONTINUED | OUTPATIENT
Start: 2022-11-22 | End: 2022-11-22

## 2022-11-22 RX ORDER — ONDANSETRON 4 MG/1
4 TABLET, ORALLY DISINTEGRATING ORAL EVERY 30 MIN PRN
Status: DISCONTINUED | OUTPATIENT
Start: 2022-11-22 | End: 2022-11-22

## 2022-11-22 RX ORDER — FERROUS GLUCONATE 324(38)MG
324 TABLET ORAL
Status: DISCONTINUED | OUTPATIENT
Start: 2022-11-23 | End: 2022-11-28 | Stop reason: HOSPADM

## 2022-11-22 RX ORDER — HYDROMORPHONE HCL IN WATER/PF 6 MG/30 ML
0.2 PATIENT CONTROLLED ANALGESIA SYRINGE INTRAVENOUS EVERY 5 MIN PRN
Status: DISCONTINUED | OUTPATIENT
Start: 2022-11-22 | End: 2022-11-22

## 2022-11-22 RX ORDER — ONDANSETRON 2 MG/ML
INJECTION INTRAMUSCULAR; INTRAVENOUS PRN
Status: DISCONTINUED | OUTPATIENT
Start: 2022-11-22 | End: 2022-11-22

## 2022-11-22 RX ORDER — LABETALOL HYDROCHLORIDE 5 MG/ML
10 INJECTION, SOLUTION INTRAVENOUS
Status: DISCONTINUED | OUTPATIENT
Start: 2022-11-22 | End: 2022-11-22

## 2022-11-22 RX ORDER — HYDROMORPHONE HCL IN WATER/PF 6 MG/30 ML
0.4 PATIENT CONTROLLED ANALGESIA SYRINGE INTRAVENOUS EVERY 5 MIN PRN
Status: DISCONTINUED | OUTPATIENT
Start: 2022-11-22 | End: 2022-11-22

## 2022-11-22 RX ORDER — LOPERAMIDE HCL 2 MG
2 CAPSULE ORAL 4 TIMES DAILY PRN
Status: DISCONTINUED | OUTPATIENT
Start: 2022-11-22 | End: 2022-11-28 | Stop reason: HOSPADM

## 2022-11-22 RX ORDER — IOPAMIDOL 612 MG/ML
INJECTION, SOLUTION INTRAVASCULAR PRN
Status: DISCONTINUED | OUTPATIENT
Start: 2022-11-22 | End: 2022-11-22 | Stop reason: HOSPADM

## 2022-11-22 RX ORDER — ONDANSETRON 2 MG/ML
4 INJECTION INTRAMUSCULAR; INTRAVENOUS EVERY 6 HOURS PRN
Status: DISCONTINUED | OUTPATIENT
Start: 2022-11-22 | End: 2022-11-28 | Stop reason: HOSPADM

## 2022-11-22 RX ORDER — FENTANYL CITRATE 0.05 MG/ML
25 INJECTION, SOLUTION INTRAMUSCULAR; INTRAVENOUS EVERY 5 MIN PRN
Status: DISCONTINUED | OUTPATIENT
Start: 2022-11-22 | End: 2022-11-22

## 2022-11-22 RX ORDER — ONDANSETRON 2 MG/ML
4 INJECTION INTRAMUSCULAR; INTRAVENOUS EVERY 30 MIN PRN
Status: DISCONTINUED | OUTPATIENT
Start: 2022-11-22 | End: 2022-11-22

## 2022-11-22 RX ORDER — OXYCODONE HYDROCHLORIDE 5 MG/1
10 TABLET ORAL EVERY 4 HOURS PRN
Status: DISCONTINUED | OUTPATIENT
Start: 2022-11-22 | End: 2022-11-28

## 2022-11-22 RX ORDER — LIDOCAINE HYDROCHLORIDE 20 MG/ML
INJECTION, SOLUTION INFILTRATION; PERINEURAL PRN
Status: DISCONTINUED | OUTPATIENT
Start: 2022-11-22 | End: 2022-11-22

## 2022-11-22 RX ORDER — SODIUM CHLORIDE 9 MG/ML
INJECTION, SOLUTION INTRAVENOUS CONTINUOUS
Status: DISCONTINUED | OUTPATIENT
Start: 2022-11-22 | End: 2022-11-25

## 2022-11-22 RX ORDER — FENTANYL CITRATE 0.05 MG/ML
25 INJECTION, SOLUTION INTRAMUSCULAR; INTRAVENOUS
Status: DISCONTINUED | OUTPATIENT
Start: 2022-11-22 | End: 2022-11-22

## 2022-11-22 RX ADMIN — PHENYLEPHRINE HYDROCHLORIDE 100 MCG: 10 INJECTION INTRAVENOUS at 09:22

## 2022-11-22 RX ADMIN — LEVOTHYROXINE SODIUM 75 MCG: 75 TABLET ORAL at 12:42

## 2022-11-22 RX ADMIN — OXYCODONE HYDROCHLORIDE 5 MG: 5 TABLET ORAL at 16:20

## 2022-11-22 RX ADMIN — PROPOFOL 20 MCG/KG/MIN: 10 INJECTION, EMULSION INTRAVENOUS at 08:15

## 2022-11-22 RX ADMIN — FENTANYL CITRATE 25 MCG: 50 INJECTION, SOLUTION INTRAMUSCULAR; INTRAVENOUS at 10:29

## 2022-11-22 RX ADMIN — PHENYLEPHRINE HYDROCHLORIDE 50 MCG: 10 INJECTION INTRAVENOUS at 08:26

## 2022-11-22 RX ADMIN — ONDANSETRON 4 MG: 2 INJECTION INTRAMUSCULAR; INTRAVENOUS at 09:44

## 2022-11-22 RX ADMIN — OXYCODONE HYDROCHLORIDE 5 MG: 5 TABLET ORAL at 22:42

## 2022-11-22 RX ADMIN — SODIUM CHLORIDE, PRESERVATIVE FREE: 5 INJECTION INTRAVENOUS at 12:50

## 2022-11-22 RX ADMIN — ACETAMINOPHEN 975 MG: 325 TABLET, FILM COATED ORAL at 12:42

## 2022-11-22 RX ADMIN — PHENYLEPHRINE HYDROCHLORIDE 50 MCG: 10 INJECTION INTRAVENOUS at 09:29

## 2022-11-22 RX ADMIN — ROCURONIUM BROMIDE 25 MG: 50 INJECTION, SOLUTION INTRAVENOUS at 08:36

## 2022-11-22 RX ADMIN — SODIUM CHLORIDE, POTASSIUM CHLORIDE, SODIUM LACTATE AND CALCIUM CHLORIDE: 600; 310; 30; 20 INJECTION, SOLUTION INTRAVENOUS at 06:30

## 2022-11-22 RX ADMIN — ACETAMINOPHEN 975 MG: 325 TABLET, FILM COATED ORAL at 20:17

## 2022-11-22 RX ADMIN — GLYCOPYRROLATE 0.4 MG: 0.2 INJECTION, SOLUTION INTRAMUSCULAR; INTRAVENOUS at 09:55

## 2022-11-22 RX ADMIN — FENTANYL CITRATE 25 MCG: 50 INJECTION, SOLUTION INTRAMUSCULAR; INTRAVENOUS at 09:12

## 2022-11-22 RX ADMIN — GENTAMICIN SULFATE 260 MG: 40 INJECTION, SOLUTION INTRAMUSCULAR; INTRAVENOUS at 07:24

## 2022-11-22 RX ADMIN — SENNOSIDES AND DOCUSATE SODIUM 1 TABLET: 50; 8.6 TABLET ORAL at 20:17

## 2022-11-22 RX ADMIN — PHENYLEPHRINE HYDROCHLORIDE 50 MCG: 10 INJECTION INTRAVENOUS at 08:44

## 2022-11-22 RX ADMIN — SENNOSIDES AND DOCUSATE SODIUM 1 TABLET: 50; 8.6 TABLET ORAL at 12:42

## 2022-11-22 RX ADMIN — PROPOFOL 100 MG: 10 INJECTION, EMULSION INTRAVENOUS at 08:09

## 2022-11-22 RX ADMIN — FENTANYL CITRATE 25 MCG: 50 INJECTION, SOLUTION INTRAMUSCULAR; INTRAVENOUS at 08:09

## 2022-11-22 RX ADMIN — CLINDAMYCIN PHOSPHATE 900 MG: 900 INJECTION, SOLUTION INTRAVENOUS at 06:40

## 2022-11-22 RX ADMIN — NEOSTIGMINE METHYLSULFATE 3 MG: 1 INJECTION, SOLUTION INTRAVENOUS at 09:55

## 2022-11-22 RX ADMIN — LIDOCAINE HYDROCHLORIDE 60 MG: 20 INJECTION, SOLUTION INFILTRATION; PERINEURAL at 08:09

## 2022-11-22 RX ADMIN — DEXAMETHASONE SODIUM PHOSPHATE 4 MG: 4 INJECTION, SOLUTION INTRA-ARTICULAR; INTRALESIONAL; INTRAMUSCULAR; INTRAVENOUS; SOFT TISSUE at 08:23

## 2022-11-22 RX ADMIN — PHENYLEPHRINE HYDROCHLORIDE 50 MCG: 10 INJECTION INTRAVENOUS at 08:59

## 2022-11-22 RX ADMIN — ROCURONIUM BROMIDE 25 MG: 50 INJECTION, SOLUTION INTRAVENOUS at 08:10

## 2022-11-22 RX ADMIN — ESCITALOPRAM 5 MG: 5 TABLET, FILM COATED ORAL at 12:42

## 2022-11-22 ASSESSMENT — COLUMBIA-SUICIDE SEVERITY RATING SCALE - C-SSRS
6. HAVE YOU EVER DONE ANYTHING, STARTED TO DO ANYTHING, OR PREPARED TO DO ANYTHING TO END YOUR LIFE?: NO
1. IN THE PAST MONTH, HAVE YOU WISHED YOU WERE DEAD OR WISHED YOU COULD GO TO SLEEP AND NOT WAKE UP?: NO
4. HAVE YOU HAD THESE THOUGHTS AND HAD SOME INTENTION OF ACTING ON THEM?: NO
2. HAVE YOU ACTUALLY HAD ANY THOUGHTS OF KILLING YOURSELF IN THE PAST MONTH?: NO
5. HAVE YOU STARTED TO WORK OUT OR WORKED OUT THE DETAILS OF HOW TO KILL YOURSELF? DO YOU INTEND TO CARRY OUT THIS PLAN?: NO
3. HAVE YOU BEEN THINKING ABOUT HOW YOU MIGHT KILL YOURSELF?: NO

## 2022-11-22 ASSESSMENT — ACTIVITIES OF DAILY LIVING (ADL)
ADLS_ACUITY_SCORE: 28
ADLS_ACUITY_SCORE: 35
ADLS_ACUITY_SCORE: 28

## 2022-11-22 NOTE — ANESTHESIA CARE TRANSFER NOTE
Patient: Serge Marin    Procedure: Procedure(s):  Percutaneous Nephrolithotomy       Diagnosis: Left renal stone [N20.0]  Diagnosis Additional Information: No value filed.    Anesthesia Type:   General     Note:    Oropharynx: oropharynx clear of all foreign objects and spontaneously breathing  Level of Consciousness: awake  Oxygen Supplementation: face mask  Level of Supplemental Oxygen (L/min / FiO2): 6  Independent Airway: airway patency satisfactory and stable  Dentition: dentition unchanged  Vital Signs Stable: post-procedure vital signs reviewed and stable  Report to RN Given: handoff report given  Patient transferred to: PACU    Handoff Report: Identifed the Patient, Identified the Reponsible Provider, Reviewed the pertinent medical history, Discussed the surgical course, Reviewed Intra-OP anesthesia mangement and issues during anesthesia, Set expectations for post-procedure period and Allowed opportunity for questions and acknowledgement of understanding      Vitals:  Vitals Value Taken Time   /86 11/22/22 1013   Temp     Pulse 80 11/22/22 1013   Resp 15 11/22/22 1015   SpO2 99 % 11/22/22 1015   Vitals shown include unvalidated device data.    Electronically Signed By: JASSON Hoffmann CRNA  November 22, 2022  10:18 AM

## 2022-11-22 NOTE — ANESTHESIA PROCEDURE NOTES
Airway       Patient location during procedure: OR ([facility] - Operating Room or Procedural Area)       Procedure Start/Stop Times: 11/22/2022 8:13 AM  Staff -        Anesthesiologist:  Kenney Conway MD       CRNA: Rafael Soliman APRN CRNA       Performed By: CRNAIndications and Patient Condition       Indications for airway management: artemio-procedural       Induction type:intravenous       Mask difficulty assessment: 1 - vent by mask    Final Airway Details       Final airway type: endotracheal airway       Successful airway: ETT - single  Endotracheal Airway Details        ETT size (mm): 7.0       Cuffed: yes       Cuff volume (mL): 10       Successful intubation technique: direct laryngoscopy       DL Blade Type: Silva 2       Grade View of Cords: 1       Adjucts: stylet       Position: Right       Measured from: lips       Secured at (cm): 20       Bite block used: None    Post intubation assessment        Number of attempts at approach: 1       Number of other approaches attempted: 0       Secured with: pink tape       Ease of procedure: easy       Dentition: Intact and Unchanged    Medication(s) Administered   Medication Administration Time: 11/22/2022 8:13 AM

## 2022-11-22 NOTE — PROGRESS NOTES
PTA medications updated by Medication Scribe prior to surgery via phone call with patient (last doses completed by Nurse)     Medication history sources: MAR ([facility])  In the past week, patient estimated taking medication this percent of the time: Greater than 90%  Adherence assessment: N/A Not Observed    Significant changes made to the medication list:  Patient reports no longer taking the following meds (med scribe removed from PTA med list): Butenafine Cream      Additional medication history information:   None    Medication reconciliation completed by provider prior to medication history? No    Time spent in this activity: 30 minutes    The information provided in this note is only as accurate as the sources available at the time of update(s)    Prior to Admission medications    Medication Sig Last Dose Taking? Auth Provider Long Term End Date   acetaminophen (TYLENOL) 500 MG tablet Take 500 mg by mouth every 6 hours as needed for mild pain  at prn Yes Unknown, Entered By History     calcium carbonate-vitamin D (OSCAL W/D) 500-200 MG-UNIT tablet Take 1 tablet by mouth daily 11/21/2022 at am Yes Unknown, Entered By History     cetirizine (ZYRTEC) 10 MG tablet Take 10 mg by mouth daily as needed for allergies  at prn Yes Reported, Patient     clotrimazole (LOTRIMIN) 1 % external cream Apply topically 2 times daily as needed  at prn Yes Reported, Patient     escitalopram (LEXAPRO) 10 MG tablet Take 5 mg by mouth daily (0.5 x 10 mg = 5 mg) 11/21/2022 at am Yes Unknown, Entered By History Yes    ferrous gluconate (FERGON) 324 (38 FE) MG tablet Take 1 tablet (324 mg) by mouth daily (with breakfast) 11/21/2022 at am Yes Kassie Wyatt MD     levothyroxine (SYNTHROID, LEVOTHROID) 75 MCG tablet Take 1 tablet (75 mcg) by mouth daily 11/21/2022 at am Yes Kassie Wyatt MD Yes    loperamide (IMODIUM) 2 MG capsule Take 2 mg by mouth 4 times daily as needed for diarrhea  at prn Yes Reported,  Patient No    nitroFURantoin macrocrystal-monohydrate (MACROBID) 100 MG capsule Take 1 capsule (100 mg) by mouth 2 times daily for 6 days 11/21/2022 at am Yes Stevo Talbot MD  11/23/22   psyllium (METAMUCIL/KONSYL) Packet Take 1 packet by mouth daily as needed for constipation  at prn Yes Reported, Patient       Medication history completed by:    Rosendo Blake CPhT  Medication [facility]

## 2022-11-22 NOTE — BRIEF OP NOTE
[facility]    Brief Operative Note    Pre-operative diagnosis: Left renal stone [N20.0]  Post-operative diagnosis Same as pre-operative diagnosis    Procedure: Procedure(s):  Percutaneous Nephrolithotomy  Surgeon: Surgeon(s) and Role:     * Stevo Talbot MD - Primary     * Armando Davis MD - Assisting     * Jorge Calabrese MD - Resident - Assisting  Anesthesia: General   Estimated Blood Loss: 30 mL from 11/22/2022  8:04 AM to 11/22/2022 10:11 AM      Drains:  12 Fr L PNT, 16 Fr [place]    Specimens:   ID Type Source Tests Collected by Time Destination   A : LEFT KIDNEY STONE Calculus/Stone Kidney, Left STONE ANALYSIS Stevo Talbot MD 11/22/2022  9:28 AM    B : LEFT KIDNEY STONE Calculus/Stone Kidney, Left AEROBIC BACTERIAL CULTURE ROUTINE Stevo Talbot MD 11/22/2022  9:37 AM      Findings:   PCNL of ~1.7 cm stone.  Complications: None.  Implants: * No implants in log *

## 2022-11-22 NOTE — PHARMACY-AMINOGLYCOSIDE DOSING SERVICE
Pharmacy Aminoglycoside Initial Note  Date of Service 2022  Patient's  1940  82 year old, female    Weight (Actual):  49.8 kg    Indication: Surgical Prophylaxis    Current estimated CrCl = Estimated Creatinine Clearance: 43.2 mL/min (based on SCr of 0.79 mg/dL).    Creatinine for last 3 days  2022: 10:41 AM Creatinine 0.79 mg/dL     Nephrotoxins and other renal medications (From now, onward)    Start     Dose/Rate Route Frequency Ordered Stop    22 193  gentamicin (GARAMYCIN) 240 mg in sodium chloride 0.9 % 50 mL intermittent infusion         5 mg/kg × 49.8 kg (Adjusted)  over 60 Minutes Intravenous EVERY 36 HOURS 22 1400            Contrast Orders - past 72 hours (72h ago, onward)    Start     Dose/Rate Route Frequency Stop    22 0920  iopamidol (ISOVUE-300) IV solution 61%  Status:  Discontinued           PRN 22 1012          Aminoglycoside Levels - past 2 days  No results found for requested labs within last 48 hours.    Aminoglycosides IV Administrations (past 72 hours)                   gentamicin (GARAMYCIN) 260 mg in sodium chloride 0.9 % 50 mL intermittent infusion (mg) 260 mg New Bag 22 6812                    Plan:  1.  Start Gentamicin 240 mg (5 mg/kg) IV q36h.   2.  Target goals based on extended interval dosing  3.  Goal peak level: 17-24 mg/L  4.  Goal trough level: <0.5mg/L  5.  Pharmacy will continue to follow and check levels as appropriate in 1-3 Days      Deann Zimmerman RPH, PharmD, BCPS

## 2022-11-22 NOTE — ANESTHESIA POSTPROCEDURE EVALUATION
Patient: Serge Marin    Procedure: Procedure(s):  Percutaneous Nephrolithotomy       Anesthesia Type:  General    Note:  Disposition: Admission   Postop Pain Control: Uneventful            Sign Out: Well controlled pain   PONV: No   Neuro/Psych: Uneventful            Sign Out: Acceptable/Baseline neuro status   Airway/Respiratory: Uneventful            Sign Out: Acceptable/Baseline resp. status   CV/Hemodynamics: Uneventful            Sign Out: Acceptable CV status; No obvious hypovolemia; No obvious fluid overload   Other NRE: NONE   DID A NON-ROUTINE EVENT OCCUR? No           Last vitals:  Vitals Value Taken Time   /58 11/22/22 1145   Temp 36.3  C (97.4  F) 11/22/22 1013   Pulse 62 11/22/22 1147   Resp 10 11/22/22 1148   SpO2 93 % 11/22/22 1148   Vitals shown include unvalidated device data.    Electronically Signed By: ABRAN RENO MD  November 22, 2022  2:32 PM

## 2022-11-22 NOTE — OR NURSING
Pt states she is taking a cab back to assisted living at discharge from hospital, scheduled outpatient in bed.

## 2022-11-22 NOTE — OP NOTE
OPERATIVE REPORT    PREOPERATIVE DIAGNOSIS:  Left renal stone    POSTOPERATIVE DIAGNOSIS:  Same    PROCEDURES PERFORMED:   Cystoscopy  Placement of Left ureteral catheter   Left   Retrograde Pyelogram with interpretation of imaging  Percutaneous nephrolithotomy for stone up to 2 cm    Antegrade ureteroscopy with stone basket extraction  Antegrade nephrostogram    STAFF SURGEON: MD Armando Modi MD (Interventional radiology for percutaneous renal access and nephrostomy tube placement)    RESIDENT(S): Jorge Calabrese MD    ANESTHESIA: General    ESTIMATED BLOOD LOSS: 20 ml    DRAINS/TUBES:   Left  12 Fr locking pigtail percutaneous nephrostomy tube  16 [place] brandon catheter    IV FLUIDS: Please see dictated anesthesia record  COMPLICATIONS: None.   SPECIMEN:   Left ureteral stone for analysis and culture    SIGNIFICANT FINDINGS:   -Left renal stone removed via left mid-pole access obtained by interventional radiology - see separate operative report  -Visually stone free at the end of the case.    BRIEF OPERATIVE INDICATIONS: Serge Marin who is a 82 year old female here for percutaneous nephrolithotomy.  she was recently diagnosed with kidney stones and was counseled regarding available treatment options and associated risks.  she elected to proceed with percutaneous nephrolithotomy.  She is aware of the risks of surgery.    OPERATIVE DETAILS: After informed consent was obtained, the patient was taken back to the operating room. Anesthesia was induced and the patient was intubated. IV culture directed antibiotics were given and bilateral sequential compression devices were placed. Prep and drape was done in the usual fashion. A timeout was taken to ensure proper patient, placement and procedure.     Cystoscopy was started.  The urethra was open.  The bladder mucosa showed no evidence of any lesions or trabeculation.  There were no stones.  A guidewire was then placed up the Left  ureter and a  5 [place] open ended catheter was advanced over the wire. A brandon catheter was placed.  The patient was then carefully placed in the prone position onto the operating room table ensuring padding of shoulders and all pressure points.   imaging and retrograde pyelogram revealed  imaging demonstrates radio-opaque renal pelvis stone consistent with pre-operative imaging.   Percutaneous access into the kidney was performed by Dr. Davis and dictated separately. Please see their separate operative report. They balloon dilated a percutaneous tract, placed a sheath with 2 wires in place, including the super stiff wire through the sheath which we used for balloon dilation.  We then inserted the rigid nephroscope into the dilated tract and removed all accessible stones with the shockpulse. This included an approximately 1.7 cm renal pelvis stone which was easily accessible via our access. We then switched to the flexible cystoscope to clear all accessible calyces.   We performed antegrade ureteroscopy to remove stone fragments from the ureter ensured that the ureter was patent with no evidence of residual ureteral calculi or ureteral trauma.  We were able to pass the scope all the way to the ureterovesical junction but could not pass this as it was tight.  Interventional radiology then placed a 12 [place] cope loop nephrostomy tube into the kidney - please see separate operative report.  We performed an antegrade nephrostogram confirming the tube was in the appropriate position and that there was no extravasation  We removed the Amplatz access sheath.  We secured the tubes to the skin with a silk tie, and re-approximated the skin using an interrupted subcuticular 4-0 monocryl suture.  We injected local anesthetic into both the wound. We placed a clean dressing over the wound and held several minutes or pressure ensuring there was no significant bleeding.  At this point, the patient was carefully placed back into the  supine position, awakened from anesthesia and extubated. she was transferred to the PACU in stable condition. she tolerated the procedure well without complication.      PLAN:  Overnight observation based upon post-op labs and pain control as well as concern for possible post-op infection.   Labs CBC/BMP in AM   Pharmacy to dose gent while in house/while stone culture pends  Home Antibiotics Macrobid 100 bid 5 days  Imaging CT tonight  Antegrade nephrostogram, possible L PNT removal if patent by IR tomorrow    Jorge Calabrese MD  Urology PGY-5      I, Stevo Talbot, was present for the entire case on 11/22/2022.

## 2022-11-22 NOTE — INTERVAL H&P NOTE
I reviewed H&P. Discussed left PCNL including benefits and risks of ureteroscopy, including but not limited to, bleeding, infection, need for stent/stent or nephrostomy tube and related symptoms, injury to ureter/kidney/surrounding structures, need for second procedure if residual fragments.

## 2022-11-22 NOTE — ANESTHESIA PREPROCEDURE EVALUATION
Anesthesia Pre-Procedure Evaluation    Patient: Serge Marin   MRN: [id number] : 1940        Procedure : Procedure(s):  Percutaneous Nephrolithotomy with possible holmium laser lithotripsy          Past Medical History:   Diagnosis Date     Hernia, abdominal       Past Surgical History:   Procedure Laterality Date     CHOLECYSTECTOMY       COLONOSCOPY Left 2015    Procedure: COMBINED COLONOSCOPY, SINGLE OR MULTIPLE BIOPSY/POLYPECTOMY BY BIOPSY;  Surgeon: Stone Lauren MD;  Location:  GI     GYN SURGERY       HERNIA REPAIR        Allergies   Allergen Reactions     Penicillins      Bactrim [Sulfamethoxazole W/Trimethoprim] Itching     Patient prescribed Bactrim at eye appointment. Assisted living reports eyes were red and itching.       Social History     Tobacco Use     Smoking status: Never     Smokeless tobacco: Never   Substance Use Topics     Alcohol use: No      Wt Readings from Last 1 Encounters:   22 49.8 kg (109 lb 12.8 oz)        Anesthesia Evaluation            ROS/MED HX  ENT/Pulmonary:    (-) sleep apnea   Neurologic: Comment: SDH (subdural hematoma);  Very [place];      Cardiovascular:       METS/Exercise Tolerance:     Hematologic:       Musculoskeletal:       GI/Hepatic:    (-) GERD   Renal/Genitourinary:     (+) Nephrolithiasis ,     Endo:     (+) thyroid problem, hypothyroidism,     Psychiatric/Substance Use:     (+) psychiatric history bipolar     Infectious Disease:       Malignancy:       Other:            Physical Exam    Airway        Mallampati: III   TM distance: > 3 FB   Neck ROM: full   Mouth opening: > 3 cm    Respiratory Devices and Support         Dental       (+) missing, chipped and loose    B=Bridge, C=Chipped, L=Loose, M=Missing    Cardiovascular   cardiovascular exam normal          Pulmonary   pulmonary exam normal                OUTSIDE LABS:  CBC:   Lab Results   Component Value Date    WBC 3.6 (L) 10/20/2022    WBC 4.6 10/19/2022    HGB 11.5 (L)  10/20/2022    HGB 12.2 10/19/2022    HCT 34.9 (L) 10/20/2022    HCT 36.3 10/19/2022     (L) 10/20/2022     (L) 10/19/2022     BMP:   Lab Results   Component Value Date     10/19/2022     08/07/2022    POTASSIUM 4.1 10/19/2022    POTASSIUM 3.4 08/07/2022    CHLORIDE 109 10/19/2022    CHLORIDE 106 08/07/2022    CO2 24 10/19/2022    CO2 28 08/07/2022    BUN 13 10/19/2022    BUN 5 (L) 08/07/2022    CR 0.80 10/19/2022    CR 0.60 08/07/2022     (H) 10/19/2022    GLC 93 10/19/2022     COAGS:   Lab Results   Component Value Date    PTT 23 10/19/2022    INR 1.04 10/19/2022     POC: No results found for: BGM, HCG, HCGS  HEPATIC:   Lab Results   Component Value Date    ALBUMIN 3.6 10/19/2022    PROTTOTAL 7.2 10/19/2022    ALT 12 10/19/2022    AST 17 10/19/2022    ALKPHOS 79 10/19/2022    BILITOTAL 0.5 10/19/2022     OTHER:   Lab Results   Component Value Date    LACT 1.1 08/04/2022    A1C 5.0 04/19/2013    BERTA 9.7 10/19/2022    PHOS 2.9 10/20/2022    MAG 1.9 10/20/2022    TSH 2.83 10/19/2022    T4 0.72 (L) 02/01/2016    T3 147 09/16/2005    CRP 8.0 08/07/2022       Anesthesia Plan    ASA Status:  3   NPO Status:  NPO Appropriate    Anesthesia Type: General.     - Airway: ETT   Induction: Intravenous.   Maintenance: Balanced.        Consents    Anesthesia Plan(s) and associated risks, benefits, and realistic alternatives discussed. Questions answered and patient/representative(s) expressed understanding.    - Discussed:     - Discussed with:  Patient         Postoperative Care    Pain management: IV analgesics.   PONV prophylaxis: Ondansetron (or other 5HT-3), Dexamethasone or Solumedrol, Background Propofol Infusion     Comments:                ABRAN RENO MD

## 2022-11-22 NOTE — CONSULTS
Patient is on IR schedule Wednesday 11/23/22 for a L nephrostogram, possible nephrostomy tube removal if ureter patent.     -Labs WNL for procedure.    -No NPO required.     [place] [phone] for procedural related questions.     Thanks, Diana [facility] Interventional Radiology CNP ([phone]) (phone [phone])

## 2022-11-23 ENCOUNTER — APPOINTMENT (OUTPATIENT)
Dept: PHYSICAL THERAPY | Facility: CLINIC | Age: 82
DRG: 660 | End: 2022-11-23
Attending: UROLOGY
Payer: COMMERCIAL

## 2022-11-23 ENCOUNTER — APPOINTMENT (OUTPATIENT)
Dept: INTERVENTIONAL RADIOLOGY/VASCULAR | Facility: CLINIC | Age: 82
DRG: 660 | End: 2022-11-23
Attending: UROLOGY
Payer: COMMERCIAL

## 2022-11-23 LAB
ANION GAP SERPL CALCULATED.3IONS-SCNC: 6 MMOL/L (ref 3–14)
BUN SERPL-MCNC: 16 MG/DL (ref 7–30)
CALCIUM SERPL-MCNC: 9 MG/DL (ref 8.5–10.1)
CHLORIDE BLD-SCNC: 107 MMOL/L (ref 94–109)
CO2 SERPL-SCNC: 25 MMOL/L (ref 20–32)
CREAT SERPL-MCNC: 1.14 MG/DL (ref 0.52–1.04)
ERYTHROCYTE [DISTWIDTH] IN BLOOD BY AUTOMATED COUNT: 13.1 % (ref 10–15)
GFR SERPL CREATININE-BSD FRML MDRD: 48 ML/MIN/1.73M2
GLUCOSE BLD-MCNC: 99 MG/DL (ref 70–99)
HCT VFR BLD AUTO: 38.7 % (ref 35–47)
HGB BLD-MCNC: 12.4 G/DL (ref 11.7–15.7)
MCH RBC QN AUTO: 29.4 PG (ref 26.5–33)
MCHC RBC AUTO-ENTMCNC: 32 G/DL (ref 31.5–36.5)
MCV RBC AUTO: 92 FL (ref 78–100)
PLATELET # BLD AUTO: 102 10E3/UL (ref 150–450)
POTASSIUM BLD-SCNC: 3.9 MMOL/L (ref 3.4–5.3)
RBC # BLD AUTO: 4.22 10E6/UL (ref 3.8–5.2)
SODIUM SERPL-SCNC: 138 MMOL/L (ref 133–144)
WBC # BLD AUTO: 7.7 10E3/UL (ref 4–11)

## 2022-11-23 PROCEDURE — 97161 PT EVAL LOW COMPLEX 20 MIN: CPT | Mod: GP | Performed by: PHYSICAL THERAPIST

## 2022-11-23 PROCEDURE — 36415 COLL VENOUS BLD VENIPUNCTURE: CPT | Performed by: STUDENT IN AN ORGANIZED HEALTH CARE EDUCATION/TRAINING PROGRAM

## 2022-11-23 PROCEDURE — 258N000003 HC RX IP 258 OP 636: Performed by: STUDENT IN AN ORGANIZED HEALTH CARE EDUCATION/TRAINING PROGRAM

## 2022-11-23 PROCEDURE — 85027 COMPLETE CBC AUTOMATED: CPT | Performed by: STUDENT IN AN ORGANIZED HEALTH CARE EDUCATION/TRAINING PROGRAM

## 2022-11-23 PROCEDURE — 97530 THERAPEUTIC ACTIVITIES: CPT | Mod: GP | Performed by: PHYSICAL THERAPIST

## 2022-11-23 PROCEDURE — 80048 BASIC METABOLIC PNL TOTAL CA: CPT | Performed by: STUDENT IN AN ORGANIZED HEALTH CARE EDUCATION/TRAINING PROGRAM

## 2022-11-23 PROCEDURE — 97116 GAIT TRAINING THERAPY: CPT | Mod: GP | Performed by: PHYSICAL THERAPIST

## 2022-11-23 PROCEDURE — 50431 NJX PX NFROSGRM &/URTRGRM: CPT

## 2022-11-23 PROCEDURE — 250N000013 HC RX MED GY IP 250 OP 250 PS 637: Performed by: STUDENT IN AN ORGANIZED HEALTH CARE EDUCATION/TRAINING PROGRAM

## 2022-11-23 PROCEDURE — BT1F1ZZ FLUOROSCOPY OF LEFT KIDNEY, URETER AND BLADDER USING LOW OSMOLAR CONTRAST: ICD-10-PCS | Performed by: RADIOLOGY

## 2022-11-23 RX ORDER — ACETAMINOPHEN 325 MG/1
325-650 TABLET ORAL EVERY 6 HOURS PRN
Qty: 90 TABLET | Refills: 0 | Status: ON HOLD | OUTPATIENT
Start: 2022-11-23 | End: 2022-12-13

## 2022-11-23 RX ORDER — NITROFURANTOIN 25; 75 MG/1; MG/1
100 CAPSULE ORAL 2 TIMES DAILY
Status: DISCONTINUED | OUTPATIENT
Start: 2022-11-23 | End: 2022-11-28 | Stop reason: HOSPADM

## 2022-11-23 RX ORDER — NITROFURANTOIN 25; 75 MG/1; MG/1
100 CAPSULE ORAL 2 TIMES DAILY
Qty: 12 CAPSULE | Refills: 0 | Status: SHIPPED | OUTPATIENT
Start: 2022-11-23 | End: 2022-11-28

## 2022-11-23 RX ORDER — AMOXICILLIN 250 MG
1 CAPSULE ORAL 2 TIMES DAILY
Qty: 30 TABLET | Refills: 0 | Status: SHIPPED | OUTPATIENT
Start: 2022-11-23 | End: 2022-11-28

## 2022-11-23 RX ADMIN — SENNOSIDES AND DOCUSATE SODIUM 1 TABLET: 50; 8.6 TABLET ORAL at 08:17

## 2022-11-23 RX ADMIN — ACETAMINOPHEN 975 MG: 325 TABLET, FILM COATED ORAL at 11:53

## 2022-11-23 RX ADMIN — POLYETHYLENE GLYCOL 3350 17 G: 17 POWDER, FOR SOLUTION ORAL at 08:17

## 2022-11-23 RX ADMIN — ACETAMINOPHEN 975 MG: 325 TABLET, FILM COATED ORAL at 20:23

## 2022-11-23 RX ADMIN — OXYCODONE HYDROCHLORIDE 5 MG: 5 TABLET ORAL at 08:29

## 2022-11-23 RX ADMIN — SENNOSIDES AND DOCUSATE SODIUM 1 TABLET: 50; 8.6 TABLET ORAL at 20:23

## 2022-11-23 RX ADMIN — OXYCODONE HYDROCHLORIDE 5 MG: 5 TABLET ORAL at 20:24

## 2022-11-23 RX ADMIN — ESCITALOPRAM 5 MG: 5 TABLET, FILM COATED ORAL at 08:17

## 2022-11-23 RX ADMIN — LEVOTHYROXINE SODIUM 75 MCG: 75 TABLET ORAL at 08:17

## 2022-11-23 RX ADMIN — NITROFURANTOIN MONOHYDRATE/MACROCRYSTALLINE 100 MG: 25; 75 CAPSULE ORAL at 18:25

## 2022-11-23 RX ADMIN — SODIUM CHLORIDE, PRESERVATIVE FREE: 5 INJECTION INTRAVENOUS at 02:32

## 2022-11-23 RX ADMIN — ACETAMINOPHEN 975 MG: 325 TABLET, FILM COATED ORAL at 04:12

## 2022-11-23 RX ADMIN — FERROUS GLUCONATE 324 MG: 324 TABLET ORAL at 08:17

## 2022-11-23 ASSESSMENT — ACTIVITIES OF DAILY LIVING (ADL)
ADLS_ACUITY_SCORE: 36
ADLS_ACUITY_SCORE: 30
ADLS_ACUITY_SCORE: 30
ADLS_ACUITY_SCORE: 36
ADLS_ACUITY_SCORE: 30
ADLS_ACUITY_SCORE: 30
ADLS_ACUITY_SCORE: 28
ADLS_ACUITY_SCORE: 30
ADLS_ACUITY_SCORE: 36
ADLS_ACUITY_SCORE: 30
DEPENDENT_IADLS:: CLEANING;LAUNDRY;SHOPPING;MEDICATION MANAGEMENT;TRANSPORTATION

## 2022-11-23 NOTE — PROGRESS NOTES
Notified provider about indwelling brandon catheter discussed removal or continued need.    Did provider choose to remove indwelling brandon catheter? no    Provider's brandon indication for keeping indwelling brandon catheter: wound healing    Is there an order for indwelling brandon catheter? yes    *If there is a plan to keep brandon catheter in place at discharge daily notification with provider is not necessary.

## 2022-11-23 NOTE — CONSULTS
Care Management Initial Consult    General Information  Assessment completed with: Care Team Member, Patient, VM-chart review,    Type of CM/SW Visit: Initial Assessment    Primary Care Provider verified and updated as needed: Yes   Readmission within the last 30 days:        Reason for Consult: discharge planning  Advance Care Planning: Advance Care Planning Reviewed: present on chart          Communication Assessment  Patient's communication style: spoken language (English or Bilingual)    Hearing Difficulty or Deaf: yes   Wear Glasses or Blind: no    Cognitive  Cognitive/Neuro/Behavioral: WDL                      Living Environment:   People in home: facility resident     Current living Arrangements: assisted living  Name of Facility: [facility] in [place], [place]   Able to return to prior arrangements: other (see comments) (Per Nursing at [facility], needs to be independent with transfers/ ambulation. They [facility] does not provide transfer assistance or help with mobility.)       Family/Social Support:  Care provided by: self, other (see comments) ([facility] staff administer medicines, laundry, housekeeping, shower assistance, meals)  Provides care for: no one  Marital Status:   Children, Facility resident(s)/Staff, Other (specify) (ILS worker)          Description of Support System:           Current Resources:   Patient receiving home care services:  Not currently     Community Resources:  [facility] , ILS worker through [employer], Elderly Waiver  Equipment currently used at home: walker, rolling  Supplies currently used at home:      Employment/Financial:  Employment Status:          Financial Concerns:             Lifestyle & Psychosocial Needs:  Social Determinants of Health     Tobacco Use: Low Risk      Smoking Tobacco Use: Never     Smokeless Tobacco Use: Never     Passive Exposure: Not on file   Alcohol Use: Not on file   Financial Resource Strain: Not on file   Food Insecurity: Not on file    Transportation Needs: Not on file   Physical Activity: Not on file   Stress: Not on file   Social Connections: Not on file   Intimate Partner Violence: Not on file   Depression: Not at risk     PHQ-2 Score: 1   Housing Stability: Not on file       Functional Status:  Prior to admission patient needed assistance:   Dependent ADLs::  (Per facility, independent with ambulation, grooming, toileting,dressing, uses a walker.)  Dependent IADLs:: Cleaning, Laundry, Shopping, Medication Management, Transportation (ILS worker Thanh Willams, 3 hours a week through [employer] at phone [phone].)       Mental Health Status:          Chemical Dependency Status:                Values/Beliefs:  Spiritual, Cultural Beliefs, Moravian Practices, Values that affect care:                 Additional Information:  Initial consult done with patient, caregiver GOLD Minor at assisted living facility and son Delfina via phone. Preferred discharge location is [facility] in [place], however needs to be independent with ambulation with her walker. Son confirms this, notes a couple falls recently out of bed.   Discussed with Mily today at facility. No returns on Thursday this week and would need to be back on Friday by 11 AM. Orders would need to be faxed to [phone] and the RN number to call is [phone].  Mily lists services as medication administration, housekeeping, laundry, assist showers , they do not do transfers and do meals which patient participates sometimes.     Pharmacy for medications is [facility] in [place].    MD paged for PT/OT assessments as nursing reporting weakness of patient and requiring assistance with ambulation.       Lillie Potts RN   [facility]   Phone [phone]

## 2022-11-23 NOTE — PLAN OF CARE
Goal Outcome Evaluation:    Arrived on floor this PM. Alert and oriented x4. Pain control with Oxy 5mg and scheduled Tylenol. Neph tube on left lower abd, with serosanguineous output. Sheffield in place, patent. On regular diet. If discharging tmrw, Her living facility want her to arrived there by 10am.

## 2022-11-23 NOTE — TELEPHONE ENCOUNTER
I was out of the office from 11/4/22 to 11/21/22 and did not receive message until I returned.     Her head CT from 11/3/22 was discussed with her in office and we had decided she should follow-up in 2 weeks with repeat head CT, this was not completed.  I called and spoke to the patient's caregiver who states she is doing fine, I will follow-up with patient next week and attempt to get a repeat head CT at that time.     JASSON Bullock, CNP  Department of Neurosurgery  Pager: [phone]

## 2022-11-23 NOTE — PROGRESS NOTES
VSS. A/O. [place]. Up-2/belt. L back nep tube dressing drainage, dressing changed. Oxy given once. Sheffield adequate UOP.

## 2022-11-23 NOTE — PLAN OF CARE
Goal Outcome Evaluation:      Plan of Care Reviewed With: patient    Overall Patient Progress: improvingOverall Patient Progress: improving     Summary: Percutaneous nephrolithotomy  Date & Time: 11/22/22-11/23/22 from 1022-2387  Orientation: A&Ox4  POD# 1  Surgeon: Stevo  Activity Level: Ax2 gaitbelt/walker  Fall Risk: Yes  Behavior & Aggression: Green  Pain Management: Scheduled Tylenol & PRN Oxycodone  ABNL VS/O2: VVS on RA  Diet: Tolerating regular diet  Bowel/Bladder: Sheffield in place with adequate output. No BM noted during shift.  Drains/Devices: Neph tube on left lower abd with minimal output  Tests/Procedures: CT scan of abdomen/pelvis completed, refer to Results in MAR.  Other Important Info: If discharging today 11/23/22, her living facility requests for her to arrive by 10am. Discharge pending improvement.

## 2022-11-23 NOTE — PROGRESS NOTES
"Urology  Progress Note    - NAEON  - Had L side pain, this is improving  - Tolerating bites of regular diet but throat is scratchy  - Passing flatus  - PNT not draining well, leaking around PNT    Exam  /58 (BP Location: Left arm)   Pulse 62   Temp 99  F (37.2  C) (Oral)   Resp 16   Ht 1.626 m (5' 4\")   Wt 49.8 kg (109 lb 12.8 oz)   LMP  (LMP Unknown)   SpO2 98%   BMI 18.85 kg/m    No acute distress  Non-labored breathing on RA  L flank dressing intact  Abdomen soft, minimally tender over left side  Lower extremities non-edematous bilaterally  ZAYNAB serosanguinous  Sheffield pink in appearance    /NR (PNT 10/0)    Labs  WBC   Date Value Ref Range Status   02/16/2017 2.1 (L) 4.0 - 11.0 10e9/L Final   02/15/2017 2.1 (L) 4.0 - 11.0 10e9/L Final   02/14/2017 3.5 (L) 4.0 - 11.0 10e9/L Final     WBC Count   Date Value Ref Range Status   11/23/2022 7.7 4.0 - 11.0 10e3/uL Final   11/22/2022 3.4 (L) 4.0 - 11.0 10e3/uL Final   10/20/2022 3.6 (L) 4.0 - 11.0 10e3/uL Final      Hemoglobin   Date Value Ref Range Status   11/23/2022 12.4 11.7 - 15.7 g/dL Final   11/22/2022 12.7 11.7 - 15.7 g/dL Final   11/22/2022 13.4 11.7 - 15.7 g/dL Final   02/16/2017 14.2 11.7 - 15.7 g/dL Final   02/15/2017 13.3 11.7 - 15.7 g/dL Final   02/14/2017 13.9 11.7 - 15.7 g/dL Final      Platelet Count   Date Value Ref Range Status   11/23/2022 102 (L) 150 - 450 10e3/uL Final   02/16/2017 111 (L) 150 - 450 10e9/L Final      Creatinine   Date Value Ref Range Status   11/23/2022 1.14 (H) 0.52 - 1.04 mg/dL Final   11/22/2022 0.79 0.52 - 1.04 mg/dL Final   10/19/2022 0.80 0.52 - 1.04 mg/dL Final   02/16/2017 0.79 0.52 - 1.04 mg/dL Final   02/15/2017 0.74 0.52 - 1.04 mg/dL Final   02/14/2017 0.84 0.52 - 1.04 mg/dL Final      Potassium   Date Value Ref Range Status   11/23/2022 3.9 3.4 - 5.3 mmol/L Final   02/16/2017 3.5 3.4 - 5.3 mmol/L Final        Culture Results: Stone culture 11/22 in progress    Assessment/Plan  82 year old y/o female " POD#1 s/p L PCNL for L renal stone.     Neuro: Tylenol, oxy for pain control  CV: JAYLEN  Pulm: incentive spirometry while awake  FEN/GI: Regular diet, MIVF @ 75/hr, senokot/miralax. ZACARIAS, presumed 2/2 surgery.  Endo: JAYLEN  : Sheffield out this AM  Heme: Hgb stable  ID: No concerns for infection,will continue to monitor. Gent periop ppx, pharmacy to dose.  Activity: Up ad manuel  PPx: SCDs  Dispo: Pending, likely here one more night, needs antegrade nephrostogram/PNT removal, facility needs transfer prior to 10 am    Discussed with staff Dr. Dahiana Calabrese MD, PGY-5  Urology Resident

## 2022-11-23 NOTE — PROGRESS NOTES
11/23/22 1200   Appointment Info   Signing Clinician's Name / Credentials (PT) Lino Bello DPT   Quick Adds   Quick Adds Certification       Present no   Living Environment   People in Home facility resident   Current Living Arrangements assisted living   Home Accessibility no concerns   Transportation Anticipated   (Pt did not state)   Living Environment Comments Pt lives in [facility]. No concerns regarding stairs. Pt reports having assist as needed upon discharge.   Self-Care   Usual Activity Tolerance moderate   Current Activity Tolerance fair   Regular Exercise No   Equipment Currently Used at Home walker, rolling   Fall history within last six months yes   Number of times patient has fallen within last six months 2   Activity/Exercise/Self-Care Comment Pt reports requiring assist with ADLs at baseline. Pt ambulated w/ 4WW at baseline. Pt reports being able to ambulate ~50' w/ 4WW before needing a rest break.   General Information   Onset of Illness/Injury or Date of Surgery 11/23/22   Referring Physician Jorge Calabrese MD   Patient/Family Therapy Goals Statement (PT) Pt did not state   Pertinent History of Current Problem (include personal factors and/or comorbidities that impact the POC) Per Chart: s/p Percutaneous Nephrolithotomy POD#1   Existing Precautions/Restrictions fall   Weight-Bearing Status - LLE full weight-bearing   Weight-Bearing Status - RLE full weight-bearing   Cognition   Orientation Status (Cognition) oriented x 3   Pain Assessment   Patient Currently in Pain Yes, see Vital Sign flowsheet  (6/10 in back)   Integumentary/Edema   Integumentary/Edema Comments Scattered bruising throughout body   Posture    Posture Forward head position;Protracted shoulders   Range of Motion (ROM)   Range of Motion ROM is WFL   Strength (Manual Muscle Testing)   Strength (Manual Muscle Testing) Deficits observed during functional mobility   Strength Comments Bilateral Hip Flexion: 3/5    Bed Mobility   Comment, (Bed Mobility) Supine>sit w/ min A x 1   Transfers   Comment, (Transfers) Sit>stand w/ 4WW and min A x 1   Gait/Stairs (Locomotion)   [place] (Gait) contact guard   Assistive Device (Gait) walker, 4-wheeled   Distance in Feet (Required for LE Total Joints) 40'  (10' eval)   Distance in Feet (Gait) 40'   Comment, (Gait/Stairs) Pt ambulated ~10' w/ 4WW and CGA for eval.   Balance   Balance Comments Pt able to sit at EOB unsupported without LOB. Pt ambulates using a 4WW for added stability and support.   Sensory Examination   Sensory Perception patient reports no sensory changes   Clinical Impression   Criteria for Skilled Therapeutic Intervention Yes, treatment indicated   PT Diagnosis (PT) Impaired gait   Influenced by the following impairments Decreased activity tolerance; decreased balance; decreased strength; increased pain   Functional limitations due to impairments Impaired functional mobility   Clinical Presentation (PT Evaluation Complexity) Stable/Uncomplicated   Clinical Presentation Rationale Clinical Judgement   Clinical Decision Making (Complexity) low complexity   Planned Therapy Interventions (PT) balance training;bed mobility training;gait training;patient/family education;strengthening;transfer training   Risk & Benefits of therapy have been explained evaluation/treatment results reviewed;risks/benefits reviewed;care plan/treatment goals reviewed;current/potential barriers reviewed;participants voiced agreement with care plan;participants included;patient   PT Total Evaluation Time   PT Eval, Low Complexity Minutes (82151) 10   Plan of Care Review   Plan of Care Reviewed With patient   Therapy Certification   Start of care date 11/23/22   Certification date from 11/23/22   Certification date to 11/28/22   Medical Diagnosis s/p Percutaneous Nephrolithotomy   Physical Therapy Goals   PT Frequency 3x/week   PT Predicted Duration/Target Date for Goal Attainment  11/28/22   PT Goals Bed Mobility;Transfers;Gait   PT: Bed Mobility Supervision/stand-by assist;Supine to/from sit   PT: Transfers Supervision/stand-by assist;Sit to/from stand;Assistive device   PT: Gait Supervision/stand-by assist;Assistive device;100 feet   Interventions   Interventions Quick Adds Gait Training;Therapeutic Activity   Therapeutic Activity   Therapeutic Activities: dynamic activities to improve functional performance Minutes (16559) 17   Symptoms Noted During/After Treatment Fatigue;Increased pain   Treatment Detail/Skilled Intervention Greeted pt supine in bed, agreed to PT. VSS on RA throughout session. Pt very [place] and refusing pocket talker. Pt performs all movement slowly. Pt performed supine>sit w/ min A x 1, needing assist to safely lift BLEs off of bed and trunk control. Additional assist neeed to scoot to EOB. Pt able to sit at EOB unsupported without LOB. Pt performed sit>stand x 2 w/ 4WW and min A x 1, needing assist to stand fully upright. After ambulation, pt returned to bed and performed stand>sit w/ 4WW and CGA, verbal cues to descend in a slow, controlled motion. Pt performed sit>supine w/ min A x 1, needing assist to safely lift BLEs back into bed and reposition. Pt ended session supine in bed, with all needs met and call light within reach.   Gait Training   Gait Training Minutes (53616) 11   Symptoms Noted During/After Treatment (Gait Training) fatigue;increased pain   Treatment Detail/Skilled Intervention Pt ambulated w/ 4WW and CGA. Pt ambulated with decreased gait speed, downward gaze, shuffled gait, and mildly unsteady. Verbal cues for upright gaze and posture, to increase step length, and 4WW proximity. Pt was mildly unsteady but no overt LOB noted. Pt unable to ambulate further citing fatigue.   PT Discharge Planning   PT Plan Bed mobility; repeat sit>stands; progress gait w/ 4WW   PT Discharge Recommendation (DC Rec) home with home care physical therapy;home with  assist;Transitional Care Facility   PT Rationale for DC Rec Pt is below baseline. Pt currently requiring assist with all functional mobility. Pt presents with deficits in activity tolerance, balance, and strength. Due to these deficits, pt is a high falls risk. Per chart, [facility] can not provide A x 1 for transfers and ambulation. If [facility] can not provide 24/7 A x 1 with ambulation and transfers, pt would require TCU stay to address deficits and improve IND with safety and functional mobility. If [facility] can provide 24/7 A x 1, then pt would be able to return to [facility] with HHPT.   PT Brief overview of current status Supine>sit w/ min A x 1; sit>stand w/ 4WW and min A x 1; gait w/ 4WW and CGA   Total Session Time   Timed Code Treatment Minutes 28   Total Session Time (sum of timed and untimed services) 38   [facility]  OUTPATIENT PHYSICAL THERAPY EVALUATION  PLAN OF TREATMENT FOR OUTPATIENT REHABILITATION  (COMPLETE FOR INITIAL CLAIMS ONLY)  Patient's Last Name, First Name, M.I.  YOB: 1940  Serge Marin                        Provider's Name  [facility] Medical Record No.  [id number]                             Onset Date:  11/23/22   Start of Care Date:  11/23/22   Type:     _X_PT   ___OT   ___SLP Medical Diagnosis:  s/p Percutaneous Nephrolithotomy              PT Diagnosis:  Impaired gait Visits from SOC:  1     See note for plan of treatment, functional goals and certification details    I CERTIFY THE NEED FOR THESE SERVICES FURNISHED UNDER        THIS PLAN OF TREATMENT AND WHILE UNDER MY CARE     (Physician co-signature of this document indicates review and certification of the therapy plan).

## 2022-11-24 ENCOUNTER — APPOINTMENT (OUTPATIENT)
Dept: OCCUPATIONAL THERAPY | Facility: CLINIC | Age: 82
DRG: 660 | End: 2022-11-24
Attending: UROLOGY
Payer: COMMERCIAL

## 2022-11-24 LAB
ANION GAP SERPL CALCULATED.3IONS-SCNC: 4 MMOL/L (ref 3–14)
BUN SERPL-MCNC: 13 MG/DL (ref 7–30)
CALCIUM SERPL-MCNC: 8.7 MG/DL (ref 8.5–10.1)
CHLORIDE BLD-SCNC: 112 MMOL/L (ref 94–109)
CO2 SERPL-SCNC: 22 MMOL/L (ref 20–32)
CREAT SERPL-MCNC: 0.95 MG/DL (ref 0.52–1.04)
ERYTHROCYTE [DISTWIDTH] IN BLOOD BY AUTOMATED COUNT: 13.1 % (ref 10–15)
GFR SERPL CREATININE-BSD FRML MDRD: 60 ML/MIN/1.73M2
GLUCOSE BLD-MCNC: 102 MG/DL (ref 70–99)
GLUCOSE BLDC GLUCOMTR-MCNC: 92 MG/DL (ref 70–99)
GLUCOSE BLDC GLUCOMTR-MCNC: 96 MG/DL (ref 70–99)
HCT VFR BLD AUTO: 35.1 % (ref 35–47)
HGB BLD-MCNC: 11 G/DL (ref 11.7–15.7)
MCH RBC QN AUTO: 29.5 PG (ref 26.5–33)
MCHC RBC AUTO-ENTMCNC: 31.3 G/DL (ref 31.5–36.5)
MCV RBC AUTO: 94 FL (ref 78–100)
PLATELET # BLD AUTO: 99 10E3/UL (ref 150–450)
POTASSIUM BLD-SCNC: 3.6 MMOL/L (ref 3.4–5.3)
RBC # BLD AUTO: 3.73 10E6/UL (ref 3.8–5.2)
SODIUM SERPL-SCNC: 138 MMOL/L (ref 133–144)
WBC # BLD AUTO: 6.9 10E3/UL (ref 4–11)

## 2022-11-24 PROCEDURE — 85027 COMPLETE CBC AUTOMATED: CPT | Performed by: UROLOGY

## 2022-11-24 PROCEDURE — 80048 BASIC METABOLIC PNL TOTAL CA: CPT | Performed by: UROLOGY

## 2022-11-24 PROCEDURE — 97535 SELF CARE MNGMENT TRAINING: CPT | Mod: GO

## 2022-11-24 PROCEDURE — 36415 COLL VENOUS BLD VENIPUNCTURE: CPT | Performed by: UROLOGY

## 2022-11-24 PROCEDURE — 97165 OT EVAL LOW COMPLEX 30 MIN: CPT | Mod: GO

## 2022-11-24 PROCEDURE — 250N000011 HC RX IP 250 OP 636: Performed by: STUDENT IN AN ORGANIZED HEALTH CARE EDUCATION/TRAINING PROGRAM

## 2022-11-24 PROCEDURE — 250N000013 HC RX MED GY IP 250 OP 250 PS 637: Performed by: STUDENT IN AN ORGANIZED HEALTH CARE EDUCATION/TRAINING PROGRAM

## 2022-11-24 PROCEDURE — 82962 GLUCOSE BLOOD TEST: CPT

## 2022-11-24 PROCEDURE — 258N000003 HC RX IP 258 OP 636: Performed by: STUDENT IN AN ORGANIZED HEALTH CARE EDUCATION/TRAINING PROGRAM

## 2022-11-24 RX ADMIN — ACETAMINOPHEN 975 MG: 325 TABLET, FILM COATED ORAL at 12:53

## 2022-11-24 RX ADMIN — SODIUM CHLORIDE, PRESERVATIVE FREE: 5 INJECTION INTRAVENOUS at 14:59

## 2022-11-24 RX ADMIN — ESCITALOPRAM 5 MG: 5 TABLET, FILM COATED ORAL at 12:55

## 2022-11-24 RX ADMIN — NITROFURANTOIN MONOHYDRATE/MACROCRYSTALLINE 100 MG: 25; 75 CAPSULE ORAL at 22:02

## 2022-11-24 RX ADMIN — FERROUS GLUCONATE 324 MG: 324 TABLET ORAL at 12:55

## 2022-11-24 RX ADMIN — ONDANSETRON 4 MG: 2 INJECTION INTRAMUSCULAR; INTRAVENOUS at 09:09

## 2022-11-24 RX ADMIN — ACETAMINOPHEN 975 MG: 325 TABLET, FILM COATED ORAL at 22:02

## 2022-11-24 RX ADMIN — SODIUM CHLORIDE, PRESERVATIVE FREE: 5 INJECTION INTRAVENOUS at 03:26

## 2022-11-24 RX ADMIN — ACETAMINOPHEN 975 MG: 325 TABLET, FILM COATED ORAL at 03:26

## 2022-11-24 RX ADMIN — SENNOSIDES AND DOCUSATE SODIUM 1 TABLET: 50; 8.6 TABLET ORAL at 12:56

## 2022-11-24 RX ADMIN — SENNOSIDES AND DOCUSATE SODIUM 1 TABLET: 50; 8.6 TABLET ORAL at 22:02

## 2022-11-24 RX ADMIN — LEVOTHYROXINE SODIUM 75 MCG: 75 TABLET ORAL at 12:56

## 2022-11-24 ASSESSMENT — ACTIVITIES OF DAILY LIVING (ADL)
ADLS_ACUITY_SCORE: 36
PREVIOUS_RESPONSIBILITIES: DRIVING
ADLS_ACUITY_SCORE: 36

## 2022-11-24 NOTE — PROGRESS NOTES
Pt is  A&O x4, hard hearing. CMS intact. Bowel sounds normoactive, passing flatus, tolerating regular diet. VSS on RA.L neph tube in place, changed the dressing changed using sterile. Voiding good on bed side commud. Up with assist 1 wGB . C/o moderate incision pain, decreased with prn oxy x1 and scheduled tylnol. Pt scoring green on the Aggression Stop Light Tool. Will continue to monitor.

## 2022-11-24 NOTE — PLAN OF CARE
"Goal Outcome Evaluation:           Overall Patient Progress: no changeOverall Patient Progress: no change    A&OX4, very [place], uses pocket talker occasionally.  VSS. Pain controlled with oxycodone.  Left NT intact with minimal bloody urine.  Sheffield out at 1300 and patient voided in toilet, small amount (unmeasured). Repo hat in toilet to measure next time up to void.  Up ambulating with SBA of 1, gait belt and walker. \"I feel so weak.\" Continue to encourage increased po intake.            "

## 2022-11-24 NOTE — PROGRESS NOTES
11/24/22 0700   Appointment Info   Signing Clinician's Name / Credentials (OT) Jovita Jacobs, OTR/L   Rehab Comments (OT) [place] (pocket talker)   Living Environment   People in Home facility resident   Current Living Arrangements assisted living   Home Accessibility no concerns   Transportation Anticipated   (Pt reported she drives)   Living Environment Comments Pt from [facility],  SET UP: Walk in shower, shower chair, grab bars; Standard toilet   Self-Care   Usual Activity Tolerance moderate   Current Activity Tolerance fair   Regular Exercise No   Equipment Currently Used at Home walker, rolling   Fall history within last six months yes   Number of times patient has fallen within last six months 1   Activity/Exercise/Self-Care Comment Assist with nearly all I/ADLs baseline.   Instrumental Activities of Daily Living (IADL)   Previous Responsibilities driving   IADL Comments Assist with all IADLs except pt reported that she drives.   General Information   Onset of Illness/Injury or Date of Surgery 11/22/22   Referring Physician Jorge Calabrese MD   Patient/Family Therapy Goal Statement (OT) Not stated   Additional Occupational Profile Info/Pertinent History of Current Problem s/p Percutaneous Nephrolithotomy POD#2   Existing Precautions/Restrictions fall   Limitations/Impairments hearing   Cognitive Status Examination   Orientation Status person;place   Affect/Mental Status (Cognitive) flat/blunted affect   Follows Commands follows one-step commands   Cognitive Status Comments Increased processing time   Visual Perception   Impact of Vision Impairment on Function (Vision) WFL   Sensory   Sensory Quick Adds unable/difficult to assess   Pain Assessment   Patient Currently in Pain   (Yes, pain in surgical region)   Posture   Posture kyphosis;protracted shoulders;forward head position   Range of Motion Comprehensive   Comment, General Range of Motion BUEs WFL   Strength Comprehensive (MMT)   Comment, General Manual  Muscle Testing (MMT) Assessment BUEs WFL, generalized weakness   Coordination   Upper Extremity Coordination Left UE impaired;Right UE impaired   Bed Mobility   Bed Mobility supine-sit   Comment (Bed Mobility) Mod A, HOB raised   Transfers   Transfers sit-stand transfer;toilet transfer   Sit-Stand Transfer   Sit/Stand Transfer Comments Min A   Toilet Transfer   Toilet Transfer Comments Min A, Stand Pivot to BSC   Balance   Balance Comments Unsteady   Activities of Daily Living   BADL Assessment/Intervention lower body dressing;toileting;upper body dressing   Upper Body Dressing Assessment/Training   Comment, (Upper Body Dressing) Mod A   Lower Body Dressing Assessment/Training   Comment, (Lower Body Dressing) Dep A   Toileting   Comment, (Toileting) SBA; Dep A for artemio-cares   Clinical Impression   Criteria for Skilled Therapeutic Interventions Met (OT) Yes, treatment indicated   OT Diagnosis Decreased ind with I/ADLs   OT Problem List-Impairments impacting ADL problems related to;activity tolerance impaired;balance;cognition;coordination;mobility   Assessment of Occupational Performance 1-3 Performance Deficits   Identified Performance Deficits toileting, LB dressing, UB dressing   Planned Therapy Interventions (OT) ADL retraining;IADL retraining;transfer training   Clinical Decision Making Complexity (OT) low complexity   Risk & Benefits of therapy have been explained evaluation/treatment results reviewed;care plan/treatment goals reviewed;risks/benefits reviewed;current/potential barriers reviewed;participants voiced agreement with care plan;participants included;patient   OT Total Evaluation Time   OT Eval, Low Complexity Minutes (99410) 12   OT Goals   Therapy Frequency (OT) 5 times/wk   OT Predicted Duration/Target Date for Goal Attainment 12/14/22   OT Goals Toilet Transfer/Toileting;Transfers;Upper Body Dressing   OT: Upper Body Dressing Supervision/stand-by assist   OT: Transfer Supervision/stand-by  assist;with assistive device  (Walk-in shower transfer with 4WW)   OT: Toilet Transfer/Toileting Supervision/stand-by assist;toilet transfer  (With 4WW)   Self-Care/Home Management   Self-Care/Home Mgmt/ADL, Compensatory, Meal Prep Minutes (60084) 23   Symptoms Noted During/After Treatment (Meal Preparation/Planning Training) fatigue   Treatment Detail/Skilled Intervention Pt supine in bed, agreeable to OT session. Pt Ox2 to self, place. Pt demo supine>sitting EOB with Mod A, HOB raised, VCs and hand-held assist for task sequence and safety. Pt demo sit<>Stand to/from EOB with Min A, no AD. Pt demo stand pivot to/from BSC with Min A, no AD. Pt demo toileting with SBA - pt Dep A for artemio-cares. Pt demo UB dressing with Mod A to doff/don gown sitting EOB, VCs for task sequence. Pt demo sitting EOB>supine with Mod A for BLEs, and trunk support. Pt Dep A to scoot up in bed. Pt supine in bed with all needs met, alarm set, RN updated.   OT Discharge Planning   OT Plan progress functional mobility, complete toilet transfer as able, UB dressing   OT Discharge Recommendation (DC Rec) home with assist;home with home care occupational therapy   OT Rationale for DC Rec Pt functioning slightly below baseline level, pt demo impairments in functional mobility, balance, cognition, and ADLs, impacting overall independence. Pt currently requries A x 1 for functional mobility/functional shower and toilet transfers. Rec home with A x 1 for all funcitonal mobility and functional transfers (shower, toilet), [facility] OT for strenghtening and fall reduction strategies. If unable to attain this level of support, Rec TCU for further strengthening prior to return to [facility].   OT Brief overview of current status Min A stand pivot to/from commode, Mod A for bed mobility, SBA for toileting - Dep A for artemio-cares   Total Session Time   Timed Code Treatment Minutes 23   Total Session Time (sum of timed and untimed services) 35    [facility]  Rehabilitation Services  OUTPATIENT OCCUPATIONAL THERAPY  EVALUATION  PLAN OF TREATMENT FOR OUTPATIENT REHABILITATION  (COMPLETE FOR INITIAL CLAIMS ONLY)  Patient's Last Name, First Name, M.I.  YOB: 1940  Serge Marin                          Provider's Name  [facility] Medical Record No.  [id number]                             Onset Date:  11/22/22   Start of Care Date:      Type:     ___PT   _X_OT   ___SLP Medical Diagnosis:                       OT Diagnosis:  Decreased ind with I/ADLs Visits from SOC:  1     See note for plan of treatment, functional goals and certification details    I CERTIFY THE NEED FOR THESE SERVICES FURNISHED UNDER        THIS PLAN OF TREATMENT AND WHILE UNDER MY CARE     (Physician co-signature of this document indicates review and certification of the therapy plan).

## 2022-11-24 NOTE — PROGRESS NOTES
Urology progress Notes:  I evaluated Serge today with  RN.  There was some concerns regarding leakage around the nephrostomy site.  Nephrostomy also appeared to have somewhat of a bloody drainage however not very significant.  Currently she feels fine, however she had some nausea and vomiting earlier and that she attributes to the nitrofurantoin and is not keen on taking that today.  After Zofran, her nausea and vomiting has improved.  On exam,  She appears comfortable,  Vitals are within normal limits abdominal is nondistended  The nephrostomy flushes well without any issue  There is minimal soakage around the nephrostomy tube.    Plan:  1.  Continue flushing nephrostomy tubes twice a day  2.  Plan to review with IR tomorrow for nephrostomy  removal    Rosales Lanier MD

## 2022-11-25 ENCOUNTER — APPOINTMENT (OUTPATIENT)
Dept: OCCUPATIONAL THERAPY | Facility: CLINIC | Age: 82
DRG: 660 | End: 2022-11-25
Attending: UROLOGY
Payer: COMMERCIAL

## 2022-11-25 ENCOUNTER — APPOINTMENT (OUTPATIENT)
Dept: INTERVENTIONAL RADIOLOGY/VASCULAR | Facility: CLINIC | Age: 82
DRG: 660 | End: 2022-11-25
Attending: UROLOGY
Payer: COMMERCIAL

## 2022-11-25 ENCOUNTER — APPOINTMENT (OUTPATIENT)
Dept: PHYSICAL THERAPY | Facility: CLINIC | Age: 82
DRG: 660 | End: 2022-11-25
Attending: UROLOGY
Payer: COMMERCIAL

## 2022-11-25 PROBLEM — N20.0 LEFT RENAL STONE: Status: ACTIVE | Noted: 2022-11-25

## 2022-11-25 PROBLEM — N39.0 URINARY TRACT INFECTION: Status: ACTIVE | Noted: 2022-11-25

## 2022-11-25 LAB — BACTERIA SPEC CULT: ABNORMAL

## 2022-11-25 PROCEDURE — 97530 THERAPEUTIC ACTIVITIES: CPT | Mod: GO

## 2022-11-25 PROCEDURE — 250N000013 HC RX MED GY IP 250 OP 250 PS 637: Performed by: STUDENT IN AN ORGANIZED HEALTH CARE EDUCATION/TRAINING PROGRAM

## 2022-11-25 PROCEDURE — BT1F1ZZ FLUOROSCOPY OF LEFT KIDNEY, URETER AND BLADDER USING LOW OSMOLAR CONTRAST: ICD-10-PCS | Performed by: RADIOLOGY

## 2022-11-25 PROCEDURE — 258N000003 HC RX IP 258 OP 636: Performed by: STUDENT IN AN ORGANIZED HEALTH CARE EDUCATION/TRAINING PROGRAM

## 2022-11-25 PROCEDURE — 120N000001 HC R&B MED SURG/OB

## 2022-11-25 PROCEDURE — 50431 NJX PX NFROSGRM &/URTRGRM: CPT

## 2022-11-25 PROCEDURE — 97116 GAIT TRAINING THERAPY: CPT | Mod: GP

## 2022-11-25 PROCEDURE — 97535 SELF CARE MNGMENT TRAINING: CPT | Mod: GO

## 2022-11-25 PROCEDURE — 97530 THERAPEUTIC ACTIVITIES: CPT | Mod: GP

## 2022-11-25 RX ADMIN — SODIUM CHLORIDE, PRESERVATIVE FREE: 5 INJECTION INTRAVENOUS at 03:50

## 2022-11-25 RX ADMIN — ACETAMINOPHEN 650 MG: 325 TABLET, FILM COATED ORAL at 07:36

## 2022-11-25 RX ADMIN — LEVOTHYROXINE SODIUM 75 MCG: 75 TABLET ORAL at 06:41

## 2022-11-25 RX ADMIN — POLYETHYLENE GLYCOL 3350 17 G: 17 POWDER, FOR SOLUTION ORAL at 07:36

## 2022-11-25 RX ADMIN — FERROUS GLUCONATE 324 MG: 324 TABLET ORAL at 07:37

## 2022-11-25 RX ADMIN — ESCITALOPRAM 5 MG: 5 TABLET, FILM COATED ORAL at 07:37

## 2022-11-25 RX ADMIN — SENNOSIDES AND DOCUSATE SODIUM 1 TABLET: 50; 8.6 TABLET ORAL at 07:36

## 2022-11-25 RX ADMIN — ACETAMINOPHEN 975 MG: 325 TABLET, FILM COATED ORAL at 03:51

## 2022-11-25 ASSESSMENT — ACTIVITIES OF DAILY LIVING (ADL)
ADLS_ACUITY_SCORE: 36

## 2022-11-25 NOTE — IR NOTE
Interventional Radiology Intra-procedural Nursing Note    Patient Name: Serge Marin  Medical Record Number: [id number]  Today's Date: November 25, 2022    Procedure: Left nephrostomy tube check s/p PCNL   Start time: 1037  End time: 1040  Report provided to: Dustin MALCOLM  Patient depart time and location: 104 to 2201    Note: Patient entered Interventional Radiology Suite number 2 via cart. Patient awake, alert and orientated. Assisted onto procedural table in prone position. Prepped and draped.  Dr. Davis in room. Time out and procedure started.  Tube to remain in place at this time.  Procedure well tolerated by patient without complications. Procedure end with debrief by Dr. Davis.  ABD, gauze and island dressing applied to right interventional procedure access site, dressing is c/d/i.

## 2022-11-25 NOTE — PROGRESS NOTES
Pt is  A&O x4, hard hearing. CMS intact. Bowel sounds normoactive, passing flatus, tolerating regular diet. VSS on RA. L neph tube in place, changed the dressing changed using sterile. Voiding good on bed side commud. Up with assist 1 wGB . C/o moderate incision pain, decreased with  scheduled tylnol. Pt scoring green on the Aggression Stop Light Tool. Will continue to monitor.

## 2022-11-25 NOTE — PROGRESS NOTES
Care Management Follow Up    Length of Stay (days): 0    Expected Discharge Date: 11/28/2022     Concerns to be Addressed:       Patient plan of care discussed at interdisciplinary rounds: Yes    Anticipated Discharge Disposition: Other (Comments) (requested therapies, as was independent with ambulation prior to admission with her walker, would need to be independent to return to facility per [facility] nursing.)     Anticipated Discharge Services:    Anticipated Discharge DME:      Patient/family educated on Medicare website which has current facility and service quality ratings:    Education Provided on the Discharge Plan:    Patient/Family in Agreement with the Plan:      Referrals Placed by CM/ADELE:    Private pay costs discussed: Not applicable    Additional Information:  SW sent referrals for pt to TCUs close to her [facility] in [place].     Pt declined by [facility] due to no bed availability.       EUSEBIO Reyna

## 2022-11-25 NOTE — UTILIZATION REVIEW
"  Admission Status; Secondary Review Determination         Under the authority of the Utilization Management Committee, the utilization review process indicated a secondary review on the above patient.  The review outcome is based on review of the medical records, discussions with staff, and applying clinical experience noted on the date of the review.        (X)      Inpatient Status Appropriate - This patient's medical care is consistent with medical management for inpatient care and reasonable inpatient medical practice.      (X) Observation Status Appropriate - This patient does not meet hospital inpatient criteria and is placed in observation status. If this patient's primary payer is Medicare and was admitted as an inpatient, Condition Code 44 should be used and patient status changed to \"observation\".   () Admission Status NOT Appropriate - This patient's medical care is not consistent with medical management for Inpatient or Observation Status.          RATIONALE FOR DETERMINATION     Patient is an 82-year-old female with a history of kidney stones who presented for percutaneous nephrolithotomy on 11/22/2022.  Patient had postoperative discomfort and her PNT was not draining well and leaking around it.  She had an increase in her creatinine and required IV fluids.  She also developed nausea with vomiting.  She required flushing of her nephrostomy tube a few times a day and is scheduled for potential nephrostomy removal today.  She also received therapy assessments.  She required hospitalization for several days post procedure and is appropriate for inpatient status.  I spoke to Dr. Calabrese.    The severity of illness, intensity of service provided, expected LOS and risk for adverse outcome make the care complex, high risk and appropriate for hospital admission.        The information on this document is developed by the utilization review team in order for the business office to ensure compliance.  This " only denotes the appropriateness of proper admission status and does not reflect the quality of care rendered.         The definitions of Inpatient Status and Observation Status used in making the determination above are those provided in the CMS Coverage Manual, Chapter 1 and Chapter 6, section 70.4.      Sincerely,     Dat Awan MD  Physician Advisor  Utilization Review/ Case Management  [facility].

## 2022-11-25 NOTE — CONSULTS
Patient is on IR schedule today Friday 11/25/22 for a Left nephrostomy tube check s/p PCNL, possible removal.     -Labs WNL for procedure.    -No NPO required.     Please contact the IR department at [phone] for procedural related questions.     Thanks, Diana [facility] Interventional Radiology CNP ([phone]) (phone [phone])

## 2022-11-25 NOTE — PROGRESS NOTES
"Urology  Progress Note    - VIRION  - L side pain continues to improve  - Passing flatus    Exam  /53 (BP Location: Left arm)   Pulse 67   Temp 98.4  F (36.9  C) (Axillary)   Resp 16   Ht 1.626 m (5' 4\")   Wt 49.8 kg (109 lb 12.8 oz)   LMP  (LMP Unknown)   SpO2 97%   BMI 18.85 kg/m    No acute distress  Non-labored breathing on RA  L flank dressing intact  Abdomen soft, minimally tender over left side  Lower extremities non-edematous bilaterally  ZAYNAB serosanguinous  [place] pink in appearance    /NR (PNT 30/0)    Labs  WBC   Date Value Ref Range Status   02/16/2017 2.1 (L) 4.0 - 11.0 10e9/L Final   02/15/2017 2.1 (L) 4.0 - 11.0 10e9/L Final   02/14/2017 3.5 (L) 4.0 - 11.0 10e9/L Final     WBC Count   Date Value Ref Range Status   11/24/2022 6.9 4.0 - 11.0 10e3/uL Final   11/23/2022 7.7 4.0 - 11.0 10e3/uL Final   11/22/2022 3.4 (L) 4.0 - 11.0 10e3/uL Final      Hemoglobin   Date Value Ref Range Status   11/24/2022 11.0 (L) 11.7 - 15.7 g/dL Final   11/23/2022 12.4 11.7 - 15.7 g/dL Final   11/22/2022 12.7 11.7 - 15.7 g/dL Final   02/16/2017 14.2 11.7 - 15.7 g/dL Final   02/15/2017 13.3 11.7 - 15.7 g/dL Final   02/14/2017 13.9 11.7 - 15.7 g/dL Final      Platelet Count   Date Value Ref Range Status   11/24/2022 99 (L) 150 - 450 10e3/uL Final   02/16/2017 111 (L) 150 - 450 10e9/L Final      Creatinine   Date Value Ref Range Status   11/24/2022 0.95 0.52 - 1.04 mg/dL Final   11/23/2022 1.14 (H) 0.52 - 1.04 mg/dL Final   11/22/2022 0.79 0.52 - 1.04 mg/dL Final   02/16/2017 0.79 0.52 - 1.04 mg/dL Final   02/15/2017 0.74 0.52 - 1.04 mg/dL Final   02/14/2017 0.84 0.52 - 1.04 mg/dL Final      Potassium   Date Value Ref Range Status   11/24/2022 3.6 3.4 - 5.3 mmol/L Final   02/16/2017 3.5 3.4 - 5.3 mmol/L Final        Culture Results: Stone culture 11/22 growing staph epi    Assessment/Plan  82 year old y/o female POD#3 s/p L PCNL for L renal stone.     Neuro: Tylenol, oxy for pain control  CV: JAYLEN  Pulm: " incentive spirometry while awake  FEN/GI: Regular diet, MIVF discontinued, senokot/miralax. ZACARIAS, presumed 2/2 surgery, improving.  Endo: JAYLEN  : [place] out 11/23, PNT in place. Will ask IR for repeat antegrade nephrostogram today.  Heme: Hgb stable  ID: No concerns for infection,will continue to monitor. Macrobid.  Activity: Up ad manuel  PPx: SCDs  Dispo: Pending tube plan, PT/OT recs for placement    Discussed with staff Dr. Dahiana Calabrese MD, PGY-5  Urology Resident

## 2022-11-26 PROCEDURE — 250N000013 HC RX MED GY IP 250 OP 250 PS 637: Performed by: STUDENT IN AN ORGANIZED HEALTH CARE EDUCATION/TRAINING PROGRAM

## 2022-11-26 PROCEDURE — 250N000011 HC RX IP 250 OP 636: Performed by: STUDENT IN AN ORGANIZED HEALTH CARE EDUCATION/TRAINING PROGRAM

## 2022-11-26 PROCEDURE — 120N000001 HC R&B MED SURG/OB

## 2022-11-26 RX ADMIN — FERROUS GLUCONATE 324 MG: 324 TABLET ORAL at 08:10

## 2022-11-26 RX ADMIN — POLYETHYLENE GLYCOL 3350 17 G: 17 POWDER, FOR SOLUTION ORAL at 08:11

## 2022-11-26 RX ADMIN — SENNOSIDES AND DOCUSATE SODIUM 1 TABLET: 50; 8.6 TABLET ORAL at 20:38

## 2022-11-26 RX ADMIN — ACETAMINOPHEN 325 MG: 325 TABLET, FILM COATED ORAL at 02:43

## 2022-11-26 RX ADMIN — ESCITALOPRAM 5 MG: 5 TABLET, FILM COATED ORAL at 08:10

## 2022-11-26 RX ADMIN — SENNOSIDES AND DOCUSATE SODIUM 1 TABLET: 50; 8.6 TABLET ORAL at 08:11

## 2022-11-26 RX ADMIN — LEVOTHYROXINE SODIUM 75 MCG: 75 TABLET ORAL at 05:56

## 2022-11-26 RX ADMIN — ONDANSETRON 4 MG: 4 TABLET, ORALLY DISINTEGRATING ORAL at 04:41

## 2022-11-26 ASSESSMENT — ACTIVITIES OF DAILY LIVING (ADL)
ADLS_ACUITY_SCORE: 44
ADLS_ACUITY_SCORE: 44
ADLS_ACUITY_SCORE: 40
ADLS_ACUITY_SCORE: 44
ADLS_ACUITY_SCORE: 44
ADLS_ACUITY_SCORE: 40

## 2022-11-26 NOTE — PROGRESS NOTES
Urology progress Notes:    Serge did not have a good night because of lack of sleep.  However pain is much reduced now, she still has the nephrostomy tube in situ draining good amount of urine which is strawberry pink in color.  Leakage still persistent, flushing well.  Awaiting placement at nursing home.    Recommendations:  1.  Nephrostomy to stay  2.  Plan to repeat nephrostogram with IR on Wednesday as an outpatient.  3.  Okay to discharge from urology perspective.      Rosales Lanier MD

## 2022-11-26 NOTE — PROGRESS NOTES
Reason for admission: Percutaneous Nephrolithotomy  Dx: Left renal stone   POD#: 4  Mental Status: x4, heard of hearing  Activity: Ax1 GB  Diet: Regular  Pain: Controlled  Urination: Uses the bathroom. Incontinent at times  Tele/Restraints/Iso: NA  LDA: PIV SL  Expected D/C Date: Pending TCU placement  Other Info: Pt still have some leakage from the neph tube site strawberry pink in color. Dressing changed twice today. Per Urology today: 1. Nephrostomy to stay  2.  Plan to repeat nephrostogram with IR on Wednesday as an outpatient. 3. Okay to discharge from urology perspective.

## 2022-11-26 NOTE — PROGRESS NOTES
Care Management Follow Up    Length of Stay (days): 1    Expected Discharge Date: 11/28/2022     Concerns to be Addressed:       Patient plan of care discussed at interdisciplinary rounds: Yes    Anticipated Discharge Disposition: Other (Comments) (requested therapies, as was independent with ambulation prior to admission with her walker, would need to be independent to return to facility per [facility] nursing.)     Anticipated Discharge Services:    Anticipated Discharge DME:      Patient/family educated on Medicare website which has current facility and service quality ratings:    Education Provided on the Discharge Plan:    Patient/Family in Agreement with the Plan:      Referrals Placed by CM/ADELE:    Private pay costs discussed: Not applicable    Additional Information:  SW informed patient is accepted at [facility] on Monday 11/28. ADELE asked to schedule transport around [id number]. SW spoke with patient and son and informed of discharge plans and both are in agreement. ADELE will schedule transport.      EUSEBIO Lemons    [facility]

## 2022-11-26 NOTE — PROGRESS NOTES
VSS. A/O. Up-1/walker. [place]. L nep tube still leaks a lot, dressing changed few times. Went to IR this am. Denies pain. Tolerating diet. Voiding fine.

## 2022-11-27 LAB
APPEARANCE STONE: NORMAL
COMPN STONE: NORMAL
HGB BLD-MCNC: 11.9 G/DL (ref 11.7–15.7)
SPECIMEN WT: 307 MG

## 2022-11-27 PROCEDURE — 85018 HEMOGLOBIN: CPT | Performed by: STUDENT IN AN ORGANIZED HEALTH CARE EDUCATION/TRAINING PROGRAM

## 2022-11-27 PROCEDURE — 120N000001 HC R&B MED SURG/OB

## 2022-11-27 PROCEDURE — 36415 COLL VENOUS BLD VENIPUNCTURE: CPT | Performed by: STUDENT IN AN ORGANIZED HEALTH CARE EDUCATION/TRAINING PROGRAM

## 2022-11-27 PROCEDURE — 250N000013 HC RX MED GY IP 250 OP 250 PS 637: Performed by: STUDENT IN AN ORGANIZED HEALTH CARE EDUCATION/TRAINING PROGRAM

## 2022-11-27 RX ADMIN — LEVOTHYROXINE SODIUM 75 MCG: 75 TABLET ORAL at 06:29

## 2022-11-27 RX ADMIN — FERROUS GLUCONATE 324 MG: 324 TABLET ORAL at 08:21

## 2022-11-27 RX ADMIN — ESCITALOPRAM 5 MG: 5 TABLET, FILM COATED ORAL at 08:21

## 2022-11-27 RX ADMIN — SENNOSIDES AND DOCUSATE SODIUM 1 TABLET: 50; 8.6 TABLET ORAL at 08:21

## 2022-11-27 RX ADMIN — POLYETHYLENE GLYCOL 3350 17 G: 17 POWDER, FOR SOLUTION ORAL at 08:25

## 2022-11-27 ASSESSMENT — ACTIVITIES OF DAILY LIVING (ADL)
ADLS_ACUITY_SCORE: 40
ADLS_ACUITY_SCORE: 44
ADLS_ACUITY_SCORE: 40
ADLS_ACUITY_SCORE: 44
ADLS_ACUITY_SCORE: 40
ADLS_ACUITY_SCORE: 40

## 2022-11-27 NOTE — PROGRESS NOTES
Urology progress Notes:    Serge is doing well overall  Does have considerable leakage from the nephrostomy site which appears more bloodstained than before.  On evaluation some tenderness at the.  Nephrostomy area, vitals are stable, last hemoglobin in 11/24 is 11.  No other concerns and will be considered for possible discharged.    Recommendations:  1.  Flush nephrostomy tube twice daily with 10 cc normal saline.  2.  Dressing change as needed  3.  Hemoglobin to be repeated, call us as necessary, will consider for imaging if significant drop in hemoglobin.    Rosales Lanier MD

## 2022-11-27 NOTE — PLAN OF CARE
Goal Outcome Evaluation:      Plan of Care Reviewed With: patient    POD4. A&Ox4, [place], VSS on RA, tolerating regular diet, up A1/GB/walker. PIV SL. Denies pain, no nausea. L neph tube leaking, dressing changed 3x, flushed x1. Pt incontinent during night. Plan for discharge to TCU Monday. Nephrostomy to stay and repeat nephrostogram with IR on Wednesday as outpatient.

## 2022-11-27 NOTE — PROGRESS NOTES
Care Management Follow Up    Length of Stay (days): 2    Expected Discharge Date: 11/28/2022     Concerns to be Addressed:       Patient plan of care discussed at interdisciplinary rounds: Yes    Anticipated Discharge Disposition: Other (Comments) (requested therapies, as was independent with ambulation prior to admission with her walker, would need to be independent to return to facility per [facility] nursing.)     Anticipated Discharge Services:    Anticipated Discharge DME:      Patient/family educated on Medicare website which has current facility and service quality ratings:    Education Provided on the Discharge Plan:    Patient/Family in Agreement with the Plan:      Referrals Placed by CM/ADELE:    Private pay costs discussed: Not applicable    Additional Information:  ADELE called [facility] and scheduled wheelchair transport for 1400 11/28      EUSEBIO Lemons    [facility]

## 2022-11-28 ENCOUNTER — APPOINTMENT (OUTPATIENT)
Dept: INTERVENTIONAL RADIOLOGY/VASCULAR | Facility: CLINIC | Age: 82
DRG: 660 | End: 2022-11-28
Attending: UROLOGY
Payer: COMMERCIAL

## 2022-11-28 VITALS
OXYGEN SATURATION: 96 % | HEIGHT: 64 IN | TEMPERATURE: 98.4 F | HEART RATE: 64 BPM | SYSTOLIC BLOOD PRESSURE: 117 MMHG | BODY MASS INDEX: 18.74 KG/M2 | RESPIRATION RATE: 18 BRPM | DIASTOLIC BLOOD PRESSURE: 57 MMHG | WEIGHT: 109.8 LBS

## 2022-11-28 PROCEDURE — 250N000013 HC RX MED GY IP 250 OP 250 PS 637: Performed by: STUDENT IN AN ORGANIZED HEALTH CARE EDUCATION/TRAINING PROGRAM

## 2022-11-28 PROCEDURE — 50431 NJX PX NFROSGRM &/URTRGRM: CPT

## 2022-11-28 RX ORDER — NITROFURANTOIN 25; 75 MG/1; MG/1
100 CAPSULE ORAL 2 TIMES DAILY
Qty: 10 CAPSULE | Refills: 0 | DISCHARGE
Start: 2022-11-28 | End: 2022-12-03

## 2022-11-28 RX ORDER — AMOXICILLIN 250 MG
1 CAPSULE ORAL 2 TIMES DAILY
Qty: 30 TABLET | Refills: 0 | Status: ON HOLD | DISCHARGE
Start: 2022-11-28 | End: 2022-12-15

## 2022-11-28 RX ORDER — NALOXONE HYDROCHLORIDE 0.4 MG/ML
0.2 INJECTION, SOLUTION INTRAMUSCULAR; INTRAVENOUS; SUBCUTANEOUS
Status: CANCELLED | OUTPATIENT
Start: 2022-11-28

## 2022-11-28 RX ORDER — FENTANYL CITRATE 50 UG/ML
25-50 INJECTION, SOLUTION INTRAMUSCULAR; INTRAVENOUS EVERY 5 MIN PRN
Status: CANCELLED | OUTPATIENT
Start: 2022-11-28

## 2022-11-28 RX ORDER — NALOXONE HYDROCHLORIDE 0.4 MG/ML
0.4 INJECTION, SOLUTION INTRAMUSCULAR; INTRAVENOUS; SUBCUTANEOUS
Status: CANCELLED | OUTPATIENT
Start: 2022-11-28

## 2022-11-28 RX ADMIN — POLYETHYLENE GLYCOL 3350 17 G: 17 POWDER, FOR SOLUTION ORAL at 08:53

## 2022-11-28 RX ADMIN — LEVOTHYROXINE SODIUM 75 MCG: 75 TABLET ORAL at 06:41

## 2022-11-28 RX ADMIN — FERROUS GLUCONATE 324 MG: 324 TABLET ORAL at 08:53

## 2022-11-28 RX ADMIN — ESCITALOPRAM 5 MG: 5 TABLET, FILM COATED ORAL at 08:53

## 2022-11-28 RX ADMIN — SENNOSIDES AND DOCUSATE SODIUM 1 TABLET: 50; 8.6 TABLET ORAL at 08:52

## 2022-11-28 ASSESSMENT — ACTIVITIES OF DAILY LIVING (ADL)
ADLS_ACUITY_SCORE: 40
ADLS_ACUITY_SCORE: 44

## 2022-11-28 NOTE — PLAN OF CARE
Goal Outcome Evaluation:      Plan of Care Reviewed With: patient    POD5. A&Ox4, VSS on RA, tolerating regular diet, A1/GB/W. PIV SL. Denies pain, no nausea. L neph tube site still leaking but seems to be improving, dressing changed x1, flushed w/ 10mL per MD orders, strawberry pink color. Pt incontinent overnight. Repeat nephrostogram w/ IR on Wednesday as outpatient. Plan for discharge today to TCU

## 2022-11-28 NOTE — PROGRESS NOTES
"Urology  Progress Note    - NAEON  - No pain other than occasional gas pain  - PNT still leaking but draining    Exam  /54 (BP Location: Left arm)   Pulse 62   Temp 98.3  F (36.8  C) (Axillary)   Resp 14   Ht 1.626 m (5' 4\")   Wt 49.8 kg (109 lb 12.8 oz)   LMP  (LMP Unknown)   SpO2 94%   BMI 18.85 kg/m    No acute distress  Non-labored breathing on RA  Abdomen soft, NT, ND  PNT watermelon, dressing intact  Lower extremities non-edematous bilaterally    PNT 30/NR    Labs  WBC   Date Value Ref Range Status   02/16/2017 2.1 (L) 4.0 - 11.0 10e9/L Final   02/15/2017 2.1 (L) 4.0 - 11.0 10e9/L Final   02/14/2017 3.5 (L) 4.0 - 11.0 10e9/L Final     WBC Count   Date Value Ref Range Status   11/24/2022 6.9 4.0 - 11.0 10e3/uL Final   11/23/2022 7.7 4.0 - 11.0 10e3/uL Final   11/22/2022 3.4 (L) 4.0 - 11.0 10e3/uL Final      Hemoglobin   Date Value Ref Range Status   11/27/2022 11.9 11.7 - 15.7 g/dL Final   11/24/2022 11.0 (L) 11.7 - 15.7 g/dL Final   11/23/2022 12.4 11.7 - 15.7 g/dL Final   02/16/2017 14.2 11.7 - 15.7 g/dL Final   02/15/2017 13.3 11.7 - 15.7 g/dL Final   02/14/2017 13.9 11.7 - 15.7 g/dL Final      Platelet Count   Date Value Ref Range Status   11/24/2022 99 (L) 150 - 450 10e3/uL Final   02/16/2017 111 (L) 150 - 450 10e9/L Final      Creatinine   Date Value Ref Range Status   11/24/2022 0.95 0.52 - 1.04 mg/dL Final   11/23/2022 1.14 (H) 0.52 - 1.04 mg/dL Final   11/22/2022 0.79 0.52 - 1.04 mg/dL Final   02/16/2017 0.79 0.52 - 1.04 mg/dL Final   02/15/2017 0.74 0.52 - 1.04 mg/dL Final   02/14/2017 0.84 0.52 - 1.04 mg/dL Final      Potassium   Date Value Ref Range Status   11/24/2022 3.6 3.4 - 5.3 mmol/L Final   02/16/2017 3.5 3.4 - 5.3 mmol/L Final        Culture Results: Stone culture 11/22 growing staph epi    Assessment/Plan  82 year old y/o female POD#6 s/p L PCNL for L renal stone.     Neuro: Tylenol for pain  CV: JAYLEN  Pulm: incentive spirometry while awake  FEN/GI: Regular diet, MIVF " discontinued, senokot/miralax. ZACARIAS resolved.  Endo: JAYLEN  : [place] out 11/23, PNT in place. Failed antegrade nephrostogram x2 on POD#1 and #3, IR to attempt again on today prior to discharge at 2 PM, and if not possible will ask for Wednesday 11/30 instead. Continue BID flushing w/ 10 mL NS, dressing change PRN.  Heme: Hgb stable  ID: No concerns for infection, will continue to monitor. Macrobid in house and at discharge for ppx.  Activity: Up ad manuel  PPx: SCDs  Dispo: Discharge to TCU today.    Discussed with staff Dr. Dahiana Calabrese MD, PGY-5  Urology Resident

## 2022-11-28 NOTE — PROCEDURES
[facility]    Procedure: Left PCN check and pull    Date/Time: 11/28/2022 10:49 AM  Performed by: Eder Yarbrough MD  Authorized by: Eder Yarbrough MD       UNIVERSAL PROTOCOL   Site Marked: Yes  Prior Images Obtained and Reviewed:  Yes  Required items: Required blood products, implants, devices and special equipment available    Patient identity confirmed:  Verbally with patient, arm band and provided demographic data  NA - No sedation, light sedation, or local anesthesia  Confirmation Checklist:  Patient's identity using two indicators, relevant allergies, procedure was appropriate and matched the consent or emergent situation and correct equipment/implants were available  Time out: Immediately prior to the procedure a time out was called    Universal Protocol: the Joint Commission Universal Protocol was followed    Preparation: Patient was prepped and draped in usual sterile fashion    ESBL (mL):  0     ANESTHESIA    Anesthesia: Local infiltration  Local Anesthetic:  Lidocaine 1% without epinephrine  Anesthetic Total (mL):  10      SEDATION    Patient Sedated: No    See dictated procedure note for full details.    PROCEDURE  Describe Procedure:     Brisk flow of contrast from the renal pelvis to the bladder.  PCN removed without issue.      Patient Tolerance:  Patient tolerated the procedure well with no immediate complications  Length of time physician/provider present for 1:1 monitoring during sedation: 0

## 2022-11-28 NOTE — CONSULTS
Patient is on IR schedule today Monday 11/28/22 for a Left nephrostomy tube check/nephrostomgram possible removal.     -Labs WNL for procedure.    -No NPO required.     Please contact the IR department at [phone] for procedural related questions.     Thanks, Diana [facility] Interventional Radiology CNP ([phone]) (phone [phone])

## 2022-11-28 NOTE — PRE-PROCEDURE
GENERAL PRE-PROCEDURE:   Procedure:  Left nephrostogram, possible removal, possible internalization of ureteral stent, possible Fentanyl   Date/Time:  11/28/2022 10:14 AM    Written consent obtained?: Yes    Risks and benefits: Risks, benefits and alternatives were discussed    Consent given by:  Patient  Patient states understanding of procedure being performed: Yes    Patient's understanding of procedure matches consent: Yes    Procedure consent matches procedure scheduled: Yes    Expected level of sedation:  Moderate  Appropriately NPO:  Yes  ASA Class:  3  Mallampati  :  Grade 3- soft palate visible, posterior pharyngeal wall not visible  Lungs:  Lungs clear with good breath sounds bilaterally  Heart:  Normal heart sounds and rate and systolic murmur  History & Physical reviewed:  History and physical reviewed and no updates needed  Statement of review:  I have reviewed the lab findings, diagnostic data, medications, and the plan for sedation  Received a request from Kendra ABDULLAHI to try to internalize left nephrostomy tube to ureteral stent if the patient fails an antegrade nephrostogram which the patient was consented for.     Thanks [facility] Interventional Radiology CNP ([phone]) (phone [phone])

## 2022-11-28 NOTE — PLAN OF CARE
Occupational Therapy Discharge Summary    Reason for therapy discharge:    Discharged to transitional care facility.    Progress towards therapy goal(s). See goals on Care Plan in [facility] electronic health record for goal details.  Goals not met.  Barriers to achieving goals:   discharge from facility.    Therapy recommendation(s):    Continued therapy is recommended.  Rationale/Recommendations:  pt is below baseline and would benefit from continued OT at TCU for decreased I with ADLs and functional mobility.

## 2022-11-28 NOTE — PLAN OF CARE
Physical Therapy Discharge Summary    Reason for therapy discharge:    Discharged to transitional care facility.    Progress towards therapy goal(s). See goals on Care Plan in [facility] electronic health record for goal details.  Goals partially met.  Barriers to achieving goals:   discharge from facility.    Therapy recommendation(s):    Continued therapy is recommended.  Rationale/Recommendations: Pt tolerated session well, making great progress with therapy and mobilizing with nursing staff. Pt currently requiring Diandra-SBA for bed mobility, otherwise, SBA-Loretta for ambulation with 4WW and transfers. Anticipate pt may return to [facility] with assist with bed mobility, home PT/OT to progress functional independence and strength.  PT Brief overview of current status: sit<>supine, Diandra. STS to FWW, SBA. Amb with FWW, SBA-Loretta.

## 2022-11-28 NOTE — PROGRESS NOTES
UROLOGY    Reviewed with IR. Will attempt to internalize JJ stent if fails NT check again today.    Kendra Paredes PA-C  [facility] Urology  [phone]  Securely message with the Vocera Web Console   Monday-Wednesday and Friday

## 2022-11-28 NOTE — PROGRESS NOTES
Reason for admission: Percutaneous Nephrolithotomy  Dx: Left renal stone   POD#: 4  Mental Status: x4, heard of hearing  Activity: Ax1 GB  Diet: Regular  Pain: Controlled  Urination: Uses the bathroom. Incontinent at times  Tele/Restraints/Iso: NA  LDA: PIV SL  Expected D/C Date: Tomorrow to TCU 2pm  Other Info: Pt still have some significant leakage from the neph tube site strawberry pink in color. Dressing changed twice today. Per Urology: Flush nephrostomy tube twice daily with 10 cc normal saline. Dressing change as needed. Nephrostomy to stay and plan to repeat nephrostogram with IR on Wednesday as an outpatient. Repeat Hgb 11.9.

## 2022-11-28 NOTE — PROGRESS NOTES
Patient discharged from gen surg unit with med transport via  .  Orientation:x4  Respirations status: WNL RA  Ambulation status: SBA  Pain status: Denies  VS: WNL  PIV: Removed and dressed  Dressings: CDI  Discharge outcome: After visit summary provided and explained, patient verbalized understanding. IATF handed to med transport. Left the floor with all of her belongings and medications.

## 2022-11-28 NOTE — PROGRESS NOTES
Care Management Discharge Note    Discharge Date: 11/25/2022       Discharge Disposition: [facility]     Discharge Services:  [facility]    Discharge DME:  None    Discharge Transportation:  [facility] w[facility]c ride    Private pay costs discussed: Not applicable    PAS Confirmation Code: [id number]  Patient/family educated on Medicare website which has current facility and service quality ratings:  yes    Education Provided on the Discharge Plan:  yes  Persons Notified of Discharge Plans: patient  Patient/Family in Agreement with the Plan:  yes    Handoff Referral Completed: Yes    Additional Information:  Discharge orders received. Patient discharging to [facility] at 1400 today. Writer completed PAS, put in chart and sent to facility. Writer faxed discharge orders to [facility]. [facility] and let them know of the ride time for today.     PAS-RR    D: Per DHS regulation, SW completed and submitted PAS-RR to [place] [employer] Direct Connect via the Senior LinkAge Line.  PAS-RR confirmation # is : [id number]    I: SW spoke with patient and they are aware a PAS-RR has been submitted.  SW reviewed with patient that they may be contacted for a follow up appointment within 10 days of hospital discharge if their SNF stay is < 30 days.  Contact information for Senior LinkAge Line was also provided.    A: Patient verbalized understanding.    P: Further questions may be directed to [facility] LinkAge Line at #[phone], option #4 for PAS-RR staff.    EUSEBIO Mckeon

## 2022-11-29 ENCOUNTER — PATIENT OUTREACH (OUTPATIENT)
Dept: CARE COORDINATION | Facility: CLINIC | Age: 82
End: 2022-11-29

## 2022-11-29 ENCOUNTER — TELEPHONE (OUTPATIENT)
Dept: FAMILY MEDICINE | Facility: CLINIC | Age: 82
End: 2022-11-29

## 2022-11-29 NOTE — PROGRESS NOTES
[facility]    Background: Transitional Care Management program identified per system criteria and reviewed by [facility] team for possible outreach.    Assessment: Upon chart review, CCRC Team member will not proceed with patient outreach related to this episode of Transitional Care Management program due to reason below:    Non-[facility] TCU: Patient is not established within [facility] and CCRC team member noted patient discharged to TCU/ARU/LTACH.    Plan: Transitional Care Management episode addressed appropriately per reason noted above.      Berkley Maxwell MA  [facility] [place], [facility]    *Connected Care Resource Team does NOT follow patient ongoing. Referrals are identified based on internal discharge reports and the outreach is to ensure patient has an understanding of their discharge instructions.

## 2022-12-01 NOTE — DISCHARGE SUMMARY
Discharge Summary     Serge Marin MRN# [id number]   YOB: 1940 Age: 82 year old     Date of Admission:  11/22/2022  Date of Discharge::  11/28/2022  2:12 PM  Admitting Physician:  Stevo Talbot MD  Discharge Physician:  Jorge Calabrese MD  Primary Care Physician:         Kassie Wyatt          Admission Diagnoses:   Left renal stone [N20.0]  Renal calculus, left [N20.0]  Urinary tract infection [N39.0]          Discharge Diagnosis:   Same as above         Procedures:   11/22/22: Procedure(s):  Percutaneous Nephrolithotomy        Non-operative procedures:   IR antegrade nephrostogram 11/23  IR antegrade nephrostogram 11/25  IR antegrade nephrostogram and PNT removal 11/28          Consultations:   INTERVENTIONAL RADIOLOGY ADULT/PEDS IP CONSULT  CARE MANAGEMENT / SOCIAL WORK IP CONSULT  PHYSICAL THERAPY ADULT IP CONSULT  OCCUPATIONAL THERAPY ADULT IP CONSULT  INTERVENTIONAL RADIOLOGY ADULT/PEDS IP CONSULT  INTERVENTIONAL RADIOLOGY ADULT/PEDS IP CONSULT         Imaging Studies:     Results for orders placed or performed during the hospital encounter of 11/22/22   XR Surgery ARLET G/T 5 Min Fluoro w Stills    Narrative    IR NEPHROLITHOTOMY, XR SURGERY ARLET FLUORO GREATER THAN 5 MIN W  STILLS, LEFT NEPHROSTOMY TUBE PLACEMENT  11/22/2022 10:22 AM     HISTORY: Less than 2 cm cm leftrenal calculus.    FINDINGS: The procedure was performed in the operating room under  general anesthetic. The co-surgeon was Dah[place]. Dr. Talbot placed a  left ureteral catheter. The patient was then placed in the prone  position and contrast and CO2 was injected through the ureteral  catheter to aid in the identification of a posterior calyx.     Under fluoroscopic guidance, a posterior calyx was successfully  punctured using an 18gauge needle. A wire was placed and advanced down  the ureter. The needle was then removed. A 9 sheath was then advanced  over the wire into the ureter,  and a second safety wire was advanced  through the sheath down the ureter.      A balloon was then used to dilate the parenchymal tract. A working  sheath was advanced over the balloon into the collecting system and  the balloon was removed. Dr. Talbot then removed multiple stones  utilizing a combination of ultrasonic lithotripsy and a basket snare.     12 [place] nephrostomy tube was not placed into the renal pelvis. There  were no immediate complications.    Fluoroscopy time: 8.79 minutes    Contrast: 50 cc Isovue      Impression    IMPRESSION: Successful percutaneous nephrolithotomy and left  nephrostomy tube placement.     JOSE J GAZRA MD         SYSTEM ID:  [id number]   CT Abdomen Pelvis w/o Contrast    Narrative    EXAM: CT ABDOMEN PELVIS W/O CONTRAST  LOCATION: [facility]  DATE/TIME: 11/22/2022 7:54 PM    INDICATION: Assess for residual stone s p L PCNL 11 22  COMPARISON: 10/20/2022  TECHNIQUE: CT scan of the abdomen and pelvis was performed without IV contrast. Multiplanar reformats were obtained. Dose reduction techniques were used.  CONTRAST: None.    FINDINGS:   LOWER CHEST: Dependent atelectasis is identified in both lower lobes. Heart is normal in size.    HEPATOBILIARY: Post surgical changes of cholecystectomy. No intrahepatic biliary ductal dilatation.    PANCREAS: Normal.    SPLEEN: Normal.    ADRENAL GLANDS: Normal.    KIDNEYS/BLADDER: High density material is identified within the left kidney. This is only minimally present on the right. The left nephrostomy tube is identified. Air is identified within the renal collecting system. Left renal cyst. No further   evaluation warranted.    BOWEL: Small and large bowel are normal in caliber. Mild colonic diverticulosis. No evidence of diverticulitis.    LYMPH NODES: Normal.    VASCULATURE: Unremarkable.    PELVIC ORGANS: Sheffeild catheter identified within the bladder. No uterus.    MUSCULOSKELETAL: Compression of the superior  endplate of L1. Compression of T11. Mild to moderate scoliosis.      Impression    IMPRESSION:   1.  High density material identified within the left kidney. Left nephrostomy tube is identified within the left renal collecting system. The known calcifications within the left kidney are not definitively identified.  2.  Post surgical changes of cholecystectomy.     XR Chest Port 1 View    Addendum: 11/28/2022    Serge Marin  Accession: #[id number]    Additional history: Pneumothorax is a known complication of  percutaneous lithotripsy of renal stones.    Michela Guillermo MD  Date of Addendum: 11/28/2022.    MICHELA GUILLERMO MD         SYSTEM ID:  [id number]      Narrative    CHEST ONE VIEW  11/22/2022 11:04 AM     HISTORY: Status post left PCNL, rule out pneumothorax.    COMPARISON: October 19, 2022      Impression    IMPRESSION: No pneumothorax. Question some minimal atelectasis at the  left base.    MICHELA GUILLERMO MD         SYSTEM ID:  [id number]   IR Nephrolithotomy    Narrative    IR NEPHROLITHOTOMY, XR SURGERY ARLET FLUORO GREATER THAN 5 MIN W  STILLS, LEFT NEPHROSTOMY TUBE PLACEMENT  11/22/2022 10:22 AM     HISTORY: Less than 2 cm cm leftrenal calculus.    FINDINGS: The procedure was performed in the operating room under  general anesthetic. The co-surgeon was Dahiana. Dr. Talbot placed a  left ureteral catheter. The patient was then placed in the prone  position and contrast and CO2 was injected through the ureteral  catheter to aid in the identification of a posterior calyx.     Under fluoroscopic guidance, a posterior calyx was successfully  punctured using an 18gauge needle. A wire was placed and advanced down  the ureter. The needle was then removed. A 9 sheath was then advanced  over the wire into the ureter, and a second safety wire was advanced  through the sheath down the ureter.      A balloon was then used to dilate the parenchymal tract. A working  sheath was advanced over the balloon into the collecting  system and  the balloon was removed. Dr. Talbot then removed multiple stones  utilizing a combination of ultrasonic lithotripsy and a basket snare.     12 [place] nephrostomy tube was not placed into the renal pelvis. There  were no immediate complications.    Fluoroscopy time: 8.79 minutes    Contrast: 50 cc Isovue      Impression    IMPRESSION: Successful percutaneous nephrolithotomy and left  nephrostomy tube placement.     JOSE J GARZA MD         SYSTEM ID:  [id number]   IR Nephrostomy Tube Check Left    Narrative    INTERVENTIONAL RADIOLOGY LEFT NEPHROSTOMY TUBE CHECK  11/23/2022 12:44  PM     HISTORY:  Patient had percutaneous nephrolithotomy yesterday.    COMPARISON: CT scan of the abdomen and pelvis dated 11/22/2022.    FINDINGS: The patient was placed in a prone position on the  fluoroscopy table. Nephrostogram was performed through existing  nephrostomy tube. There is filling of the intrarenal collecting system  as well as the proximal mid and mid distal ureter. No drainage from  the distal ureter into the bladder was identified. This was presumed  to be secondary to obstruction by blood clots. Attempts to flush these  were unsuccessful. Therefore, the nephrostomy tube was left in place.    Fluoroscopy time: 0.9 minutes    Total fluoroscopy dose: 1.72 mGy Air Kerma    Contrast: 25 cc Isovue      Impression    IMPRESSION: Nephrostogram does not show drainage of contrast from the  distal ureter into the bladder. Therefore, nephrostomy tube was left  in place.    JOSE J GARZA MD         SYSTEM ID:  [id number]   IR Nephrostomy Tube Check Left    Narrative    IR NEPHROSTOMY TUBE CHECK LEFT  11/25/2022 10:46 AM     HISTORY:  Patient had percutaneous nephrolithotomy on 11/22/2022. She  had a left nephrostomy tube placed.    COMPARISON: 11/23/2022    FINDINGS: After obtaining informed consent, the patient was placed in  a prone position on the fluoroscopy table. A nephrostogram was  performed through the  nephrostomy tube. There was slow drainage from  the left renal pelvis to the bladder. There remained a mild to  moderate amount of probable thrombus in the mid distal left ureter.  Therefore, the nephrostomy tube was left in place.    Fluoroscopy time: 0.6 minutes    Total fluoroscopy dose: 1.50 mGy Air Kerma    Contrast: 15 cc Isovue      Impression    IMPRESSION: Nephrostogram performed as above. Sluggish drainage from  the left renal pelvis to the bladder. Persistent blood clots in the  mid distal ureter. Nephrostomy tube left in place.     JOSE J GARZA MD         SYSTEM ID:  [id number]   IR Nephrostomy Tube Check Left    Addendum: 11/29/2022    Prior findings in error.  See below    FINDINGS: After obtaining informed consent, the patient was placed in  a prone position on the fluoroscopy table. A nephrostogram was  performed through the nephrostomy tube. There was brisk drainage from  the left renal pelvis to the bladder. No residual thrombus.   Therefore, the nephrostomy tube was removed.    Impression remains the same.        Narrative    IR NEPHROSTOMY TUBE CHECK LEFT  11/28/2022 10:38 AM     HISTORY:  Patient had percutaneous nephrolithotomy on 11/22/2022. She  had a left nephrostomy tube placed.    COMPARISON: 11/25/2022    FINDINGS: After obtaining informed consent, the patient was placed in  a prone position on the fluoroscopy table. A nephrostogram was  performed through the nephrostomy tube. There was slow drainage from  the left renal pelvis to the bladder. There remained a mild to  moderate amount of probable thrombus in the mid distal left ureter.  Therefore, the nephrostomy tube was left in place.    Fluoroscopy time: 0.6 minutes    Total fluoroscopy dose: 1.50 mGy Air Kerma    Contrast: 10 cc Isovue      Impression    IMPRESSION: Nephrostogram performed as above. Brisk drainage from the  left renal pelvis to the bladder. Resolution of blood clots in the mid  distal ureter. Nephrostomy tube  removed without issue.     HARDEEP HINES MD         SYSTEM ID:  [id number]            Medications Prior to Admission:     No medications prior to admission.            Discharge Medications:     Discharge Medication List as of 11/28/2022 12:12 PM      START taking these medications    Details   !! acetaminophen (TYLENOL) 325 MG tablet Take 1-2 tablets (325-650 mg) by mouth every 6 hours as needed for mild pain, Disp-90 tablet, R-0, E-Prescribe       !! - Potential duplicate medications found. Please discuss with provider.      CONTINUE these medications which have CHANGED    Details   nitroFURantoin macrocrystal-monohydrate (MACROBID) 100 MG capsule Take 1 capsule (100 mg) by mouth 2 times daily for 5 days, Disp-10 capsule, R-0, Transitional      senna-docusate (SENOKOT-S/PERICOLACE) 8.6-50 MG tablet Take 1 tablet by mouth 2 times daily, Disp-30 tablet, R-0, Transitional         CONTINUE these medications which have NOT CHANGED    Details   !! acetaminophen (TYLENOL) 500 MG tablet Take 500 mg by mouth every 6 hours as needed for mild pain, Historical      calcium carbonate-vitamin D (OSCAL W/D) 500-200 MG-UNIT tablet Take 1 tablet by mouth daily, Historical      cetirizine (ZYRTEC) 10 MG tablet Take 10 mg by mouth daily as needed for allergies, Historical      clotrimazole (LOTRIMIN) 1 % external cream Apply topically 2 times daily as neededHistorical      escitalopram (LEXAPRO) 10 MG tablet Take 5 mg by mouth daily (0.5 x 10 mg = 5 mg), Historical      ferrous gluconate (FERGON) 324 (38 FE) MG tablet Take 1 tablet (324 mg) by mouth daily (with breakfast), Disp-90 tablet, R-3, Local Print      levothyroxine (SYNTHROID, LEVOTHROID) 75 MCG tablet Take 1 tablet (75 mcg) by mouth daily, Disp-30 tablet, R-3, Fax      loperamide (IMODIUM) 2 MG capsule Take 2 mg by mouth 4 times daily as needed for diarrhea, Historical      psyllium (METAMUCIL/KONSYL) Packet Take 1 packet by mouth daily as needed for constipation,  Historical       !! - Potential duplicate medications found. Please discuss with provider.                 Brief History of Illness:   Reason for admission requiring a surgical or invasive procedure:   Left renal stone [N20.0]   The patient underwent the following procedure(s):   See above   There were no immediate complications during this procedure.    Please refer to the full operative summary for details.           Hospital Course:   The patient's hospital course was unremarkable.      POD #1 and #3 she failed antegrade nephrostogram so left PNT was left in place.    She demonstrated below baseline functional status so TCU was recommended.     On POD#6 patient passed antegrade nephrostogram so her PNT was removed. She was tolerating the discharge diet, had pain controlled with PO medications to go home with, and requiring no IV medications or fluids. Patient was discharged home with appropriate contact information, follow-up and instructions as seen below in the discharge paperwork.         Final Pathology Result:   Pending at time of discharge         Discharge Instructions and Follow-Up:     Discharge Procedure Orders   IR Nephrostomy Tube Check Left   Standing Status: Future Standing Exp. Date: 12/28/22   Order Comments: Post nephrolithoiomy     Order Specific Question Answer Comments   PHE Acuity urgent    Is the patient on aspirin, Plavix or blood thinners? NO - order as requested      Reason for your hospital stay   Order Comments: Left percutaneous nephrolithotomy (stone removal through your back on the left side)     Follow-up and recommended labs and tests    Order Comments: Please see instructions below     Activity   Order Comments: Your activity upon discharge: please see instructions below     Order Specific Question Answer Comments   Is discharge order? Yes      General info for SNF   Order Comments: Length of Stay Estimate: Short Term Care: Estimated # of Days <30  Condition at Discharge:  Improving  Level of care:skilled   Rehabilitation Potential: Good  Admission H&P remains valid and up-to-date: Yes  Recent Chemotherapy: N/A  Use Nursing Home Standing Orders: Yes     Mantoux instructions   Order Comments: Give two-step Mantoux (PPD) Per Facility Policy Yes     Wound care   Order Comments: Please see separate instructions regarding management of left percutaneous nephrostomy tube     Discharge Instructions   Order Comments: POSTOPERATIVE INSTRUCTIONS    Diagnosis-------------------------------   Left kidney stone    Procedure-------------------------------  Left percutaneous nephrolithotomy    Home-going instructions-----------------         Activity Limitation:     - No driving or operating heavy machinery while on narcotic pain medication.   - No strenuous exercise for 6 weeks.   - No lifting, pushing, pulling more than 10 pounds for 6 weeks. Take care when pushing with your arms to stand up.   - Do not strain your belly area. When you bend, sit up or twice, you could strain the area around your incision.   - Do not strain with bowel movements.   - Do not drive until you can press the brake pedal quickly and fully without pain.   - Do not operate a motor vehicle while taking narcotic pain medications    FOLLOW THESE INSTRUCTIONS AS INDICATED BELOW:  - Observe operative area for signs of excessive bleeding.  - You may shower.  - Increase fluid intake to promote clear urine.  - Resume usual diet as tolerated    What to expect while recovering----------- TUBES AND DRAINS:  1) Percutaneous nephrostomy tube: You are going home with a tube in your left side/back which is draining your left kidney. This will remain in place until your follow-up appointment, and will continue to be managed by drainage into a bag. Your nurse or  will provide written tube care instructions for you to take home.   - Protect the tube and treat it like an extension of your body - keep it secured to your back and do  not let it get caught, snagged, or tugged.   - Should the tube somehow come out of position, do not let anyone but a urologist who is aware of your recent surgery try to replace a catheter. Best yet, contact our urology office right away with any concerns.   - Please flush tube twice daily with 10 mL sterile saline, and reinforce dressing as needed when saturated    What to expect while recovering----------- WOUND CARE:  - You may shower and get incisions wet starting 48 hrs after surgery.  - Do not scrub incisions or submerge wounds (aka, bath, pool, hot tub, etc.) for 2 weeks or until wounds have healed and catheter is removed.   - Remove wound dressing 48 hours after surgery if already not removed.   - If purple dermabond glue was used, avoid applying any lotions or ointments.   - Leave incision open to air. Cover with gauze only if needed for comfort or to protect clothing from drainage.    Discharge Medications/instructions:   1) PAIN: Take tylenol as needed for pain.    2) CONSTIPATION: Pericolace (senna/docusate sodium) can be taken twice daily for prevention of constipation since surgery, pain medications and bladder spasm medications can all make you constipated. Please reduce or stop pericolace if you develop loose stools. Other over the counter solutions such as prune juice, miralax, fiber products, senna, and dulcolax can also be used. If you are taking the pericolace but still have not had a bowel movement in 3 days, start over-the-counter Milk of Magnesia taken twice daily until you have a nice bowel movement. Call the office with any concerns.     3) ANTIBIOTICS - take antibiotics (macrobid) as prescribed      Questions/concerns------------------------  [facility]: [phone]  [facility]: [phone]    Future appointments  We will arrange for a follow up visit with you in the coming week. If you do not hear from our schedulers within the next 2 business days,  please call the number above.      Stevo Talbot MD     Diet   Order Comments: Follow this diet upon discharge: regular diet     Order Specific Question Answer Comments   Is discharge order? Yes             Discharge Disposition:     Discharged to TCU      Condition at discharge: Good    --    Jorge Calabrese MD  Urology PGY-5    11:01 AM, 12/1/2022

## 2022-12-05 ENCOUNTER — TELEPHONE (OUTPATIENT)
Dept: NEUROSURGERY | Facility: CLINIC | Age: 82
End: 2022-12-05

## 2022-12-08 ENCOUNTER — OFFICE VISIT (OUTPATIENT)
Dept: NEUROSURGERY | Facility: CLINIC | Age: 82
End: 2022-12-08
Payer: COMMERCIAL

## 2022-12-08 ENCOUNTER — ANCILLARY PROCEDURE (OUTPATIENT)
Dept: CT IMAGING | Facility: CLINIC | Age: 82
End: 2022-12-08
Attending: NURSE PRACTITIONER
Payer: COMMERCIAL

## 2022-12-08 ENCOUNTER — APPOINTMENT (OUTPATIENT)
Dept: CT IMAGING | Facility: CLINIC | Age: 82
DRG: 025 | End: 2022-12-08
Attending: STUDENT IN AN ORGANIZED HEALTH CARE EDUCATION/TRAINING PROGRAM
Payer: COMMERCIAL

## 2022-12-08 ENCOUNTER — HOSPITAL ENCOUNTER (INPATIENT)
Facility: CLINIC | Age: 82
LOS: 7 days | Discharge: NURSING, REHAB OR LONG-TERM CARE | DRG: 025 | End: 2022-12-15
Attending: EMERGENCY MEDICINE | Admitting: NEUROLOGICAL SURGERY
Payer: COMMERCIAL

## 2022-12-08 VITALS
RESPIRATION RATE: 16 BRPM | BODY MASS INDEX: 18.54 KG/M2 | DIASTOLIC BLOOD PRESSURE: 74 MMHG | OXYGEN SATURATION: 96 % | SYSTOLIC BLOOD PRESSURE: 116 MMHG | WEIGHT: 108 LBS | HEART RATE: 63 BPM

## 2022-12-08 DIAGNOSIS — N20.0 LEFT RENAL STONE: ICD-10-CM

## 2022-12-08 DIAGNOSIS — S06.5XAA SDH (SUBDURAL HEMATOMA) (H): Primary | ICD-10-CM

## 2022-12-08 DIAGNOSIS — S06.5XAA SUBDURAL HEMATOMA (H): ICD-10-CM

## 2022-12-08 DIAGNOSIS — S06.5XAA SDH (SUBDURAL HEMATOMA) (H): ICD-10-CM

## 2022-12-08 DIAGNOSIS — Z00.00 HEALTH CARE MAINTENANCE: ICD-10-CM

## 2022-12-08 DIAGNOSIS — E03.8 OTHER SPECIFIED HYPOTHYROIDISM: ICD-10-CM

## 2022-12-08 LAB
ALBUMIN SERPL BCG-MCNC: 3.9 G/DL (ref 3.5–5.2)
ALP SERPL-CCNC: 69 U/L (ref 35–104)
ALT SERPL W P-5'-P-CCNC: 5 U/L (ref 10–35)
ANION GAP SERPL CALCULATED.3IONS-SCNC: 12 MMOL/L (ref 7–15)
APTT PPP: 28 SECONDS (ref 22–38)
AST SERPL W P-5'-P-CCNC: 27 U/L (ref 10–35)
BILIRUB SERPL-MCNC: 0.5 MG/DL
BUN SERPL-MCNC: 14.5 MG/DL (ref 8–23)
CALCIUM SERPL-MCNC: 9.4 MG/DL (ref 8.8–10.2)
CHLORIDE SERPL-SCNC: 105 MMOL/L (ref 98–107)
CREAT SERPL-MCNC: 0.8 MG/DL (ref 0.51–0.95)
DEPRECATED HCO3 PLAS-SCNC: 22 MMOL/L (ref 22–29)
ERYTHROCYTE [DISTWIDTH] IN BLOOD BY AUTOMATED COUNT: 13.2 % (ref 10–15)
GFR SERPL CREATININE-BSD FRML MDRD: 73 ML/MIN/1.73M2
GLUCOSE BLDC GLUCOMTR-MCNC: 102 MG/DL (ref 70–99)
GLUCOSE SERPL-MCNC: 95 MG/DL (ref 70–99)
HCT VFR BLD AUTO: 38.9 % (ref 35–47)
HGB BLD-MCNC: 12.2 G/DL (ref 11.7–15.7)
HOLD SPECIMEN: NORMAL
INR PPP: 1.08 (ref 0.85–1.15)
MCH RBC QN AUTO: 28.8 PG (ref 26.5–33)
MCHC RBC AUTO-ENTMCNC: 31.4 G/DL (ref 31.5–36.5)
MCV RBC AUTO: 92 FL (ref 78–100)
PLATELET # BLD AUTO: 215 10E3/UL (ref 150–450)
POTASSIUM SERPL-SCNC: 4.4 MMOL/L (ref 3.4–5.3)
PROT SERPL-MCNC: 6.7 G/DL (ref 6.4–8.3)
RBC # BLD AUTO: 4.24 10E6/UL (ref 3.8–5.2)
SODIUM SERPL-SCNC: 139 MMOL/L (ref 136–145)
WBC # BLD AUTO: 3.4 10E3/UL (ref 4–11)

## 2022-12-08 PROCEDURE — 70450 CT HEAD/BRAIN W/O DYE: CPT | Mod: 26 | Performed by: RADIOLOGY

## 2022-12-08 PROCEDURE — 36415 COLL VENOUS BLD VENIPUNCTURE: CPT | Performed by: EMERGENCY MEDICINE

## 2022-12-08 PROCEDURE — 93010 ELECTROCARDIOGRAM REPORT: CPT | Performed by: INTERNAL MEDICINE

## 2022-12-08 PROCEDURE — 99285 EMERGENCY DEPT VISIT HI MDM: CPT | Performed by: EMERGENCY MEDICINE

## 2022-12-08 PROCEDURE — 250N000009 HC RX 250

## 2022-12-08 PROCEDURE — 85730 THROMBOPLASTIN TIME PARTIAL: CPT | Performed by: STUDENT IN AN ORGANIZED HEALTH CARE EDUCATION/TRAINING PROGRAM

## 2022-12-08 PROCEDURE — 36415 COLL VENOUS BLD VENIPUNCTURE: CPT | Performed by: STUDENT IN AN ORGANIZED HEALTH CARE EDUCATION/TRAINING PROGRAM

## 2022-12-08 PROCEDURE — 99215 OFFICE O/P EST HI 40 MIN: CPT | Performed by: NURSE PRACTITIONER

## 2022-12-08 PROCEDURE — 250N000011 HC RX IP 250 OP 636

## 2022-12-08 PROCEDURE — 85610 PROTHROMBIN TIME: CPT | Performed by: STUDENT IN AN ORGANIZED HEALTH CARE EDUCATION/TRAINING PROGRAM

## 2022-12-08 PROCEDURE — 200N000002 HC R&B ICU UMMC

## 2022-12-08 PROCEDURE — 93005 ELECTROCARDIOGRAM TRACING: CPT

## 2022-12-08 PROCEDURE — 85014 HEMATOCRIT: CPT | Performed by: STUDENT IN AN ORGANIZED HEALTH CARE EDUCATION/TRAINING PROGRAM

## 2022-12-08 PROCEDURE — 82374 ASSAY BLOOD CARBON DIOXIDE: CPT | Performed by: STUDENT IN AN ORGANIZED HEALTH CARE EDUCATION/TRAINING PROGRAM

## 2022-12-08 PROCEDURE — 70450 CT HEAD/BRAIN W/O DYE: CPT | Mod: GC | Performed by: RADIOLOGY

## 2022-12-08 PROCEDURE — 82310 ASSAY OF CALCIUM: CPT | Performed by: STUDENT IN AN ORGANIZED HEALTH CARE EDUCATION/TRAINING PROGRAM

## 2022-12-08 PROCEDURE — 70450 CT HEAD/BRAIN W/O DYE: CPT

## 2022-12-08 RX ORDER — AMOXICILLIN 250 MG
1 CAPSULE ORAL 2 TIMES DAILY
Status: DISCONTINUED | OUTPATIENT
Start: 2022-12-08 | End: 2022-12-15 | Stop reason: HOSPADM

## 2022-12-08 RX ORDER — ESCITALOPRAM OXALATE 5 MG/1
5 TABLET ORAL DAILY
Status: DISCONTINUED | OUTPATIENT
Start: 2022-12-09 | End: 2022-12-15 | Stop reason: HOSPADM

## 2022-12-08 RX ORDER — ACETAMINOPHEN 325 MG/1
650 TABLET ORAL EVERY 4 HOURS PRN
Status: DISCONTINUED | OUTPATIENT
Start: 2022-12-08 | End: 2022-12-10

## 2022-12-08 RX ORDER — LIDOCAINE HYDROCHLORIDE 10 MG/ML
10 INJECTION, SOLUTION EPIDURAL; INFILTRATION; INTRACAUDAL; PERINEURAL ONCE
Status: COMPLETED | OUTPATIENT
Start: 2022-12-08 | End: 2022-12-08

## 2022-12-08 RX ORDER — CETIRIZINE HYDROCHLORIDE 10 MG/1
10 TABLET ORAL DAILY PRN
Status: DISCONTINUED | OUTPATIENT
Start: 2022-12-08 | End: 2022-12-15 | Stop reason: HOSPADM

## 2022-12-08 RX ORDER — CLINDAMYCIN PHOSPHATE 900 MG/50ML
900 INJECTION, SOLUTION INTRAVENOUS ONCE
Status: COMPLETED | OUTPATIENT
Start: 2022-12-08 | End: 2022-12-08

## 2022-12-08 RX ORDER — FENTANYL CITRATE 50 UG/ML
25 INJECTION, SOLUTION INTRAMUSCULAR; INTRAVENOUS EVERY 5 MIN PRN
Status: DISCONTINUED | OUTPATIENT
Start: 2022-12-08 | End: 2022-12-12

## 2022-12-08 RX ORDER — LIDOCAINE 40 MG/G
CREAM TOPICAL
Status: DISCONTINUED | OUTPATIENT
Start: 2022-12-08 | End: 2022-12-10 | Stop reason: HOSPADM

## 2022-12-08 RX ORDER — FERROUS GLUCONATE 324(38)MG
324 TABLET ORAL
Status: DISCONTINUED | OUTPATIENT
Start: 2022-12-09 | End: 2022-12-15 | Stop reason: HOSPADM

## 2022-12-08 RX ORDER — LEVOTHYROXINE SODIUM 75 UG/1
75 TABLET ORAL DAILY
Status: DISCONTINUED | OUTPATIENT
Start: 2022-12-09 | End: 2022-12-15 | Stop reason: HOSPADM

## 2022-12-08 RX ADMIN — FENTANYL CITRATE 25 MCG: 50 INJECTION, SOLUTION INTRAMUSCULAR; INTRAVENOUS at 20:09

## 2022-12-08 RX ADMIN — MIDAZOLAM 1 MG: 1 INJECTION INTRAMUSCULAR; INTRAVENOUS at 20:07

## 2022-12-08 RX ADMIN — MIDAZOLAM 0.5 MG: 1 INJECTION INTRAMUSCULAR; INTRAVENOUS at 18:52

## 2022-12-08 RX ADMIN — FENTANYL CITRATE 25 MCG: 50 INJECTION, SOLUTION INTRAMUSCULAR; INTRAVENOUS at 19:10

## 2022-12-08 RX ADMIN — FENTANYL CITRATE 25 MCG: 50 INJECTION, SOLUTION INTRAMUSCULAR; INTRAVENOUS at 20:07

## 2022-12-08 RX ADMIN — FENTANYL CITRATE 25 MCG: 50 INJECTION, SOLUTION INTRAMUSCULAR; INTRAVENOUS at 20:08

## 2022-12-08 RX ADMIN — FENTANYL CITRATE 25 MCG: 50 INJECTION, SOLUTION INTRAMUSCULAR; INTRAVENOUS at 20:06

## 2022-12-08 RX ADMIN — LIDOCAINE HYDROCHLORIDE 10 ML: 10 INJECTION, SOLUTION EPIDURAL; INFILTRATION; INTRACAUDAL; PERINEURAL at 20:07

## 2022-12-08 RX ADMIN — CLINDAMYCIN IN 5 PERCENT DEXTROSE 900 MG: 18 INJECTION, SOLUTION INTRAVENOUS at 18:51

## 2022-12-08 RX ADMIN — FENTANYL CITRATE 25 MCG: 50 INJECTION, SOLUTION INTRAMUSCULAR; INTRAVENOUS at 19:13

## 2022-12-08 ASSESSMENT — ACTIVITIES OF DAILY LIVING (ADL)
BATHING: 1-->ASSISTANCE NEEDED
ADLS_ACUITY_SCORE: 39
DIFFICULTY_EATING/SWALLOWING: NO
NUMBER_OF_TIMES_PATIENT_HAS_FALLEN_WITHIN_LAST_SIX_MONTHS: 1
TOILETING_ISSUES: NO
DRESSING/BATHING_DIFFICULTY: YES
CONCENTRATING,_REMEMBERING_OR_MAKING_DECISIONS_DIFFICULTY: NO
ADLS_ACUITY_SCORE: 35
DOING_ERRANDS_INDEPENDENTLY_DIFFICULTY: YES
ADLS_ACUITY_SCORE: 35
EQUIPMENT_CURRENTLY_USED_AT_HOME: WALKER, ROLLING
DRESS: 1-->ASSISTANCE (EQUIPMENT/PERSON) NEEDED
WEAR_GLASSES_OR_BLIND: NO
FALL_HISTORY_WITHIN_LAST_SIX_MONTHS: YES
CHANGE_IN_FUNCTIONAL_STATUS_SINCE_ONSET_OF_CURRENT_ILLNESS/INJURY: YES
ADLS_ACUITY_SCORE: 35
WALKING_OR_CLIMBING_STAIRS_DIFFICULTY: YES
ADLS_ACUITY_SCORE: 35
WALKING_OR_CLIMBING_STAIRS: AMBULATION DIFFICULTY, REQUIRES EQUIPMENT
DRESS: 1-->ASSISTANCE (EQUIPMENT/PERSON) NEEDED (NOT DEVELOPMENTALLY APPROPRIATE)
TRANSFERRING: 1-->ASSISTANCE (EQUIPMENT/PERSON) NEEDED
DRESSING/BATHING: BATHING DIFFICULTY, ASSISTANCE 1 PERSON
ADLS_ACUITY_SCORE: 35
TRANSFERRING: 1-->ASSISTANCE (EQUIPMENT/PERSON) NEEDED (NOT DEVELOPMENTALLY APPROPRIATE)

## 2022-12-08 ASSESSMENT — VISUAL ACUITY: OU: BASELINE

## 2022-12-08 NOTE — ED NOTES
Bed: ED22  Expected date: 12/8/22  Expected time:   Means of arrival:   Comments:  Please move room 11 to here -Mariama LOPEZ will take report from Anayeli/Drake

## 2022-12-08 NOTE — LETTER
12/8/2022       RE: Serge Marin  [address]  [address]  [facility] [address]     Dear Colleague,    Thank you for referring your patient, Serge Marin, to the [facility] [place] at [facility]. Please see a copy of my visit note below.       Neurosurgery Clinic Note     Chief Complaint: hospital follow-up      DATE OF VISIT: 12/8/2022     HPI:   Serge Marin is a 82 year old female PMHx Bipolar I disorder, hypothyroidism who was seen for SDH identified on imaging in ED on 10/19/22. She presented to her PCP this morning and was observed to have altered mental status and a large ecchymosis on her right forehead; she was advised to present to the ED for evaluation. Imaging at that time identified a chronic-appearing ~11mm SDH on the right frontal convexity with 3-4mm midline shift as well as a left occipital condyle fracture. Patient reported falling 3 days prior to presentation and lost consciousness, eventually walking up on the floor. She denied neck pain and her C-spine was cleared.  Repeat head CT was obtained during hospitalization and was found to be stable.  Patient was discharged home on 10/21/22.   Patient was last seen on 11/3/2022 and at that time the subdural collection was stable in size and at that time was asked to follow-up in 2 weeks with repeat imaging, however this appointment was scheduled incorrectly and the patient did not present for repeat imaging or appointment. The patient subsequently presents today.   Since her last visit the patient denies headache but continues to noted intermittent dizziness when she lies down at night.   The episodes last approximately 5 seconds and resolve spontaneously.  Denies nausea, vomiting, weakness, word finding difficulties, or vision changes. The patient states she has not fallen since her last appointment one month ago.  Patient denies prior use of anti-platelet or  anti-coagulant medications.  Patient recently underwent nephrostomy tube placement and was hospitalized from 11/23-11/28/22, following this hospitalization the patient was transferred to a transitional care facility ([facility] [phone]) and has been residing there since.  Prior to hospitalization the patient was living in an independent living facility.                      Review of Systems   Negative except in HPI     Past Medical History        Past Medical History:   Diagnosis Date     Hernia, abdominal              Past Surgical History         Past Surgical History:   Procedure Laterality Date     CHOLECYSTECTOMY         COLONOSCOPY Left 03/18/2015     Procedure: COMBINED COLONOSCOPY, SINGLE OR MULTIPLE BIOPSY/POLYPECTOMY BY BIOPSY;  Surgeon: Stone Lauren MD;  Location:  GI     GYN SURGERY         HERNIA REPAIR                Social History           Socioeconomic History     Marital status:    Tobacco Use     Smoking status: Never     Smokeless tobacco: Never   Substance and Sexual Activity     Alcohol use: No     Drug use: No         family history is not on file.                Physical Exam   LMP  (LMP Unknown)   Constitutional: Oriented to person, place, and time. Appears well-developed and well-nourished. Cooperative. No distress.   HENT: Head normocephalic and atraumatic.   Neurological: alert and oriented to person, place, and time. CN II-XII grossly  intact        STRENGTH LEFT RIGHT   Deltoid 5 5   Bicep 5 5   Wrist Extensor 5 5   Tricep 5 5   Finger flexion 5 5   Finger abduction 5 5    5 5         Hip Flexion       5       5   Knee Extension 5 5   Ankle Dorsiflexion 5 5   Extensor Hallucis Longus 5 5   Plantar Flexion 5 5   Foot eversion 5 5   Foot inversion 5 5        Skin: Skin is warm, dry and intact.   Psychiatric: Normal mood and affect. Speech is normal and behavior is normal.        IMAGING:  Head CT obtained on 12/8/2022 personally  reviewed, interval increase in right subdural hematoma size and midline shift.      ASSESSMENT:  Serge Marin is a 82 year old female recently hospitalized after sustaining a fall and found to have a chronic appearing subdural hematoma     PLAN:  Given the ongoing increased size of the right subdural hematoma on imaging, the patient was discussed with staff Neurosurgeon, Dr. Dat Avila.   It was felt given the continued increase in size the patient would require either MMA emobolization or evacuation.   I discussed both options with both the patient and her son who is her POA (Delfina Marin).  Given difficulty with finding transportation for follow-up and the fact she is significantly hard of hearing and often unable to understand conversations over the phone it was felt that transfer to [facility] ED for admission for intervention would be the best option.     Prior to transfer I also spoke with the patient's prior TCU facility ([facility]) to update her care team of the current situation.  Patient and son were both agreeable to this plan.           I spent 45 minutes in patient care with greater than 50% spent in counseling and/or coordination of care.     I performed independent visualization of radiographic imaging and entered my own interpretation, reviewed and/or ordered tests in radiology           JASSON Bullock, CNP  Department of Neurosurgery  Pager: [phone]

## 2022-12-08 NOTE — ED PROVIDER NOTES
[place] [facility]ERGENCY DEPARTMENT ([facility])  December 8, 2022     History     Chief Complaint   Patient presents with     Neurologic Problem     HPI  Serge Marin is a 82 year old female with a past medical history including SDH, bipolar 1 disorder who presents to the Emergency Department for evaluation of increased confusion and recent history of falls. The patient is brought in by ambulance from [facility] today, where she had been following up for SDH identified on imaging in ED on 10/19/22. Upon imaging today the SDH was found to have increased in size anad will require admission       Per chart review, patient presented to the ER on 10/19 after a fall. She was observed to have altered mental status and a large ecchymosis on her right forehead. Imaging showed a chronic-appearing ~11mm SDH on the right frontal convexity with 3-4mm midline shift as well as a left occipital condyle fracture. Patient reported falling 3 days prior to presentation and lost consciousness, eventually waking up on the floor. She denied neck pain and her C-spine was cleared.  Repeat head CT was obtained during hospitalization and was found to be stable.  Patient was discharged home on 10/21/22.    The patient later was hospitalized 11/23 - 11/28 following a nephrostomy tube placement, and at that time the subdural collection was stable in size. She was asked to follow up in 2 weeks for repeat imaging but this was scheduled incorrectly and she did not present. Subsequently, she presented to Neurosurgery today, where she continued to note intermittent dizziness when she lies down at night. Given the ongoing increased size of the right subdural hematoma on imaging today, it was felt the patient would require either MMA emobolization or evacuation. Given difficulty with finding transportation for follow-up and the fact she is significantly hard of hearing and often unable to understand conversations over the phone  it was felt that transfer to [facility] ED for admission for intervention would be the best option.    At time of interview, pt is alert and oriented, conversant. Denies PAREDES, n.v. no subjective weakness or arms or legs.     CT HEAD W/O CONTRAST  12/8/2022 9:08 AM   IMPRESSION:    Slightly increased size of the predominantly hypodense  subdural hematoma overlying the right frontal convexity with increased  mass effect on the right frontal lobe and slightly worsened leftward  midline shift now measuring up to 6 mm.    CT HEAD W/O CONTRAST 11/3/2022 12:53 PM  Impression:    Slightly increased size of the predominantly low dense  right frontoparietal subdural hemorrhage.    CT HEAD W/O CONTRAST, CT CERVICAL SPINE W/O CONTRAST 10/19/2022 5:03 PM  IMPRESSION:  HEAD CT:  1.  Left frontal convexity extra-axial fluid collection that is hypodense without any hyperdense components to suggest acute process, therefore likely subacute or chronic process. However the collection is new from prior CT of 08/05/2022.  2.  Right-to-left midline shift 3 to 4 mm.  CERVICAL SPINE CT:  1.  Acute non comminuted fracture tip of left occipital condyle (Hubert and Rajeev type 3), isolated fracture.  2.  [id number] alignment is preserved.  3.  No additional fractures.    Past Medical History  Past Medical History:   Diagnosis Date     Anemia      Bipolar disorder (H)      Elevated fasting glucose      Hernia, abdominal      Kidney stone      Nonimmune to hepatitis B virus      Subdural hematoma      Thyroid disease     Hypothyroid     Past Surgical History:   Procedure Laterality Date     CHOLECYSTECTOMY       COLONOSCOPY Left 03/18/2015    Procedure: COMBINED COLONOSCOPY, SINGLE OR MULTIPLE BIOPSY/POLYPECTOMY BY BIOPSY;  Surgeon: Stone Lauren MD;  Location: [facility] GI     CRANIOTOMY Right 12/10/2022    Procedure: RIGHT CRANIOTOMY FOR RIGHT SUBDURAL HEMATOMA EVACUATION;  Surgeon: Dat Avila MD;  Location: [facility] OR     GYN SURGERY    "    HERNIA REPAIR       IR NEPHROLITHOTOMY  11/22/2022     PERCUTANEOUS NEPHROLITHOTOMY Left 11/22/2022    Procedure: Percutaneous Nephrolithotomy;  Surgeon: Stevo Talbot MD;  Location:  OR     No current outpatient medications on file.    Allergies   Allergen Reactions     Penicillins      Bactrim [Sulfamethoxazole W/Trimethoprim] Itching     Patient prescribed Bactrim at eye appointment. Assisted living reports eyes were red and itching.      Family History  History reviewed. No pertinent family history.  Social History   Social History     Tobacco Use     Smoking status: Never     Smokeless tobacco: Never   Vaping Use     Vaping Use: Never used   Substance Use Topics     Alcohol use: No     Drug use: No      Past medical history, past surgical history, medications, allergies, family history, and social history were reviewed with the patient. No additional pertinent items.       Review of Systems  A complete review of systems was performed with pertinent positives and negatives noted in the HPI, and all other systems negative.    Physical Exam   BP: 118/84  Pulse: 76  Temp: 98.1  F (36.7  C)  Resp: 15  Height: 162.6 cm (5' 4\")  Weight: 49.1 kg (108 lb 3.9 oz)  SpO2: 93 %  Physical Exam  GEN: Well appearing, non toxic, cooperative and conversant.   HEENT: The head is normocephalic and atraumatic. Pupils are equal round and reactive to light. Extraocular motions are intact. There is no facial swelling. The neck is nontender and supple.   CV: Regular rate and rhythm. 2+ radial pulses bilaterally.  PULM: Clear to auscultation bilaterally.  ABD: Soft, nontender, nondistended.   EXT: Full range of motion.    NEURO: Cranial nerves II through XII are intact and symmetric. Bilateral upper and lower extremities grossly show full range of motion without any focal deficits.  EHL, FHL, TA 5/5 and symmetric, SILT.   Median, ulnar, radial nerve strength examined at the hand 5/5 and symmetric, SILT.     PSYCH: Calm and " cooperative, interactive.     ED Course      Procedures                     Results for orders placed or performed during the hospital encounter of 12/08/22   CT Head w/o Contrast     Status: None    Narrative    CT HEAD W/O CONTRAST 12/8/2022 8:02 PM    History: S/p right subdural seps drain   Comparison: Earlier CT head    Technique: Using multidetector thin collimation helical acquisition  technique, axial, coronal and sagittal CT images from the skull base  to the vertex were obtained without intravenous contrast.   (topogram) image(s) also obtained and reviewed.    Findings: Interval placement of right frontal SEPS drain. No  significant change in size of predominantly hypodense subdural  hematoma overlying the right frontal convexity with small hyperdense  component layering posteriorly and near the entrance point of the  drain. Mass effect on adjacent brain parenchyma with slight increase  in right to left midline shift, measuring 7 mm, previously 6 mm.  Unchanged low density collection over the lateral superior right  cerebellar hemisphere. No acute loss of gray-white matter  differentiation. Ventricles are proportionate to the cerebral sulci.  The basal cisterns are clear.    The bony calvaria and the bones of the skull base are normal. The  visualized portions of the paranasal sinuses and mastoid air cells are  clear.      Impression    Impression:  1. Interval placement of a right frontal SEPS drain. Stable size of  predominantly hypodense right frontoparietal subdural collection with  small amount of new hyperdense component adjacent to the drain.  2. Slightly increased leftward midline shift    I have personally reviewed the examination and initial interpretation  and I agree with the findings.    LEYDA PEARSON MD         SYSTEM ID:  [id number]   CT Head w/o Contrast     Status: None    Narrative    CT HEAD W/O CONTRAST 12/10/2022 1:26 PM    History: Post SDH evacuation     Comparison: 12/8/2022  head CTs    Technique: Using multidetector thin collimation helical acquisition  technique, axial, coronal and sagittal CT images from the skull base  to the vertex were obtained without intravenous contrast.   (topogram) image(s) also obtained and reviewed.    Findings:   Postoperative changes of right frontoparietal craniotomy with partial  subjacent evacuation of underlying subdural hematoma. Increased dense  blood within the subdural collection, traversed by a new subdural  drain. The mixed attenuating extra-axial fluid collection overlying  the right frontal convexity measures up to 1.7 cm with adjacent  anti-dependent pneumocephalus and increased sulcal effacement of the  underlying anterior right frontal lobe. However, the margin of the  frontal lobe remains convex. Slightly increased right to left midline  shift of 4 mm with partial effacement of the right lateral ventricle.  Minimal subdural hemorrhage along the right tentorium. Small amount of  subarachnoid hemorrhage over the right frontal lobe.    No acute loss of gray-white differentiation. Globes are unremarkable.  Paranasal sinuses and visualized portions of the mastoid air cells are  clear.      Impression    Impression:  Postsurgical changes of right subdural hematoma evacuation with  questionable mild tension pneumocephalus.    These findings were communicated to Ifrah Pantoja at 3:09PM on  12/10/2022 by Enrike Mcrae over the phone.     I have personally reviewed the examination and initial interpretation  and I agree with the findings.    DIONICIO GONZALEZ MD         SYSTEM ID:  [id number]   CT Head w/o Contrast     Status: None    Narrative    CT HEAD W/O CONTRAST 12/11/2022 5:53 AM    History: evaluate right subdural hematoma s/p crani for evacuation on  12/10   ICD-10:    Comparison: Head CT 12/10/2022    Technique: Using multidetector thin collimation helical acquisition  technique, axial, coronal and sagittal CT images from the skull  base  to the vertex were obtained without intravenous contrast.   (topogram) image(s) also obtained and reviewed.    Findings:   Postoperative changes of right frontoparietal craniotomy with partial  subjacent evacuation of underlying subdural hematoma. Stable drain in  the right frontal extra-axial collection. No new hemorrhage. Decreased  mixed attenuating extra-axial fluid collection overlying the right  frontal convexity now measuring up to 1.4 cm (previously 1.7 cm when  measured similarly) with decreased adjacent anti-dependent  pneumocephalus and sulcal effacement of the underlying anterior right  frontal lobe. Unchanged right to left midline shift of 4 mm with  partial effacement of the right lateral ventricle. Small amount of  subarachnoid hemorrhage over the right frontal lobe is unchanged. No  acute loss of gray-white differentiation.    Bony calvarium and bones of the skull base are unremarkable. Globes  are unremarkable. Paranasal sinuses and visualized portions of the  mastoid air cells are clear. Stable small amount of subcutaneous  emphysema about the craniotomy site.      Impression    Impression:  1. Decreased size of the extra-axial fluid collection overlying the  right frontal convexity with decreased pneumocephalus. Unchanged 4 mm  right-to-left midline shift. Right frontal drain in stable position.  2. Stable small amount of subarachnoid hemorrhage over the right  frontal lobe.    I have personally reviewed the examination and initial interpretation  and I agree with the findings.    DIONICIO GONZALEZ MD         SYSTEM ID:  [id number]   CT Head w/o Contrast     Status: None    Narrative    EXAM: CT HEAD W/O CONTRAST  12/12/2022 4:15 PM     HISTORY:  SDH evaluation s/p cranie for evacuation       COMPARISON:  12/11/2022    TECHNIQUE: Using multidetector thin collimation helical acquisition  technique, axial, coronal and sagittal CT images from the skull base  to the vertex were obtained without  intravenous contrast.   (topogram) image(s) also obtained and reviewed.    FINDINGS:    Changes of right parietal craniectomy with subdural drain in place.  Postsurgical pneumocephalus decreased from prior. Mixed density fluid  adjacent to the craniectomy site measuring 12 mm, unchanged. Unchanged  3 mm right-to-left midline shift and adjacent sulcal effacement.  Stable amount of subarachnoid hemorrhage overlying the right frontal  lobe is unchanged.    No acute loss of gray-white matter differentiation in the cerebral  hemispheres. Ventricles are proportionate to the cerebral sulci. Clear  basal cisterns.    The bony calvaria and the bones of the skull base are normal. The  visualized portions of the paranasal sinuses and mastoid air cells are  clear. Grossly normal orbits.       Impression    IMPRESSION:  1. Unchanged size of extra-axial fluid collection overlying the right  frontal convexity with decreased pneumocephalus. Similar 3 mm of  right-to-left midline shift. Right frontal drain in unchanged  position.  2. Unchanged small amount of subarachnoid hemorrhage over the right  frontal lobe.    I have personally reviewed the examination and initial interpretation  and I agree with the findings.    ABRAM SHOOK MD         SYSTEM ID:  [id number]   [place] Draw     Status: None    Narrative    The following orders were created for panel order [place] Draw.  Procedure                               Abnormality         Status                     ---------                               -----------         ------                     Extra Blue Top Tube[[id number]]                              Final result               Extra Red Top Tube[[id number]]                               Final result               Extra Green Top (Lithium...[[id number]]                      Final result               Extra Purple Top Tube[[id number]]                            Final result                 Please view results for these tests on  the individual orders.   Extra Blue Top Tube     Status: None   Result Value Ref Range    Hold Specimen JIC    Extra Red Top Tube     Status: None   Result Value Ref Range    Hold Specimen JIC    Extra Green Top (Lithium Heparin) Tube     Status: None   Result Value Ref Range    Hold Specimen JIC    Extra Purple Top Tube     Status: None   Result Value Ref Range    Hold Specimen JIC    CBC with platelets     Status: Abnormal   Result Value Ref Range    WBC Count 3.4 (L) 4.0 - 11.0 10e3/uL    RBC Count 4.24 3.80 - 5.20 10e6/uL    Hemoglobin 12.2 11.7 - 15.7 g/dL    Hematocrit 38.9 35.0 - 47.0 %    MCV 92 78 - 100 fL    MCH 28.8 26.5 - 33.0 pg    MCHC 31.4 (L) 31.5 - 36.5 g/dL    RDW 13.2 10.0 - 15.0 %    Platelet Count 215 150 - 450 10e3/uL   Comprehensive metabolic panel     Status: Abnormal   Result Value Ref Range    Sodium 139 136 - 145 mmol/L    Potassium 4.4 3.4 - 5.3 mmol/L    Chloride 105 98 - 107 mmol/L    Carbon Dioxide (CO2) 22 22 - 29 mmol/L    Anion Gap 12 7 - 15 mmol/L    Urea Nitrogen 14.5 8.0 - 23.0 mg/dL    Creatinine 0.80 0.51 - 0.95 mg/dL    Calcium 9.4 8.8 - 10.2 mg/dL    Glucose 95 70 - 99 mg/dL    Alkaline Phosphatase 69 35 - 104 U/L    AST 27 10 - 35 U/L    ALT 5 (L) 10 - 35 U/L    Protein Total 6.7 6.4 - 8.3 g/dL    Albumin 3.9 3.5 - 5.2 g/dL    Bilirubin Total 0.5 <=1.2 mg/dL    GFR Estimate 73 >60 mL/min/1.73m2   INR     Status: Normal   Result Value Ref Range    INR 1.08 0.85 - 1.15   Partial thromboplastin time     Status: Normal   Result Value Ref Range    aPTT 28 22 - 38 Seconds   Glucose by meter     Status: Abnormal   Result Value Ref Range    GLUCOSE BY METER POCT 102 (H) 70 - 99 mg/dL   INR     Status: Abnormal   Result Value Ref Range    INR 1.16 (H) 0.85 - 1.15   Partial thromboplastin time     Status: Normal   Result Value Ref Range    aPTT 29 22 - 38 Seconds   Glucose by meter     Status: Normal   Result Value Ref Range    GLUCOSE BY METER POCT 96 70 - 99 mg/dL   Extra Tube      Status: None    Narrative    The following orders were created for panel order Extra Tube.  Procedure                               Abnormality         Status                     ---------                               -----------         ------                     Extra Purple Top Tube[[id number]]                            Final result                 Please view results for these tests on the individual orders.   Extra Purple Top Tube     Status: None   Result Value Ref Range    Hold Specimen JIC    Extra Tube     Status: None    Narrative    The following orders were created for panel order Extra Tube.  Procedure                               Abnormality         Status                     ---------                               -----------         ------                     Extra Green Top (Lithium...[[id number]]                      Final result                 Please view results for these tests on the individual orders.   Extra Green Top (Lithium Heparin) Tube     Status: None   Result Value Ref Range    Hold Specimen JIC    Glucose by meter     Status: Abnormal   Result Value Ref Range    GLUCOSE BY METER POCT 168 (H) 70 - 99 mg/dL   CBC with platelets     Status: Abnormal   Result Value Ref Range    WBC Count 5.4 4.0 - 11.0 10e3/uL    RBC Count 3.30 (L) 3.80 - 5.20 10e6/uL    Hemoglobin 9.5 (L) 11.7 - 15.7 g/dL    Hematocrit 30.0 (L) 35.0 - 47.0 %    MCV 91 78 - 100 fL    MCH 28.8 26.5 - 33.0 pg    MCHC 31.7 31.5 - 36.5 g/dL    RDW 13.2 10.0 - 15.0 %    Platelet Count 144 (L) 150 - 450 10e3/uL   Basic metabolic panel     Status: Abnormal   Result Value Ref Range    Sodium 143 136 - 145 mmol/L    Potassium 3.8 3.4 - 5.3 mmol/L    Chloride 111 (H) 98 - 107 mmol/L    Carbon Dioxide (CO2) 23 22 - 29 mmol/L    Anion Gap 9 7 - 15 mmol/L    Urea Nitrogen 9.9 8.0 - 23.0 mg/dL    Creatinine 0.71 0.51 - 0.95 mg/dL    Calcium 8.1 (L) 8.8 - 10.2 mg/dL    Glucose 101 (H) 70 - 99 mg/dL    GFR Estimate 84 >60 mL/min/1.73m2    Magnesium     Status: Normal   Result Value Ref Range    Magnesium 1.7 1.7 - 2.3 mg/dL   Phosphorus     Status: Normal   Result Value Ref Range    Phosphorus 3.1 2.5 - 4.5 mg/dL   Glucose by meter     Status: Normal   Result Value Ref Range    GLUCOSE BY METER POCT 94 70 - 99 mg/dL   CBC with platelets     Status: Abnormal   Result Value Ref Range    WBC Count 5.0 4.0 - 11.0 10e3/uL    RBC Count 3.57 (L) 3.80 - 5.20 10e6/uL    Hemoglobin 10.2 (L) 11.7 - 15.7 g/dL    Hematocrit 32.2 (L) 35.0 - 47.0 %    MCV 90 78 - 100 fL    MCH 28.6 26.5 - 33.0 pg    MCHC 31.7 31.5 - 36.5 g/dL    RDW 13.1 10.0 - 15.0 %    Platelet Count 156 150 - 450 10e3/uL   Basic metabolic panel     Status: Abnormal   Result Value Ref Range    Sodium 143 136 - 145 mmol/L    Potassium 3.8 3.4 - 5.3 mmol/L    Chloride 109 (H) 98 - 107 mmol/L    Carbon Dioxide (CO2) 23 22 - 29 mmol/L    Anion Gap 11 7 - 15 mmol/L    Urea Nitrogen 4.6 (L) 8.0 - 23.0 mg/dL    Creatinine 0.66 0.51 - 0.95 mg/dL    Calcium 8.8 8.8 - 10.2 mg/dL    Glucose 93 70 - 99 mg/dL    GFR Estimate 87 >60 mL/min/1.73m2   Magnesium     Status: Normal   Result Value Ref Range    Magnesium 1.8 1.7 - 2.3 mg/dL   Phosphorus     Status: Normal   Result Value Ref Range    Phosphorus 2.5 2.5 - 4.5 mg/dL   EKG 12-lead, complete     Status: None   Result Value Ref Range    Systolic Blood Pressure  mmHg    Diastolic Blood Pressure  mmHg    Ventricular Rate 59 BPM    Atrial Rate 60 BPM    RI Interval  ms    QRS Duration 92 ms     ms    QTc 425 ms    P Axis  degrees    R AXIS -25 degrees    T Axis 28 degrees    Interpretation ECG       sinus rhythm  Minimal voltage criteria for LVH, may be normal variant  Cannot rule out Anterior infarct , age undetermined  Abnormal ECG  When compared with ECG of 19-OCT-2022 17:19,  ST no longer depressed in Inferior leads  T wave amplitude has decreased in Anterior leads  Confirmed by MD JEFFRY, ALISSA ([id number]) on 12/9/2022 7:44:29 AM     Adult Type and  Screen     Status: None   Result Value Ref Range    ABO/RH(D) A POS     Antibody Screen Negative Negative    SPECIMEN EXPIRATION DATE [id number]    ABO/Rh type and screen *Canceled*     Status: None ()    Narrative    The following orders were created for panel order ABO/Rh type and screen.  Procedure                               Abnormality         Status                     ---------                               -----------         ------                       Please view results for these tests on the individual orders.   ABO/Rh type and screen *Canceled*     Status: None ()    Narrative    The following orders were created for panel order ABO/Rh type and screen.  Procedure                               Abnormality         Status                     ---------                               -----------         ------                     Adult Type and Screen[[id number]]                                                         Please view results for these tests on the individual orders.   ABO/Rh type and screen     Status: None    Narrative    The following orders were created for panel order ABO/Rh type and screen.  Procedure                               Abnormality         Status                     ---------                               -----------         ------                     Adult Type and Screen[[id number]]                            Final result                 Please view results for these tests on the individual orders.     Medications   cetirizine (zyrTEC) tablet 10 mg ( Oral Unhold 12/10/22 1356)   escitalopram (LEXAPRO) tablet 5 mg (5 mg Oral Not Given 12/12/22 8780)   levothyroxine (SYNTHROID/LEVOTHROID) tablet 75 mcg (75 mcg Oral Not Given 12/12/22 0596)   psyllium (METAMUCIL/KONSYL) Packet 1 packet ( Oral Unhold 12/10/22 1356)   senna-docusate (SENOKOT-S/PERICOLACE) 8.6-50 MG per tablet 1 tablet (1 tablet Oral Not Given 12/12/22 6369)   ferrous gluconate (FERGON) tablet 324 mg (324 mg Oral  Not Given 12/12/22 0750)   naloxone (NARCAN) injection 0.2 mg ( Intravenous Unhold 12/10/22 1356)     Or   naloxone (NARCAN) injection 0.4 mg ( Intravenous Unhold 12/10/22 1356)     Or   naloxone (NARCAN) injection 0.2 mg ( Intramuscular Unhold 12/10/22 1356)     Or   naloxone (NARCAN) injection 0.4 mg ( Intramuscular Unhold 12/10/22 1356)   polyethylene glycol (MIRALAX) Packet 17 g (17 g Oral Not Given 12/12/22 0748)   lidocaine 1 % 0.1-1 mL (has no administration in time range)   lidocaine (LMX4) cream (has no administration in time range)   sodium chloride (PF) 0.9% PF flush 3 mL (3 mLs Intracatheter Given 12/12/22 2204)   sodium chloride (PF) 0.9% PF flush 3 mL (has no administration in time range)   labetalol (NORMODYNE/TRANDATE) injection 10-40 mg (has no administration in time range)   hydrALAZINE (APRESOLINE) injection 10-20 mg (has no administration in time range)   sodium chloride 0.9% infusion (0 mLs Intravenous Stopped 12/11/22 0700)   acetaminophen (TYLENOL) tablet 650 mg (has no administration in time range)   ondansetron (ZOFRAN ODT) ODT tab 4 mg ( Oral See Alternative 12/12/22 1128)     Or   ondansetron (ZOFRAN) injection 4 mg (4 mg Intravenous Given 12/12/22 1128)   prochlorperazine (COMPAZINE) injection 5 mg (5 mg Intravenous Not Given 12/10/22 1408)     Or   prochlorperazine (COMPAZINE) tablet 5 mg ( Oral See Alternative 12/10/22 1408)   magnesium hydroxide (MILK OF MAGNESIA) suspension 30 mL (has no administration in time range)   bisacodyl (DULCOLAX) suppository 10 mg (has no administration in time range)   oxyCODONE IR (ROXICODONE) half-tab 2.5 mg (has no administration in time range)     Or   oxyCODONE (ROXICODONE) tablet 5 mg (has no administration in time range)   HYDROmorphone (PF) (DILAUDID) injection 0.3-0.5 mg (0.3 mg Intravenous Given 12/11/22 1014)   levETIRAcetam (KEPPRA) 750 mg in sodium chloride 0.9 % 100 mL intermittent infusion (750 mg Intravenous New Bag 12/12/22 1939)   sodium  chloride 0.9% infusion ( Intravenous New Bag 12/12/22 2203)   acetaminophen (TYLENOL) Suppository 650 mg (650 mg Rectal Given 12/12/22 2135)   clindamycin (CLEOCIN) infusion 900 mg (900 mg Intravenous New Bag 12/8/22 1851)   lidocaine (PF) (XYLOCAINE) 1 % injection 10 mL (10 mLs Subcutaneous Given 12/8/22 2007)   midazolam (VERSED) injection 1 mg (1 mg Intravenous Given 12/8/22 2007)   acetaminophen (TYLENOL) tablet 975 mg (0 mg Oral Return to Cabinet 12/10/22 0818)   levETIRAcetam (KEPPRA) intermittent infusion 1,000 mg (1,000 mg Intravenous Given 12/10/22 0951)   clindamycin (CLEOCIN) infusion 900 mg (900 mg Intravenous New Bag 12/11/22 0639)   0.9% sodium chloride BOLUS (1,000 mLs Intravenous New Bag 12/10/22 2253)   potassium chloride 10 mEq in 100 mL sterile water infusion (10 mEq Intravenous New Bag 12/11/22 0750)   magnesium sulfate 2 g in water intermittent infusion (2 g Intravenous New Bag 12/11/22 0520)   potassium chloride 10 mEq in 100 mL sterile water infusion (10 mEq Intravenous New Bag 12/12/22 0621)   magnesium sulfate 2 g in water intermittent infusion (2 g Intravenous New Bag 12/12/22 0519)   potassium phosphate 9 mmol in 250 mL D5W intermittent infusion (9 mmol Intravenous Given 12/12/22 0856)        Assessments & Plan (with Medical Decision Making)   83-year-old female with chronic subdural increasing in size with associated midline shift.  Neurological examination is stable.  Patient has been sent from the neurosurgical clinic.  Discussed with neurosurgery consult and she is admitted to their service for further management and  Evacuation of SDH  Labs reviewed and unrevealing, type and screen ordered.  Will continue every 4 hours neurochecks  Antiemetics and analgesics as needed        I have reviewed the nursing notes. I have reviewed the findings, diagnosis, plan and need for follow up with the patient.    Current Discharge Medication List          Final diagnoses:   SDH (subdural hematoma)        --  Domingo Turcios MD  [facility] EMERGENCY DEPARTMENT  12/8/2022     Domingo Turcios MD  12/13/22 0358

## 2022-12-08 NOTE — PROGRESS NOTES
Neurosurgery Clinic Note     Chief Complaint: hospital follow-up      DATE OF VISIT: 12/8/2022     HPI:   Serge Marin is a 82 year old female PMHx Bipolar I disorder, hypothyroidism who was seen for SDH identified on imaging in ED on 10/19/22. She presented to her PCP this morning and was observed to have altered mental status and a large ecchymosis on her right forehead; she was advised to present to the ED for evaluation. Imaging at that time identified a chronic-appearing ~11mm SDH on the right frontal convexity with 3-4mm midline shift as well as a left occipital condyle fracture. Patient reported falling 3 days prior to presentation and lost consciousness, eventually walking up on the floor. She denied neck pain and her C-spine was cleared.  Repeat head CT was obtained during hospitalization and was found to be stable.  Patient was discharged home on 10/21/22.   Patient was last seen on 11/3/2022 and at that time the subdural collection was stable in size and at that time was asked to follow-up in 2 weeks with repeat imaging, however this appointment was scheduled incorrectly and the patient did not present for repeat imaging or appointment. The patient subsequently presents today.   Since her last visit the patient denies headache but continues to noted intermittent dizziness when she lies down at night.   The episodes last approximately 5 seconds and resolve spontaneously.  Denies nausea, vomiting, weakness, word finding difficulties, or vision changes. The patient states she has not fallen since her last appointment one month ago.  Patient denies prior use of anti-platelet or anti-coagulant medications.  Patient recently underwent nephrostomy tube placement and was hospitalized from 11/23-11/28/22, following this hospitalization the patient was transferred to a transitional care facility ([facility] [phone]) and has been residing there since.  Prior to  hospitalization the patient was living in an independent living facility.                      Review of Systems   Negative except in HPI     Past Medical History        Past Medical History:   Diagnosis Date     Hernia, abdominal              Past Surgical History         Past Surgical History:   Procedure Laterality Date     CHOLECYSTECTOMY         COLONOSCOPY Left 03/18/2015     Procedure: COMBINED COLONOSCOPY, SINGLE OR MULTIPLE BIOPSY/POLYPECTOMY BY BIOPSY;  Surgeon: Stone Lauren MD;  Location:  GI     GYN SURGERY         HERNIA REPAIR                Social History           Socioeconomic History     Marital status:    Tobacco Use     Smoking status: Never     Smokeless tobacco: Never   Substance and Sexual Activity     Alcohol use: No     Drug use: No         family history is not on file.                Physical Exam   LMP  (LMP Unknown)   Constitutional: Oriented to person, place, and time. Appears well-developed and well-nourished. Cooperative. No distress.   HENT: Head normocephalic and atraumatic.   Neurological: alert and oriented to person, place, and time. CN II-XII grossly  intact        STRENGTH LEFT RIGHT   Deltoid 5 5   Bicep 5 5   Wrist Extensor 5 5   Tricep 5 5   Finger flexion 5 5   Finger abduction 5 5    5 5         Hip Flexion       5       5   Knee Extension 5 5   Ankle Dorsiflexion 5 5   Extensor Hallucis Longus 5 5   Plantar Flexion 5 5   Foot eversion 5 5   Foot inversion 5 5        Skin: Skin is warm, dry and intact.   Psychiatric: Normal mood and affect. Speech is normal and behavior is normal.        IMAGING:  Head CT obtained on 12/8/2022 personally reviewed, interval increase in right subdural hematoma size and midline shift.      ASSESSMENT:  Serge Marin is a 82 year old female recently hospitalized after sustaining a fall and found to have a chronic appearing subdural hematoma     PLAN:  Given the ongoing increased size of the right subdural hematoma on  imaging, the patient was discussed with staff Neurosurgeon, Dr. Dat Avila.   It was felt given the continued increase in size the patient would require either MMA emobolization or evacuation.   I discussed both options with both the patient and her son who is her POA (Delfina Marin).  Given difficulty with finding transportation for follow-up and the fact she is significantly hard of hearing and often unable to understand conversations over the phone it was felt that transfer to [facility] ED for admission for intervention would be the best option.     Prior to transfer I also spoke with the patient's prior TCU facility ([facility]) to update her care team of the current situation.  Patient and son were both agreeable to this plan.           I spent 45 minutes in patient care with greater than 50% spent in counseling and/or coordination of care.     I performed independent visualization of radiographic imaging and entered my own interpretation, reviewed and/or ordered tests in radiology           JASSON Bullock, CNP  Department of Neurosurgery  Pager: [phone]

## 2022-12-08 NOTE — H&P
General acute hospital       NEUROSURGERY H&P NOTE    HPI:  Serge Marin is a 82 year old female PMHx Bipolar I disorder, hypothyroidism, recent nephrostomy tube placement for renal calculi and chronic subdural hematoma which was sustained from a fall on 10/16/22. She was admitted to the hospital for the chronic SDH which was found to be stable on repeat head CT and she showed clinical improvement and was discharged from the hospital with plans to follow up in the clinic 2 weeks after. There was an issue with scheduling and she was not seen for her original appointment and subsequently did not return to the clinic until 12/8/22. She had a repeat CT head which demonstrated growth in her SDH. Given difficulty with finding transportation for follow-up and the fact she is significantly hard of hearing and often unable to understand conversations over the phone it was felt that transfer to [facility] ED for admission for intervention would be the best option.      She states that she has not had any more recent falls. She endorses intermittent dizziness when she lays down at night. She states that she has headaches but they have significantly improved from her last admission. She does not take any anticoagulation and does not smoke. She has not noticed any other new symptoms since her discharge.     She is currently living at a TCU ([facility]).    No recent fevers, chills, nausea, vomiting, chest pain, shortness of breath, and denies headaches, weakness, LOC, numbness/weakness/paresthesias in extremities, changes in sensation, taste, smell, nor trouble speaking or other neurologic symptoms.    PAST MEDICAL HISTORY:   Past Medical History:   Diagnosis Date     Anemia      Bipolar disorder (H)      Elevated fasting glucose      Hernia, abdominal      Kidney stone      Nonimmune to hepatitis B virus      Subdural hematoma      Thyroid disease     Hypothyroid       PAST  SURGICAL HISTORY:   Past Surgical History:   Procedure Laterality Date     CHOLECYSTECTOMY       COLONOSCOPY Left 03/18/2015    Procedure: COMBINED COLONOSCOPY, SINGLE OR MULTIPLE BIOPSY/POLYPECTOMY BY BIOPSY;  Surgeon: Stone Lauren MD;  Location:  GI     GYN SURGERY       HERNIA REPAIR       IR NEPHROLITHOTOMY  11/22/2022     PERCUTANEOUS NEPHROLITHOTOMY Left 11/22/2022    Procedure: Percutaneous Nephrolithotomy;  Surgeon: Stevo Talbot MD;  Location:  OR       FAMILY HISTORY: No family history on file.    SOCIAL HISTORY:   Social History     Tobacco Use     Smoking status: Never     Smokeless tobacco: Never   Substance Use Topics     Alcohol use: No       MEDICATIONS:  Current Outpatient Medications   Medication Sig Dispense Refill     acetaminophen (TYLENOL) 325 MG tablet Take 1-2 tablets (325-650 mg) by mouth every 6 hours as needed for mild pain 90 tablet 0     acetaminophen (TYLENOL) 500 MG tablet Take 500 mg by mouth every 6 hours as needed for mild pain       calcium carbonate-vitamin D (OSCAL W/D) 500-200 MG-UNIT tablet Take 1 tablet by mouth daily       cetirizine (ZYRTEC) 10 MG tablet Take 10 mg by mouth daily as needed for allergies       clotrimazole (LOTRIMIN) 1 % external cream Apply topically 2 times daily as needed       escitalopram (LEXAPRO) 10 MG tablet Take 5 mg by mouth daily (0.5 x 10 mg = 5 mg)       ferrous gluconate (FERGON) 324 (38 FE) MG tablet Take 1 tablet (324 mg) by mouth daily (with breakfast) 90 tablet 3     levothyroxine (SYNTHROID, LEVOTHROID) 75 MCG tablet Take 1 tablet (75 mcg) by mouth daily 30 tablet 3     loperamide (IMODIUM) 2 MG capsule Take 2 mg by mouth 4 times daily as needed for diarrhea       psyllium (METAMUCIL/KONSYL) Packet Take 1 packet by mouth daily as needed for constipation       senna-docusate (SENOKOT-S/PERICOLACE) 8.6-50 MG tablet Take 1 tablet by mouth 2 times daily 30 tablet 0       Allergies:  Allergies   Allergen Reactions     Penicillins       Bactrim [Sulfamethoxazole W/Trimethoprim] Itching     Patient prescribed Bactrim at eye appointment. Assisted living reports eyes were red and itching.        ROS: 10 point ROS of systems including Constitutional, Eyes, Respiratory, Cardiovascular, Gastroenterology, Genitourinary, Integumentary, Muscularskeletal, Psychiatric were all negative except for pertinent positives noted in my HPI.     Physical exam:   Blood pressure 123/63, pulse 76, temperature 98.1  F (36.7  C), temperature source Oral, resp. rate 15, SpO2 95 %, not currently breastfeeding.  General: awake and alert  HEENT: atraumatic, normocephalic  PULM: breathing comfortably on room air  NEUROLOGIC:  -- Awake; Alert; oriented x 3  -- Follows commands briskly  -- +repetition, and naming  -- Speech fluent, spontaneous. No aphasia or dysarthria.  -- no gaze preference. No apparent hemineglect.  Cranial Nerves:  -- visual fields full to confrontation, PERRL 3-2mm bilat and brisk, extraocular movements intact  -- face symmetrical, tongue midline  -- sensory V1-V3 intact bilaterally  -- palate elevates symmetrically, uvula midline  -- hearing grossly intact bilaterally, diminished globally, presbycusis    -- Trapezii 5/5 strength bilat symmetric  -- Cerebellar:  intact rapid alternating motions bilaterally    Motor:  Normal bulk / tone for age; no tremor, rigidity, or bradykinesia.  No muscle wasting or fasciculations     Delt Bi Tri Hand Flexion/  Extension Iliopsoas Quadriceps Tibialis Anterior EHL Gastroc    C5 C6 C7 C8/T1 L2 L3 L4 L5 S1   R 5 5 5 5 5 5 5 5 5   L 5 5 5 5 5 5 5 5 5   Sensory:  intact to LT x 4 extremities     Reflexes:     Bi Tri BR Melodie Pat Ach Bab    C5-6 C7-8 C6 UMN L2-4 S1 UMN   R 2+ 2+ 2+ Norm 2+ 2+ Norm   L 2+ 2+ 2+ Norm 2+ 2+ Norm     Gait: Uses walker for ambulation       IMAGING:  EXAM: CT HEAD W/O CONTRAST  12/8/2022 9:08 AM      HISTORY:  right SDH; Subdural hematoma        COMPARISON:  CT 11/3/2022,  10/20/2022     TECHNIQUE: Using multidetector thin collimation helical acquisition  technique, axial, coronal and sagittal CT images from the skull base  to the vertex were obtained without intravenous contrast.   (topogram) image(s) also obtained and reviewed.     FINDINGS:  Slightly increased size of the predominantly hypodense subdural  hematoma overlying the right frontal convexity with some continued  layering hyperdensity posteriorly. This measures up to 1.6 cm  posteriorly, measuring up to 1.4 cm. There is slightly increased mass  effect on the right frontal lobe. Interval increase in leftward  midline shift, now measuring up to 6 mm, previously 4 mm. No new  intracranial hemorrhage. No acute loss of gray-white matter  differentiation in the cerebral hemispheres. Ventricles are  proportionate to the cerebral sulci. Clear basal cisterns. Unchanged  low density collection over the lateral superior right cerebral  hemisphere.     The bony calvaria and the bones of the skull base are normal. The  visualized portions of the paranasal sinuses and mastoid air cells are  clear. Grossly normal orbits.                                                                       IMPRESSION:  Slightly increased size of the predominantly hypodense  subdural hematoma overlying the right frontal convexity with increased  mass effect on the right frontal lobe and slightly worsened leftward  midline shift now measuring up to 6 mm.       LABS:   Lab Results   Component Value Date    WBC 3.4 (L) 12/08/2022    WBC 6.9 11/24/2022    WBC 7.7 11/23/2022    HGB 12.2 12/08/2022    HGB 11.9 11/27/2022    HGB 11.0 (L) 11/24/2022    HCT 38.9 12/08/2022    HCT 35.1 11/24/2022    HCT 38.7 11/23/2022     12/08/2022    PLT 99 (L) 11/24/2022     (L) 11/23/2022     12/08/2022     11/24/2022     11/23/2022    POTASSIUM 4.4 12/08/2022    POTASSIUM 3.6 11/24/2022    POTASSIUM 3.9 11/23/2022    CHLORIDE 105  12/08/2022    CHLORIDE 112 (H) 11/24/2022    CHLORIDE 107 11/23/2022    CO2 22 12/08/2022    CO2 22 11/24/2022    CO2 25 11/23/2022    BUN 14.5 12/08/2022    BUN 13 11/24/2022    BUN 16 11/23/2022    CR 0.80 12/08/2022    CR 0.95 11/24/2022    CR 1.14 (H) 11/23/2022    GLC 95 12/08/2022    GLC 92 11/24/2022     (H) 11/24/2022    DD 0.7 (H) 04/01/2013    TROPI <0.012 07/18/2013    TROPI <0.012 04/01/2013    TROPI <0.012 04/01/2013    AST 27 12/08/2022    AST 17 10/19/2022    AST 49 (H) 08/04/2022    ALT 5 (L) 12/08/2022    ALT 12 10/19/2022    ALT 18 08/04/2022    ALKPHOS 69 12/08/2022    ALKPHOS 79 10/19/2022    ALKPHOS 41 08/04/2022    BILITOTAL 0.5 12/08/2022    BILITOTAL 0.5 10/19/2022    BILITOTAL 0.8 08/04/2022    INR 1.08 12/08/2022    INR 1.04 10/19/2022       ASSESSMENT:  82 year old female with known chronic subdural hematoma which has increased in size with increase of midline shift who is at her neurological baseline without new focal findings.     PLAN:  Admit to Neurosurgery, ICU  Will place a SEPS drain for evacuation of the SDH  Repeat head CT after placement of drain  Activity: ambulate with assist  Q4H neuro exams   Multimodal Pain control  Supplemental oxygen PRN  Incentive spirometry Q1H while awake  Regular Diet  Bowel regimen. PRN anti-emetics.  Continue to monitor for fevers and/or signs of infection  Platelets > 100,000  INR < 1.5  Hemoglobin > 8  DVT: SCDs while in bed  Disposition: ICU   PT/OT consulted    The patient was discussed with Dr. Avila neurosurgery staff, and neurosurgery chief resident Dr. Juarez, who agree with the above.    Dagoberto Ricketts MD PGY1  Plastic and Reconstructive Surgery  Neurosurgery

## 2022-12-08 NOTE — ED TRIAGE NOTES
BIBA from clinic. Recent hx of falls with known SDH that neuro has been tracking. SDH larger on CT scan today. Confused.       Triage Assessment     Row Name 12/08/22 1204       Triage Assessment (Adult)    Airway WDL WDL       Respiratory WDL    Respiratory WDL WDL       Skin Circulation/Temperature WDL    Skin Circulation/Temperature WDL WDL       Cardiac WDL    Cardiac WDL WDL       Peripheral/Neurovascular WDL    Peripheral Neurovascular WDL WDL       Cognitive/Neuro/Behavioral WDL    Cognitive/Neuro/Behavioral WDL X    Level of Consciousness confused

## 2022-12-09 ENCOUNTER — ANESTHESIA EVENT (OUTPATIENT)
Dept: SURGERY | Facility: CLINIC | Age: 82
DRG: 025 | End: 2022-12-09
Payer: COMMERCIAL

## 2022-12-09 LAB
ABO/RH(D): NORMAL
ANTIBODY SCREEN: NEGATIVE
ATRIAL RATE - MUSE: 60 BPM
DIASTOLIC BLOOD PRESSURE - MUSE: NORMAL MMHG
INTERPRETATION ECG - MUSE: NORMAL
P AXIS - MUSE: NORMAL DEGREES
PR INTERVAL - MUSE: NORMAL MS
QRS DURATION - MUSE: 92 MS
QT - MUSE: 430 MS
QTC - MUSE: 425 MS
R AXIS - MUSE: -25 DEGREES
SPECIMEN EXPIRATION DATE: NORMAL
SYSTOLIC BLOOD PRESSURE - MUSE: NORMAL MMHG
T AXIS - MUSE: 28 DEGREES
VENTRICULAR RATE- MUSE: 59 BPM

## 2022-12-09 PROCEDURE — 86901 BLOOD TYPING SEROLOGIC RH(D): CPT | Performed by: STUDENT IN AN ORGANIZED HEALTH CARE EDUCATION/TRAINING PROGRAM

## 2022-12-09 PROCEDURE — 250N000013 HC RX MED GY IP 250 OP 250 PS 637: Performed by: STUDENT IN AN ORGANIZED HEALTH CARE EDUCATION/TRAINING PROGRAM

## 2022-12-09 PROCEDURE — 36415 COLL VENOUS BLD VENIPUNCTURE: CPT | Performed by: STUDENT IN AN ORGANIZED HEALTH CARE EDUCATION/TRAINING PROGRAM

## 2022-12-09 PROCEDURE — 120N000002 HC R&B MED SURG/OB UMMC

## 2022-12-09 PROCEDURE — 86850 RBC ANTIBODY SCREEN: CPT | Performed by: STUDENT IN AN ORGANIZED HEALTH CARE EDUCATION/TRAINING PROGRAM

## 2022-12-09 PROCEDURE — 258N000003 HC RX IP 258 OP 636: Performed by: STUDENT IN AN ORGANIZED HEALTH CARE EDUCATION/TRAINING PROGRAM

## 2022-12-09 PROCEDURE — 250N000011 HC RX IP 250 OP 636: Performed by: STUDENT IN AN ORGANIZED HEALTH CARE EDUCATION/TRAINING PROGRAM

## 2022-12-09 RX ORDER — ONDANSETRON 4 MG/1
4 TABLET, ORALLY DISINTEGRATING ORAL EVERY 30 MIN PRN
Status: DISCONTINUED | OUTPATIENT
Start: 2022-12-09 | End: 2022-12-10 | Stop reason: HOSPADM

## 2022-12-09 RX ORDER — FENTANYL CITRATE 50 UG/ML
25 INJECTION, SOLUTION INTRAMUSCULAR; INTRAVENOUS EVERY 5 MIN PRN
Status: DISCONTINUED | OUTPATIENT
Start: 2022-12-09 | End: 2022-12-10 | Stop reason: HOSPADM

## 2022-12-09 RX ORDER — NALOXONE HYDROCHLORIDE 0.4 MG/ML
0.2 INJECTION, SOLUTION INTRAMUSCULAR; INTRAVENOUS; SUBCUTANEOUS
Status: DISCONTINUED | OUTPATIENT
Start: 2022-12-09 | End: 2022-12-15 | Stop reason: HOSPADM

## 2022-12-09 RX ORDER — CLINDAMYCIN PHOSPHATE 900 MG/50ML
900 INJECTION, SOLUTION INTRAVENOUS
Status: DISCONTINUED | OUTPATIENT
Start: 2022-12-09 | End: 2022-12-10 | Stop reason: HOSPADM

## 2022-12-09 RX ORDER — SENNOSIDES 8.6 MG
1 TABLET ORAL 2 TIMES DAILY
Status: DISCONTINUED | OUTPATIENT
Start: 2022-12-09 | End: 2022-12-09

## 2022-12-09 RX ORDER — NALOXONE HYDROCHLORIDE 0.4 MG/ML
0.4 INJECTION, SOLUTION INTRAMUSCULAR; INTRAVENOUS; SUBCUTANEOUS
Status: DISCONTINUED | OUTPATIENT
Start: 2022-12-09 | End: 2022-12-15 | Stop reason: HOSPADM

## 2022-12-09 RX ORDER — HYDROMORPHONE HCL IN WATER/PF 6 MG/30 ML
0.4 PATIENT CONTROLLED ANALGESIA SYRINGE INTRAVENOUS EVERY 5 MIN PRN
Status: DISCONTINUED | OUTPATIENT
Start: 2022-12-09 | End: 2022-12-10 | Stop reason: HOSPADM

## 2022-12-09 RX ORDER — LABETALOL HYDROCHLORIDE 5 MG/ML
10 INJECTION, SOLUTION INTRAVENOUS
Status: DISCONTINUED | OUTPATIENT
Start: 2022-12-09 | End: 2022-12-10 | Stop reason: HOSPADM

## 2022-12-09 RX ORDER — SODIUM CHLORIDE, SODIUM LACTATE, POTASSIUM CHLORIDE, CALCIUM CHLORIDE 600; 310; 30; 20 MG/100ML; MG/100ML; MG/100ML; MG/100ML
INJECTION, SOLUTION INTRAVENOUS CONTINUOUS
Status: DISCONTINUED | OUTPATIENT
Start: 2022-12-09 | End: 2022-12-10 | Stop reason: HOSPADM

## 2022-12-09 RX ORDER — FENTANYL CITRATE 50 UG/ML
50 INJECTION, SOLUTION INTRAMUSCULAR; INTRAVENOUS EVERY 5 MIN PRN
Status: DISCONTINUED | OUTPATIENT
Start: 2022-12-09 | End: 2022-12-10 | Stop reason: HOSPADM

## 2022-12-09 RX ORDER — OXYCODONE HYDROCHLORIDE 5 MG/1
5 TABLET ORAL EVERY 4 HOURS PRN
Status: DISCONTINUED | OUTPATIENT
Start: 2022-12-09 | End: 2022-12-10

## 2022-12-09 RX ORDER — HYDROMORPHONE HCL IN WATER/PF 6 MG/30 ML
0.2 PATIENT CONTROLLED ANALGESIA SYRINGE INTRAVENOUS
Status: DISCONTINUED | OUTPATIENT
Start: 2022-12-09 | End: 2022-12-09

## 2022-12-09 RX ORDER — ONDANSETRON 2 MG/ML
4 INJECTION INTRAMUSCULAR; INTRAVENOUS EVERY 30 MIN PRN
Status: DISCONTINUED | OUTPATIENT
Start: 2022-12-09 | End: 2022-12-10 | Stop reason: HOSPADM

## 2022-12-09 RX ORDER — CLINDAMYCIN PHOSPHATE 900 MG/50ML
900 INJECTION, SOLUTION INTRAVENOUS SEE ADMIN INSTRUCTIONS
Status: DISCONTINUED | OUTPATIENT
Start: 2022-12-09 | End: 2022-12-10 | Stop reason: HOSPADM

## 2022-12-09 RX ORDER — LIDOCAINE 40 MG/G
CREAM TOPICAL
Status: DISCONTINUED | OUTPATIENT
Start: 2022-12-09 | End: 2022-12-10 | Stop reason: HOSPADM

## 2022-12-09 RX ORDER — POLYETHYLENE GLYCOL 3350 17 G/17G
17 POWDER, FOR SOLUTION ORAL DAILY
Status: DISCONTINUED | OUTPATIENT
Start: 2022-12-09 | End: 2022-12-15 | Stop reason: HOSPADM

## 2022-12-09 RX ORDER — ACETAMINOPHEN 325 MG/1
975 TABLET ORAL
Status: COMPLETED | OUTPATIENT
Start: 2022-12-09 | End: 2022-12-10

## 2022-12-09 RX ORDER — HYDROMORPHONE HCL IN WATER/PF 6 MG/30 ML
0.2 PATIENT CONTROLLED ANALGESIA SYRINGE INTRAVENOUS EVERY 5 MIN PRN
Status: DISCONTINUED | OUTPATIENT
Start: 2022-12-09 | End: 2022-12-10 | Stop reason: HOSPADM

## 2022-12-09 RX ADMIN — ESCITALOPRAM OXALATE 5 MG: 5 TABLET, FILM COATED ORAL at 08:44

## 2022-12-09 RX ADMIN — HYDROMORPHONE HYDROCHLORIDE 0.2 MG: 0.2 INJECTION, SOLUTION INTRAMUSCULAR; INTRAVENOUS; SUBCUTANEOUS at 03:27

## 2022-12-09 RX ADMIN — LEVOTHYROXINE SODIUM 75 MCG: 0.07 TABLET ORAL at 08:44

## 2022-12-09 RX ADMIN — FERROUS GLUCONATE 324 MG: 324 TABLET ORAL at 08:44

## 2022-12-09 RX ADMIN — SODIUM CHLORIDE, POTASSIUM CHLORIDE, SODIUM LACTATE AND CALCIUM CHLORIDE: 600; 310; 30; 20 INJECTION, SOLUTION INTRAVENOUS at 23:00

## 2022-12-09 ASSESSMENT — ACTIVITIES OF DAILY LIVING (ADL)
ADLS_ACUITY_SCORE: 39
ADLS_ACUITY_SCORE: 41
ADLS_ACUITY_SCORE: 43
ADLS_ACUITY_SCORE: 43
ADLS_ACUITY_SCORE: 47
ADLS_ACUITY_SCORE: 42
ADLS_ACUITY_SCORE: 42
ADLS_ACUITY_SCORE: 39
ADLS_ACUITY_SCORE: 42

## 2022-12-09 ASSESSMENT — VISUAL ACUITY
OU: BASELINE
OU: BASELINE

## 2022-12-09 NOTE — PROGRESS NOTES
Neurocritical Care Multi-Disciplinary Note    Reason for critical care admission: subdural hematoma  Admitting Team: neurosurgery  Date of Service:  12/09/2022  Date of Admission:  12/8/2022  Hospital Day: 2    Patient condition reviewed and discussed while on multidisciplinary rounds today.   Please note these minor interventions that were initiated:  1. None    The Neurocritical Care service will continue to follow peripherally while the patient is within the ICU. We are readily available should issues arise. Please feel free to contact us for critical care issues with which we may be of assistance. For all other concerns, please contact primary service first.     Please contact NCC team phone at *[phone]    JASSON Lewis, CNP  Neurocritical Care  *[phone]  Text Page

## 2022-12-09 NOTE — PLAN OF CARE
[facility]      Patient Name: Serge Marin MRN# [id number]   Age: 82 year old YOB: 1940   Date of Admission:12/8/2022      ICU End of Shift Summary. Please see flowsheets for vital signs and detailed assessment data.    Changes this shift: No acute events or changes noted overnight. Patient markedly hard of hearing--Pocke[employer]er effectively employed to facilitate communication. Patient placed on right side to facilitate postural drainage, with approximately 11 mL of sanguineous output from right subdural evacuating port system (SEPS) between placement time and 0000. Patient placed in Trendelenburg position from [id number] to promote postural drainage; however, no appreciable increase in output observed. Patient with asymptomatic bradycardia overnight. Hydromorphone IV PRN given for subjective report of 7/10 pain associated with SEPS--effective, per patient. Of note, bulb suction only remains for approximately 5 minutes, requiring frequent re-compression.     Plan: Continue with current plan of care, assessing neurological status, controlling pain, and assessing/promoting SEPS output via postural assistance.     Goal Outcome Evaluation:      Plan of Care Reviewed With: patient    Overall Patient Progress: improving        Shin Vitale RN  Critical Care Flex Team

## 2022-12-09 NOTE — PROGRESS NOTES
[facility], [place]   12/09/2022  Neurosurgery Progress Note:    Assessment:  Serge Marin is a 82 year old female with known chronic subdural hematoma which has increased in size with increase of midline shift who is at her neurological baseline without new focal findings.     Patient is postprocedure day 1 status post right subdural evacuating port system (SEPS) drain insertion for subdural hematoma evacuation.    Plan:  -Continue to monitor for SEPS drain output-can turn the patient towards the right side to facilitate drainage  - Serial neuro exams  - Pain control  - HOB > 30 degrees  - Advance diet as tolerates  - Bowel regimen  - PRN antiemetics  - IVF until taking adequate PO  - PT/OT  - SCDs for DVT proph  - Tentative OR tomorrow for craniotomy and evacuation of hematoma  - Preop orders/labs/marking and consenting to be completed  -----------------------------------  Raul Haley  Neurosurgery Resident PGY2    Interval History: No acute events overnight; status post right SEPS drain insertion; neurological examination stable.  Feels same as before in terms of clinical status and symptoms.    Objective:   Temp:  [97.7  F (36.5  C)-98.1  F (36.7  C)] 97.9  F (36.6  C)  Pulse:  [50-76] 57  Resp:  [11-19] 16  BP: ()/(49-84) 107/53  SpO2:  [93 %-98 %] 97 %  I/O last 3 completed shifts:  In: 360 [P.O.:360]  Out: 16 [Drains:16]    Gen: Appears comfortable, NAD  Neurologic:  - Alert & Oriented to person, place, time, and situation  - Follows commands briskly  - Speech fluent, spontaneous. No aphasia or dysarthria.  - No gaze preference. No apparent hemineglect.  - PERRL, EOMI  - Face symmetric with sensation intact to light touch  - Palate elevates symmetrically, uvula midline, tongue protrudes midline  - Trapezii muscles 5/5 bilaterally  - No pronator drift     Del Tr Bi WE WF Gr   R 5 5 5 5 5 5   L 5 5 5 5 5 5    HF KE KF DF PF EHL   R 5 5 5 5 5 5   L 5 5 5 5 5 5      Reflexes 2+ throughout    Sensation intact and symmetric to light touch throughout    LABS:  Recent Labs   Lab 12/08/22  1804 12/08/22  1213   NA  --  139   POTASSIUM  --  4.4   CHLORIDE  --  105   CO2  --  22   ANIONGAP  --  12   * 95   BUN  --  14.5   CR  --  0.80   BERTA  --  9.4       Recent Labs   Lab 12/08/22  1213   WBC 3.4*   RBC 4.24   HGB 12.2   HCT 38.9   MCV 92   MCH 28.8   MCHC 31.4*   RDW 13.2          IMAGING:  Recent Results (from the past 24 hour(s))   CT Head w/o Contrast    Narrative    CT HEAD W/O CONTRAST 12/8/2022 8:02 PM    History: S/p right subdural seps drain   Comparison: Earlier CT head    Technique: Using multidetector thin collimation helical acquisition  technique, axial, coronal and sagittal CT images from the skull base  to the vertex were obtained without intravenous contrast.   (topogram) image(s) also obtained and reviewed.    Findings: Interval placement of right frontal SEPS drain. No  significant change in size of predominantly hypodense subdural  hematoma overlying the right frontal convexity with small hyperdense  component layering posteriorly and near the entrance point of the  drain. Mass effect on adjacent brain parenchyma with slight increase  in right to left midline shift, measuring 7 mm, previously 6 mm.  Unchanged low density collection over the lateral superior right  cerebellar hemisphere. No acute loss of gray-white matter  differentiation. Ventricles are proportionate to the cerebral sulci.  The basal cisterns are clear.    The bony calvaria and the bones of the skull base are normal. The  visualized portions of the paranasal sinuses and mastoid air cells are  clear.      Impression    Impression:  1. Interval placement of a right frontal SEPS drain. Stable size of  predominantly hypodense right frontoparietal subdural collection with  small amount of new hyperdense component adjacent to the drain.  2. Slightly increased leftward midline  shift    I have personally reviewed the examination and initial interpretation  and I agree with the findings.    LEYDA PEARSON MD         SYSTEM ID:  [id number]

## 2022-12-09 NOTE — PROGRESS NOTES
SEPS Drain Procedure Note     Date of Procedure: 12/8/2022    Pre procedure Diagnosis: Subdural hematoma  Post procedure Diagnosis: Subdural hematoma  Consent: Obtained  Anesthesia: Local, versed, fentanyl  Time Out Performed  Procedure: Right sided SEPS drain Placement.     Details of the Procedure:  - Bed was positioned for unencumbered access for sterile procedure  - Patient was supine with head in the neutral position. Right side of the head was widely shaved using clippers.  - Antibiotics were given pre-procedure. 0.5 mg versed and 50 mcg of fentanyl were given.  - Anatomical landmarks were used for defining the trajectory and entry point for the SEPS drain. The incision was marked.  - The site was then prepped and draped in the standard sterile fashion.  - 8 mls of 1% lidocaine was injected.  - Using a # 15 blade a 1-2 cm incision was made.  - A hand drill was used to make a regina hole.  - A #11 blade was used to make dural opening.  - The key to the hand drill was marked at 3 cm depth and inserted through the dural opening to break all the membranes.   - The drain 'bolt' from the kit was inserted through the skull opening and tightened to hand tightness.   - Drain tubing was then connected to the external part of the bolt and this was further connected to the bulb with bulb at suction.   - Pursestring suture 3/0 Nylon was taken around the bolt and was then tied to the bolt.   - Occlusive dressing and 4x4 gauze were laid around the bolt and tegaderm was applied.      Dr. Avila, neurosurgery staff was immediately available throughout all portions of the case.     Vera Weber MD PhD  Neurosurgery Resident PGY1

## 2022-12-09 NOTE — PROGRESS NOTES
Admitted/transferred from: ED   Reason for admission/transfer: SDH evacuation   2 RN skin assessment: completed by Jeana MILLER and Le ALMODOVAR   Result of skin assessment and interventions/actions: skin tags, scattered bruising and bruise on right eye. Skin tags scattered  Height, weight, drug calc weight: Done  Patient belongings (see Flowsheet)  MDRO education added to care planN/A  ?

## 2022-12-10 ENCOUNTER — APPOINTMENT (OUTPATIENT)
Dept: CT IMAGING | Facility: CLINIC | Age: 82
DRG: 025 | End: 2022-12-10
Attending: STUDENT IN AN ORGANIZED HEALTH CARE EDUCATION/TRAINING PROGRAM
Payer: COMMERCIAL

## 2022-12-10 ENCOUNTER — ANESTHESIA (OUTPATIENT)
Dept: SURGERY | Facility: CLINIC | Age: 82
DRG: 025 | End: 2022-12-10
Payer: COMMERCIAL

## 2022-12-10 LAB
APTT PPP: 29 SECONDS (ref 22–38)
GLUCOSE BLDC GLUCOMTR-MCNC: 168 MG/DL (ref 70–99)
GLUCOSE BLDC GLUCOMTR-MCNC: 96 MG/DL (ref 70–99)
HOLD SPECIMEN: NORMAL
HOLD SPECIMEN: NORMAL
INR PPP: 1.16 (ref 0.85–1.15)

## 2022-12-10 PROCEDURE — 250N000011 HC RX IP 250 OP 636: Performed by: NEUROLOGICAL SURGERY

## 2022-12-10 PROCEDURE — 00C40ZZ EXTIRPATION OF MATTER FROM INTRACRANIAL SUBDURAL SPACE, OPEN APPROACH: ICD-10-PCS | Performed by: NEUROLOGICAL SURGERY

## 2022-12-10 PROCEDURE — 250N000025 HC SEVOFLURANE, PER MIN: Performed by: NEUROLOGICAL SURGERY

## 2022-12-10 PROCEDURE — 36415 COLL VENOUS BLD VENIPUNCTURE: CPT | Performed by: STUDENT IN AN ORGANIZED HEALTH CARE EDUCATION/TRAINING PROGRAM

## 2022-12-10 PROCEDURE — 250N000011 HC RX IP 250 OP 636: Performed by: STUDENT IN AN ORGANIZED HEALTH CARE EDUCATION/TRAINING PROGRAM

## 2022-12-10 PROCEDURE — C1763 CONN TISS, NON-HUMAN: HCPCS | Performed by: NEUROLOGICAL SURGERY

## 2022-12-10 PROCEDURE — 258N000003 HC RX IP 258 OP 636: Performed by: STUDENT IN AN ORGANIZED HEALTH CARE EDUCATION/TRAINING PROGRAM

## 2022-12-10 PROCEDURE — 250N000009 HC RX 250: Performed by: NEUROLOGICAL SURGERY

## 2022-12-10 PROCEDURE — 258N000003 HC RX IP 258 OP 636: Performed by: ANESTHESIOLOGY

## 2022-12-10 PROCEDURE — C1713 ANCHOR/SCREW BN/BN,TIS/BN: HCPCS | Performed by: NEUROLOGICAL SURGERY

## 2022-12-10 PROCEDURE — 250N000009 HC RX 250: Performed by: ANESTHESIOLOGY

## 2022-12-10 PROCEDURE — 70450 CT HEAD/BRAIN W/O DYE: CPT | Mod: 26 | Performed by: RADIOLOGY

## 2022-12-10 PROCEDURE — 70450 CT HEAD/BRAIN W/O DYE: CPT

## 2022-12-10 PROCEDURE — 710N000010 HC RECOVERY PHASE 1, LEVEL 2, PER MIN: Performed by: NEUROLOGICAL SURGERY

## 2022-12-10 PROCEDURE — 00P000Z REMOVAL OF DRAINAGE DEVICE FROM BRAIN, OPEN APPROACH: ICD-10-PCS | Performed by: NEUROLOGICAL SURGERY

## 2022-12-10 PROCEDURE — 250N000013 HC RX MED GY IP 250 OP 250 PS 637: Performed by: STUDENT IN AN ORGANIZED HEALTH CARE EDUCATION/TRAINING PROGRAM

## 2022-12-10 PROCEDURE — 250N000011 HC RX IP 250 OP 636: Performed by: ANESTHESIOLOGY

## 2022-12-10 PROCEDURE — 61312 CRNEC/CRNOT STTL XDRL/SDRL: CPT | Mod: GC | Performed by: NEUROLOGICAL SURGERY

## 2022-12-10 PROCEDURE — 272N000001 HC OR GENERAL SUPPLY STERILE: Performed by: NEUROLOGICAL SURGERY

## 2022-12-10 PROCEDURE — 360N000078 HC SURGERY LEVEL 5, PER MIN: Performed by: NEUROLOGICAL SURGERY

## 2022-12-10 PROCEDURE — 200N000002 HC R&B ICU UMMC

## 2022-12-10 PROCEDURE — 85730 THROMBOPLASTIN TIME PARTIAL: CPT | Performed by: STUDENT IN AN ORGANIZED HEALTH CARE EDUCATION/TRAINING PROGRAM

## 2022-12-10 PROCEDURE — 370N000017 HC ANESTHESIA TECHNICAL FEE, PER MIN: Performed by: NEUROLOGICAL SURGERY

## 2022-12-10 PROCEDURE — 85610 PROTHROMBIN TIME: CPT | Performed by: STUDENT IN AN ORGANIZED HEALTH CARE EDUCATION/TRAINING PROGRAM

## 2022-12-10 PROCEDURE — 278N000051 HC OR IMPLANT GENERAL: Performed by: NEUROLOGICAL SURGERY

## 2022-12-10 PROCEDURE — 999N000141 HC STATISTIC PRE-PROCEDURE NURSING ASSESSMENT: Performed by: NEUROLOGICAL SURGERY

## 2022-12-10 DEVICE — GRAFT DURAGEN 2X2" ID220: Type: IMPLANTABLE DEVICE | Site: CRANIAL | Status: FUNCTIONAL

## 2022-12-10 DEVICE — IMP SCR SYN MATRIX LOW PRO 1.5X04MM SELF DRILL 04.503.104.01: Type: IMPLANTABLE DEVICE | Site: CRANIAL | Status: FUNCTIONAL

## 2022-12-10 DEVICE — IMP BUR HOLE COVER 17MM LOW PROFILE TI 421.527: Type: IMPLANTABLE DEVICE | Site: CRANIAL | Status: FUNCTIONAL

## 2022-12-10 DEVICE — IMP PLATE SYN BOX LOW PROFILE 04H TI 421.511: Type: IMPLANTABLE DEVICE | Site: CRANIAL | Status: FUNCTIONAL

## 2022-12-10 RX ORDER — LEVETIRACETAM 10 MG/ML
1000 INJECTION INTRAVASCULAR ONCE
Status: COMPLETED | OUTPATIENT
Start: 2022-12-10 | End: 2022-12-10

## 2022-12-10 RX ORDER — HYDROMORPHONE HYDROCHLORIDE 1 MG/ML
0.4 INJECTION, SOLUTION INTRAMUSCULAR; INTRAVENOUS; SUBCUTANEOUS EVERY 5 MIN PRN
Status: DISCONTINUED | OUTPATIENT
Start: 2022-12-10 | End: 2022-12-10 | Stop reason: HOSPADM

## 2022-12-10 RX ORDER — FENTANYL CITRATE 50 UG/ML
50 INJECTION, SOLUTION INTRAMUSCULAR; INTRAVENOUS EVERY 5 MIN PRN
Status: DISCONTINUED | OUTPATIENT
Start: 2022-12-10 | End: 2022-12-10 | Stop reason: HOSPADM

## 2022-12-10 RX ORDER — PROPOFOL 10 MG/ML
INJECTION, EMULSION INTRAVENOUS PRN
Status: DISCONTINUED | OUTPATIENT
Start: 2022-12-10 | End: 2022-12-10

## 2022-12-10 RX ORDER — SODIUM CHLORIDE, SODIUM GLUCONATE, SODIUM ACETATE, POTASSIUM CHLORIDE AND MAGNESIUM CHLORIDE 526; 502; 368; 37; 30 MG/100ML; MG/100ML; MG/100ML; MG/100ML; MG/100ML
INJECTION, SOLUTION INTRAVENOUS CONTINUOUS PRN
Status: DISCONTINUED | OUTPATIENT
Start: 2022-12-10 | End: 2022-12-10

## 2022-12-10 RX ORDER — ACETAMINOPHEN 325 MG/1
650 TABLET ORAL EVERY 4 HOURS PRN
Status: DISCONTINUED | OUTPATIENT
Start: 2022-12-13 | End: 2022-12-15 | Stop reason: HOSPADM

## 2022-12-10 RX ORDER — ONDANSETRON 2 MG/ML
INJECTION INTRAMUSCULAR; INTRAVENOUS PRN
Status: DISCONTINUED | OUTPATIENT
Start: 2022-12-10 | End: 2022-12-10

## 2022-12-10 RX ORDER — CLINDAMYCIN PHOSPHATE 900 MG/50ML
900 INJECTION, SOLUTION INTRAVENOUS EVERY 8 HOURS
Status: COMPLETED | OUTPATIENT
Start: 2022-12-10 | End: 2022-12-11

## 2022-12-10 RX ORDER — AMOXICILLIN 250 MG
1 CAPSULE ORAL 2 TIMES DAILY
Status: DISCONTINUED | OUTPATIENT
Start: 2022-12-10 | End: 2022-12-10

## 2022-12-10 RX ORDER — ACETAMINOPHEN 325 MG/1
975 TABLET ORAL EVERY 8 HOURS
Status: DISCONTINUED | OUTPATIENT
Start: 2022-12-10 | End: 2022-12-12

## 2022-12-10 RX ORDER — OXYCODONE HYDROCHLORIDE 5 MG/1
5 TABLET ORAL EVERY 4 HOURS PRN
Status: DISCONTINUED | OUTPATIENT
Start: 2022-12-10 | End: 2022-12-15 | Stop reason: HOSPADM

## 2022-12-10 RX ORDER — FENTANYL CITRATE 50 UG/ML
INJECTION, SOLUTION INTRAMUSCULAR; INTRAVENOUS PRN
Status: DISCONTINUED | OUTPATIENT
Start: 2022-12-10 | End: 2022-12-10

## 2022-12-10 RX ORDER — LIDOCAINE HYDROCHLORIDE 20 MG/ML
INJECTION, SOLUTION INFILTRATION; PERINEURAL PRN
Status: DISCONTINUED | OUTPATIENT
Start: 2022-12-10 | End: 2022-12-10

## 2022-12-10 RX ORDER — PROCHLORPERAZINE MALEATE 5 MG
5 TABLET ORAL EVERY 6 HOURS PRN
Status: DISCONTINUED | OUTPATIENT
Start: 2022-12-10 | End: 2022-12-15 | Stop reason: HOSPADM

## 2022-12-10 RX ORDER — EPHEDRINE SULFATE 50 MG/ML
INJECTION, SOLUTION INTRAMUSCULAR; INTRAVENOUS; SUBCUTANEOUS PRN
Status: DISCONTINUED | OUTPATIENT
Start: 2022-12-10 | End: 2022-12-10

## 2022-12-10 RX ORDER — HYDRALAZINE HYDROCHLORIDE 20 MG/ML
10-20 INJECTION INTRAMUSCULAR; INTRAVENOUS EVERY 30 MIN PRN
Status: DISCONTINUED | OUTPATIENT
Start: 2022-12-10 | End: 2022-12-15 | Stop reason: HOSPADM

## 2022-12-10 RX ORDER — GLYCOPYRROLATE 0.2 MG/ML
INJECTION, SOLUTION INTRAMUSCULAR; INTRAVENOUS PRN
Status: DISCONTINUED | OUTPATIENT
Start: 2022-12-10 | End: 2022-12-10

## 2022-12-10 RX ORDER — ONDANSETRON 2 MG/ML
4 INJECTION INTRAMUSCULAR; INTRAVENOUS EVERY 6 HOURS PRN
Status: DISCONTINUED | OUTPATIENT
Start: 2022-12-10 | End: 2022-12-15 | Stop reason: HOSPADM

## 2022-12-10 RX ORDER — POLYETHYLENE GLYCOL 3350 17 G/17G
17 POWDER, FOR SOLUTION ORAL DAILY
Status: DISCONTINUED | OUTPATIENT
Start: 2022-12-11 | End: 2022-12-10

## 2022-12-10 RX ORDER — LIDOCAINE 40 MG/G
CREAM TOPICAL
Status: DISCONTINUED | OUTPATIENT
Start: 2022-12-10 | End: 2022-12-15 | Stop reason: HOSPADM

## 2022-12-10 RX ORDER — ONDANSETRON 2 MG/ML
4 INJECTION INTRAMUSCULAR; INTRAVENOUS EVERY 30 MIN PRN
Status: DISCONTINUED | OUTPATIENT
Start: 2022-12-10 | End: 2022-12-10 | Stop reason: HOSPADM

## 2022-12-10 RX ORDER — SODIUM CHLORIDE 9 MG/ML
INJECTION, SOLUTION INTRAVENOUS CONTINUOUS
Status: ACTIVE | OUTPATIENT
Start: 2022-12-10 | End: 2022-12-11

## 2022-12-10 RX ORDER — HYDROMORPHONE HYDROCHLORIDE 1 MG/ML
.3-.5 INJECTION, SOLUTION INTRAMUSCULAR; INTRAVENOUS; SUBCUTANEOUS
Status: DISCONTINUED | OUTPATIENT
Start: 2022-12-10 | End: 2022-12-15 | Stop reason: HOSPADM

## 2022-12-10 RX ORDER — DEXAMETHASONE SODIUM PHOSPHATE 4 MG/ML
INJECTION, SOLUTION INTRA-ARTICULAR; INTRALESIONAL; INTRAMUSCULAR; INTRAVENOUS; SOFT TISSUE PRN
Status: DISCONTINUED | OUTPATIENT
Start: 2022-12-10 | End: 2022-12-10

## 2022-12-10 RX ORDER — LABETALOL HYDROCHLORIDE 5 MG/ML
10-40 INJECTION, SOLUTION INTRAVENOUS EVERY 10 MIN PRN
Status: DISCONTINUED | OUTPATIENT
Start: 2022-12-10 | End: 2022-12-15 | Stop reason: HOSPADM

## 2022-12-10 RX ORDER — ONDANSETRON 4 MG/1
4 TABLET, ORALLY DISINTEGRATING ORAL EVERY 30 MIN PRN
Status: DISCONTINUED | OUTPATIENT
Start: 2022-12-10 | End: 2022-12-10 | Stop reason: HOSPADM

## 2022-12-10 RX ORDER — SODIUM CHLORIDE, SODIUM LACTATE, POTASSIUM CHLORIDE, CALCIUM CHLORIDE 600; 310; 30; 20 MG/100ML; MG/100ML; MG/100ML; MG/100ML
INJECTION, SOLUTION INTRAVENOUS CONTINUOUS
Status: DISCONTINUED | OUTPATIENT
Start: 2022-12-10 | End: 2022-12-10 | Stop reason: HOSPADM

## 2022-12-10 RX ORDER — ONDANSETRON 4 MG/1
4 TABLET, ORALLY DISINTEGRATING ORAL EVERY 6 HOURS PRN
Status: DISCONTINUED | OUTPATIENT
Start: 2022-12-10 | End: 2022-12-15 | Stop reason: HOSPADM

## 2022-12-10 RX ORDER — HYDROMORPHONE HYDROCHLORIDE 1 MG/ML
0.2 INJECTION, SOLUTION INTRAMUSCULAR; INTRAVENOUS; SUBCUTANEOUS EVERY 5 MIN PRN
Status: DISCONTINUED | OUTPATIENT
Start: 2022-12-10 | End: 2022-12-10 | Stop reason: HOSPADM

## 2022-12-10 RX ORDER — FENTANYL CITRATE 50 UG/ML
25 INJECTION, SOLUTION INTRAMUSCULAR; INTRAVENOUS EVERY 5 MIN PRN
Status: DISCONTINUED | OUTPATIENT
Start: 2022-12-10 | End: 2022-12-10 | Stop reason: HOSPADM

## 2022-12-10 RX ORDER — SODIUM CHLORIDE, SODIUM LACTATE, POTASSIUM CHLORIDE, CALCIUM CHLORIDE 600; 310; 30; 20 MG/100ML; MG/100ML; MG/100ML; MG/100ML
INJECTION, SOLUTION INTRAVENOUS CONTINUOUS PRN
Status: DISCONTINUED | OUTPATIENT
Start: 2022-12-10 | End: 2022-12-10

## 2022-12-10 RX ORDER — BISACODYL 10 MG
10 SUPPOSITORY, RECTAL RECTAL DAILY PRN
Status: DISCONTINUED | OUTPATIENT
Start: 2022-12-10 | End: 2022-12-15 | Stop reason: HOSPADM

## 2022-12-10 RX ADMIN — FENTANYL CITRATE 50 MCG: 50 INJECTION, SOLUTION INTRAMUSCULAR; INTRAVENOUS at 11:13

## 2022-12-10 RX ADMIN — LEVOTHYROXINE SODIUM 75 MCG: 0.07 TABLET ORAL at 07:27

## 2022-12-10 RX ADMIN — SENNOSIDES AND DOCUSATE SODIUM 1 TABLET: 8.6; 5 TABLET ORAL at 20:39

## 2022-12-10 RX ADMIN — SODIUM CHLORIDE, POTASSIUM CHLORIDE, SODIUM LACTATE AND CALCIUM CHLORIDE: 600; 310; 30; 20 INJECTION, SOLUTION INTRAVENOUS at 09:21

## 2022-12-10 RX ADMIN — CLINDAMYCIN IN 5 PERCENT DEXTROSE 900 MG: 18 INJECTION, SOLUTION INTRAVENOUS at 15:14

## 2022-12-10 RX ADMIN — GLYCOPYRROLATE 0.2 MG: 0.2 INJECTION, SOLUTION INTRAMUSCULAR; INTRAVENOUS at 08:55

## 2022-12-10 RX ADMIN — PHENYLEPHRINE HYDROCHLORIDE 0.3 MCG/KG/MIN: 10 INJECTION INTRAVENOUS at 09:21

## 2022-12-10 RX ADMIN — SUGAMMADEX 150 MG: 100 INJECTION, SOLUTION INTRAVENOUS at 11:18

## 2022-12-10 RX ADMIN — LEVETIRACETAM 1000 MG: 10 INJECTION INTRAVENOUS at 09:51

## 2022-12-10 RX ADMIN — ACETAMINOPHEN 975 MG: 325 TABLET, FILM COATED ORAL at 22:21

## 2022-12-10 RX ADMIN — FENTANYL CITRATE 50 MCG: 50 INJECTION, SOLUTION INTRAMUSCULAR; INTRAVENOUS at 10:30

## 2022-12-10 RX ADMIN — CLINDAMYCIN PHOSPHATE 900 MG: 900 INJECTION, SOLUTION INTRAVENOUS at 08:34

## 2022-12-10 RX ADMIN — SODIUM CHLORIDE, SODIUM GLUCONATE, SODIUM ACETATE, POTASSIUM CHLORIDE AND MAGNESIUM CHLORIDE: 526; 502; 368; 37; 30 INJECTION, SOLUTION INTRAVENOUS at 08:30

## 2022-12-10 RX ADMIN — SODIUM CHLORIDE 1000 ML: 9 INJECTION, SOLUTION INTRAVENOUS at 22:53

## 2022-12-10 RX ADMIN — Medication 20 MG: at 09:48

## 2022-12-10 RX ADMIN — FENTANYL CITRATE 100 MCG: 50 INJECTION, SOLUTION INTRAMUSCULAR; INTRAVENOUS at 08:43

## 2022-12-10 RX ADMIN — Medication 10 MG: at 09:13

## 2022-12-10 RX ADMIN — LIDOCAINE HYDROCHLORIDE 65 MG: 20 INJECTION, SOLUTION INFILTRATION; PERINEURAL at 08:44

## 2022-12-10 RX ADMIN — Medication 50 MG: at 08:44

## 2022-12-10 RX ADMIN — LEVETIRACETAM 750 MG: 100 INJECTION, SOLUTION INTRAVENOUS at 20:40

## 2022-12-10 RX ADMIN — ONDANSETRON 4 MG: 2 INJECTION INTRAMUSCULAR; INTRAVENOUS at 11:01

## 2022-12-10 RX ADMIN — PHENYLEPHRINE HYDROCHLORIDE 100 MCG: 10 INJECTION INTRAVENOUS at 08:54

## 2022-12-10 RX ADMIN — ONDANSETRON 4 MG: 2 INJECTION INTRAMUSCULAR; INTRAVENOUS at 14:09

## 2022-12-10 RX ADMIN — PROPOFOL 90 MG: 10 INJECTION, EMULSION INTRAVENOUS at 08:44

## 2022-12-10 RX ADMIN — SODIUM CHLORIDE: 9 INJECTION, SOLUTION INTRAVENOUS at 12:38

## 2022-12-10 RX ADMIN — Medication 5 MG: at 10:39

## 2022-12-10 RX ADMIN — CLINDAMYCIN IN 5 PERCENT DEXTROSE 900 MG: 18 INJECTION, SOLUTION INTRAVENOUS at 22:28

## 2022-12-10 RX ADMIN — PHENYLEPHRINE HYDROCHLORIDE 100 MCG: 10 INJECTION INTRAVENOUS at 08:47

## 2022-12-10 RX ADMIN — Medication 10 MG: at 08:53

## 2022-12-10 RX ADMIN — DEXAMETHASONE SODIUM PHOSPHATE 4 MG: 4 INJECTION, SOLUTION INTRA-ARTICULAR; INTRALESIONAL; INTRAMUSCULAR; INTRAVENOUS; SOFT TISSUE at 09:14

## 2022-12-10 RX ADMIN — FENTANYL CITRATE 50 MCG: 50 INJECTION, SOLUTION INTRAMUSCULAR; INTRAVENOUS at 09:46

## 2022-12-10 ASSESSMENT — VISUAL ACUITY
OU: BASELINE
OU: BASELINE

## 2022-12-10 ASSESSMENT — ACTIVITIES OF DAILY LIVING (ADL)
ADLS_ACUITY_SCORE: 47
ADLS_ACUITY_SCORE: 44
ADLS_ACUITY_SCORE: 44
ADLS_ACUITY_SCORE: 47
ADLS_ACUITY_SCORE: 50
ADLS_ACUITY_SCORE: 47
ADLS_ACUITY_SCORE: 50
ADLS_ACUITY_SCORE: 44
ADLS_ACUITY_SCORE: 47
ADLS_ACUITY_SCORE: 50
ADLS_ACUITY_SCORE: 44
ADLS_ACUITY_SCORE: 50

## 2022-12-10 NOTE — ANESTHESIA PREPROCEDURE EVALUATION
Anesthesia Pre-Procedure Evaluation    Patient: Serge Marin   MRN: [id number] : 1940        Procedure : Procedure(s):  RT CRANIOTOMY FOR RT SUBDURAL HEMATOMA EVACUATION          Past Medical History:   Diagnosis Date     Anemia      Bipolar disorder (H)      Elevated fasting glucose      Hernia, abdominal      Kidney stone      Nonimmune to hepatitis B virus      Subdural hematoma      Thyroid disease     Hypothyroid      Past Surgical History:   Procedure Laterality Date     CHOLECYSTECTOMY       COLONOSCOPY Left 2015    Procedure: COMBINED COLONOSCOPY, SINGLE OR MULTIPLE BIOPSY/POLYPECTOMY BY BIOPSY;  Surgeon: Stone Lauren MD;  Location: [facility]     GYN SURGERY       HERNIA REPAIR       IR NEPHROLITHOTOMY  2022     PERCUTANEOUS NEPHROLITHOTOMY Left 2022    Procedure: Percutaneous Nephrolithotomy;  Surgeon: Stevo Talbot MD;  Location:  OR      Allergies   Allergen Reactions     Penicillins      Bactrim [Sulfamethoxazole W/Trimethoprim] Itching     Patient prescribed Bactrim at eye appointment. Assisted living reports eyes were red and itching.       Social History     Tobacco Use     Smoking status: Never     Smokeless tobacco: Never   Substance Use Topics     Alcohol use: No      Wt Readings from Last 1 Encounters:   22 51.3 kg (113 lb 1.5 oz)        Anesthesia Evaluation            ROS/MED HX  ENT/Pulmonary:    (-) sleep apnea   Neurologic: Comment: Known chronic SDH from fall 10/16/22 which has increased in size with increase of midline shift who is at her neurological baseline without new focal findings, s/p right subdural evacuating port system (SEPS) drain insertion on 22    Hearing loss      Cardiovascular:     (+) -----Previous cardiac testing   Echo: Date: Results:    Stress Test: Date: Results:    ECG Reviewed: Date: 22 Results:  Sinus rhythm   Minimal voltage criteria for LVH, may be normal variant  Cath: Date: Results:      METS/Exercise  Tolerance:     Hematologic:  - neg hematologic  ROS     Musculoskeletal:       GI/Hepatic:    (-) GERD   Renal/Genitourinary: Comment: Recent nephrostomy tube placement for renal calculi    (+) Nephrolithiasis ,     Endo:     (+) thyroid problem, hypothyroidism,     Psychiatric/Substance Use:     (+) psychiatric history bipolar     Infectious Disease:  - neg infectious disease ROS     Malignancy:       Other:            Physical Exam    Airway        Mallampati: II   TM distance: > 3 FB   Neck ROM: full   Mouth opening: > 3 cm    Respiratory Devices and Support         Dental         B=Bridge, C=Chipped, L=Loose, M=Missing    Cardiovascular   cardiovascular exam normal          Pulmonary                   OUTSIDE LABS:  CBC:   Lab Results   Component Value Date    WBC 3.4 (L) 12/08/2022    WBC 6.9 11/24/2022    HGB 12.2 12/08/2022    HGB 11.9 11/27/2022    HCT 38.9 12/08/2022    HCT 35.1 11/24/2022     12/08/2022    PLT 99 (L) 11/24/2022     BMP:   Lab Results   Component Value Date     12/08/2022     11/24/2022    POTASSIUM 4.4 12/08/2022    POTASSIUM 3.6 11/24/2022    CHLORIDE 105 12/08/2022    CHLORIDE 112 (H) 11/24/2022    CO2 22 12/08/2022    CO2 22 11/24/2022    BUN 14.5 12/08/2022    BUN 13 11/24/2022    CR 0.80 12/08/2022    CR 0.95 11/24/2022     (H) 12/08/2022    GLC 95 12/08/2022     COAGS:   Lab Results   Component Value Date    PTT 28 12/08/2022    INR 1.08 12/08/2022     POC: No results found for: BGM, HCG, HCGS  HEPATIC:   Lab Results   Component Value Date    ALBUMIN 3.9 12/08/2022    PROTTOTAL 6.7 12/08/2022    ALT 5 (L) 12/08/2022    AST 27 12/08/2022    ALKPHOS 69 12/08/2022    BILITOTAL 0.5 12/08/2022     OTHER:   Lab Results   Component Value Date    LACT 1.1 08/04/2022    A1C 5.0 04/19/2013    BERTA 9.4 12/08/2022    PHOS 2.9 10/20/2022    MAG 1.9 10/20/2022    TSH 2.83 10/19/2022    T4 0.72 (L) 02/01/2016    T3 147 09/16/2005    CRP 8.0 08/07/2022       Anesthesia  Plan    ASA Status:  3   NPO Status:  NPO Appropriate    Anesthesia Type: General.     - Airway: ETT   Induction: Intravenous.   Maintenance: Balanced.   Techniques and Equipment:     - Lines/Monitors: 2nd IV, Arterial Line     Consents    Anesthesia Plan(s) and associated risks, benefits, and realistic alternatives discussed. Questions answered and patient/representative(s) expressed understanding.     - Discussed: Risks, Benefits and Alternatives for BOTH SEDATION and the PROCEDURE were discussed     - Discussed with:  Patient         Postoperative Care    Pain management: IV analgesics, Oral pain medications, Multi-modal analgesia.   PONV prophylaxis: Ondansetron (or other 5HT-3), Dexamethasone or Solumedrol     Comments:                Rafael Fischer MD

## 2022-12-10 NOTE — PROGRESS NOTES
[facility], [place]   12/10/2022  Neurosurgery Progress Note:    Assessment:  Serge Marin is a 82 year old female with known chronic subdural hematoma which has increased in size with increase of midline shift who is at her neurological baseline without new focal findings.     Patient is postprocedure day 2 status post right subdural evacuating port system (SEPS) drain insertion for subdural hematoma evacuation.    Plan:  -OR Today for right craniotomy for evacuation of hematoma   - Serial neuro exams  - SCDs for DVT proph  - Preop orders/labs/marking and consenting completed  -----------------------------------  Raul Haley  Neurosurgery Resident PGY2    Interval History: No acute events overnight; status post right SEPS drain insertion; neurological examination stable.  Feels same as before in terms of clinical status and symptoms.    Objective:   Temp:  [96  F (35.6  C)-98.3  F (36.8  C)] 97.5  F (36.4  C)  Pulse:  [57-90] 90  Resp:  [12-17] 17  BP: ()/(46-71) 119/71  MAP:  [67 mmHg] 67 mmHg  Arterial Line BP: ()/(47-50) 100/47  SpO2:  [97 %-100 %] 99 %  I/O last 3 completed shifts:  In: 780 [P.O.:780]  Out: 153 [Urine:150; Drains:3]    Gen: Appears comfortable, NAD  Neurologic:  - Alert & Oriented to person, place, time, and situation  - Follows commands briskly  - Speech fluent, spontaneous. No aphasia or dysarthria.  - No gaze preference. No apparent hemineglect.  - PERRL, EOMI  - Face symmetric with sensation intact to light touch  - Palate elevates symmetrically, uvula midline, tongue protrudes midline  - Trapezii muscles 5/5 bilaterally  - No pronator drift     Del Tr Bi WE WF Gr   R 5 5 5 5 5 5   L 5 5 5 5 5 5    HF KE KF DF PF EHL   R 5 5 5 5 5 5   L 5 5 5 5 5 5     Reflexes 2+ throughout    Sensation intact and symmetric to light touch throughout    LABS:  Recent Labs   Lab 12/10/22  0201 12/08/22  1804 12/08/22  1213   NA  --   --  139   POTASSIUM  --    --  4.4   CHLORIDE  --   --  105   CO2  --   --  22   ANIONGAP  --   --  12   GLC 96 102* 95   BUN  --   --  14.5   CR  --   --  0.80   BERTA  --   --  9.4       Recent Labs   Lab 12/08/22  1213   WBC 3.4*   RBC 4.24   HGB 12.2   HCT 38.9   MCV 92   MCH 28.8   MCHC 31.4*   RDW 13.2          IMAGING:  No results found for this or any previous visit (from the past 24 hour(s)).

## 2022-12-10 NOTE — PLAN OF CARE
Arrived from: 4A  Belongings/meds: Personal wheelchair, and clothing remain with patient.   2 RN Skin Assessment Completed by: Rose Marie Unger RNs  Non-intact findings documented (yes/no/NA): Blanchable redness to coccyx. SEPS drain to bulb suction (no output currently) with dressing.     Status: S/p SEPS drain insertion for chronic SDH that has increased in size 12/8.   Vitals: VSS, RA.   Neuros: A&Ox4. Forgetful. [place] bilaterally. 4/5 strength t/o. Weak d/p.   IV: PIV x2 SL.   Labs/Electrolytes: WDL.   Resp/trach: LS CTA.   Diet: Regular diet.   Bowel status: LBM 12/8. BS+.   : Voiding spontaneously, intermittent incontinence.  Skin: See above.   Pain: Denies upon last assessment. Intermittent temporal HA.   Activity: Up with 1, GB.   Social: No visitors today.   Plan: OR tomorrow for SDH evac.   Updates this shift: Pre-op orders released. Needs surgical scrub. NPO at midnight. Per Vera Weber from neurosurgery, keep SEPS system to bulb suction, no need to strip drain.

## 2022-12-10 NOTE — OR NURSING
Removed patient's ring on left finger, placed the ring in a specimen bag and put patient's label on it. Placed the bag inside the patient's chart.

## 2022-12-10 NOTE — PLAN OF CARE
Status: S/p SEPS drain insertion for chronic SDH that has increased in size 12/8  Vitals: VSS  Neuros: A/ox4. Forgetful. Very [place] bilaterally, no hearing aids available. 4/5 strength throughout.  IV: PIV x2, infusing LR at 100/hr.  Labs/Electrolytes: AM labs  Resp/trach: LS clear, RA.  Diet: Regular  Bowel status: LBM 12/8  : Incontinence, purewick placed.  Skin: Generalized bruising, blanchable redness to coxxyx. SEPS drain to bulb suction to right forehead.  Pain: Denies. Intermittent right temporal headache.  Activity: 1A GB.  Plan: OR in AM for SDH evac.  Updates this shift: CHG bath/new linens completed [place] and am of surgery. NPO since midnight 12/9. Report given to 3C GOLD Graham prior to transfer to preop.

## 2022-12-10 NOTE — ANESTHESIA PROCEDURE NOTES
Airway       Patient location during procedure: OR       Procedure Start/Stop Times: 12/10/2022 8:47 AM  Staff -        CRNA: Geneva Gil APRN CRNA       Performed By: CRNA  Consent for Airway        Urgency: elective  Indications and Patient Condition       Indications for airway management: artemio-procedural       Induction type:intravenous       Mask difficulty assessment: 1 - vent by mask    Final Airway Details       Final airway type: endotracheal airway       Successful airway: ETT - single  Endotracheal Airway Details        ETT size (mm): 7.0       Cuffed: yes       Successful intubation technique: direct laryngoscopy       DL Blade Type: MAC 3       Grade View of Cords: 1       Adjucts: stylet       Position: Right       Measured from: lips       Secured at (cm): 21       Bite block used: None    Post intubation assessment        Placement verified by: capnometry, equal breath sounds and chest rise        Number of attempts at approach: 1       Number of other approaches attempted: 0       Secured with: pink tape       Ease of procedure: easy       Dentition: Intact and Unchanged    Medication(s) Administered   Medication Administration Time: 12/10/2022 8:47 AM    Additional Comments       Bottom middle tooth extremely loose prior to intubation. Intact following intubation

## 2022-12-10 NOTE — ANESTHESIA POSTPROCEDURE EVALUATION
Patient: Serge Marin    Procedure: Procedure(s):  RIGHT CRANIOTOMY FOR RIGHT SUBDURAL HEMATOMA EVACUATION       Anesthesia Type:  General    Note:  Disposition: Inpatient   Postop Pain Control: Uneventful            Sign Out: Well controlled pain   PONV: No   Neuro/Psych: Uneventful            Sign Out: Acceptable/Baseline neuro status   Airway/Respiratory: Uneventful            Sign Out: Acceptable/Baseline resp. status   CV/Hemodynamics: Uneventful            Sign Out: Acceptable CV status; No obvious hypovolemia; No obvious fluid overload   Other NRE: NONE   DID A NON-ROUTINE EVENT OCCUR? No           Last vitals:  Vitals Value Taken Time   /71 12/10/22 1215   Temp 36.4  C (97.5  F) 12/10/22 1215   Pulse 90 12/10/22 1215   Resp 17 12/10/22 1215   SpO2 99 % 12/10/22 1200       Electronically Signed By: Carlos Gonzalez MD  December 10, 2022  12:20 PM

## 2022-12-10 NOTE — ANESTHESIA PROCEDURE NOTES
Arterial Line Procedure Note    Pre-Procedure   Staff -        Anesthesiologist:  Carlos Gonzalez MD       Resident/Fellow: Kyle Ramirez MD       Performed By: resident       Location: OR       Pre-Anesthestic Checklist: patient identified, IV checked, risks and benefits discussed, informed consent, monitors and equipment checked, pre-op evaluation and at physician/surgeon's request  Timeout:       Correct Patient: Yes        Correct Procedure: Yes        Correct Site: Yes        Correct Position: Yes   Line Placement:   This line was placed Post Induction starting at 12/10/2022 8:02 AM and ending at 12/10/2022 9:12 AM  Procedure   Procedure: arterial line       Diagnosis: Hemodynamic monitoring       Laterality: right       Insertion Site: radial.  Sterile Prep        Standard elements of sterile barrier followed       Skin prep: Chloraprep  Insertion/Injection        Technique: Seldinger Technique and ultrasound guided        1. Ultrasound was used to evaluate the access site.       2. Artery evaluated via ultrasound for patency/adequacy.       3. Using real-time ultrasound the needle/catheter was observed entering the artery/vein.       Catheter Type/Size: 20 G, 1.75 in/4.5 cm quick cath (integral wire)  Narrative        Tegaderm dressing used.       Complications: None apparent,        Arterial waveform: Yes        IBP within 10% of NIBP: Yes

## 2022-12-10 NOTE — PROGRESS NOTES
Admitted/transferred from: PACU  Reason for admission/transfer: Post crani monitoring and hemodynamic stability.   2 RN skin assessment: completed by Ck and Farrah SANTOS.   Result of skin assessment and interventions/actions: Sacral mepilex in place. No new skin concerns. Surgical dressing clean, dry and intact.   Height, weight, drug calc weight: Done  Patient belongings (see Flowsheet)  MDRO education added to care planNo  ?

## 2022-12-10 NOTE — OR NURSING
Text paged Dr Avila regarding rt pupil larger than left.  Pt opening eyes but no voice at this time.

## 2022-12-10 NOTE — BRIEF OP NOTE
"[facility]    Brief Operative Note    Pre-operative diagnosis: Subdural hematoma [S06.5XAA]  Post-operative diagnosis Same as pre-operative diagnosis    Procedure: Procedure(s):  RIGHT CRANIOTOMY FOR RIGHT SUBDURAL HEMATOMA EVACUATION  Surgeon: Surgeon(s) and Role:     * Dat Avila MD - Primary     * Ifrah Pantoja MD - Resident - Assisting  Anesthesia: General   Estimated Blood Loss: 50 mL    Drains: John-Baer  Specimens: * No specimens in log *  Findings:   Thick membranes with motor oil colored fluid evacuated.  Complications: None.  Implants:   Implant Name Type Inv. Item Serial No.  Lot No. LRB No. Used Action   GRAFT DURAGEN 2X2\"  - [id number]  GRAFT DURAGEN 2X2\"   [employer] [id number] Right 1 Implanted   IMP PLATE SYN BOX LOW PROFILE 04H .511 - [employer]/[place] IMP PLATE SYN BOX LOW PROFILE 04H .511 NA SYNTHES-STRATEC NA Right 2 Implanted   IMP BUR HOLE COVER 17MM LOW PROFILE .527 - [employer]/[place] IMP BUR HOLE COVER 17MM LOW PROFILE .527 NA SYNTHES-STRATEC NA Right 1 Implanted   IMP SCR SYN MATRIX LOW PRO 1.5X04MM SELF DRILL [id number] - SNA Metallic Hardware/[place] IMP SCR SYN MATRIX LOW PRO 1.5X04MM SELF DRILL [id number] NA SYNTHES-STRATEC NA Right 13 Implanted           "

## 2022-12-10 NOTE — ANESTHESIA CARE TRANSFER NOTE
Patient: Serge Marin    Procedure: Procedure(s):  RIGHT CRANIOTOMY FOR RIGHT SUBDURAL HEMATOMA EVACUATION       Diagnosis: Subdural hematoma [S06.5XAA]  Diagnosis Additional Information: No value filed.    Anesthesia Type:   General     Note:    Oropharynx: oropharynx clear of all foreign objects and spontaneously breathing  Level of Consciousness: awake and drowsy  Oxygen Supplementation: face mask  Level of Supplemental Oxygen (L/min / FiO2): 4  Independent Airway: airway patency satisfactory and stable  Dentition: dentition unchanged  Vital Signs Stable: post-procedure vital signs reviewed and stable  Report to RN Given: handoff report given  Patient transferred to: PACU    Handoff Report: Identifed the Patient, Identified the Reponsible Provider, Reviewed the pertinent medical history, Discussed the surgical course, Reviewed Intra-OP anesthesia mangement and issues during anesthesia, Set expectations for post-procedure period and Allowed opportunity for questions and acknowledgement of understanding      Vitals:  Vitals Value Taken Time   BP 99/57    Temp     Pulse 66    Resp 14    SpO2 97        Electronically Signed By: JASSON Styles CRNA  December 10, 2022  11:49 AM

## 2022-12-10 NOTE — OP NOTE
PATIENT NAME:   Serge Marin  PATIENT MRN:   [id number]  PATIENT ACCOUNT:  [id number]  PATIENT YOB: 1940    NEUROSURGERY OPERATIVE REPORT     DATE OF SURGERY: 12/10/22     PREOPERATIVE DIAGNOSIS:  1. Chronic subdural hematoma     POSTOPERATIVE DIAGNOSIS:  1. Chronic subdural hematoma     PROCEDURES PERFORMED:  1. Right craniotomy for subdural hematoma evacuation     STAFF SURGEON: Dat Avila MD     RESIDENT SURGEONS: Ifrah Pantoja MD     ANESTHESIA: General endotracheal anesthesia     ESTIMATED BLOOD LOSS: 50 mL     FINDINGS:  Thick membranes with motor oil colored fluid evacuated.     INDICATIONS: Serge Marin is an 82-year-old female with a history of a subdural hematoma on the right side due to a fall on 10/16/2022.  Initially the subdural hematoma remained stable and she was followed on an outpatient basis.  Repeat imaging showed increasing size of the subdural hematoma with chronic changes, and membrane formation.  She was then admitted on 12/8/2022 for intervention of a growing hematoma.  A SEPS drain was placed, though due to the membranes it was unsuccessful in evacuating the hematoma. We therefore recommended craniotomy for further resection to giver her a chance at more definitive improvement. Risks, benefits, and alternatives were discussed and written consent was obtained for the above operation.     DESCRIPTION OF PROCEDURE:  Serge Marin was brought to the operating room, and her identity was verified. The patient was transferred in the supine position to the operating table. General endotracheal anesthesia was induced. The bed was turned 180 degrees. Both arms were tucked. The patient's head was placed in a horseshoe with her head turned to the left to allow access to the right side of her head. Preoperative antibiotics were administered.  The SEPS drain was removed using standard sterile technique.  A Monocryl stitch was placed to close the incision where the  substrate was placed.    The patient was cleaned, prepared, and draped in sterile fashion. 4 mL of local anesthetic was used. Timeout was performed.    Using a #10 blade scalpel, our planned incision was opened in a curvilinear fashion just superior to the superior temporal line.  The incision for the substrate was incorporated into our new incision.  Monopolar cautery was used to dissect down to the cranium, as well as maintain hemostasis.  A periosteal elevator was used to expose the bone and clear any soft tissue.  A 5 mm cutting bur was then used to create a bur hole in the posterior aspect of the incision.  A B1 with footplate was then used to turn a craniotomy, incorporating the bur hole from the SEPS drain.  The bone flap was then removed and placed on the back table for later replacement.  The dura was coagulated using bipolar cautery.  Peripheral tack up sutures were placed using a C1 without footplate drill bit.  The dura was then opened sharply using tenotomy scissors starting at the SEPS drain location.  The dura was opened in a stellate fashion.  Thick membranes were encountered that were coagulated and removed using bipolar cautery and tumor forceps.  Motor oil colored fluid between membranes was encountered and evacuated.  All bleeding edges of membranes were coagulated as well as any cortical surface bleeding.  The area was irrigated copiously to ensure all remaining blood clot was removed.  Once the area was completely evacuated and there was no further noted bleeding, we focused on closure.  A 10 [place] round ZAYNAB drain was placed in the subdural space and tunneled anteriorly away from the incision.  Meticulous hemostasis was achieved.    The dural leaflets were reapproximated using 4-0 Nurolon suture.  DuraGen was placed over the approximated dura.  The bone flap was then replaced using the neuro Synthes plating system.  The area was irrigated again, and the skin was closed in layers.  The galea was  closed using 2-0 Vicryl sutures in an inverted interrupted fashion.  The skin was closed using 3-0 Monocryl in a running fashion.  The drain was secured using 3-0 nylon suture.  The John-Baer bulb was attached to the drain and placed on suction.  The wound was cleaned and dried and sterile dressings were applied.  The patient was extubated and returned to the postanesthesia care unit without incident. At the end of the procedure, sponge and needle counts were correct. Estimated blood loss totaled 50 ml.     Dr. Avila was present for the critical portions of the procedure and immediately available for the remainder.     Operative note prepared by Ifrah Pantoja MD, Neurosurgery Resident, PGY-4

## 2022-12-11 ENCOUNTER — APPOINTMENT (OUTPATIENT)
Dept: PHYSICAL THERAPY | Facility: CLINIC | Age: 82
DRG: 025 | End: 2022-12-11
Attending: STUDENT IN AN ORGANIZED HEALTH CARE EDUCATION/TRAINING PROGRAM
Payer: COMMERCIAL

## 2022-12-11 ENCOUNTER — APPOINTMENT (OUTPATIENT)
Dept: SPEECH THERAPY | Facility: CLINIC | Age: 82
DRG: 025 | End: 2022-12-11
Payer: COMMERCIAL

## 2022-12-11 ENCOUNTER — APPOINTMENT (OUTPATIENT)
Dept: CT IMAGING | Facility: CLINIC | Age: 82
DRG: 025 | End: 2022-12-11
Payer: COMMERCIAL

## 2022-12-11 ENCOUNTER — APPOINTMENT (OUTPATIENT)
Dept: OCCUPATIONAL THERAPY | Facility: CLINIC | Age: 82
DRG: 025 | End: 2022-12-11
Attending: STUDENT IN AN ORGANIZED HEALTH CARE EDUCATION/TRAINING PROGRAM
Payer: COMMERCIAL

## 2022-12-11 LAB
ANION GAP SERPL CALCULATED.3IONS-SCNC: 9 MMOL/L (ref 7–15)
BUN SERPL-MCNC: 9.9 MG/DL (ref 8–23)
CALCIUM SERPL-MCNC: 8.1 MG/DL (ref 8.8–10.2)
CHLORIDE SERPL-SCNC: 111 MMOL/L (ref 98–107)
CREAT SERPL-MCNC: 0.71 MG/DL (ref 0.51–0.95)
DEPRECATED HCO3 PLAS-SCNC: 23 MMOL/L (ref 22–29)
ERYTHROCYTE [DISTWIDTH] IN BLOOD BY AUTOMATED COUNT: 13.2 % (ref 10–15)
GFR SERPL CREATININE-BSD FRML MDRD: 84 ML/MIN/1.73M2
GLUCOSE BLDC GLUCOMTR-MCNC: 94 MG/DL (ref 70–99)
GLUCOSE SERPL-MCNC: 101 MG/DL (ref 70–99)
HCT VFR BLD AUTO: 30 % (ref 35–47)
HGB BLD-MCNC: 9.5 G/DL (ref 11.7–15.7)
MAGNESIUM SERPL-MCNC: 1.7 MG/DL (ref 1.7–2.3)
MCH RBC QN AUTO: 28.8 PG (ref 26.5–33)
MCHC RBC AUTO-ENTMCNC: 31.7 G/DL (ref 31.5–36.5)
MCV RBC AUTO: 91 FL (ref 78–100)
PHOSPHATE SERPL-MCNC: 3.1 MG/DL (ref 2.5–4.5)
PLATELET # BLD AUTO: 144 10E3/UL (ref 150–450)
POTASSIUM SERPL-SCNC: 3.8 MMOL/L (ref 3.4–5.3)
RBC # BLD AUTO: 3.3 10E6/UL (ref 3.8–5.2)
SODIUM SERPL-SCNC: 143 MMOL/L (ref 136–145)
WBC # BLD AUTO: 5.4 10E3/UL (ref 4–11)

## 2022-12-11 PROCEDURE — 97535 SELF CARE MNGMENT TRAINING: CPT | Mod: GO | Performed by: OCCUPATIONAL THERAPIST

## 2022-12-11 PROCEDURE — 84100 ASSAY OF PHOSPHORUS: CPT | Performed by: STUDENT IN AN ORGANIZED HEALTH CARE EDUCATION/TRAINING PROGRAM

## 2022-12-11 PROCEDURE — 97530 THERAPEUTIC ACTIVITIES: CPT | Mod: GO | Performed by: OCCUPATIONAL THERAPIST

## 2022-12-11 PROCEDURE — 92610 EVALUATE SWALLOWING FUNCTION: CPT | Mod: GN

## 2022-12-11 PROCEDURE — 258N000003 HC RX IP 258 OP 636: Performed by: STUDENT IN AN ORGANIZED HEALTH CARE EDUCATION/TRAINING PROGRAM

## 2022-12-11 PROCEDURE — 70450 CT HEAD/BRAIN W/O DYE: CPT | Mod: 26 | Performed by: RADIOLOGY

## 2022-12-11 PROCEDURE — 97530 THERAPEUTIC ACTIVITIES: CPT | Mod: GP | Performed by: PHYSICAL THERAPIST

## 2022-12-11 PROCEDURE — 80048 BASIC METABOLIC PNL TOTAL CA: CPT | Performed by: STUDENT IN AN ORGANIZED HEALTH CARE EDUCATION/TRAINING PROGRAM

## 2022-12-11 PROCEDURE — 200N000002 HC R&B ICU UMMC

## 2022-12-11 PROCEDURE — 250N000013 HC RX MED GY IP 250 OP 250 PS 637: Performed by: STUDENT IN AN ORGANIZED HEALTH CARE EDUCATION/TRAINING PROGRAM

## 2022-12-11 PROCEDURE — 85014 HEMATOCRIT: CPT | Performed by: STUDENT IN AN ORGANIZED HEALTH CARE EDUCATION/TRAINING PROGRAM

## 2022-12-11 PROCEDURE — 70450 CT HEAD/BRAIN W/O DYE: CPT

## 2022-12-11 PROCEDURE — 92526 ORAL FUNCTION THERAPY: CPT | Mod: GN

## 2022-12-11 PROCEDURE — 83735 ASSAY OF MAGNESIUM: CPT | Performed by: STUDENT IN AN ORGANIZED HEALTH CARE EDUCATION/TRAINING PROGRAM

## 2022-12-11 PROCEDURE — 97162 PT EVAL MOD COMPLEX 30 MIN: CPT | Mod: GP | Performed by: PHYSICAL THERAPIST

## 2022-12-11 PROCEDURE — 97165 OT EVAL LOW COMPLEX 30 MIN: CPT | Mod: GO | Performed by: OCCUPATIONAL THERAPIST

## 2022-12-11 PROCEDURE — 250N000011 HC RX IP 250 OP 636: Performed by: NEUROLOGICAL SURGERY

## 2022-12-11 PROCEDURE — 250N000011 HC RX IP 250 OP 636: Performed by: STUDENT IN AN ORGANIZED HEALTH CARE EDUCATION/TRAINING PROGRAM

## 2022-12-11 RX ORDER — POTASSIUM CHLORIDE 7.45 MG/ML
10 INJECTION INTRAVENOUS ONCE
Status: COMPLETED | OUTPATIENT
Start: 2022-12-11 | End: 2022-12-11

## 2022-12-11 RX ORDER — MAGNESIUM SULFATE HEPTAHYDRATE 40 MG/ML
2 INJECTION, SOLUTION INTRAVENOUS ONCE
Status: COMPLETED | OUTPATIENT
Start: 2022-12-11 | End: 2022-12-11

## 2022-12-11 RX ADMIN — ACETAMINOPHEN 975 MG: 325 TABLET, FILM COATED ORAL at 05:57

## 2022-12-11 RX ADMIN — LEVETIRACETAM 750 MG: 100 INJECTION, SOLUTION INTRAVENOUS at 19:38

## 2022-12-11 RX ADMIN — CLINDAMYCIN IN 5 PERCENT DEXTROSE 900 MG: 18 INJECTION, SOLUTION INTRAVENOUS at 06:39

## 2022-12-11 RX ADMIN — POTASSIUM CHLORIDE 10 MEQ: 7.46 INJECTION, SOLUTION INTRAVENOUS at 07:50

## 2022-12-11 RX ADMIN — FERROUS GLUCONATE 324 MG: 324 TABLET ORAL at 07:46

## 2022-12-11 RX ADMIN — MAGNESIUM SULFATE IN WATER 2 G: 40 INJECTION, SOLUTION INTRAVENOUS at 05:20

## 2022-12-11 RX ADMIN — POLYETHYLENE GLYCOL 3350 17 G: 17 POWDER, FOR SOLUTION ORAL at 07:50

## 2022-12-11 RX ADMIN — SENNOSIDES AND DOCUSATE SODIUM 1 TABLET: 8.6; 5 TABLET ORAL at 19:40

## 2022-12-11 RX ADMIN — HYDROMORPHONE HYDROCHLORIDE 0.3 MG: 1 INJECTION, SOLUTION INTRAMUSCULAR; INTRAVENOUS; SUBCUTANEOUS at 10:14

## 2022-12-11 RX ADMIN — ONDANSETRON 4 MG: 2 INJECTION INTRAMUSCULAR; INTRAVENOUS at 09:57

## 2022-12-11 RX ADMIN — LEVOTHYROXINE SODIUM 75 MCG: 0.07 TABLET ORAL at 07:48

## 2022-12-11 RX ADMIN — ESCITALOPRAM OXALATE 5 MG: 5 TABLET, FILM COATED ORAL at 07:44

## 2022-12-11 RX ADMIN — LEVETIRACETAM 750 MG: 100 INJECTION, SOLUTION INTRAVENOUS at 07:57

## 2022-12-11 RX ADMIN — ACETAMINOPHEN 975 MG: 325 TABLET, FILM COATED ORAL at 21:46

## 2022-12-11 ASSESSMENT — ACTIVITIES OF DAILY LIVING (ADL)
ADLS_ACUITY_SCORE: 45
ADLS_ACUITY_SCORE: 44
ADLS_ACUITY_SCORE: 44
ADLS_ACUITY_SCORE: 45
ADLS_ACUITY_SCORE: 44
ADLS_ACUITY_SCORE: 45
DEPENDENT_IADLS:: CLEANING;LAUNDRY;SHOPPING;MEDICATION MANAGEMENT;TRANSPORTATION
ADLS_ACUITY_SCORE: 44
ADLS_ACUITY_SCORE: 44
ADLS_ACUITY_SCORE: 45
ADLS_ACUITY_SCORE: 45

## 2022-12-11 NOTE — PROGRESS NOTES
12/11/22 0900   Living Environment   People in Home facility resident   Current Living Arrangements assisted living   Home Accessibility no concerns   Transportation Anticipated   (Pt reported she drives)   Living Environment Comments Pt from [facility]: Walk in shower, shower chair, grab bars; Standard toilet   Self-Care   Usual Activity Tolerance moderate   Current Activity Tolerance fair   Regular Exercise No   Equipment Currently Used at Home walker, rolling   Fall history within last six months yes   Number of times patient has fallen within last six months 1   Activity/Exercise/Self-Care Comment Pt from [facility]: Walk in shower, shower chair, grab bars; Standard toilet   General Information   Onset of Illness/Injury or Date of Surgery 12/10/22   Referring Physician Ifrah Pantoja MD   Additional Occupational Profile Info/Pertinent History of Current Problem Serge Marin is a 82 year old female with known chronic subdural hematoma which has increased in size with increase of midline shift who is at her neurological baseline without new focal findings.      Patient is postprocedure day 3 status post right subdural evacuating port system (SEPS) drain insertion for subdural hematoma evacuation. Postop day 1 status post right craniotomy for chronic subdural hematoma evacuation.   Limitations/Impairments   (crani)   Left Upper Extremity (Weight-bearing Status)   (10#)   Right Upper Extremity (Weight-bearing Status)   (10#)   Cognitive Status Examination   Orientation Status person;place  (stating date was dec 10th)   Cognitive Status Comments OT will continue to asess   Visual Perception   Impact of Vision Impairment on Function (Vision) no acute changes noted   Range of Motion Comprehensive   Comment, General Range of Motion WFL   Strength Comprehensive (MMT)   Comment, General Manual Muscle Testing (MMT) Assessment NT 2/2 precautions, WFL   Bed Mobility   Comment (Bed Mobility) mod A  rolling for sling placement   Transfers   Transfer Comments mod A supine<> EOB   Upper Body Dressing Assessment/Training   Comment, (Upper Body Dressing) max A   Lower Body Dressing Assessment/Training   Comment, (Lower Body Dressing) antiicpate max A   Grooming Assessment/Training   Comment, (Grooming) washing face max A   Toileting   Comment, (Toileting) anticipate max A x2   Clinical Impression   Criteria for Skilled Therapeutic Interventions Met (OT) Yes, treatment indicated   OT Diagnosis decreased ind in I/ADLS   OT Problem List-Impairments impacting ADL problems related to;activity tolerance impaired;balance;cognition;mobility;pain;strength;post-surgical precautions   ADL comments/analysis decreased ind in I/ADLS   Assessment of Occupational Performance 1-3 Performance Deficits   Planned Therapy Interventions (OT) ADL retraining;IADL retraining;bed mobility training;cognition;strengthening;transfer training   Clinical Decision Making Complexity (OT) low complexity   Risk & Benefits of therapy have been explained evaluation/treatment results reviewed;care plan/treatment goals reviewed;patient   OT Total Evaluation Time   OT Eval, Low Complexity Minutes (30832) 8

## 2022-12-11 NOTE — PROGRESS NOTES
12/11/22 1600   Appointment Info   Signing Clinician's Name / Credentials (PT) ana laura weiner, pt   Living Environment   People in Home facility resident   Current Living Arrangements assisted living   Home Accessibility no concerns   Transportation Anticipated family or friend will provide   Living Environment Comments 1 br apt in [facility], gets meals provided in dining room   Self-Care   Usual Activity Tolerance moderate   Current Activity Tolerance fair   Regular Exercise No   Equipment Currently Used at Home walker, rolling  (4WW)   Fall history within last six months yes   Number of times patient has fallen within last six months 3   Activity/Exercise/Self-Care Comment mod IND ambulator at baseline; walks to/from dining room 2x/day   General Information   Onset of Illness/Injury or Date of Surgery 12/08/22   Referring Physician Ifrah Pantoja MD   Patient/Family Therapy Goals Statement (PT) return home   Pertinent History of Current Problem (include personal factors and/or comorbidities that impact the POC) Serge Marin is a 82 year old female with known chronic subdural hematoma which has increased in size with increase of midline shift who is at her neurological baseline without new focal findings.      Patient is postprocedure day 3 status post right subdural evacuating port system (SEPS) drain insertion for subdural hematoma evacuation. Postop day 1 status post right craniotomy for chronic subdural hematoma evacuation.   Existing Precautions/Restrictions   (craniotomy)   General Observations [place], use pocket talker   Cognition   Orientation Status (Cognition) oriented x 4   Follows Commands (Cognition) WNL   Posture    Posture Forward head position;Protracted shoulders   Range of Motion (ROM)   ROM Comment B LE grossly WFL   Strength (Manual Muscle Testing)   Strength Comments demonstrated B LE antigravity strength   Bed Mobility   Comment, (Bed Mobility) sit > supine min assist   Transfers   Comment,  (Transfers) sit > stand min assist   Balance   Balance Comments required UE suppport and min assist to maintain standing tolerance   Sensory Examination   Sensory Perception Comments light touch/proprioception intact B LE   Muscle Tone   Muscle Tone no deficits were identified   Clinical Impression   Criteria for Skilled Therapeutic Intervention Yes, treatment indicated   PT Diagnosis (PT) impaired functional mobility in setting of SDH   Influenced by the following impairments impaired balance, decreased activity tolerance   Functional limitations due to impairments impaired bed mobility, impaired transfers, impaired gait   Clinical Presentation (PT Evaluation Complexity) Evolving/Changing   Clinical Presentation Rationale clinical judgement, comorbidities   Clinical Decision Making (Complexity) moderate complexity   Planned Therapy Interventions (PT) balance training;bed mobility training;gait training;neuromuscular re-education;transfer training;progressive activity/exercise   Risk & Benefits of therapy have been explained evaluation/treatment results reviewed;care plan/treatment goals reviewed   PT Total Evaluation Time   PT Amos, Moderate Complexity Minutes (42616) 8   Physical Therapy Goals   PT Frequency 5x/week   PT Predicted Duration/Target Date for Goal Attainment 12/18/22   PT Goals Bed Mobility;Transfers;Gait   PT: Bed Mobility Independent;Supine to/from sit   PT: Transfers Modified independent;Sit to/from stand;Assistive device  (FWW)   PT: Gait Modified independent;Rolling walker;150 feet   PT Discharge Planning   PT Plan standing balance, bed > chair, initiate short distance gait with FWW/assist x 2   PT Discharge Recommendation (DC Rec) Transitional Care Facility   PT Rationale for DC Rec dependence with functional mobility, falls risk   PT Brief overview of current status sit > stand and bed > chair min/mod assist, sit > supine min assist   Total Session Time   Total Session Time (sum of timed and  untimed services) 8

## 2022-12-11 NOTE — CONSULTS
Care Management Initial Consult    General Information  Assessment completed with: VM-chart review, Care Team Member,    Type of CM/SW Visit: Initial Assessment    Primary Care Provider verified and updated as needed:     Readmission within the last 30 days: current reason for admission unrelated to previous admission   Return Category: Progression of disease  Reason for Consult: discharge planning  Advance Care Planning: Advance Care Planning Reviewed: present on chart          Communication Assessment  Patient's communication style: spoken language (English or Bilingual)    Hearing Difficulty or Deaf: yes   Wear Glasses or Blind: no    Cognitive  Cognitive/Neuro/Behavioral: .WDL except  Level of Consciousness: lethargic  Arousal Level: opens eyes spontaneously  Orientation: oriented x 4  Mood/Behavior: calm, cooperative  Best Language: 0 - No aphasia  Speech: dysarthric    Living Environment:   People in home: facility resident     Current living Arrangements: assisted living      Able to return to prior arrangements: other (see comments)       Family/Social Support:  Care provided by: self, other (see comments)  Provides care for: no one  Marital Status: Single  Children          Description of Support System: Involved, Supportive    Support Assessment: Adequate family and caregiver support, Adequate social supports    Current Resources:   Patient receiving home care services: Yes  Skilled Home Care Services: Skilled Nursing, Physicial Therapy  Community Resources: Transitional Care  Equipment currently used at home: walker, rolling  Supplies currently used at home:      Employment/Financial:  Employment Status: retired        Financial Concerns: No concerns identified   Referral to Financial Worker: No       Lifestyle & Psychosocial Needs:  Social Determinants of Health     Tobacco Use: Low Risk      Smoking Tobacco Use: Never     Smokeless Tobacco Use: Never     Passive Exposure: Not on file   Alcohol Use: Not  on file   Financial Resource Strain: Not on file   Food Insecurity: Not on file   Transportation Needs: Not on file   Physical Activity: Not on file   Stress: Not on file   Social Connections: Not on file   Intimate Partner Violence: Not on file   Depression: Not at risk     PHQ-2 Score: 1   Housing Stability: Not on file       Functional Status:  Prior to admission patient needed assistance:   Dependent ADLs:: Independent  Dependent IADLs:: Cleaning, Laundry, Shopping, Medication Management, Transportation  Assesssment of Functional Status: Not at baseline with ADL Functioning, Not at baseline with mobility    Mental Health Status:  Mental Health Status: No Current Concerns       Chemical Dependency Status:  Chemical Dependency Status: No Current Concerns             Values/Beliefs:  Spiritual, Cultural Beliefs, Shinto Practices, Values that affect care: yes               Additional Information:  [facility] Before her TCU stay she was living at an assisted living facility and received [facility].Per therapy recs will likely need to return to a TCU at discharge. RNCC/SW will continue to follow for needs.     Lizette Chino, RN, BSN  RN Care Coordinator   MICU/SICU  [phone]          Lizette Chino RN

## 2022-12-11 NOTE — PLAN OF CARE
Major Shift Events: Neurologically intact. A&Ox4. Follows commands and moves all extremities. Very hard of hearing. Minimal R side head incisional pain overnight. Afebrile. SR; HR 60s-80s. Soft BP overnight; MAPs 60s-70s; NSGY resident aware. LS diminished on RA. Denied nausea. Full liquid diet; tolerating small sips of water/soda. Low UOP in brandon 15-30mL/Q2H at start of shift; NSGY resident notified; 1L NS bolus given; UOP responsive and increased 100-200mL/Q2H. No BM overnight. Sanguinous output 10-30mL/Q2H in ZAYNAB drain to thumbprint bulb suction.    Plan: Monitor neurological status. Notify primary team of changes in POC.  For vital signs and complete assessments, please see documentation flowsheets.    Goal Outcome Evaluation:      Plan of Care Reviewed With: patient    Overall Patient Progress: improvingOverall Patient Progress: improving    Outcome Evaluation: Neurologically intact; A&Ox4. Soft blood pressures overnight.

## 2022-12-11 NOTE — PROGRESS NOTES
Neurocritical Care Multi-Disciplinary Note    Reason for critical care admission: subdural hematoma  Admitting Team: neurosurgery  Date of Service:  12/11/2022  Date of Admission:  12/8/2022  Hospital Day: 4    Patient condition reviewed and discussed while on multidisciplinary rounds today.   Please note these minor interventions that were initiated:  1. None    The Neurocritical Care service will continue to follow peripherally while the patient is within the ICU. We are readily available should issues arise. Please feel free to contact us for critical care issues with which we may be of assistance. For all other concerns, please contact primary service first.     Please contact NCC team phone at [phone]    JASSON Guzman, CNP  Neurocritical Care  *[phone]

## 2022-12-11 NOTE — PROGRESS NOTES
12/11/22 2738   Appointment Info   Signing Clinician's Name / Credentials (SLP) Holli Cunningham MS CCC-SLP   General Information   Onset of Illness/Injury or Date of Surgery 12/08/22   Referring Physician Vera Weber MD   Patient/Family Therapy Goal Statement (SLP) None stated   Pertinent History of Current Problem Serge Marin is a 82 year old female with known chronic subdural hematoma which has increased in size with increase of midline shift who is at her neurological baseline without new focal findings. Patient is postprocedure day 3 status post right subdural evacuating port system (SEPS) drain insertion for subdural hematoma evacuation. Postop day 1 status post right craniotomy for chronic subdural hematoma evacuation. Clinical swallow eval completed per MD orders to further assess oropharyngeal swallow function.   Type of Evaluation   Type of Evaluation Swallow Evaluation   Oral Motor   Oral Musculature unable to assess due to poor participation/comprehension   Structural Abnormalities none present   Mucosal Quality dry   Dentition (Oral Motor)   Dentition (Oral Motor) adequate dentition   Facial Symmetry (Oral Motor)   Facial Symmetry (Oral Motor) unable/difficult to assess   Lip Function (Oral Motor)   Lip Range of Motion (Oral Motor) unable/difficult to assess   Tongue Function (Oral Motor)   Tongue ROM (Oral Motor) unable/difficult to assess   Jaw Function (Oral Motor)   Jaw Function (Oral Motor) WNL   Cough/Swallow/Gag Reflex (Oral Motor)   Volitional Throat Clear/Cough (Oral Motor) reduced strength   Vocal Quality/Secretion Management (Oral Motor)   Vocal Quality (Oral Motor) WFL   Comment, Vocal Quality/Secretion Management (Oral Motor) Pt with mild dysarthria noted   General Swallowing Observations   Past History of Dysphagia No hx of dysphagia per chart review or pt report.   Comment, General Swallowing Observations Pt appeared confused and noted to make some nonsensical comments    Respiratory Support (General Swallowing Observations) none   Current Diet/Method of Nutritional Intake (General Swallowing Observations, NIS) full liquid diet;thin liquids (level 0)   Swallowing Evaluation Clinical swallow evaluation   Clinical Swallow Evaluation   Feeding Assistance dependent   Clinical Swallow Evaluation Textures Trialed thin liquids;pureed   Clinical Swallow Eval: Thin Liquid Texture Trial   Mode of Presentation, Thin Liquids spoon;cup;fed by clinician   Volume of Liquid or Food Presented 2 ice chips, 2 oz H20   Oral Phase of Swallow WFL   Pharyngeal Phase of Swallow intact   Diagnostic Statement No overt s/sx of aspiration   Clinical Swallow Evaluation: Puree Solid Texture Trial   Mode of Presentation, Puree spoon;fed by clinician   Volume of Puree Presented 2 tbsp   Oral Phase, Puree   (bolus holding)   Pharyngeal Phase, Puree intact   Diagnostic Statement No overt s/sx of aspiration. Pt with bolus holding which required verbal cues to swallow.   Esophageal Phase of Swallow   Patient reports or presents with symptoms of esophageal dysphagia No   Swallowing Recommendations   Diet Consistency Recommendations thin liquids (level 0);full liquid diet   Supervision Level for Intake 1:1 supervision needed   Mode of Delivery Recommendations bolus size, small;food moistened;slow rate of intake   Swallowing Maneuver Recommendations alternate food and liquid intake   Monitoring/Assistance Required (Eating/Swallowing) monitor for cough or change in vocal quality with intake;stop eating activities when fatigue is present;cue for finger/lingual sweep if oral pocketing present   Recommended Feeding/Eating Techniques (Swallow Eval) maintain upright sitting position for eating;maintain upright posture during/after eating for 30 minutes;minimize distractions during oral intake;provide assist with feeding;set-up and prepare tray   Medication Administration Recommendations, Swallowing (SLP) consider serving PO  meds in small amount of puree   Instrumental Assessment Recommendations instrumental evaluation not recommended at this time   General Therapy Interventions   Planned Therapy Interventions Dysphagia Treatment   Dysphagia treatment Compensatory strategies for swallowing;Instruction of safe swallow strategies;Modified diet education   Clinical Impression   Criteria for Skilled Therapeutic Interventions Met (SLP Eval) Yes, treatment indicated   SLP Diagnosis mild oropharyngea dysphagia   Risks & Benefits of therapy have been explained evaluation/treatment results reviewed;care plan/treatment goals reviewed;risks/benefits reviewed;current/potential barriers reviewed;participants voiced agreement with care plan;participants included;patient   Clinical Impression Comments Clinical swallow eval completed per MD orders. Pt presents with mild oropharyngeal dysphagia in the setting of AMS. Pt required max encouragement to participate in evaluation. Pt with mild dysarthira noted, otherwise unable to fully asssess oral musculature due to limited participation. Pt tolerated ice chips, thin liquids via cup, and pureed textures with no overt s/sx of aspiration. Bolus holding noted which required verbal cues to swallow. Pt declined additional PO trials despite encouragement. Recommend pt continue full liquids (thin consistency) with 1:1 supervision. Pt should be fully upright and alert for all PO, take small sips/bites, slow pacing, and alternate between consistencies. ST to continue to follow to assess diet tolerance and advancement as appropriate. Anticipate pt will require ST follow-up at discharge.   SLP Total Evaluation Time   Eval: oral/pharyngeal swallow function, clinical swallow Minutes (99235) 10   SLP Goals   Therapy Frequency (SLP Eval) 5 times/wk   SLP Predicted Duration/Target Date for Goal Attainment 02/10/23   SLP Goals Swallow   SLP: Safely tolerate diet without signs/symptoms of aspiration Regular diet;Thin  liquids;With use of swallow precautions;With assistance/supervision   Interventions   Interventions Quick Adds Swallowing Dysfunction   SLP Discharge Planning   SLP Plan diet tolerance, advancement as appropriate   SLP Discharge Recommendation Transitional Care Facility   SLP Rationale for DC Rec pt below baseline oropharyngeal swallowwing skills   SLP Brief overview of current status  Recommend pt continue full liquids (thin consistency) with 1:1 supervision. Pt should be fully upright and alert for all PO, take small sips/bites, slow pacing, and alternate between consistencies.   Total Session Time   Total Session Time (sum of timed and untimed services) 10

## 2022-12-11 NOTE — PLAN OF CARE
Major Shift Events:  Q1h neuro checks. Pt is A&Ox4. Intermittent confusion/delirium. Very [place], pocket talker. Lethargic. Garbled speech/heavy accent. Headache after moving to chair, refused Tylenol. Sinus rhythm. MAP goal >65, SBP <140, no PRNs needed. RA. No BM. [place], good output. Incotinent at baseline. Nausea and small emesis x2. Speech therapy saw pt, pt refused to eat anything. Needs to remain on full liquid diet until reassessment tomorrow. ZAYNAB drain, 5-40/hr. Pt up in chair w/ lift. Able to stand at edge of bed.  Plan: Neuro checks. Continue w/ POC.  For vital signs and complete assessments, please see documentation flowsheets.       Problem: Craniotomy/Craniectomy/Cranioplasty  Goal: Absence of Bleeding  Outcome: Progressing     Problem: Craniotomy/Craniectomy/Cranioplasty  Goal: Fluid and Electrolyte Balance  Outcome: Progressing     Problem: Plan of Care - These are the overarching goals to be used throughout the patient stay.    Goal: Optimal Comfort and Wellbeing  Outcome: Progressing  Intervention: Monitor Pain and Promote Comfort  Recent Flowsheet Documentation  Taken 12/11/2022 1000 by Paige Elliott RN  Pain Management Interventions: medication (see MAR)  Intervention: Provide Person-Centered Care  Recent Flowsheet Documentation  Taken 12/11/2022 1200 by Paige Elliott RN  Trust Relationship/Rapport:    care explained    questions answered    reassurance provided    thoughts/feelings acknowledged  Taken 12/11/2022 0800 by Paige Elliott RN  Trust Relationship/Rapport:    care explained    questions answered    reassurance provided    thoughts/feelings acknowledged

## 2022-12-11 NOTE — PROGRESS NOTES
[facility], [place]   12/11/2022  Neurosurgery Progress Note:    Assessment:  Serge Marin is a 82 year old female with known chronic subdural hematoma which has increased in size with increase of midline shift who is at her neurological baseline without new focal findings.     Patient is postprocedure day 3 status post right subdural evacuating port system (SEPS) drain insertion for subdural hematoma evacuation. Postop day 1 status post right craniotomy for chronic subdural hematoma evacuation.    Plan:  - Serial neuro exams  - Pain control  - SBP <140  - Continue Keppra 7d postop  - INR <1.5, platelets >100k, Hb >7.0; transfuse as needed  - No anticoagulating/antiplatelet/fibrinolytic medications  - SCDs for DVT proph    -----------------------------------  Jaret Hearn M.D.  Neurosurgery Resident, PGY-4    Please contact neurosurgery resident on call with questions.    Dial * * [phone], enter [phone] when prompted.      Interval History: OR yesterday for right craniotomy for subdural hematoma evacuation. Postop CT with moderate pneumocephalus and small residual hematoma. Subdural ZAYNAB drain with bloody output.    Objective:   Temp:  [96  F (35.6  C)-99  F (37.2  C)] 98.4  F (36.9  C)  Pulse:  [62-90] 79  Resp:  [12-18] 16  BP: ()/(48-71) 90/48  MAP:  [55 mmHg-81 mmHg] 71 mmHg  Arterial Line BP: ()/(39-60) 101/51  SpO2:  [95 %-100 %] 98 %  I/O last 3 completed shifts:  In: 1834.5 [I.V.:1834.5]  Out: 970 [Urine:635; Drains:285; Blood:50]    Gen: Appears comfortable, NAD  Neurologic:  - Alert & Oriented to person, place, and intermittently year/month  - Very hard of hearing. Follows commands briskly  - Speech fluent, spontaneous. Thick accent. No aphasia or dysarthria.  - No gaze preference. No apparent hemineglect.  - PERRL, EOMI  - Face symmetric with sensation intact to light touch  - Tongue protrudes midline  - Trapezii muscles 5/5 bilaterally    Grossly deconditioned.    Del Tr Bi WE WF Gr   R 4+ 4+ 4+ 4+ 4+ 4+   L 4+ 4+ 4+ 4+ 4+ 4+    HF KE KF DF PF EHL   R 4 4 4 4 4 4   L 4 4 4 4 4 4     Sensation intact and symmetric to light touch throughout    Arguello's and clonus negative.    LABS:  Recent Labs   Lab 12/10/22  1341 12/10/22  0201 12/08/22  1804 12/08/22  1213   NA  --   --   --  139   POTASSIUM  --   --   --  4.4   CHLORIDE  --   --   --  105   CO2  --   --   --  22   ANIONGAP  --   --   --  12   * 96 102* 95   BUN  --   --   --  14.5   CR  --   --   --  0.80   BERTA  --   --   --  9.4       Recent Labs   Lab 12/08/22  1213   WBC 3.4*   RBC 4.24   HGB 12.2   HCT 38.9   MCV 92   MCH 28.8   MCHC 31.4*   RDW 13.2          IMAGING:  Recent Results (from the past 24 hour(s))   CT Head w/o Contrast    Narrative    CT HEAD W/O CONTRAST 12/10/2022 1:26 PM    History: Post SDH evacuation     Comparison: 12/8/2022 head CTs    Technique: Using multidetector thin collimation helical acquisition  technique, axial, coronal and sagittal CT images from the skull base  to the vertex were obtained without intravenous contrast.   (topogram) image(s) also obtained and reviewed.    Findings:   Postoperative changes of right frontoparietal craniotomy with partial  subjacent evacuation of underlying subdural hematoma. Increased dense  blood within the subdural collection, traversed by a new subdural  drain. The mixed attenuating extra-axial fluid collection overlying  the right frontal convexity measures up to 1.7 cm with adjacent  anti-dependent pneumocephalus and increased sulcal effacement of the  underlying anterior right frontal lobe. However, the margin of the  frontal lobe remains convex. Slightly increased right to left midline  shift of 4 mm with partial effacement of the right lateral ventricle.  Minimal subdural hemorrhage along the right tentorium. Small amount of  subarachnoid hemorrhage over the right frontal lobe.    No acute loss of gray-white differentiation. Globes  are unremarkable.  Paranasal sinuses and visualized portions of the mastoid air cells are  clear.      Impression    Impression:  Postsurgical changes of right subdural hematoma evacuation with  questionable mild tension pneumocephalus.    These findings were communicated to Ifrah Pantoja at 3:09PM on  12/10/2022 by Enrike Mcrae over the phone.     I have personally reviewed the examination and initial interpretation  and I agree with the findings.    DIONICIO GONZALEZ MD         SYSTEM ID:  [id number]

## 2022-12-11 NOTE — PLAN OF CARE
Major Shift Events:    Q1h neuro checks stable. Patient A&Ox4 but [place] and speaks with a garbled tone. RADFORD. Pupils equal and reactive. Endorses right temporal pain. She is able to tolerate only small sips of water at this time. IV dilaudid requested. Zofran given for nausea. Afebrile. BPs soft. MAP >65. NSR. Right radial a-line with appropriate wave forms. Sating 100% on 2L NC. LS clear/diminished. Sheffield in place with minimal output. No BM. Diet to advance as tolerated.    Plan: Continue q1h neuro exams and notify neurosurgery of any changes.   For vital signs and complete assessments, please see documentation flowsheets.

## 2022-12-12 ENCOUNTER — APPOINTMENT (OUTPATIENT)
Dept: CT IMAGING | Facility: CLINIC | Age: 82
DRG: 025 | End: 2022-12-12
Payer: COMMERCIAL

## 2022-12-12 ENCOUNTER — APPOINTMENT (OUTPATIENT)
Dept: SPEECH THERAPY | Facility: CLINIC | Age: 82
DRG: 025 | End: 2022-12-12
Payer: COMMERCIAL

## 2022-12-12 ENCOUNTER — APPOINTMENT (OUTPATIENT)
Dept: OCCUPATIONAL THERAPY | Facility: CLINIC | Age: 82
DRG: 025 | End: 2022-12-12
Payer: COMMERCIAL

## 2022-12-12 LAB
ANION GAP SERPL CALCULATED.3IONS-SCNC: 11 MMOL/L (ref 7–15)
BUN SERPL-MCNC: 4.6 MG/DL (ref 8–23)
CALCIUM SERPL-MCNC: 8.8 MG/DL (ref 8.8–10.2)
CHLORIDE SERPL-SCNC: 109 MMOL/L (ref 98–107)
CREAT SERPL-MCNC: 0.66 MG/DL (ref 0.51–0.95)
DEPRECATED HCO3 PLAS-SCNC: 23 MMOL/L (ref 22–29)
ERYTHROCYTE [DISTWIDTH] IN BLOOD BY AUTOMATED COUNT: 13.1 % (ref 10–15)
GFR SERPL CREATININE-BSD FRML MDRD: 87 ML/MIN/1.73M2
GLUCOSE SERPL-MCNC: 93 MG/DL (ref 70–99)
HCT VFR BLD AUTO: 32.2 % (ref 35–47)
HGB BLD-MCNC: 10.2 G/DL (ref 11.7–15.7)
MAGNESIUM SERPL-MCNC: 1.8 MG/DL (ref 1.7–2.3)
MCH RBC QN AUTO: 28.6 PG (ref 26.5–33)
MCHC RBC AUTO-ENTMCNC: 31.7 G/DL (ref 31.5–36.5)
MCV RBC AUTO: 90 FL (ref 78–100)
PHOSPHATE SERPL-MCNC: 2.5 MG/DL (ref 2.5–4.5)
PLATELET # BLD AUTO: 156 10E3/UL (ref 150–450)
POTASSIUM SERPL-SCNC: 3.8 MMOL/L (ref 3.4–5.3)
RBC # BLD AUTO: 3.57 10E6/UL (ref 3.8–5.2)
SODIUM SERPL-SCNC: 143 MMOL/L (ref 136–145)
WBC # BLD AUTO: 5 10E3/UL (ref 4–11)

## 2022-12-12 PROCEDURE — 120N000002 HC R&B MED SURG/OB UMMC

## 2022-12-12 PROCEDURE — 250N000011 HC RX IP 250 OP 636: Performed by: NEUROLOGICAL SURGERY

## 2022-12-12 PROCEDURE — 258N000003 HC RX IP 258 OP 636: Performed by: NEUROLOGICAL SURGERY

## 2022-12-12 PROCEDURE — 92526 ORAL FUNCTION THERAPY: CPT | Mod: GN

## 2022-12-12 PROCEDURE — 97530 THERAPEUTIC ACTIVITIES: CPT | Mod: GO | Performed by: OCCUPATIONAL THERAPIST

## 2022-12-12 PROCEDURE — 70450 CT HEAD/BRAIN W/O DYE: CPT

## 2022-12-12 PROCEDURE — 258N000003 HC RX IP 258 OP 636: Performed by: STUDENT IN AN ORGANIZED HEALTH CARE EDUCATION/TRAINING PROGRAM

## 2022-12-12 PROCEDURE — 97535 SELF CARE MNGMENT TRAINING: CPT | Mod: GO | Performed by: OCCUPATIONAL THERAPIST

## 2022-12-12 PROCEDURE — 85027 COMPLETE CBC AUTOMATED: CPT | Performed by: STUDENT IN AN ORGANIZED HEALTH CARE EDUCATION/TRAINING PROGRAM

## 2022-12-12 PROCEDURE — 250N000011 HC RX IP 250 OP 636: Performed by: STUDENT IN AN ORGANIZED HEALTH CARE EDUCATION/TRAINING PROGRAM

## 2022-12-12 PROCEDURE — 250N000009 HC RX 250: Performed by: NEUROLOGICAL SURGERY

## 2022-12-12 PROCEDURE — 82374 ASSAY BLOOD CARBON DIOXIDE: CPT | Performed by: STUDENT IN AN ORGANIZED HEALTH CARE EDUCATION/TRAINING PROGRAM

## 2022-12-12 PROCEDURE — 70450 CT HEAD/BRAIN W/O DYE: CPT | Mod: 26 | Performed by: RADIOLOGY

## 2022-12-12 PROCEDURE — 84100 ASSAY OF PHOSPHORUS: CPT | Performed by: STUDENT IN AN ORGANIZED HEALTH CARE EDUCATION/TRAINING PROGRAM

## 2022-12-12 PROCEDURE — 250N000013 HC RX MED GY IP 250 OP 250 PS 637: Performed by: STUDENT IN AN ORGANIZED HEALTH CARE EDUCATION/TRAINING PROGRAM

## 2022-12-12 PROCEDURE — 83735 ASSAY OF MAGNESIUM: CPT | Performed by: STUDENT IN AN ORGANIZED HEALTH CARE EDUCATION/TRAINING PROGRAM

## 2022-12-12 RX ORDER — LEVETIRACETAM 750 MG/1
750 TABLET ORAL 2 TIMES DAILY
Status: DISCONTINUED | OUTPATIENT
Start: 2022-12-12 | End: 2022-12-12

## 2022-12-12 RX ORDER — POTASSIUM CHLORIDE 7.45 MG/ML
10 INJECTION INTRAVENOUS ONCE
Status: COMPLETED | OUTPATIENT
Start: 2022-12-12 | End: 2022-12-12

## 2022-12-12 RX ORDER — MAGNESIUM SULFATE HEPTAHYDRATE 40 MG/ML
2 INJECTION, SOLUTION INTRAVENOUS ONCE
Status: COMPLETED | OUTPATIENT
Start: 2022-12-12 | End: 2022-12-12

## 2022-12-12 RX ORDER — SODIUM CHLORIDE 9 MG/ML
INJECTION, SOLUTION INTRAVENOUS CONTINUOUS
Status: DISCONTINUED | OUTPATIENT
Start: 2022-12-12 | End: 2022-12-15 | Stop reason: HOSPADM

## 2022-12-12 RX ORDER — ACETAMINOPHEN 650 MG/1
650 SUPPOSITORY RECTAL EVERY 4 HOURS PRN
Status: DISCONTINUED | OUTPATIENT
Start: 2022-12-12 | End: 2022-12-15 | Stop reason: HOSPADM

## 2022-12-12 RX ADMIN — SODIUM CHLORIDE: 9 INJECTION, SOLUTION INTRAVENOUS at 22:03

## 2022-12-12 RX ADMIN — ONDANSETRON 4 MG: 2 INJECTION INTRAMUSCULAR; INTRAVENOUS at 11:28

## 2022-12-12 RX ADMIN — MAGNESIUM SULFATE IN WATER 2 G: 40 INJECTION, SOLUTION INTRAVENOUS at 05:19

## 2022-12-12 RX ADMIN — POTASSIUM PHOSPHATE, MONOBASIC POTASSIUM PHOSPHATE, DIBASIC 9 MMOL: 224; 236 INJECTION, SOLUTION, CONCENTRATE INTRAVENOUS at 08:56

## 2022-12-12 RX ADMIN — LEVETIRACETAM 750 MG: 100 INJECTION, SOLUTION INTRAVENOUS at 19:39

## 2022-12-12 RX ADMIN — POTASSIUM CHLORIDE 10 MEQ: 7.46 INJECTION, SOLUTION INTRAVENOUS at 06:21

## 2022-12-12 RX ADMIN — ACETAMINOPHEN 650 MG: 650 SUPPOSITORY RECTAL at 21:35

## 2022-12-12 RX ADMIN — LEVETIRACETAM 750 MG: 100 INJECTION, SOLUTION INTRAVENOUS at 07:44

## 2022-12-12 ASSESSMENT — ACTIVITIES OF DAILY LIVING (ADL)
ADLS_ACUITY_SCORE: 45
ADLS_ACUITY_SCORE: 49
ADLS_ACUITY_SCORE: 45
ADLS_ACUITY_SCORE: 49
ADLS_ACUITY_SCORE: 45
ADLS_ACUITY_SCORE: 45

## 2022-12-12 ASSESSMENT — VISUAL ACUITY
OU: BASELINE
OU: BASELINE

## 2022-12-12 NOTE — PLAN OF CARE
Major Shift Events: Neurologically intact. A&Ox4. Follows commands and moves all extremities. Very hard of hearing; pocket talker at bedside. Minimal R side head incisional pain overnight. Afebrile. SR; HR 50s-70s. MAPs 60-80s; MAPs in 60s when sleeping. LS diminished on RA. Denied nausea. Full liquid diet; poor appetite and needs feeding assistance. Large volumes of UOP in brandon, 150-300 mL/Q2H. No BM overnight. Serosanguinous output 10-15 mL/Q2H in ZAYNAB drain to thumbprint bulb suction.     Plan: Repeat swallow eval with speech today. Monitor neurological status. Notify primary team of changes in POC.  For vital signs and complete assessments, please see documentation flowsheets.    Goal Outcome Evaluation:      Plan of Care Reviewed With: patient    Overall Patient Progress: improvingOverall Patient Progress: improving    Outcome Evaluation: Neurologically intact; A&Ox4. Soft blood pressures overnight.

## 2022-12-12 NOTE — PROGRESS NOTES
[facility], [place]   12/12/2022  Neurosurgery Progress Note:    Assessment:  Serge Marin is a 82 year old female with known chronic subdural hematoma which has increased in size with increase of midline shift who is at her neurological baseline without new focal findings.     Patient is postprocedure day 4 status post right subdural evacuating port system (SEPS) drain insertion for subdural hematoma evacuation. Postop day 2 status post right craniotomy for chronic subdural hematoma evacuation.    Plan:  - Serial neuro exams  - Pain control  - SBP <140  - Continue Keppra 7d postop  - INR <1.5, platelets >100k, Hb >7.0; transfuse as needed  - No anticoagulating/antiplatelet/fibrinolytic medications  - SCDs for DVT proph  -Okay to transfer to the floor  - Continue to monitor drain outputs  -----------------------------------  Raul Haley  Neurosurgery Resident, PGY-2    Please contact neurosurgery resident on call with questions.    Dial * * [phone], enter [phone] when prompted.      Interval History: No acute events overnight, stable neurological examination.    Objective:   Temp:  [98.4  F (36.9  C)-100  F (37.8  C)] 98.8  F (37.1  C)  Pulse:  [53-80] 62  Resp:  [10-24] 15  MAP:  [58 mmHg-87 mmHg] 87 mmHg  Arterial Line BP: ()/(40-64) 123/64  SpO2:  [93 %-98 %] 97 %  I/O last 3 completed shifts:  In: 967 [P.O.:240; I.V.:727]  Out: 2496 [Urine:2180; Emesis/NG output:70; Drains:246]    Gen: Appears comfortable, NAD  Neurologic:  - Alert & Oriented to person, place, and intermittently year/month  - Very hard of hearing. Follows commands briskly  - Speech fluent, spontaneous. Thick accent. No aphasia or dysarthria.  - No gaze preference. No apparent hemineglect.  - PERRL, EOMI  - Face symmetric with sensation intact to light touch  - Tongue protrudes midline  - Trapezii muscles 5/5 bilaterally    Grossly deconditioned.   Del Tr Bi WE WF Gr   R 4+ 4+ 4+ 4+ 4+ 4+   L 4+ 4+ 4+ 4+ 4+ 4+     HF KE KF DF PF EHL   R 4 4 4 4 4 4   L 4 4 4 4 4 4     Sensation intact and symmetric to light touch throughout    Arguello's and clonus negative.    LABS:  Recent Labs   Lab 12/12/22  0340 12/11/22  0808 12/11/22  0330 12/08/22  1804 12/08/22  1213     --  143  --  139   POTASSIUM 3.8  --  3.8  --  4.4   CHLORIDE 109*  --  111*  --  105   CO2 23  --  23  --  22   ANIONGAP 11  --  9  --  12   GLC 93 94 101*   < > 95   BUN 4.6*  --  9.9  --  14.5   CR 0.66  --  0.71  --  0.80   BERTA 8.8  --  8.1*  --  9.4    < > = values in this interval not displayed.       Recent Labs   Lab 12/12/22  0340   WBC 5.0   RBC 3.57*   HGB 10.2*   HCT 32.2*   MCV 90   MCH 28.6   MCHC 31.7   RDW 13.1          IMAGING:  No results found for this or any previous visit (from the past 24 hour(s)).

## 2022-12-12 NOTE — PLAN OF CARE
"Major Shift Events: Lethargic but arousing to voice with pocket talker on. Orientation can wax and wane, delirium seems to be getting worse. Making statements like \"Ingris been waiting forever for a room, can you call someone?\". Easily redirectable. NSG changed vitals and neuros to Q4. Dysarthric, quiet speech. PERRLA, follows commands and RADFORD equally. SLP advanced diet to purees with thins, needs assistance with eating. Sheffield and a-line removed. ZAYNAB x1 to thumbprint suction with larger outputs (20-42 ml Q2) - NSG aware. ZAYNAB pulled at 1815.     Plan: Encourage meals and working with therapies. Transfer to  pending bed availability.   For vital signs and complete assessments, please see documentation flowsheets.                           "

## 2022-12-12 NOTE — PROGRESS NOTES
Neurocritical Care Multi-Disciplinary Note    Reason for critical care admission: SDH  Admitting Team: AMBER  Date of Service:  12/12/2022  Date of Admission:  12/8/2022  Hospital Day: 5    Patient condition reviewed and discussed while on multidisciplinary rounds today.   Please note these minor interventions that were initiated:  1. None    The Neurocritical Care service will continue to follow peripherally while the patient is within the ICU. We are readily available should issues arise. Please feel free to contact us for critical care issues with which we may be of assistance. For all other concerns, please contact primary service first.     Please contact NCC team phone at [phone]    Chen SPAULDING CNP  NeuroCritical Care Nurse Practitioner  Pager: [phone]  Ascom: [phone] available [facility] [phone]

## 2022-12-13 ENCOUNTER — APPOINTMENT (OUTPATIENT)
Dept: SPEECH THERAPY | Facility: CLINIC | Age: 82
DRG: 025 | End: 2022-12-13
Payer: COMMERCIAL

## 2022-12-13 ENCOUNTER — APPOINTMENT (OUTPATIENT)
Dept: GENERAL RADIOLOGY | Facility: CLINIC | Age: 82
DRG: 025 | End: 2022-12-13
Attending: NURSE PRACTITIONER
Payer: COMMERCIAL

## 2022-12-13 ENCOUNTER — APPOINTMENT (OUTPATIENT)
Dept: CT IMAGING | Facility: CLINIC | Age: 82
DRG: 025 | End: 2022-12-13
Attending: NURSE PRACTITIONER
Payer: COMMERCIAL

## 2022-12-13 LAB
ALBUMIN UR-MCNC: NEGATIVE MG/DL
ANION GAP SERPL CALCULATED.3IONS-SCNC: 13 MMOL/L (ref 7–15)
APPEARANCE UR: ABNORMAL
BILIRUB UR QL STRIP: NEGATIVE
BUN SERPL-MCNC: 7.9 MG/DL (ref 8–23)
CALCIUM SERPL-MCNC: 8.7 MG/DL (ref 8.8–10.2)
CHLORIDE SERPL-SCNC: 105 MMOL/L (ref 98–107)
COLOR UR AUTO: YELLOW
CREAT SERPL-MCNC: 0.68 MG/DL (ref 0.51–0.95)
DEPRECATED HCO3 PLAS-SCNC: 22 MMOL/L (ref 22–29)
ERYTHROCYTE [DISTWIDTH] IN BLOOD BY AUTOMATED COUNT: 13 % (ref 10–15)
GFR SERPL CREATININE-BSD FRML MDRD: 86 ML/MIN/1.73M2
GLUCOSE SERPL-MCNC: 87 MG/DL (ref 70–99)
GLUCOSE UR STRIP-MCNC: NEGATIVE MG/DL
HCT VFR BLD AUTO: 34 % (ref 35–47)
HGB BLD-MCNC: 10.7 G/DL (ref 11.7–15.7)
HGB UR QL STRIP: NEGATIVE
KETONES UR STRIP-MCNC: 20 MG/DL
LEUKOCYTE ESTERASE UR QL STRIP: NEGATIVE
MAGNESIUM SERPL-MCNC: 2 MG/DL (ref 1.7–2.3)
MCH RBC QN AUTO: 29.2 PG (ref 26.5–33)
MCHC RBC AUTO-ENTMCNC: 31.5 G/DL (ref 31.5–36.5)
MCV RBC AUTO: 93 FL (ref 78–100)
MUCOUS THREADS #/AREA URNS LPF: PRESENT /LPF
NITRATE UR QL: NEGATIVE
PH UR STRIP: 6.5 [PH] (ref 5–7)
PHOSPHATE SERPL-MCNC: 3.1 MG/DL (ref 2.5–4.5)
PLATELET # BLD AUTO: 146 10E3/UL (ref 150–450)
POTASSIUM SERPL-SCNC: 3.5 MMOL/L (ref 3.4–5.3)
RBC # BLD AUTO: 3.67 10E6/UL (ref 3.8–5.2)
RBC URINE: 2 /HPF
SODIUM SERPL-SCNC: 140 MMOL/L (ref 136–145)
SP GR UR STRIP: 1.01 (ref 1–1.03)
UROBILINOGEN UR STRIP-MCNC: NORMAL MG/DL
WBC # BLD AUTO: 5.4 10E3/UL (ref 4–11)
WBC URINE: 0 /HPF

## 2022-12-13 PROCEDURE — 250N000011 HC RX IP 250 OP 636: Performed by: NEUROLOGICAL SURGERY

## 2022-12-13 PROCEDURE — 71045 X-RAY EXAM CHEST 1 VIEW: CPT | Mod: 26 | Performed by: RADIOLOGY

## 2022-12-13 PROCEDURE — 999N000127 HC STATISTIC PERIPHERAL IV START W US GUIDANCE

## 2022-12-13 PROCEDURE — 70450 CT HEAD/BRAIN W/O DYE: CPT

## 2022-12-13 PROCEDURE — 36415 COLL VENOUS BLD VENIPUNCTURE: CPT | Performed by: STUDENT IN AN ORGANIZED HEALTH CARE EDUCATION/TRAINING PROGRAM

## 2022-12-13 PROCEDURE — 250N000013 HC RX MED GY IP 250 OP 250 PS 637: Performed by: STUDENT IN AN ORGANIZED HEALTH CARE EDUCATION/TRAINING PROGRAM

## 2022-12-13 PROCEDURE — 71045 X-RAY EXAM CHEST 1 VIEW: CPT

## 2022-12-13 PROCEDURE — 99024 POSTOP FOLLOW-UP VISIT: CPT | Performed by: NURSE PRACTITIONER

## 2022-12-13 PROCEDURE — 80048 BASIC METABOLIC PNL TOTAL CA: CPT | Performed by: STUDENT IN AN ORGANIZED HEALTH CARE EDUCATION/TRAINING PROGRAM

## 2022-12-13 PROCEDURE — 83735 ASSAY OF MAGNESIUM: CPT | Performed by: STUDENT IN AN ORGANIZED HEALTH CARE EDUCATION/TRAINING PROGRAM

## 2022-12-13 PROCEDURE — 84100 ASSAY OF PHOSPHORUS: CPT | Performed by: STUDENT IN AN ORGANIZED HEALTH CARE EDUCATION/TRAINING PROGRAM

## 2022-12-13 PROCEDURE — 81001 URINALYSIS AUTO W/SCOPE: CPT | Performed by: NURSE PRACTITIONER

## 2022-12-13 PROCEDURE — 85027 COMPLETE CBC AUTOMATED: CPT | Performed by: STUDENT IN AN ORGANIZED HEALTH CARE EDUCATION/TRAINING PROGRAM

## 2022-12-13 PROCEDURE — 120N000002 HC R&B MED SURG/OB UMMC

## 2022-12-13 PROCEDURE — 999N000157 HC STATISTIC RCP TIME EA 10 MIN

## 2022-12-13 PROCEDURE — 70450 CT HEAD/BRAIN W/O DYE: CPT | Mod: 26 | Performed by: RADIOLOGY

## 2022-12-13 PROCEDURE — 92526 ORAL FUNCTION THERAPY: CPT | Mod: GN

## 2022-12-13 PROCEDURE — 258N000003 HC RX IP 258 OP 636: Performed by: NEUROLOGICAL SURGERY

## 2022-12-13 RX ORDER — MAGNESIUM SULFATE HEPTAHYDRATE 40 MG/ML
2 INJECTION, SOLUTION INTRAVENOUS ONCE
Status: COMPLETED | OUTPATIENT
Start: 2022-12-13 | End: 2022-12-13

## 2022-12-13 RX ORDER — POTASSIUM CHLORIDE 7.45 MG/ML
10 INJECTION INTRAVENOUS ONCE
Status: COMPLETED | OUTPATIENT
Start: 2022-12-13 | End: 2022-12-13

## 2022-12-13 RX ADMIN — ACETAMINOPHEN 650 MG: 650 SUPPOSITORY RECTAL at 12:01

## 2022-12-13 RX ADMIN — LEVETIRACETAM 750 MG: 100 INJECTION, SOLUTION INTRAVENOUS at 13:14

## 2022-12-13 RX ADMIN — ESCITALOPRAM OXALATE 5 MG: 5 TABLET, FILM COATED ORAL at 10:32

## 2022-12-13 RX ADMIN — POTASSIUM CHLORIDE 10 MEQ: 7.46 INJECTION, SOLUTION INTRAVENOUS at 12:07

## 2022-12-13 RX ADMIN — MAGNESIUM SULFATE IN WATER 2 G: 40 INJECTION, SOLUTION INTRAVENOUS at 11:02

## 2022-12-13 ASSESSMENT — ACTIVITIES OF DAILY LIVING (ADL)
ADLS_ACUITY_SCORE: 48
ADLS_ACUITY_SCORE: 48
ADLS_ACUITY_SCORE: 49
ADLS_ACUITY_SCORE: 49
ADLS_ACUITY_SCORE: 48
ADLS_ACUITY_SCORE: 49
ADLS_ACUITY_SCORE: 49
ADLS_ACUITY_SCORE: 48
ADLS_ACUITY_SCORE: 49
ADLS_ACUITY_SCORE: 48

## 2022-12-13 NOTE — PHARMACY-ADMISSION MEDICATION HISTORY
Admission Medication History Completed by Pharmacy    See [facility] Admission Navigator for allergy information, preferred outpatient pharmacy, prior to admission medications and immunization status.     Medication History Sources:     [phone] chart review    Changes made to PTA medication list (reason):    Added: None    Deleted: duplicate acetaminophen    Changed: None    Additional Information:    None    Prior to Admission medications    Medication Sig Last Dose Taking? Auth Provider Long Term End Date   acetaminophen (TYLENOL) 500 MG tablet Take 500 mg by mouth every 6 hours as needed for mild pain  at PRN Yes Unknown, Entered By History     calcium carbonate-vitamin D (OSCAL W/D) 500-200 MG-UNIT tablet Take 1 tablet by mouth daily 12/8/2022 Yes Unknown, Entered By History     cetirizine (ZYRTEC) 10 MG tablet Take 10 mg by mouth daily as needed for allergies  Yes Reported, Patient     clotrimazole (LOTRIMIN) 1 % external cream Apply topically 2 times daily as needed  at PRN Yes Reported, Patient     escitalopram (LEXAPRO) 10 MG tablet Take 5 mg by mouth daily (0.5 x 10 mg = 5 mg) 12/8/2022 Yes Unknown, Entered By History Yes    ferrous gluconate (FERGON) 324 (38 FE) MG tablet Take 1 tablet (324 mg) by mouth daily (with breakfast) 12/8/2022 Yes Kassie Wyatt MD     levothyroxine (SYNTHROID, LEVOTHROID) 75 MCG tablet Take 1 tablet (75 mcg) by mouth daily 12/8/2022 Yes Kassie Wyatt MD Yes    loperamide (IMODIUM) 2 MG capsule Take 2 mg by mouth 4 times daily as needed for diarrhea  at PRN Yes Reported, Patient No    psyllium (METAMUCIL/KONSYL) Packet Take 1 packet by mouth daily as needed for constipation  at PRN Yes Reported, Patient     senna-docusate (SENOKOT-S/PERICOLACE) 8.6-50 MG tablet Take 1 tablet by mouth 2 times daily 12/8/2022 Yes Jorge Calabrese MD         Date completed: 12/13/22    Medication history completed by: Pat Medeiros  PharmD

## 2022-12-13 NOTE — PLAN OF CARE
Status: POD 3 right crani for chronic SDH evacuation. SEPS drain removed 12/12.  Vitals: VSS.  Neuros: Orientedx3-4. W/W. Lethargic. Speech dysarthric, garbled. Strength 4/5 throughout. Uses pocket talker to communicate.  IV: PIVx2. Infusing NS at 35/hr.  Labs/Electrolytes: Reviewed.   Resp/trach: LS clear, on RA. Continuous pulse oximetry WNL.  Diet: NPO pending SLP exam.  Bowel status: LBM 12/9.  : Voids per PW/incontinent.  Skin: R crani site/SEPS site covered wtih primapore, old drainage-marked no change. Generalized bruising, skin tags.   Pain: Denies.  Activity: Turns q2h in bed.  Plan: Continue current POC. Anticipate discharge to TCU when medically able.

## 2022-12-13 NOTE — PLAN OF CARE
Status: POD#3 R crani for chronic SDH evacuation. SEPS drain removed 12/12.  Vitals: VSS on RA  Neuros: AO x 4, slow to respond, inconsistently follows commands. Speech dysarthric & garbled. Pocket talker to communicate. 4/5 RUE, 3/5 LUE, 3/5 BLE.   IV: PIV replaced this shift. NS at 35 ml/hr.   Labs/Electrolytes: Mag and Pot replaced, redraws in the AM.   Resp/trach: Placed on high flow NC for pneumocephalus.   Diet: Pureed w/ thins, 1:1 supervision.   Bowel status: No BM this shift.   : Voiding w/ incontinence, Purewick in place.   Skin: R crani site/SEPS site covered w/ Primapore. Mepilex placed between knees & heels for blanchable redness.   Pain: Suppository PRN Tylenol x 1.   Activity: A2 w/ lift, turn & repo Q2.   Plan: Continue to monitor and follow POC.   Updates this shift: CT done this shift, placed on high flow. SLP saw patient today & cleared for diet.     *Paged NSG regarding loose tooth on bottom jaw, looks like it may fall out soon.*

## 2022-12-13 NOTE — PROGRESS NOTES
[facility], [place]   12/13/2022  Neurosurgery Progress Note:    Assessment:  Serge Marin is a 82 year old female with known chronic subdural hematoma which has increased in size with increase of midline shift who is at her neurological baseline without new focal findings.     Patient underwent right subdural evacuating port system (SEPS) drain insertion for subdural hematoma evacuation on 12/9/22.  No drainage was noted therefore patient was taken to OR on 12/10/22 and is now status post right craniotomy for chronic subdural hematoma evacuation.    Plan:  - Serial neuro exams  - Pain control  - SBP <140  - Continue Keppra 7d postop  - INR <1.5, platelets >100k, Hb >7.0; transfuse as needed  - No anticoagulating/antiplatelet/fibrinolytic medications  - SCDs for DVT proph  - Continue to monitor drain outputs  -----------------------------------  JASSON Bullock, CNP  Department of Neurosurgery  Pager: [phone]      Interval History:  Less alert this morning, repeat head CT obtained and remains stable with ongoing pneumocephalus.  SLP evaluated and recommends pureed diet.     Objective:   Temp:  [96.1  F (35.6  C)-99  F (37.2  C)] 96.1  F (35.6  C)  Pulse:  [53-87] 54  Resp:  [9-28] 16  BP: ()/(43-64) 105/43  SpO2:  [95 %-97 %] 97 %  I/O last 3 completed shifts:  In: 486 [P.O.:120; I.V.:366]  Out: 931 [Urine:800; Drains:131]    Gen: Appears comfortable, NAD  Neurologic:  - Alert & Oriented to person, place, and intermittently year/month  - Very hard of hearing but follows commands    - Speech fluent, spontaneous. Thick accent. No aphasia or dysarthria.  - No gaze preference. No apparent hemineglect.  - PERRL, EOMI  - Face symmetric with sensation intact to light touch  - Tongue protrudes midline  - Trapezii muscles 5/5 bilaterally    Grossly deconditioned.   Del Tr Bi WE WF Gr   R 4+ 4+ 4+ 4+ 4+ 4+   L 4+ 4+ 4+ 4+ 4+ 4+    HF KE KF DF PF EHL   R 4 4 4 4 4 4   L 4 4 4 4 4 4      Sensation intact and symmetric to light touch throughout    Arguello's and clonus negative.    LABS:  Recent Labs   Lab 12/13/22  0738 12/12/22  0340 12/11/22  0808 12/11/22  0330    143  --  143   POTASSIUM 3.5 3.8  --  3.8   CHLORIDE 105 109*  --  111*   CO2 22 23  --  23   ANIONGAP 13 11  --  9   GLC 87 93 94 101*   BUN 7.9* 4.6*  --  9.9   CR 0.68 0.66  --  0.71   BERTA 8.7* 8.8  --  8.1*       Recent Labs   Lab 12/13/22  0738   WBC 5.4   RBC 3.67*   HGB 10.7*   HCT 34.0*   MCV 93   MCH 29.2   MCHC 31.5   RDW 13.0   *       IMAGING:  Recent Results (from the past 24 hour(s))   CT Head w/o Contrast    Narrative    EXAM: CT HEAD W/O CONTRAST  12/12/2022 4:15 PM     HISTORY:  SDH evaluation s/p cranie for evacuation       COMPARISON:  12/11/2022    TECHNIQUE: Using multidetector thin collimation helical acquisition  technique, axial, coronal and sagittal CT images from the skull base  to the vertex were obtained without intravenous contrast.   (topogram) image(s) also obtained and reviewed.    FINDINGS:    Changes of right parietal craniectomy with subdural drain in place.  Postsurgical pneumocephalus decreased from prior. Mixed density fluid  adjacent to the craniectomy site measuring 12 mm, unchanged. Unchanged  3 mm right-to-left midline shift and adjacent sulcal effacement.  Stable amount of subarachnoid hemorrhage overlying the right frontal  lobe is unchanged.    No acute loss of gray-white matter differentiation in the cerebral  hemispheres. Ventricles are proportionate to the cerebral sulci. Clear  basal cisterns.    The bony calvaria and the bones of the skull base are normal. The  visualized portions of the paranasal sinuses and mastoid air cells are  clear. Grossly normal orbits.       Impression    IMPRESSION:  1. Unchanged size of extra-axial fluid collection overlying the right  frontal convexity with decreased pneumocephalus. Similar 3 mm of  right-to-left midline shift. Right  frontal drain in unchanged  position.  2. Unchanged small amount of subarachnoid hemorrhage over the right  frontal lobe.    I have personally reviewed the examination and initial interpretation  and I agree with the findings.    ABRAM SHOOK MD         SYSTEM ID:  [id number]   CT Head w/o Contrast    Narrative    CT HEAD W/O CONTRAST 12/13/2022 9:31 AM    History: AMS     Comparison: 12/12/2022    Technique: Using multidetector thin collimation helical acquisition  technique, axial, coronal and sagittal CT images from the skull base  to the vertex were obtained without intravenous contrast.   (topogram) image(s) also obtained and reviewed.    Findings: Post surgical changes of right calvarial craniotomy similar  to prior. The previous subdural drain has been removed. Grossly  unchanged volume of the pneumocephalus with slight redistribution over  the anterior right frontal lobe. Mixed density extra-axial fluid  hemorrhage overlying the right frontoparietal region measuring up to  1.2 cm is similar to prior. This exerts mild mass effect on the right  cervical hemisphere with similar 4 mm of leftward midline shift. Small  amount of adjacent subarachnoid hemorrhage. There is some adjacent  intraparenchymal hypodensity in hyperintensity in the right frontal  lobe slightly increased from prior exams.    No new acute calvarial fractures. Trace mucosal thickening of the  paranasal sinuses. Mastoid air cells are clear.      Impression    Impression:  1. Intraparenchymal hypodensity in the right frontal lobe underlying  the subdural collection has slightly increased in size over multiple  prior exams. This may relate to edema/contusion. MRI could be  considered.  2. Similar size of the mixed density extra-axial hemorrhage/fluid  overlying the right frontoparietal convexity status post subdural  drain removal. Continued 4 mm leftward midline shift.  3. Similar small volume subarachnoid hemorrhage.    I have personally  reviewed the examination and initial interpretation  and I agree with the findings.    ABRAM SHOOK MD         SYSTEM ID:  [id number]

## 2022-12-13 NOTE — PLAN OF CARE
Arrived from: 4a  Belongings/meds: clothing  2 RN Skin Assessment Completed by: Gely LALA and Hugo RAMON    Non-intact findings documented (yes/no/NA): blanchable sacrum, buttocks and back; skin tags throughout; generalized bruising; scab on back and inner L heel; dry and cracked bilateral heels; R crani incision with primipore covering, minimal drainage marked and no change noted    Status: pt admitted d/t increase in size and increase of midline shift of chronic subdural hematoma; POD 4 of R subdural evac port sytem drain for SDH evac; POD2 R crani for chronic SDH evac  Vitals: VSS on RA, HTN within parameters  Neuros: letharguc but arouses to voice. Very [place], needs pocket talker on high levels. Oriented x4, can wax and wane at times. 4/5 ARDEN 3/5 BLE. Dysarthric/garbled speech  IV: 1 PIV SL 1 PIV running NG @ 35 ml/hr  Labs/Electrolytes: WNL  Resp/trach: clear, diminished  Diet: NPO  Bowel status: incontinent, LBM 12/9  : incontinent, purewick in place  Skin: see above  Pain: tylenol given x1 for HA and incisional pain  Activity: A2lift  Plan: re-eval swallow tomorrow, continue IV keppra, no anticoags/platelets/fibrinolytics  Updates this shift: pt having trouble swallowing thin liquids, refusing to swallow pureed diet food. Team aware, made pt NPO , started fluids, meds changed to IV or rectal route. Swallow eval in morning.

## 2022-12-13 NOTE — PROGRESS NOTES
Care Management Follow Up     Length of Stay (days): 5     Expected Discharge Date: 12/13/2022     Concerns to be Addressed: Disposition planning   Patient plan of care discussed at interdisciplinary rounds: Yes     Anticipated Discharge Disposition:    Return to TCU placement at [facility]     Anticipated Discharge Services:  Return to TCU placement at [facility]  Anticipated Discharge DME:  Not applicable at this time     Patient/family educated on Medicare website which has current facility and service quality ratings:  No as pt's bed is being held at SNF of origin  Education Provided on the Discharge Plan:  yes  Patient/Family in Agreement with the Plan:  yes     Referrals Placed by CM/SW:  No new referrals placed on this date  Private pay costs discussed: Not applicable at this time     Additional Information:    Pt was admitted to [facility] from [facility].  Pt was at [facility] for TCU placement.  Pt's bed is being held.  ADELE spoke with Angelika Crystal NP who indicates that pt is not medically ready for discharge.      ADELE will continue to follow for discharge planning.    EUSEBIO Sloan  Social Work, 6A  Phone:  [phone]  Pager:  [phone]  12/13/2022

## 2022-12-13 NOTE — PROGRESS NOTES
Brief Neurosurgery Update    I removed the subdural drain.    First the bandage which was covering the incision and drain were removed. Next the suture securing the drain was cut at the level of the skin. The suture was then completely removed from the skin and disposed of. Next the drain was relieved of it suction by releasing the bulb's valve. Then the drain was removed from the wound with slow steady pressure. There was no resistance removing the drain. The entirety of the drain was removed from the wound. There was no drainage from the drain site. The drain site was cleaned with betadine and steristrips were then placed on top of the site. Finally a bandage was applied over the incision and drain site.     The patient tolerated the procedure well. There were no complications.    Dagoberto Ricketts MD PGY1  Plastic and Reconstructive Surgery  Neurosurgery

## 2022-12-13 NOTE — PROGRESS NOTES
Care Management Follow Up    Length of Stay (days): 5    Expected Discharge Date: 12/13/2022     Concerns to be Addressed: Disposition planning   Patient plan of care discussed at interdisciplinary rounds: Yes    Anticipated Discharge Disposition:    Return to TCU placement at [facility]     Anticipated Discharge Services:  Return to TCU placement at [facility]  Anticipated Discharge DME:  Not applicable at this time    Patient/family educated on Medicare website which has current facility and service quality ratings:  No as pt's bed is being held at SNF of origin  Education Provided on the Discharge Plan:  yes  Patient/Family in Agreement with the Plan:  yes    Referrals Placed by CM/SW:  No new referrals placed on this date  Private pay costs discussed: Not applicable at this time    Additional Information:    Pt was admitted to [facility] from [facility].  Pt was placed at [facility] on 11/28/2022 for TCU placement.  ADELE phoned Admissions ([place]osh[place] [phone]) who confirms that pt has a 18 day medical assistance bed hold.  ADELE will coordinate return to [facility] upon receipt of MD discharge orders.    EUSEBIO Sloan  Social Work, 6A  Phone:  [phone]  Pager:  [phone]  12/13/2022          AISSATOU Newsome

## 2022-12-14 ENCOUNTER — APPOINTMENT (OUTPATIENT)
Dept: SPEECH THERAPY | Facility: CLINIC | Age: 82
DRG: 025 | End: 2022-12-14
Payer: COMMERCIAL

## 2022-12-14 ENCOUNTER — APPOINTMENT (OUTPATIENT)
Dept: PHYSICAL THERAPY | Facility: CLINIC | Age: 82
DRG: 025 | End: 2022-12-14
Payer: COMMERCIAL

## 2022-12-14 LAB
ANION GAP SERPL CALCULATED.3IONS-SCNC: 10 MMOL/L (ref 7–15)
BUN SERPL-MCNC: 12.9 MG/DL (ref 8–23)
CALCIUM SERPL-MCNC: 8.6 MG/DL (ref 8.8–10.2)
CHLORIDE SERPL-SCNC: 106 MMOL/L (ref 98–107)
CREAT SERPL-MCNC: 0.69 MG/DL (ref 0.51–0.95)
DEPRECATED HCO3 PLAS-SCNC: 23 MMOL/L (ref 22–29)
ERYTHROCYTE [DISTWIDTH] IN BLOOD BY AUTOMATED COUNT: 13 % (ref 10–15)
GFR SERPL CREATININE-BSD FRML MDRD: 86 ML/MIN/1.73M2
GLUCOSE SERPL-MCNC: 92 MG/DL (ref 70–99)
HCT VFR BLD AUTO: 33.9 % (ref 35–47)
HGB BLD-MCNC: 11 G/DL (ref 11.7–15.7)
HOLD SPECIMEN: NORMAL
HOLD SPECIMEN: NORMAL
MAGNESIUM SERPL-MCNC: 1.9 MG/DL (ref 1.7–2.3)
MCH RBC QN AUTO: 28.7 PG (ref 26.5–33)
MCHC RBC AUTO-ENTMCNC: 32.4 G/DL (ref 31.5–36.5)
MCV RBC AUTO: 89 FL (ref 78–100)
PHOSPHATE SERPL-MCNC: 3 MG/DL (ref 2.5–4.5)
PLATELET # BLD AUTO: 146 10E3/UL (ref 150–450)
POTASSIUM SERPL-SCNC: 3.8 MMOL/L (ref 3.4–5.3)
RBC # BLD AUTO: 3.83 10E6/UL (ref 3.8–5.2)
SODIUM SERPL-SCNC: 139 MMOL/L (ref 136–145)
WBC # BLD AUTO: 5.7 10E3/UL (ref 4–11)

## 2022-12-14 PROCEDURE — 250N000013 HC RX MED GY IP 250 OP 250 PS 637: Performed by: STUDENT IN AN ORGANIZED HEALTH CARE EDUCATION/TRAINING PROGRAM

## 2022-12-14 PROCEDURE — 250N000011 HC RX IP 250 OP 636: Performed by: NEUROLOGICAL SURGERY

## 2022-12-14 PROCEDURE — 36415 COLL VENOUS BLD VENIPUNCTURE: CPT | Performed by: STUDENT IN AN ORGANIZED HEALTH CARE EDUCATION/TRAINING PROGRAM

## 2022-12-14 PROCEDURE — 84100 ASSAY OF PHOSPHORUS: CPT | Performed by: STUDENT IN AN ORGANIZED HEALTH CARE EDUCATION/TRAINING PROGRAM

## 2022-12-14 PROCEDURE — 92526 ORAL FUNCTION THERAPY: CPT | Mod: GN

## 2022-12-14 PROCEDURE — 83735 ASSAY OF MAGNESIUM: CPT | Performed by: STUDENT IN AN ORGANIZED HEALTH CARE EDUCATION/TRAINING PROGRAM

## 2022-12-14 PROCEDURE — 258N000003 HC RX IP 258 OP 636: Performed by: NEUROLOGICAL SURGERY

## 2022-12-14 PROCEDURE — 97116 GAIT TRAINING THERAPY: CPT | Mod: GP

## 2022-12-14 PROCEDURE — 85027 COMPLETE CBC AUTOMATED: CPT | Performed by: STUDENT IN AN ORGANIZED HEALTH CARE EDUCATION/TRAINING PROGRAM

## 2022-12-14 PROCEDURE — 120N000002 HC R&B MED SURG/OB UMMC

## 2022-12-14 PROCEDURE — 97530 THERAPEUTIC ACTIVITIES: CPT | Mod: GP

## 2022-12-14 PROCEDURE — 80048 BASIC METABOLIC PNL TOTAL CA: CPT | Performed by: STUDENT IN AN ORGANIZED HEALTH CARE EDUCATION/TRAINING PROGRAM

## 2022-12-14 PROCEDURE — 999N000215 HC STATISTIC HFNC ADULT NON-CPAP

## 2022-12-14 RX ORDER — MAGNESIUM SULFATE HEPTAHYDRATE 40 MG/ML
2 INJECTION, SOLUTION INTRAVENOUS ONCE
Status: COMPLETED | OUTPATIENT
Start: 2022-12-14 | End: 2022-12-14

## 2022-12-14 RX ORDER — POTASSIUM CHLORIDE 20MEQ/15ML
10 LIQUID (ML) ORAL ONCE
Status: COMPLETED | OUTPATIENT
Start: 2022-12-14 | End: 2022-12-14

## 2022-12-14 RX ORDER — POTASSIUM CHLORIDE 7.45 MG/ML
10 INJECTION INTRAVENOUS ONCE
Status: COMPLETED | OUTPATIENT
Start: 2022-12-14 | End: 2022-12-14

## 2022-12-14 RX ADMIN — ESCITALOPRAM OXALATE 5 MG: 5 TABLET, FILM COATED ORAL at 08:54

## 2022-12-14 RX ADMIN — LEVETIRACETAM 750 MG: 100 INJECTION, SOLUTION INTRAVENOUS at 12:41

## 2022-12-14 RX ADMIN — POTASSIUM CHLORIDE 10 MEQ: 7.46 INJECTION, SOLUTION INTRAVENOUS at 14:29

## 2022-12-14 RX ADMIN — POLYETHYLENE GLYCOL 3350 17 G: 17 POWDER, FOR SOLUTION ORAL at 08:53

## 2022-12-14 RX ADMIN — LEVETIRACETAM 750 MG: 100 INJECTION, SOLUTION INTRAVENOUS at 00:40

## 2022-12-14 RX ADMIN — MAGNESIUM SULFATE IN WATER 2 G: 40 INJECTION, SOLUTION INTRAVENOUS at 11:19

## 2022-12-14 RX ADMIN — LEVOTHYROXINE SODIUM 75 MCG: 0.07 TABLET ORAL at 08:54

## 2022-12-14 RX ADMIN — ACETAMINOPHEN 650 MG: 325 TABLET, FILM COATED ORAL at 02:34

## 2022-12-14 ASSESSMENT — ACTIVITIES OF DAILY LIVING (ADL)
ADLS_ACUITY_SCORE: 48
ADLS_ACUITY_SCORE: 50
ADLS_ACUITY_SCORE: 48
ADLS_ACUITY_SCORE: 50
ADLS_ACUITY_SCORE: 50
ADLS_ACUITY_SCORE: 48
ADLS_ACUITY_SCORE: 50

## 2022-12-14 ASSESSMENT — VISUAL ACUITY
OU: NOT TESTABLE
OU: NOT TESTABLE

## 2022-12-14 NOTE — PROGRESS NOTES
[facility], [place]   12/14/2022  Neurosurgery Progress Note:    Assessment:  Serge Marin is a 82 year old female with known chronic subdural hematoma which has increased in size with increase of midline shift who is at her neurological baseline without new focal findings.     Patient underwent right subdural evacuating port system (SEPS) drain insertion for subdural hematoma evacuation on 12/9/22.  No drainage was noted therefore patient was taken to OR on 12/10/22 and is now status post right craniotomy for chronic subdural hematoma evacuation.    Plan:  - Serial neuro exams  - Pain control  - SBP <160  - Continue Keppra until 12/17/22  - INR <1.5, platelets >100k, Hb >7.0; transfuse as needed  - No anticoagulating/antiplatelet/fibrinolytic medications  - SCDs for DVT proph    -----------------------------------  JASSON Bullock, CNP  Department of Neurosurgery  Pager: [phone]      Interval History:  High flow 02 placed yesterday.  Patient continues to complain of frontal headache.  Repeat head CT yesterday stable.  SLP recommending pureed diet.     Objective:   Temp:  [96.5  F (35.8  C)-97.8  F (36.6  C)] 97.8  F (36.6  C)  Pulse:  [52-68] 67  Resp:  [16-20] 20  BP: ()/(40-53) 109/51  FiO2 (%):  [100 %] 100 %  SpO2:  [96 %-100 %] 100 %  I/O last 3 completed shifts:  In: 652.42 [I.V.:652.42]  Out: 600 [Urine:600]    Gen: Appears comfortable, NAD  Neurologic:  - Alert & Oriented to person, place, and intermittently year/month  - Very hard of hearing but follows commands    - Speech fluent, spontaneous. Thick accent. No aphasia or dysarthria.  - No gaze preference. No apparent hemineglect.  - PERRL, EOMI  - Face symmetric with sensation intact to light touch  - Tongue protrudes midline  - Trapezii muscles 5/5 bilaterally    Grossly deconditioned.   Del Tr Bi WE WF Gr   R 4+ 4+ 4+ 4+ 4+ 4+   L 4+ 4+ 4+ 4+ 4+ 4+    HF KE KF DF PF EHL   R 4 4 4 4 4 4   L 4 4 4 4 4 4      Sensation intact and symmetric to light touch throughout    Arguello's and clonus negative.    LABS:  Recent Labs   Lab 12/13/22  0738 12/12/22  0340 12/11/22  0808 12/11/22  0330    143  --  143   POTASSIUM 3.5 3.8  --  3.8   CHLORIDE 105 109*  --  111*   CO2 22 23  --  23   ANIONGAP 13 11  --  9   GLC 87 93 94 101*   BUN 7.9* 4.6*  --  9.9   CR 0.68 0.66  --  0.71   BERTA 8.7* 8.8  --  8.1*       Recent Labs   Lab 12/13/22  0738   WBC 5.4   RBC 3.67*   HGB 10.7*   HCT 34.0*   MCV 93   MCH 29.2   MCHC 31.5   RDW 13.0   *       IMAGING:  Recent Results (from the past 24 hour(s))   CT Head w/o Contrast    Narrative    CT HEAD W/O CONTRAST 12/13/2022 9:31 AM    History: AMS     Comparison: 12/12/2022    Technique: Using multidetector thin collimation helical acquisition  technique, axial, coronal and sagittal CT images from the skull base  to the vertex were obtained without intravenous contrast.   (topogram) image(s) also obtained and reviewed.    Findings: Post surgical changes of right calvarial craniotomy similar  to prior. The previous subdural drain has been removed. Grossly  unchanged volume of the pneumocephalus with slight redistribution over  the anterior right frontal lobe. Mixed density extra-axial fluid  hemorrhage overlying the right frontoparietal region measuring up to  1.2 cm is similar to prior. This exerts mild mass effect on the right  cervical hemisphere with similar 4 mm of leftward midline shift. Small  amount of adjacent subarachnoid hemorrhage. There is some adjacent  intraparenchymal hypodensity in hyperintensity in the right frontal  lobe slightly increased from prior exams.    No new acute calvarial fractures. Trace mucosal thickening of the  paranasal sinuses. Mastoid air cells are clear.      Impression    Impression:  1. Intraparenchymal hypodensity in the right frontal lobe underlying  the subdural collection has slightly increased in size over multiple  prior exams.  This may relate to edema/contusion. MRI could be  considered.  2. Similar size of the mixed density extra-axial hemorrhage/fluid  overlying the right frontoparietal convexity status post subdural  drain removal. Continued 4 mm leftward midline shift.  3. Similar small volume subarachnoid hemorrhage.    I have personally reviewed the examination and initial interpretation  and I agree with the findings.    ABRAM SHOOK MD         SYSTEM ID:  [id number]   XR Chest Port 1 View    Narrative    EXAM: XR CHEST PORT 1 VIEW 12/13/2022 10:26 AM    HISTORY: 82 years Female concern for PNA.     COMPARISON: Chest radiograph 11/22/2022, CT chest/abdomen/pelvis  10/20/2022.    TECHNIQUE: Single portable AP view of the chest.    FINDINGS:   Patient is apically oriented and rotated to the left. Radiopaque  densities project over the abdomen. Midline trachea. Normal cardiac  silhouette. Mediastinum is within normal limits. Distinct pulmonary  vasculature. Flattened left hemidiaphragm with costophrenic angle  blunting. No discernible pneumothorax. Normal lung volumes. Mild left  perihilar opacification with bronchial wall thickening corresponding  with that seen on CT from 10/20/2022. Unremarkable upper abdomen. No  acute or suspicious osseous abnormalities. Unremarkable soft tissues.      Impression    IMPRESSION:   1.  Possible viral pattern of respiratory illness versus reactive  airway disease. No focal pneumonia.  2.  Small left pleural effusion.    I have personally reviewed the examination and initial interpretation  and I agree with the findings.    CHRIS REED MD         SYSTEM ID:  [id number]

## 2022-12-14 NOTE — PLAN OF CARE
Status: POD#3 R crani for chronic SDH evacuation. SEPS drain removed 12/12.  Vitals: HTN within parameters, intermittent david to high 40s x2 this shift, team aware, on high flow nasal cannula  Neuros: Waxes and wanes. Lethargic but arouses to voice at beginning of shift, alert and opens eyes spontaneously towards end. Very [place], pt refuses pocket talker. Oriented x4. 4/5 RUE, 3/5 LUE 3/5 BLE. Dysarthric/garbled speech  IV: 1 PIV SL 1 PIV running NS @ 35 ml/hr  Labs/Electrolytes: WNL  Resp/trach: clear, diminished  Diet: pureed with thins, 1:1 feeder, poor PO intake  Bowel status: incontinent, LBM 12/9  : incontinent  Skin: R crani site/SEPS site covered with primipore. Mepilex to sacrum and R knee and heels for blanchable redness  Activity: A2lift, repo q2hr  Plan: awaiting placement until medically stable  Updates this shift: NSG paged for intermittent bradycardia. Bedside dental consult done regarding potential lower tooth looseness, NSG to contact dental to pursue treatment.

## 2022-12-14 NOTE — PLAN OF CARE
Status: POD#4 R crani for chronic SDH evacuation. SEPS drain removed 12/12.  Vitals: VSS on RA  Neuros: AO x 4, slow to respond, inconsistently follows commands. Speech dysarthric & garbled. Pocket talker to communicate. 4/5 RUE, 3/5 LUE, 3/5 BLE.   IV: PIV NS at 35 ml/hr.   Labs/Electrolytes: Mag and Pot replaced, redraws in the AM.   Resp/trach: High flow removed this shift, on RA.   Diet: Pureed w/ thins, 1:1 supervision. Poor appetite.   Bowel status: Smearing BM this shift.   : Voiding w/ incontinence, Purewick in place.   Skin: R crani site/SEPS site covered w/ Primapore. Mepilex placed between knees & heels for blanchable redness.   Pain: No non-verbal statements of pain.   Activity: A2 w/ lift, turn & repo Q2.   Plan: Continue to monitor and follow POC.

## 2022-12-14 NOTE — CONSULTS
Dental Consult,  General Practice Residency  Patient:  Serge Marin   Date of birth 1940, Medical record number [id number]  Date of Visit:  12/13/2022  Date of Admission: 12/8/2022  Consult Requested by:Dat Avila MD                                        Assessment and Recommendations:   ASSESSMENT:  1. Serge Marin is a 82 year old female with a history of chronic subdural hematoma who presents s/p subdural evacuating port system drain insertion for the subdural hematoma evacuation. Oral exam concerning for carmen signs of chronic periodontal disease, calculus bridge present from #22-#27 (lower anterior teeth). Class 3 mobility displayed on the lower anterior teeth as a complete unit.         RECOMMENDATION:  1. Although the clinical and radiographic examination indicate the patient has outstanding dental needs, due to the urgency of their other medical needs, dental treatment may be postponed. Potential odontogenic issues may be managed with:  a. Chlorhexidine gluconate 0.12% rinse, pre and post surgery    Clinic information:   ([facility]     [facility]  [address] [place], [place] [address] [phone]      Thank you for allowing us to participate in the care of this patient,  Direct any further questions to:     Rafael Bueno MD DDS,  PGY1  General Practice Residency  Pager: [phone]    Patient discussed with:   Yuriy Naranjo DDS  , [facility]                                                Reason for Consult:   Referring MD & Reason for Visit: I was asked by Dat Avila MD, to see Serge Marin as part of a dental assessment of a moving tooth.                                                History of Present Illness:   Serge Marin is a 82 year old female with a history of chronic subdural hematoma who presents s/p subdural evacuating port system drain insertion for the subdural  hematoma evacuation. Oral exam concerning for calculus bridge on the lower anterior teeth, they display class 3 mobility.                                                 Medical and surgical History       Past Medical History   Past Medical History:   Diagnosis Date     Anemia      Bipolar disorder (H)      Elevated fasting glucose      Hernia, abdominal      Kidney stone      Nonimmune to hepatitis B virus      Subdural hematoma      Thyroid disease     Hypothyroid       Immunization History   Administered Date(s) Administered     COVID-19 Vaccine 18+ (Moderna) 01/29/2021, 02/26/2021     Flu 65+ Years 09/25/2019     Influenza (High Dose) 3 valent vaccine 10/13/2016, 09/04/2018, 10/01/2019     Influenza (IIV3) PF 12/03/2001, 10/14/2008     Influenza Vaccine 65+ (Fluzone HD) 10/21/2022     Influenza Vaccine, 6+MO IM (QUADRIVALENT W/PRESERVATIVES) 09/18/2015, 09/15/2017     Mantoux Tuberculin Skin Test 08/18/2022, 08/28/2022     Pneumo Conj 13-V (2010&after) 02/01/2016     Pneumococcal 23 valent 05/08/2018     Tdap (Adacel,Boostrix) 01/19/2005       Past Surgical History   Past Surgical History:   Procedure Laterality Date     CHOLECYSTECTOMY       COLONOSCOPY Left 03/18/2015    Procedure: COMBINED COLONOSCOPY, SINGLE OR MULTIPLE BIOPSY/POLYPECTOMY BY BIOPSY;  Surgeon: Stone Lauren MD;  Location:  GI     CRANIOTOMY Right 12/10/2022    Procedure: RIGHT CRANIOTOMY FOR RIGHT SUBDURAL HEMATOMA EVACUATION;  Surgeon: Dat Avila MD;  Location:  OR     GYN SURGERY       HERNIA REPAIR       IR NEPHROLITHOTOMY  11/22/2022     PERCUTANEOUS NEPHROLITHOTOMY Left 11/22/2022    Procedure: Percutaneous Nephrolithotomy;  Surgeon: Stevo Talbot MD;  Location:  OR                                                     Social and Family History     Social History     History reviewed. No pertinent family history.                                                            Allergies     Allergies   Allergies   Allergen  Reactions     Penicillins      Bactrim [Sulfamethoxazole W/Trimethoprim] Itching     Patient prescribed Bactrim at eye appointment. Assisted living reports eyes were red and itching.                                                            Medications     Current Facility-Administered Medications Ordered in Epic   Medication Dose Route Frequency Last Rate Last Admin     acetaminophen (TYLENOL) Suppository 650 mg  650 mg Rectal Q4H PRN   650 mg at 12/13/22 1201     acetaminophen (TYLENOL) tablet 650 mg  650 mg Oral Q4H PRN         bisacodyl (DULCOLAX) suppository 10 mg  10 mg Rectal Daily PRN         cetirizine (zyrTEC) tablet 10 mg  10 mg Oral Daily PRN         escitalopram (LEXAPRO) tablet 5 mg  5 mg Oral Daily   5 mg at 12/13/22 1032     [Held by provider] ferrous gluconate (FERGON) tablet 324 mg  324 mg Oral Daily with breakfast   324 mg at 12/11/22 0746     hydrALAZINE (APRESOLINE) injection 10-20 mg  10-20 mg Intravenous Q30 Min PRN         HYDROmorphone (PF) (DILAUDID) injection 0.3-0.5 mg  0.3-0.5 mg Intravenous Q3H PRN   0.3 mg at 12/11/22 1014     labetalol (NORMODYNE/TRANDATE) injection 10-40 mg  10-40 mg Intravenous Q10 Min PRN         levETIRAcetam (KEPPRA) 750 mg in sodium chloride 0.9 % 100 mL intermittent infusion  750 mg Intravenous Q12H 400 mL/hr at 12/13/22 1314 750 mg at 12/13/22 1314     levothyroxine (SYNTHROID/LEVOTHROID) tablet 75 mcg  75 mcg Oral Daily   75 mcg at 12/11/22 0748     lidocaine (LMX4) cream   Topical Q1H PRN         lidocaine 1 % 0.1-1 mL  0.1-1 mL Other Q1H PRN         magnesium hydroxide (MILK OF MAGNESIA) suspension 30 mL  30 mL Oral Daily PRN         naloxone (NARCAN) injection 0.2 mg  0.2 mg Intravenous Q2 Min PRN        Or     naloxone (NARCAN) injection 0.4 mg  0.4 mg Intravenous Q2 Min PRN        Or     naloxone (NARCAN) injection 0.2 mg  0.2 mg Intramuscular Q2 Min PRN        Or     naloxone (NARCAN) injection 0.4 mg  0.4 mg Intramuscular Q2 Min PRN          ondansetron (ZOFRAN ODT) ODT tab 4 mg  4 mg Oral Q6H PRN        Or     ondansetron (ZOFRAN) injection 4 mg  4 mg Intravenous Q6H PRN   4 mg at 12/12/22 1128     oxyCODONE IR (ROXICODONE) half-tab 2.5 mg  2.5 mg Oral Q4H PRN        Or     oxyCODONE (ROXICODONE) tablet 5 mg  5 mg Oral Q4H PRN         polyethylene glycol (MIRALAX) Packet 17 g  17 g Oral Daily   17 g at 12/11/22 0750     prochlorperazine (COMPAZINE) injection 5 mg  5 mg Intravenous Q6H PRN        Or     prochlorperazine (COMPAZINE) tablet 5 mg  5 mg Oral Q6H PRN         psyllium (METAMUCIL/KONSYL) Packet 1 packet  1 packet Oral Daily PRN         senna-docusate (SENOKOT-S/PERICOLACE) 8.6-50 MG per tablet 1 tablet  1 tablet Oral BID   1 tablet at 12/1940     sodium chloride (PF) 0.9% PF flush 3 mL  3 mL Intracatheter Q8H   3 mL at 12/12/22 2204     sodium chloride (PF) 0.9% PF flush 3 mL  3 mL Intracatheter q1 min prn         sodium chloride 0.9% infusion   Intravenous Continuous 35 mL/hr at 12/12/22 2203 New Bag at 12/12/22 2203     No current [facility]-ordered outpatient medications on file.       Prior to Admission Medications                                                   Clinical Examination     Vitals were reviewed  Temp: 97  F (36.1  C) Temp src: Axillary BP: 108/48 Pulse: 53   Resp: 16 SpO2: 96 % O2 Device: High Flow Nasal Cannula (HFNC) Oxygen Delivery: 30 LPM      Extraoral exam:   No submandibular lymphadenopathy, no induration or erythema, no abnormal skin lesions. Inferior border of mandible is palpable bilaterally, STEWART >45mm. No TMJ discomfort bilaterally. FOM soft and non tender. No clicking of TMJ on opening and lateral movements. No swelling, no induration, no contusions, and no erythema of the infraorbital skin bilaterally. Patient able to open and close both eyes without obstruction. Patient exhibits no obstructions during breathing.   Intraoral Findings: could not be completed due to limited opening and intubation tube.      Imaging: No imaging taken due to short timeframe given between consultation request and discharge.

## 2022-12-15 VITALS
WEIGHT: 107.58 LBS | HEART RATE: 77 BPM | TEMPERATURE: 98.5 F | SYSTOLIC BLOOD PRESSURE: 114 MMHG | BODY MASS INDEX: 18.37 KG/M2 | RESPIRATION RATE: 20 BRPM | OXYGEN SATURATION: 92 % | DIASTOLIC BLOOD PRESSURE: 48 MMHG | HEIGHT: 64 IN

## 2022-12-15 LAB
ANION GAP SERPL CALCULATED.3IONS-SCNC: 11 MMOL/L (ref 7–15)
BUN SERPL-MCNC: 13.7 MG/DL (ref 8–23)
CALCIUM SERPL-MCNC: 8.8 MG/DL (ref 8.8–10.2)
CHLORIDE SERPL-SCNC: 105 MMOL/L (ref 98–107)
CREAT SERPL-MCNC: 0.64 MG/DL (ref 0.51–0.95)
DEPRECATED HCO3 PLAS-SCNC: 22 MMOL/L (ref 22–29)
ERYTHROCYTE [DISTWIDTH] IN BLOOD BY AUTOMATED COUNT: 12.7 % (ref 10–15)
GFR SERPL CREATININE-BSD FRML MDRD: 88 ML/MIN/1.73M2
GLUCOSE SERPL-MCNC: 87 MG/DL (ref 70–99)
HCT VFR BLD AUTO: 32.7 % (ref 35–47)
HGB BLD-MCNC: 10.6 G/DL (ref 11.7–15.7)
MAGNESIUM SERPL-MCNC: 1.8 MG/DL (ref 1.7–2.3)
MCH RBC QN AUTO: 28.9 PG (ref 26.5–33)
MCHC RBC AUTO-ENTMCNC: 32.4 G/DL (ref 31.5–36.5)
MCV RBC AUTO: 89 FL (ref 78–100)
PHOSPHATE SERPL-MCNC: 2.7 MG/DL (ref 2.5–4.5)
PLATELET # BLD AUTO: 150 10E3/UL (ref 150–450)
POTASSIUM SERPL-SCNC: 3.8 MMOL/L (ref 3.4–5.3)
RBC # BLD AUTO: 3.67 10E6/UL (ref 3.8–5.2)
SODIUM SERPL-SCNC: 138 MMOL/L (ref 136–145)
WBC # BLD AUTO: 5.5 10E3/UL (ref 4–11)

## 2022-12-15 PROCEDURE — 250N000013 HC RX MED GY IP 250 OP 250 PS 637: Performed by: STUDENT IN AN ORGANIZED HEALTH CARE EDUCATION/TRAINING PROGRAM

## 2022-12-15 PROCEDURE — 83735 ASSAY OF MAGNESIUM: CPT | Performed by: STUDENT IN AN ORGANIZED HEALTH CARE EDUCATION/TRAINING PROGRAM

## 2022-12-15 PROCEDURE — 258N000003 HC RX IP 258 OP 636: Performed by: NEUROLOGICAL SURGERY

## 2022-12-15 PROCEDURE — 80048 BASIC METABOLIC PNL TOTAL CA: CPT | Performed by: STUDENT IN AN ORGANIZED HEALTH CARE EDUCATION/TRAINING PROGRAM

## 2022-12-15 PROCEDURE — 84100 ASSAY OF PHOSPHORUS: CPT | Performed by: STUDENT IN AN ORGANIZED HEALTH CARE EDUCATION/TRAINING PROGRAM

## 2022-12-15 PROCEDURE — 85027 COMPLETE CBC AUTOMATED: CPT | Performed by: STUDENT IN AN ORGANIZED HEALTH CARE EDUCATION/TRAINING PROGRAM

## 2022-12-15 PROCEDURE — 36415 COLL VENOUS BLD VENIPUNCTURE: CPT | Performed by: STUDENT IN AN ORGANIZED HEALTH CARE EDUCATION/TRAINING PROGRAM

## 2022-12-15 PROCEDURE — 999N000127 HC STATISTIC PERIPHERAL IV START W US GUIDANCE

## 2022-12-15 PROCEDURE — 250N000011 HC RX IP 250 OP 636: Performed by: NEUROLOGICAL SURGERY

## 2022-12-15 RX ORDER — ESCITALOPRAM OXALATE 10 MG/1
5 TABLET ORAL DAILY
COMMUNITY
Start: 2022-12-15

## 2022-12-15 RX ORDER — LEVETIRACETAM 750 MG/1
750 TABLET ORAL 2 TIMES DAILY
Qty: 60 TABLET | Refills: 0 | COMMUNITY
Start: 2022-12-15 | End: 2023-05-15

## 2022-12-15 RX ORDER — CLOTRIMAZOLE 1 %
CREAM (GRAM) TOPICAL 2 TIMES DAILY PRN
COMMUNITY
Start: 2022-12-15

## 2022-12-15 RX ORDER — AMOXICILLIN 250 MG
1 CAPSULE ORAL 2 TIMES DAILY
Qty: 30 TABLET | Refills: 0 | COMMUNITY
Start: 2022-12-15 | End: 2023-12-20

## 2022-12-15 RX ORDER — LEVOTHYROXINE SODIUM 75 UG/1
75 TABLET ORAL DAILY
Qty: 30 TABLET | Refills: 3 | COMMUNITY
Start: 2022-12-15

## 2022-12-15 RX ORDER — MAGNESIUM SULFATE HEPTAHYDRATE 40 MG/ML
2 INJECTION, SOLUTION INTRAVENOUS ONCE
Status: COMPLETED | OUTPATIENT
Start: 2022-12-15 | End: 2022-12-15

## 2022-12-15 RX ORDER — FERROUS GLUCONATE 324(38)MG
324 TABLET ORAL
Qty: 90 TABLET | Refills: 3 | COMMUNITY
Start: 2022-12-15

## 2022-12-15 RX ORDER — POTASSIUM CHLORIDE 7.45 MG/ML
10 INJECTION INTRAVENOUS ONCE
Status: COMPLETED | OUTPATIENT
Start: 2022-12-15 | End: 2022-12-15

## 2022-12-15 RX ADMIN — MAGNESIUM SULFATE IN WATER 2 G: 40 INJECTION, SOLUTION INTRAVENOUS at 11:20

## 2022-12-15 RX ADMIN — POLYETHYLENE GLYCOL 3350 17 G: 17 POWDER, FOR SOLUTION ORAL at 09:04

## 2022-12-15 RX ADMIN — POTASSIUM CHLORIDE 10 MEQ: 7.46 INJECTION, SOLUTION INTRAVENOUS at 09:35

## 2022-12-15 RX ADMIN — ESCITALOPRAM OXALATE 5 MG: 5 TABLET, FILM COATED ORAL at 09:07

## 2022-12-15 RX ADMIN — LEVOTHYROXINE SODIUM 75 MCG: 0.07 TABLET ORAL at 09:07

## 2022-12-15 RX ADMIN — LEVETIRACETAM 750 MG: 100 INJECTION, SOLUTION INTRAVENOUS at 12:51

## 2022-12-15 RX ADMIN — SENNOSIDES AND DOCUSATE SODIUM 1 TABLET: 8.6; 5 TABLET ORAL at 09:07

## 2022-12-15 RX ADMIN — LEVETIRACETAM 750 MG: 100 INJECTION, SOLUTION INTRAVENOUS at 00:12

## 2022-12-15 ASSESSMENT — ACTIVITIES OF DAILY LIVING (ADL)
ADLS_ACUITY_SCORE: 50
ADLS_ACUITY_SCORE: 52
ADLS_ACUITY_SCORE: 50
ADLS_ACUITY_SCORE: 50

## 2022-12-15 NOTE — PLAN OF CARE
Status: POD#5 R crani for chronic SDH evacuation. SEPS drain removed 12/12.  Vitals: VSS on RA  Neuros: AO x 4, slow to respond, inconsistently follows commands. Speech dysarthric & garbled. Pocket talker to communicate. 4/5 RUE, 3/5 LUE, 3/5 BLE.   IV: PIV removed prior to discharge.   Labs/Electrolytes: Mag and Pot replaced this shift.   Resp/trach: LS clear, int cough.   Diet: Pureed w/ thins, 1:1 supervision. Poor appetite.   Bowel status: Smearing BM this shift x 2.   : Voiding w/ incontinence.  Skin: R crani site/SEPS site covered w/ Primapore. Mepilex placed between knees & heels for blanchable redness.   Pain: No non-verbal statements of pain.   Activity: A2 w/ lift, turn & repo Q2.   Plan: Patient was discharged today at 1400 via [facility]. Pt was transported in her personal WC. All belongings sent with patient. Packet sent with transporter. Attempted to call report to facility, but phone line was busy, will try back later.

## 2022-12-15 NOTE — PROGRESS NOTES
"Care Management Discharge Note    Discharge Date: 12/15/2022       Discharge Disposition:  [facility] ([phone])    Discharge Services:  TCU at [facility] ([phone])    Discharge DME:  Pt has her own w/c    Discharge Transportation: ADELE spoke with pt's floor nurse (Felicitas) who states that pt has her own w/c with her at [facility].  Felicitas indicates that pt is appropriate to transport by w/c.  ADELE arranged for [facility] (Christophe [phone]) to provide w/c transport at 2pm    Private pay costs discussed: Not applicable at this time    PAS Confirmation Code:  Not required as pt is returning to SNF of origin where bed was held  Patient/family educated on Medicare website which has current facility and service quality ratings:  No, as pt is returning to SNF of origin where bed was held    Education Provided on the Discharge Plan:  yes  Persons Notified of Discharge Plans: Pt, son (Delfina), 6A nursing and Lino Spence NP  Patient/Family in Agreement with the Plan:  yes    Handoff Referral Completed: Yes    Additional Information:  - Lino Spence NP has confirmed readiness for discharge  - Admissions (Paige) at [facility] has confirmed acceptance for return admission  - SW emailed (as phone lines are down at [facility]) secure, pt's discharge orders and discharge summary to [facility]  - ADELE completed \"Important Message from Medicare\" with pt's son (Delfina) via phone call.    EUSEBIO Sloan  Social Work, 6A  Phone:  [phone]  Pager:  [phone]  12/15/2022            AISSATOU Newsome      "

## 2022-12-15 NOTE — PLAN OF CARE
Occupational Therapy Discharge Summary    Reason for therapy discharge:    Discharged to transitional care facility.    Progress towards therapy goal(s). See goals on Care Plan in [facility] electronic health record for goal details.  Goals partially met.  Barriers to achieving goals:   discharge from facility.    Therapy recommendation(s):    Continued therapy is recommended.  Rationale/Recommendations:  to increase ind in I/ADLS.

## 2022-12-15 NOTE — DISCHARGE SUMMARY
[facility] Discharge Summary and Instructions    Serge Marin MRN# [id number]   Age: 82 year old YOB: 1940     Date of Admission:  12/8/2022  Date of Discharge::  12/15/2022  Admitting Physician:  Dta Avila MD  Discharge Physician:  Dat Avila MD          Admission Diagnoses:   SDH (subdural hematoma) [S06.5XAA]          Discharge Diagnosis:     SDH (subdural hematoma) [S06.5XAA]     Clinically Significant Risk Factors Present on Admission             Procedures:   12/10/22 right craniotomy for chronic subdural hematoma evacuation.           Brief History of Illness:   Serge Marin is a 82 year old female with known chronic subdural hematoma which has increased in size with increase of midline shift who is at her neurological baseline without new focal findings.      Patient underwent right subdural evacuating port system (SEPS) drain insertion for subdural hematoma evacuation on 12/9/22.  No drainage was noted therefore patient was taken to OR on 12/10/22 and is now status post right craniotomy for chronic subdural hematoma evacuation.ient has elected to undergo above-mentioned procedure.           Hospital Course:   Patient underwent above-mentioned procedure on 12/10/2022. The operation was uncomplicated and she was admitted to the surgical ICU for routine post operative cares. On post operative day 1, she was doing well and transferred to the floor. On 12/13/2022, head CT revealed mild pneumocephalus and she was placed on high flow O2 for 24 hours. On post operative day 5, she was ambulating, voiding without a brandon, eating pureed diet with thin liquids, pain was well controlled and therefore she was discharged to her TCU. She will follow-up with Neurosurgery in 2 weeks for wound check and repeat head CT. Plan to continue oral keppra until follow-up appointment.       Gen: Appears comfortable, NAD  Neurologic:  - Alert & Oriented to person, place, and intermittently  year/month  - Very hard of hearing but follows commands    - Speech fluent, spontaneous. Thick accent. No aphasia or dysarthria.  - No gaze preference. No apparent hemineglect.  - PERRL, EOMI  - Face symmetric with sensation intact to light touch  - Tongue protrudes midline  - Trapezii muscles 5/5 bilaterally     Grossly deconditioned.    Del Tr Bi WE WF Gr   R 4+ 4+ 4+ 4+ 4+ 4+   L 4+ 4+ 4+ 4+ 4+ 4+     HF KE KF DF PF EHL   R 4 4 4 4 4 4   L 4 4 4 4 4 4      Sensation intact and symmetric to light touch throughout           Discharge Medications:     Current Discharge Medication List      CONTINUE these medications which have NOT CHANGED    Details   acetaminophen (TYLENOL) 500 MG tablet Take 500 mg by mouth every 6 hours as needed for mild pain      calcium carbonate-vitamin D (OSCAL W/D) 500-200 MG-UNIT tablet Take 1 tablet by mouth daily      cetirizine (ZYRTEC) 10 MG tablet Take 10 mg by mouth daily as needed for allergies      clotrimazole (LOTRIMIN) 1 % external cream Apply topically 2 times daily as needed      escitalopram (LEXAPRO) 10 MG tablet Take 5 mg by mouth daily (0.5 x 10 mg = 5 mg)      ferrous gluconate (FERGON) 324 (38 FE) MG tablet Take 1 tablet (324 mg) by mouth daily (with breakfast)  Qty: 90 tablet, Refills: 3    Associated Diagnoses: Health care maintenance      levETIRAcetam (KEPPRA) 750 MG tablet Take 1 tablet (750 mg) by mouth 2 times daily  Qty: 60 tablet, Refills: 0    Comments: Seizure ppx      levothyroxine (SYNTHROID, LEVOTHROID) 75 MCG tablet Take 1 tablet (75 mcg) by mouth daily  Qty: 30 tablet, Refills: 3    Associated Diagnoses: Other specified hypothyroidism      loperamide (IMODIUM) 2 MG capsule Take 2 mg by mouth 4 times daily as needed for diarrhea      psyllium (METAMUCIL/KONSYL) Packet Take 1 packet by mouth daily as needed for constipation      senna-docusate (SENOKOT-S/PERICOLACE) 8.6-50 MG tablet Take 1 tablet by mouth 2 times daily  Qty: 30 tablet, Refills: 0     Associated Diagnoses: Left renal stone                     Discharge Instructions and Follow-Up:     Discharge diet: Regular   Discharge activity: You may advance activity as tolerated. No strenuous exercise or heay lifting greater than 10 lbs for 4 weeks or until seen and cleared in clinic.    Discharge follow-up: Follow-up with Neurosurgery [facility] in 2 weeks for wound check/post-hospital evaluation.    All additional follow-up visits to be determined by Dr. Dat Avila MD       Wound care/Instructions: - If you are on a steroid medication (decadron or dexamethasone) please follow the detailed instructions with the prescription to slowly decrease the amount you are taking.     - You are taking Keppra (levtiracitam) for prophylaxis.  Please continue to take your anti-seizure medications as prescribed. You will be given a schedule of how to slowly decrease these medications as well which you should follow. If this is not clear to you, please call our clinic for assistance. .    - You will have follow up scheduled with the physician assistants and/or nurse practitioners in our clinic 2 weeks after your surgery.  If you live far away, you may see your primary care doctor for a wound check at 2 weeks.     - If you have not heard from our clinic about your follow up visit by 3-4 days following your discharge, please call our clinic at [phone] to schedule an appointment with the Neurosurgery teams.     After discharge, your activity restrictions are:   -We encourage short frequent walks, increasing as tolerated.  - No driving until you are seen in clinic and cleared by your neurosurgeon.  If you have had a seizure, you may not drive for at least 3 months according to [place] law.    - No strenuous activity.  - No lifting more than 10 pounds until you are seen in clinic (a gallon of milk weighs approximately 8 pounds)    Wound cares after surgery  - You are ok to shower, but do not soak your incisions. Pat them  dry if they get damp.   - Avoid coloring your hair or permanent styling until cleared by your surgeon  - No baths, hot tubs or pools for 4-6 weeks after surgery.       Call if you have any of the followin. Temperature greater than 101.5 F.   2. Any redness, swelling or discharge from the wound.   3. Any new weakness, numbness or altered mental status.  4. Worsening pain that is not improving with the pain medications you were prescribed.     Call [phone] or after 5:00 pm or on weekends call [phone] and ask for the neurosurgery resident on call. Thank You.            Please call if you have:  1. increased pain, redness, drainage, swelling at your incision  2. fevers > 101.5 F degrees  3. with any questions or concerns.  You may reach the Neurosurgery clinic at [phone] during regular work hours. ER at [phone].    and ask for the Neurosurgery Resident on call at [phone], during off hours or weekends.         Discharge Disposition:     Discharged to TCU        JASSON Alvarez, CNP  Neurosurgery  Pager [phone]

## 2022-12-15 NOTE — PLAN OF CARE
Status: POD 5 right crani for chronic SDH evacuation. SEPS drain removed 12/12.  Vitals: VSS.  Neuros: Orientedx3-4. W/W. Speech dysarthric, garbled. Strength RUE 4/5, all others 3/5. Uses pocket talker to communicate.  IV: PIV infusing NS at 35/hr.  Labs/Electrolytes: Reviewed.   Resp/trach: LS clear, on RA. Continuous pulse oximetry WNL.  Diet: Thins no straws. Puree. 1:1 feed supervision.  Bowel status: LBM 12/15.  : Incontinent  Skin: R crani site/SEPS site covered wtih primapore. Generalized bruising, skin tags.   Pain: Denies.  Activity: Turns q2h in bed.  Plan: Continue current POC. Anticipate discharge to TCU when medically able.

## 2022-12-15 NOTE — PLAN OF CARE
Speech Language Therapy Discharge Summary    Reason for therapy discharge:    Discharged to transitional care facility.    Progress towards therapy goal(s). See goals on Care Plan in Epic electronic health record for goal details.  Goals partially met.  Barriers to achieving goals:   discharge from facility.    Therapy recommendation(s):    Continued therapy is recommended.  Rationale/Recommendations:  At time of discharge, SLP recommended puree diet (IDDSI 4) and thin liquids (IDDSI 0) with 1:1 assistance/supervision, no straws. Meds okay whole with puree solids. Pt should be fully alert and upright with PO, small bites, alternate liquids/solids, 1:1 please monitor for left-sided pocketing and remind pt of swallow strategies. Please hold PO when pt is lethargic.

## 2022-12-15 NOTE — PLAN OF CARE
Physical Therapy Discharge Summary    Reason for therapy discharge:    Discharged to transitional care facility.    Progress towards therapy goal(s). See goals on Care Plan in [facility] electronic health record for goal details.  Goals partially met.  Barriers to achieving goals:   discharge from facility.    Therapy recommendation(s):    Continued therapy is recommended.  Rationale/Recommendations:  TCU to progress functional IND.

## 2022-12-18 NOTE — PROGRESS NOTES
Found wedding ring with patient's sticker.  In Rm [id number].  Spoke to Delfina romano @[phone], who is out of town and indicated he will have his brother Shon will come and collected the ring (forwarded to the CN).

## 2022-12-23 ENCOUNTER — TELEPHONE (OUTPATIENT)
Dept: NEUROSURGERY | Facility: CLINIC | Age: 82
End: 2022-12-23

## 2022-12-23 NOTE — TELEPHONE ENCOUNTER
Health Call Center    Phone Message    May a detailed message be left on voicemail: yes     Reason for Call: Other: Gladys from the rehab center was calling to make a f/u appt from CT scan for Pt. writer could not get Pt in until 1/12/2023 Gladys stated that in the notes that Pt was to be seen within 2 weeks and that would be the week of 12/26/2022, writer was unbale to get that week to schedule. Please call Gladys back at [phone]     Action Taken: Message routed to:  [facility] ([facility] Neurosurgery    Travel Screening: Not Applicable

## 2022-12-26 NOTE — TELEPHONE ENCOUNTER
Writer spoke with Gladys and scheduled a visit with Blanquita Moran for 12/30. Please help to reschedule to an in person or virtual visit with Blanquita.

## 2022-12-29 ENCOUNTER — ANCILLARY PROCEDURE (OUTPATIENT)
Dept: CT IMAGING | Facility: CLINIC | Age: 82
End: 2022-12-29
Attending: NURSE PRACTITIONER
Payer: COMMERCIAL

## 2022-12-29 DIAGNOSIS — S06.5XAA SDH (SUBDURAL HEMATOMA) (H): ICD-10-CM

## 2022-12-29 PROCEDURE — 70450 CT HEAD/BRAIN W/O DYE: CPT | Performed by: RADIOLOGY

## 2022-12-30 ENCOUNTER — OFFICE VISIT (OUTPATIENT)
Dept: NEUROSURGERY | Facility: CLINIC | Age: 82
End: 2022-12-30
Payer: COMMERCIAL

## 2022-12-30 ENCOUNTER — CARE COORDINATION (OUTPATIENT)
Dept: NEUROSURGERY | Facility: CLINIC | Age: 82
End: 2022-12-30

## 2022-12-30 VITALS
SYSTOLIC BLOOD PRESSURE: 109 MMHG | RESPIRATION RATE: 16 BRPM | OXYGEN SATURATION: 96 % | HEART RATE: 99 BPM | DIASTOLIC BLOOD PRESSURE: 69 MMHG

## 2022-12-30 DIAGNOSIS — S06.5XAA SUBDURAL HEMATOMA (H): Primary | ICD-10-CM

## 2022-12-30 PROCEDURE — 99024 POSTOP FOLLOW-UP VISIT: CPT | Performed by: PHYSICIAN ASSISTANT

## 2022-12-30 NOTE — LETTER
2022       RE: Serge Marin  [address]  [address]  [place] [address]     Dear Colleague,    Thank you for referring your patient, Serge Marin, to the [facility] [place] at [facility]. Please see a copy of my visit note below.    [facility]  Department of Neurosurgery  Center for Skull Base and Pituitary Surgery    Name: Serge Marin  MRN: [id number]  Age: 82 year old  : 2022      Chief Complaint:   Right subdural hematoma s/p right craniotomy for evacuation 12/10/2022, 2 week postoperative visit    History of Present Illness:   Serge Marin is an 82 year old female with a history of bipolar disorder type I and hypothyroidism who is seen today for a 2 week postop visit. She had a chronic appearing right subdural hematoma identified on imaging in October this year after a fall and AMS. This was followed in outpatient Neurosurgery clinic and was found to be increasing in size, so the patient ultimately underwent the above mentioned procedure with Dr. Avila. She had postoperative mild pneumocephalus on imaging and was placed on high flow O2 x 24 hours. She discharged to TCU 12/15/2022. She's here today with her son who flew in from [place] yesterday. She's currently residing at [facility]. She has occasionally headaches that have improved. She notes today that her left arm is numb and mildly painful; started this morning upon waking. She denies new falls. She also reports decreased appetite, and says the food at her facility is terrible.    She is still taking Keppra 750 bid, which was prescribed prophylaxis in hospital. Needs taper. No history of seizure. She is not on anticoagulation.    Review of Systems:   Pertinent items are noted in HPI or as in patient entered ROS below, remainder of complete ROS is negative.     Physical Exam:   /69   Pulse 99   Resp 16   LMP   (LMP Unknown)   SpO2 96%   General: No acute distress.    Eyes: Conjunctivae are normal.  MSK: Moves all extremities.  No obvious deformity. She is somewhat guarding her left arm in a flexed position, reports pain with palpation of the elbow. There is a quarter sized bruise on the ulnar aspect of her left wrist and a 1cm bruise over her knuckle of the left hand. No bruising or swelling about the elbow that I can discern.   Neuro: The patient is fully oriented. Speech is normal, slightly slow. Extraocular movements are intact without nystagmus. Facial nerve function is normal, rated as a House Brackmann 1.  Shoulders are full strength. There is no pronator drift. Full strength in all extremities.Her  strength is equal bilaterally.   Psych: Normal mood and affect. Behavior is normal.    Incision: Her right crani incision is clean, dry, and well healed. Monocryl sutures in place.    Imaging:  Head CT done yesterday was reviewed:    Impression:     1. Postoperative changes of right parietal craniotomy with underlying  mixed density subdural fluid collection, extradural pneumocephalus,  and dural thickening. The subdural fluid collection has significantly  decreased in size compared to the prior study.  2. Low-density subdural collection over the anterior inferior right  frontal lobe with pneumocephalus that has decreased since the prior  study.  3. Subdural hygroma versus chronic subdural hematoma overlying the  right cerebellar hemisphere.  4. Moderate cerebral atrophy..      OH GAUTHIER MD      Assessment:  1. Right subdural hematoma s/p right craniotomy for evacuation 12/10/2022, 2 week postoperative visit  2. Left arm pain/paresthesias  3. Keppra use    Plan:  1. Reassuring head CT findings discussed with the patient and her son today. We'll plan for 4 week follow up with one more CT prior to appointment. They were asked to call sooner with new questions/concerns.  2. Unlikely related to SDH. I'm concerned  about an injury. I called her care facility today and discussed this with RN, Lizette, who will follow up with [facility] provider for evaluation.   3. Discussed tapering this over the next two weeks: 750mg daily x 1 week then 750 mg every other day x 1 week, then stop.    Blanquita Moran PA-C  Department of Neurosurgery

## 2022-12-30 NOTE — PROGRESS NOTES
[facility]  Department of Neurosurgery  Center for Skull Base and Pituitary Surgery    Name: Serge Marin  MRN: [id number]  Age: 82 year old  : 2022      Chief Complaint:   Right subdural hematoma s/p right craniotomy for evacuation 12/10/2022, 2 week postoperative visit    History of Present Illness:   Serge Marin is an 82 year old female with a history of bipolar disorder type I and hypothyroidism who is seen today for a 2 week postop visit. She had a chronic appearing right subdural hematoma identified on imaging in October this year after a fall and AMS. This was followed in outpatient Neurosurgery clinic and was found to be increasing in size, so the patient ultimately underwent the above mentioned procedure with Dr. Avila. She had postoperative mild pneumocephalus on imaging and was placed on high flow O2 x 24 hours. She discharged to TCU 12/15/2022. She's here today with her son who flew in from [place] yesterday. She's currently residing at [facility]. She has occasionally headaches that have improved. She notes today that her left arm is numb and mildly painful; started this morning upon waking. She denies new falls. She also reports decreased appetite, and says the food at her facility is terrible.    She is still taking Keppra 750 bid, which was prescribed prophylaxis in hospital. Needs taper. No history of seizure. She is not on anticoagulation.      Review of Systems:   Pertinent items are noted in HPI or as in patient entered ROS below, remainder of complete ROS is negative.     Physical Exam:   /69   Pulse 99   Resp 16   LMP  (LMP Unknown)   SpO2 96%   General: No acute distress.    Eyes: Conjunctivae are normal.  MSK: Moves all extremities.  No obvious deformity. She is somewhat guarding her left arm in a flexed position, reports pain with palpation of the elbow. There is a quarter sized bruise on the ulnar aspect of her left wrist and a 1cm  bruise over her knuckle of the left hand. No bruising or swelling about the elbow that I can discern.   Neuro: The patient is fully oriented. Speech is normal, slightly slow. Extraocular movements are intact without nystagmus. Facial nerve function is normal, rated as a House Brackmann 1.  Shoulders are full strength. There is no pronator drift. Full strength in all extremities.Her  strength is equal bilaterally.   Psych: Normal mood and affect. Behavior is normal.    Incision: Her right crani incision is clean, dry, and well healed. Monocryl sutures in place.    Imaging:  Head CT done yesterday was reviewed:    Impression:     1. Postoperative changes of right parietal craniotomy with underlying  mixed density subdural fluid collection, extradural pneumocephalus,  and dural thickening. The subdural fluid collection has significantly  decreased in size compared to the prior study.  2. Low-density subdural collection over the anterior inferior right  frontal lobe with pneumocephalus that has decreased since the prior  study.  3. Subdural hygroma versus chronic subdural hematoma overlying the  right cerebellar hemisphere.  4. Moderate cerebral atrophy..      OH GAUTHIER MD      Assessment:  1. Right subdural hematoma s/p right craniotomy for evacuation 12/10/2022, 2 week postoperative visit  2. Left arm pain/paresthesias  3. Keppra use    Plan:  1. Reassuring head CT findings discussed with the patient and her son today. We'll plan for 4 week follow up with one more CT prior to appointment. They were asked to call sooner with new questions/concerns.  2. Unlikely related to SDH. I'm concerned about an injury. I called her care facility today and discussed this with RN, Lizette, who will follow up with [facility] facility provider for evaluation.   3. Discussed tapering this over the next two weeks: 750mg daily x 1 week then 750 mg every other day x 1 week, then stop.         Blanquita Moran PA-C  Department of  Neurosurgery

## 2023-01-05 ENCOUNTER — TELEPHONE (OUTPATIENT)
Dept: NEUROSURGERY | Facility: CLINIC | Age: 83
End: 2023-01-05

## 2023-01-17 ENCOUNTER — TELEPHONE (OUTPATIENT)
Dept: NEUROSURGERY | Facility: CLINIC | Age: 83
End: 2023-01-17
Payer: COMMERCIAL

## 2023-01-17 NOTE — TELEPHONE ENCOUNTER
Writer routed to Blanquita Moran PA-C.   Patient follow up for suture removal orders.     Maude Gaspar LPN  Neurosurgery

## 2023-01-17 NOTE — TELEPHONE ENCOUNTER
[facility] Call Center    Phone Message    May a detailed message be left on voicemail: yes     Reason for Call: Keely at Pt's residence said that no orders were written regarding head sutures and next appointment is virtual.  She needs to know if she should take the sutures out at the facility.  Thanks.

## 2023-01-18 NOTE — TELEPHONE ENCOUNTER
Returned call and informed Keely that patient's sutures are monocryl (absorbable) and do not need to be removed. Keely verbalized understanding and has no other questions at this time.

## 2023-01-20 NOTE — PLAN OF CARE
Status: POD#4 R crani for chronic SDH evacuation. SEPS drain removed 12/12.  Vitals: VSS on high flow NC, slightly hypotensive WNL, and intermittent bradycardia in 50's while resting   Neuros: A&Ox4, extremely slow to respond with dysarthric garbled speech. Hus[place], does not like to use pocket talker and frequently refuses. At 0000 very lethargic and unable to keep eyes open for assessment. Follows most commands slowly.RUE 4/5 with moderate , AOE 3/5.   IV: 1 PIV SL 1 PIV running NS @ 35 ml/hr  Labs/Electrolytes: WNL  Resp/trach: clear, diminished. Continuous pulse ox in place  Diet: pureed with thins, 1:1 feeder, poor PO intake  Bowel status: incontinent, had a very small hard BM this shift  : incontinent  Skin: R crani site/SEPS site covered with primipore. Mepilex to sacrum and R knee and heels for blanchable redness  Activity: A2lift, repo q2hr  Pain: C/o 5/10 HA pain managed with PRN tylenol   Plan: Continue Keppra 7d postop. Pt. is not medically ready for discharge, once ready will return to [facility] where bed is being held     Tarsorrhaphy Text: A tarsorrhaphy was performed using Frost sutures.

## 2023-01-30 ENCOUNTER — ANCILLARY PROCEDURE (OUTPATIENT)
Dept: CT IMAGING | Facility: CLINIC | Age: 83
End: 2023-01-30
Attending: PHYSICIAN ASSISTANT
Payer: COMMERCIAL

## 2023-01-30 ENCOUNTER — MEDICAL CORRESPONDENCE (OUTPATIENT)
Dept: HEALTH INFORMATION MANAGEMENT | Facility: CLINIC | Age: 83
End: 2023-01-30

## 2023-01-30 DIAGNOSIS — S06.5XAA SUBDURAL HEMATOMA (H): ICD-10-CM

## 2023-01-30 PROCEDURE — 70450 CT HEAD/BRAIN W/O DYE: CPT | Mod: GC | Performed by: RADIOLOGY

## 2023-02-03 ENCOUNTER — VIRTUAL VISIT (OUTPATIENT)
Dept: NEUROSURGERY | Facility: CLINIC | Age: 83
End: 2023-02-03
Payer: COMMERCIAL

## 2023-02-03 DIAGNOSIS — S06.5XAA SUBDURAL HEMATOMA (H): Primary | ICD-10-CM

## 2023-02-03 PROCEDURE — 99024 POSTOP FOLLOW-UP VISIT: CPT | Mod: 93 | Performed by: PHYSICIAN ASSISTANT

## 2023-02-03 NOTE — PROGRESS NOTES
Serge is a 82 year old who is being evaluated via a billable telephone visit.      What phone number would you like to be contacted at? [phone]  How would you like to obtain your AVS? Mail a copy    Distant Location (provider location):  On-site  Phone call duration: 7 minutes      [facility]  Department of Neurosurgery  Center for Skull Base and Pituitary Surgery    Name: Serge Marin  MRN: [id number]  Age: 82 year old  : [birth year]23      Chief Complaint:   Right subdural hematoma s/p right craniotomy for evacuation 12/10/2022, follow up visit    History of Present Illness:   Serge Marin is an 82 year old female with a history of bipolar disorder type I and hypothyroidism who is seen today by telephone visit for follow up regarding her right subdural collection which was evacuated with a craniotomy in early December by Dr. Avila. We last saw her  and asked that she follow up in 4 weeks with head CT. She is still residing at [facility] for rehab. She has no complaints today by phone other than recent urinary frequency for which she's now on antibiotics. She had a head CT done earlier this week.       Review of Systems:   Pertinent items are noted in HPI or as in patient entered ROS below, remainder of complete ROS is negative.     Physical Exam:   Exam today is limited by telephone visit. Speech is normal, answers questions though seems mildly confused about my role in her care.     Imaging:  We reviewed the head CT done 2023 over the phone today which shows decreased extra-axial fluid and air collection underneath the craniotomy site without new abnormality.     Assessment:  Right subdural hematoma s/p right craniotomy for evacuation 12/10/2022, follow up visit    Plan:  I relayed her head CT findings to both Serge and her nurse today and asked that they follow up with Neurosurgery as needed with new symptoms; these were discussed in detail and include new or  worsening headaches, paresthesias, weakness, confusion, or falls. They were encouraged to call with questions or concerns.        Blanquita Moran PA-C  Department of Neurosurgery

## 2023-02-03 NOTE — PATIENT INSTRUCTIONS
Follow up with Neurosurgery as needed with new questions, concerns or symptoms.  Go to the Emergency Department with new or worsening headache, confusion, falls, numbness, or weakness.

## 2023-02-03 NOTE — LETTER
2/3/2023       RE: Serge Marin  [address]  [address]  [facility] [address]     Dear Colleague,    Thank you for referring your patient, Serge Marin, to the [facility] NEUROSURGERY CLINIC [place] at [facility]. Please see a copy of my visit note below.      [facility]  Department of Neurosurgery  Center for Skull Base and Pituitary Surgery    Name: Serge Marin  MRN: [id number]  Age: 82 year old  : 2023      Chief Complaint:   Right subdural hematoma s/p right craniotomy for evacuation 12/10/2022, follow up visit    History of Present Illness:   Serge Marin is an 82 year old female with a history of bipolar disorder type I and hypothyroidism who is seen today by telephone visit for follow up regarding her right subdural collection which was evacuated with a craniotomy in early December by Dr. Avila. We last saw her  and asked that she follow up in 4 weeks with head CT. She is still residing at [place] for rehab. She has no complaints today by phone other than recent urinary frequency for which she's now on antibiotics. She had a head CT done earlier this week.       Review of Systems:   Pertinent items are noted in HPI or as in patient entered ROS below, remainder of complete ROS is negative.     Physical Exam:   Exam today is limited by telephone visit. Speech is normal, answers questions though seems mildly confused about my role in her care.     Imaging:  We reviewed the head CT done 2023 over the phone today which shows decreased extra-axial fluid and air collection underneath the craniotomy site without new abnormality.     Assessment:  Right subdural hematoma s/p right craniotomy for evacuation 12/10/2022, follow up visit    Plan:  I relayed her head CT findings to both Serge and her nurse today and asked that they follow up with Neurosurgery as needed with new symptoms; these were  discussed in detail and include new or worsening headaches, paresthesias, weakness, confusion, or falls. They were encouraged to call with questions or concerns.        Blanquita Moran PA-C  Department of Neurosurgery

## 2023-02-03 NOTE — NURSING NOTE
Chief Complaint   Patient presents with     Telephone     CT review     Nurse was unable to verify any medications, allergies, pt vitals with me.  tEhel Zuluaga 2/3/23

## 2023-03-21 ENCOUNTER — TELEPHONE (OUTPATIENT)
Dept: NEUROSURGERY | Facility: CLINIC | Age: 83
End: 2023-03-21
Payer: COMMERCIAL

## 2023-03-21 NOTE — TELEPHONE ENCOUNTER
[facility] Call Center    Phone Message    May a detailed message be left on voicemail: yes     Reason for Call: Other: Patient called stating that she returned home last Friday, her scab is all healed up and she has been feeling fine. She has had no headaches or dizzy spells. The patient [place] and is wanting to make sure that it is ok'd with us first. Please call patient back to discuss further.     Action Taken: Message routed to:  [facility] ([facility]    Travel Screening: Not Applicable

## 2023-03-22 NOTE — TELEPHONE ENCOUNTER
"Pt s/p Right subdural hematoma, right craniotomy for evacuation 12/10/2022. She was last seen in clinic on 2/3/23 and was told to return PRN. Called pt back in response to her question about whether it's OK for her to go the casino. She is not experiencing headache, confusion, weakness and said she hasn't fallen recently. \"I'm feeling fine.\" I let pt know that I don't see a reason she can't go. She will be with friends/family.     Pt asked if she needs to continue taking Lexapro.b/c she said she doesn't feel depressed. Advised pt to continue taking it until she is seen by her PCP, which she said is coming up in April.     Pt expressed understanding. Nothing further at this time.   "

## 2023-05-02 NOTE — PROGRESS NOTES
Assessment & Plan     History of SDH (subdural hematoma) (H)  Patient with an initial subdural hematoma and closed fracture of left occipital condyle initially evaluated on 10/19/2022, sent into the ED from clinic.  Had hospital stay in the ICU, and an extended stay in TCU at [facility].  Subsequently followed by neurosurgery.  Last evaluation on 2/3/2023 without need for further follow-up unless there are symptoms of weakness, confusion, or falls.  Patient denies any symptoms today expresses her thankfulness of our clinic's quick action to get her to the hospital.  Is now home with 24/7 PCA assistance as well as  that sees her regularly.  Feels like she has the resources she needs at home and family support readily available if she needs it.  - CBC with platelets  - Basic metabolic panel  -Continue Lexapro for chronic disease and illness      Preventative health care  Need for diphtheria-tetanus-pertussis (Tdap) vaccine  Hard of Hearing  Patient due for multiple vaccinations.  Agreed to Tdap today.  Also due for Medicare wellness visit, encourage patient to schedule.  Patient presenting with significant difficulty in hearing questions today.  Pocket talker helped significantly, would recommend its use in the future.    Chronic diarrhea  Moderate protein-calorie malnutrition (H)  This problem is longstanding and pre-existing hospital stay.  Reviewed patient's diet with her, which is heavy in dairy.  Encourage patient to decrease the amount of dairy in her diet to see if that helps with her diarrhea.  Patient also has Imodium available to her as a prescription.  States she has not taking her senna currently.  Patient also met with Pharm.D. today, please see separate note.  Encouraged regular intake with 3 meals a day plus snacks plus supplements.  - CBC with platelets  - Basic metabolic panel  -Discontinue senna from the hospital  -Continue Imodium as needed    Thrombocytopenia, unspecified (H)  Obtain  "CBC today resulted in the 120 platelet count.  This is within her readings over the past year, will recheck on return visit in 3 months.  Remainder of CBC within normal limits.  - CBC with platelets        Return in about 3 months (around 8/5/2023).    Lizette DO Shorty  [facility] TOÑO Valentine is a 82 year old, presenting for the following health issues:  Clinic Care Coordination - Post Hospital (Hospital follow up, head injury from fall three months ago, unable to se left hand ) and Diarrhea (This morning )    HPI     Subdural hematoma  - No Headache, confusion,  Weakness, falls   - Home now  - [place] ([facility]) lives with her, full time.   - [place] -  sees her frequently  - [facility] the  got her to appointment today  - [place] for rehab  - Last saw Neurosurg on 2/3 told to follow up with new symptoms  - Lexapro? - concerned it's making her weak    Family  Son, brother came to hospital   Grandchildren maybe this month - coming to see her    Diarrhea   - not taking senna  - imodium works when she takes it  - eats a lot of dairy, ice cream, etc          Objective    /72   Pulse 71   Temp 97.6  F (36.4  C) (Oral)   Resp 16   Ht 1.549 m (5' 1\")   Wt 50.3 kg (110 lb 12.8 oz)   LMP  (LMP Unknown)   SpO2 95%   BMI 20.94 kg/m    Body mass index is 20.94 kg/m .  Physical Exam   Constitutional: No acute distress, sitting comfortably  Eyes: EOMI, no eye discharge, no icterus, injection or swelling.  Ears, nose, mouth, throat: Normocephalic, no nasal drainage, speaks clearly, no lip cracking.   Neck: Normal range of motion  CV: Extremeties well perfused  Respiratory: No audible wheezing, strido, cough, or other respiratory distress. Speaking in full sentences.  GI: Nondistended abdomen  MSK: Normal digits, moving extremeties well, symmetric strength visible throughout.  Skin: No visible rashes, bruising or other skin lesions.   Neuro: AOx3  Psych: More flat affect than " usual                 Add Postop Global No-Charge Code (94439)?: yes Detail Level: Simple

## 2023-05-05 ENCOUNTER — OFFICE VISIT (OUTPATIENT)
Dept: FAMILY MEDICINE | Facility: CLINIC | Age: 83
End: 2023-05-05
Payer: COMMERCIAL

## 2023-05-05 ENCOUNTER — OFFICE VISIT (OUTPATIENT)
Dept: PHARMACY | Facility: CLINIC | Age: 83
End: 2023-05-05
Payer: COMMERCIAL

## 2023-05-05 VITALS
HEIGHT: 61 IN | SYSTOLIC BLOOD PRESSURE: 119 MMHG | BODY MASS INDEX: 20.92 KG/M2 | RESPIRATION RATE: 16 BRPM | OXYGEN SATURATION: 95 % | WEIGHT: 110.8 LBS | DIASTOLIC BLOOD PRESSURE: 72 MMHG | TEMPERATURE: 97.6 F | HEART RATE: 71 BPM

## 2023-05-05 DIAGNOSIS — S06.5XAA SDH (SUBDURAL HEMATOMA) (H): Primary | ICD-10-CM

## 2023-05-05 DIAGNOSIS — E03.8 OTHER SPECIFIED HYPOTHYROIDISM: ICD-10-CM

## 2023-05-05 DIAGNOSIS — H91.8X9 OTHER SPECIFIED FORMS OF HEARING LOSS, UNSPECIFIED LATERALITY: ICD-10-CM

## 2023-05-05 DIAGNOSIS — Z23 ENCOUNTER FOR IMMUNIZATION: ICD-10-CM

## 2023-05-05 DIAGNOSIS — K52.9 CHRONIC DIARRHEA: ICD-10-CM

## 2023-05-05 DIAGNOSIS — F31.9 BIPOLAR 1 DISORDER (H): ICD-10-CM

## 2023-05-05 DIAGNOSIS — Z00.00 PREVENTATIVE HEALTH CARE: ICD-10-CM

## 2023-05-05 DIAGNOSIS — E44.0 MODERATE PROTEIN-CALORIE MALNUTRITION (H): ICD-10-CM

## 2023-05-05 DIAGNOSIS — Z23 NEED FOR DIPHTHERIA-TETANUS-PERTUSSIS (TDAP) VACCINE: ICD-10-CM

## 2023-05-05 DIAGNOSIS — D69.6 THROMBOCYTOPENIA, UNSPECIFIED (H): ICD-10-CM

## 2023-05-05 LAB
ANION GAP SERPL CALCULATED.3IONS-SCNC: 11 MMOL/L (ref 7–15)
BUN SERPL-MCNC: 16.9 MG/DL (ref 8–23)
CALCIUM SERPL-MCNC: 10 MG/DL (ref 8.8–10.2)
CHLORIDE SERPL-SCNC: 106 MMOL/L (ref 98–107)
CREAT SERPL-MCNC: 0.94 MG/DL (ref 0.51–0.95)
DEPRECATED HCO3 PLAS-SCNC: 25 MMOL/L (ref 22–29)
ERYTHROCYTE [DISTWIDTH] IN BLOOD BY AUTOMATED COUNT: 11.9 % (ref 10–15)
GFR SERPL CREATININE-BSD FRML MDRD: 60 ML/MIN/1.73M2
GLUCOSE SERPL-MCNC: 83 MG/DL (ref 70–99)
HCT VFR BLD AUTO: 40.6 % (ref 35–47)
HGB BLD-MCNC: 13 G/DL (ref 11.7–15.7)
MCH RBC QN AUTO: 30.4 PG (ref 26.5–33)
MCHC RBC AUTO-ENTMCNC: 32 G/DL (ref 31.5–36.5)
MCV RBC AUTO: 95 FL (ref 78–100)
PLATELET # BLD AUTO: 120 10E3/UL (ref 150–450)
POTASSIUM SERPL-SCNC: 4.8 MMOL/L (ref 3.4–5.3)
RBC # BLD AUTO: 4.28 10E6/UL (ref 3.8–5.2)
SODIUM SERPL-SCNC: 142 MMOL/L (ref 136–145)
WBC # BLD AUTO: 4.3 10E3/UL (ref 4–11)

## 2023-05-05 PROCEDURE — 99607 MTMS BY PHARM ADDL 15 MIN: CPT | Performed by: PHARMACIST

## 2023-05-05 PROCEDURE — 90471 IMMUNIZATION ADMIN: CPT | Performed by: STUDENT IN AN ORGANIZED HEALTH CARE EDUCATION/TRAINING PROGRAM

## 2023-05-05 PROCEDURE — 85027 COMPLETE CBC AUTOMATED: CPT | Performed by: STUDENT IN AN ORGANIZED HEALTH CARE EDUCATION/TRAINING PROGRAM

## 2023-05-05 PROCEDURE — 99605 MTMS BY PHARM NP 15 MIN: CPT | Performed by: PHARMACIST

## 2023-05-05 PROCEDURE — 36415 COLL VENOUS BLD VENIPUNCTURE: CPT | Performed by: STUDENT IN AN ORGANIZED HEALTH CARE EDUCATION/TRAINING PROGRAM

## 2023-05-05 PROCEDURE — 90715 TDAP VACCINE 7 YRS/> IM: CPT | Performed by: STUDENT IN AN ORGANIZED HEALTH CARE EDUCATION/TRAINING PROGRAM

## 2023-05-05 PROCEDURE — 99213 OFFICE O/P EST LOW 20 MIN: CPT | Mod: 25 | Performed by: STUDENT IN AN ORGANIZED HEALTH CARE EDUCATION/TRAINING PROGRAM

## 2023-05-05 PROCEDURE — 80048 BASIC METABOLIC PNL TOTAL CA: CPT | Performed by: STUDENT IN AN ORGANIZED HEALTH CARE EDUCATION/TRAINING PROGRAM

## 2023-05-05 NOTE — PROGRESS NOTES
Preceptor Attestation:   Patient seen, evaluated and discussed with the resident. I have verified the content of the note, which accurately reflects my assessment of the patient and the plan of care.   Supervising Physician:  Gila Yancey, DO

## 2023-05-05 NOTE — LETTER
_  Medication List        Prepared on: May 5, 2023     Bring your Medication List when you go to the doctor, hospital, or   emergency room. And, share it with your family or caregivers.     Note any changes to how you take your medications.  Cross out medications when you no longer use them.    Medication How I take it Why I use it Prescriber   escitalopram (LEXAPRO) 10 MG tablet Take 0.5 tablets (5 mg) by mouth daily (0.5 x 10 mg = 5 mg)  Depression  JASSON Alvarez CNP   ferrous gluconate (FERGON) 324 (38 Fe) MG tablet Take 1 tablet (324 mg) by mouth daily (with breakfast) Health care maintenance JASSON Alvarez CNP   levothyroxine (SYNTHROID/LEVOTHROID) 75 MCG tablet Take 1 tablet (75 mcg) by mouth daily Other specified hypothyroidism JASSON Alvarez CNP   senna-docusate (SENOKOT-S/PERICOLACE) 8.6-50 MG tablet Take 1 tablet by mouth 2 times daily as needed  Left renal stone JASSON Alvarez CNP         Add new medications, over-the-counter drugs, herbals, vitamins, or  minerals in the blank rows below.    Medication How I take it Why I use it Prescriber                                      Allergies:      bee pollen; penicillins; bactrim [sulfamethoxazole-trimethoprim]        Side effects I have had:               Other Information:              My notes and questions:

## 2023-05-05 NOTE — PATIENT INSTRUCTIONS
It was great to see you today! Thanks for coming to [facility]!      Here is the plan from today's visit    Come back in 3 months!      Thank you for coming to [facility] today.  Lab Testing:  **If you had lab testing today and your results are reassuring or normal they will be mailed to you or sent through Glarity within 7 days.   **If the lab tests need quick action we will call you with the results.  **If you are having labs done on a different day, please call [phone] to schedule at [facility] or [phone] for other [facility] Outpatient Lab locations. Labs do not offer walk-in appointments.  The phone number we will call with results is # [phone] (home) . If this is not the best number please call our clinic and change the number.    Medication Refills:  If you need any refills please call your pharmacy and they will contact us.   If you need to  your refill at a new pharmacy, please contact the new pharmacy directly. The new pharmacy will help you get your medications transferred faster.       Scheduling, Urgent Medical Concerns (24 hours a day!), or ultrasound [phone] (8-5:00 M-F)    Referrals: If a referral was made to an [facility] specialty provider and you do not get a call from central scheduling, please refer to directions on your visit summary or call our office during normal business hours for assistance.     If a Mammogram was ordered for you at the Breast Center call [phone] to schedule or change your appointment.  If you had an XRay/CT/Ultrasound/MRI ordered the number is [phone] to schedule or change your radiology appointment.       Lizette Oro, DO  Pronouns: she/her/hers  Resident Physician  [facility]/ [facility]    Department of Family Medicine and Community Health

## 2023-05-05 NOTE — PROGRESS NOTES
"Medication Therapy Management (MTM) Encounter    ASSESSMENT:                            Medication Adherence/Access: No issues identified      Bipolar type I:   Controlled, will continue on Lexapro 10 mg once daily.  We will discuss adverse effects with Dr. Oro today.    Subdural hematoma:   Stable, will discontinue Keppra from medication list as part of medication reconciliation today    Levothyroxine:   Controlled no changes recommended at this time.        PLAN:                              Keppra removed from the med list      Follow-up: with PCP    SUBJECTIVE/OBJECTIVE:                          Serge Marin is a 82 year old female coming in for a CANLEO visit.  She was discharged from TCU in [place]  On 3/21/23 for Subdural Hematoma.      Reason for visit: Transition of Care follow up.    Allergies/ADRs: Reviewed in chart  Past Medical History: Reviewed in chart  Tobacco: She reports that she has never smoked. She has never used smokeless tobacco.  Alcohol: none      Serge is very hard of hearing.  With the use of a clinic hearing device we are able to communicate, but this remains limited throughout her interview.    Medication Adherence/Access: no issues reported    Biopolar type I:   Has been taking escitalopram 10 mg daily.  Discussed with Dr. Oro today potential adverse effects she contributes to this medication.    Subdural Hematoma:   Was started on Keppra for prophylaxis.  Unclear the history of taking this medication and Serge does not recall recently taking it.  Note from end of December provides a tapering dose over a 3-week period  Starting in January.    Hypothyroid:   Currently taking 75 mcg once daily.    Today's Vitals:   BP Readings from Last 1 Encounters:   05/05/23 119/72     Pulse Readings from Last 1 Encounters:   05/05/23 71     Wt Readings from Last 1 Encounters:   05/05/23 110 lb 12.8 oz (50.3 kg)     Ht Readings from Last 1 Encounters:   05/05/23 5' 1\" (1.549 m) " "    Estimated body mass index is 20.94 kg/m  as calculated from the following:    Height as of an earlier encounter on 5/5/23: 5' 1\" (1.549 m).    Weight as of an earlier encounter on 5/5/23: 110 lb 12.8 oz (50.3 kg).    Temp Readings from Last 1 Encounters:   05/05/23 97.6  F (36.4  C) (Oral)     ----------------  Post Discharge Medication Reconciliation Status: discharge medications reconciled and changed, per note/orders.    I spent 20 minutes with this patient today. Dr. Oro (resident physician) was provided the recommendations above  in clinic today and Dr. Yancey is the authorizing prescriber for this visit through the pharmacist collaborative practice agreement.. A copy of the visit note was provided to the patient's provider(s).    A summary of these recommendations was given to the patient (see AVS from today's appointment with Dr. Oro).    Angelito Enciso, Pharm.D.       Medication Therapy Recommendations  No medication therapy recommendations to display       "

## 2023-05-05 NOTE — LETTER
May 17, 2023  Serge EPIFANIO Tania  [address]    [facility] [address]    Dear EPIFANIO Aguilar [facility] TOÑO     Thank you for talking with me on May 5, 2023 about your health and medications. As a follow-up to our conversation, I have included two documents:      Your Recommended To-Do List has steps you should take to get the best results from your medications.  Your Medication List will help you keep track of your medications and how to take them.    If you want to talk about these documents, please call Angelito Enciso RPH at phone: [phone], Monday-Friday 8-4:30pm.    I look forward to working with you and your doctors to make sure your medications work well for you.    Sincerely,  Angelito Enciso RPH  [facility] Pharmacist, [facility]

## 2023-05-05 NOTE — LETTER
"Recommended To-Do List      Prepared on: May 5, 2023       You can get the best results from your medications by completing the items on this \"To-Do List.\"      Bring your To-Do List when you go to your doctor. And, share it with your family or caregivers.    My To-Do List:  What we talked about: What I should do:    What my medicines are for, how to know if my medicines are working, made sure my medicines are safe for me and reviewed how to take my medicines.      Take my medicines every day                  "

## 2023-05-12 ENCOUNTER — TELEPHONE (OUTPATIENT)
Dept: FAMILY MEDICINE | Facility: CLINIC | Age: 83
End: 2023-05-12
Payer: COMMERCIAL

## 2023-05-12 DIAGNOSIS — H91.93 HEARING DIFFICULTY OF BOTH EARS: Primary | ICD-10-CM

## 2023-05-12 NOTE — TELEPHONE ENCOUNTER
[facility] phone call message - order or referral request for patient:     Order or referral being requested: Seeking referral for ENT specialty clinic       Additional Comments:     OK to leave a message on voice mail? Yes    Primary language: English      needed? No    Call taken on May 12, 2023 at 8:40 AM by Husam Young

## 2023-05-17 NOTE — TELEPHONE ENCOUNTER
Care Coordinator attempted to reach out to [facility] to get more information on why an ENT referral is needed, however her mailbox is full, unable to leave a voicemail. CC will reach out again.      Veronica Monique  Care Coordinator  [facility]  [phone]

## 2023-05-18 NOTE — TELEPHONE ENCOUNTER
"5/18/2023     Called Misti again to get more info on why the ENT referral is needed. Misti stated \"The patient and her  are requesting referral for hearing aids.\" Per Misti, if a call back is needed, please call [phone] Misti and not patient due to her hearing.    Message sent to Dr. Wyatt for possible referral.     Shala Anthony   "

## 2023-05-19 NOTE — TELEPHONE ENCOUNTER
5/19/2023    Care Coordinator contacted Annabel to inform about ENT/audiology referral. Annabel stated that she would call and make the appointment for Serge at [phone].     Joss

## 2023-05-22 ENCOUNTER — TELEPHONE (OUTPATIENT)
Dept: FAMILY MEDICINE | Facility: CLINIC | Age: 83
End: 2023-05-22

## 2023-05-22 NOTE — TELEPHONE ENCOUNTER
RN spoke to carmen on independent living,Patient fell yesterday but per protocol has to notified a PCP,patient not injured, no pain and she is on her base line .      [facility] phone call message- general phone call:    Reason for call: Carmen called to report the patient suffered a fall today while attempting to  an ornament that had fallen from a [place] tree. The patient was not injured; no bruising to report.    Return call needed: Only if there are any concerns.    OK to leave a message on voice mail? Yes    Primary language: English      needed? No    Call taken on May 22, 2023 at 2:13 PM by Ashleigh Kelley RN

## 2023-07-27 ENCOUNTER — DOCUMENTATION ONLY (OUTPATIENT)
Dept: FAMILY MEDICINE | Facility: CLINIC | Age: 83
End: 2023-07-27
Payer: COMMERCIAL

## 2023-07-27 NOTE — PROGRESS NOTES
"When opening a documentation only encounter, be sure to enter in \"Chief Complaint\" Forms and in \" Comments\" Title of form, description if needed.    Serge is a 82 year old  female  Form received via: Fax  Form now resides in: Provider Yanni Bryson, [facility]    Form has been completed by provider.     Form sent out via: Fax to [facility] at Fax Number: [phone]  Patient informed: Yes  Output date: August 9, 2023    MONIK HERRERA      **Please close the encounter**              "

## 2023-07-31 ENCOUNTER — OFFICE VISIT (OUTPATIENT)
Dept: AUDIOLOGY | Facility: CLINIC | Age: 83
End: 2023-07-31
Attending: FAMILY MEDICINE
Payer: COMMERCIAL

## 2023-07-31 DIAGNOSIS — H91.93 HEARING DIFFICULTY OF BOTH EARS: ICD-10-CM

## 2023-07-31 DIAGNOSIS — H90.3 SENSORINEURAL HEARING LOSS (SNHL) OF BOTH EARS: Primary | ICD-10-CM

## 2023-07-31 PROCEDURE — 92557 COMPREHENSIVE HEARING TEST: CPT | Performed by: AUDIOLOGIST

## 2023-07-31 PROCEDURE — 92567 TYMPANOMETRY: CPT | Performed by: AUDIOLOGIST

## 2023-07-31 PROCEDURE — 92591 PR HEARING AID EXAM BINAURAL: CPT | Performed by: AUDIOLOGIST

## 2023-07-31 NOTE — PROGRESS NOTES
AUDIOLOGY REPORT    SUBJECTIVE:  Serge Marin is a 83 year old female who was seen in the Audiology Clinic at the [facility] for audiologic evaluation, referred by Kassie Wyatt M.D.  The patient reports a gradual hearing loss bilaterally for which she has tried hearing aids in the past. The patient denies otalgia, aural fullness, otorrhea, tinnitus, and dizziness.  The patient notes difficulty with communication in a variety of listening situations.      OBJECTIVE:  Abuse Screening:  Do you feel unsafe at home or work/school? No  Do you feel threatened by someone? No  Does anyone try to keep you from having contact with others, or doing things outside of your home? No  Physical signs of abuse present? No     Fall Risk Screen:  1. Have you fallen two or more times in the past year? No  2. Have you fallen and had an injury in the past year? Yes    Patient reports falling out of bed several months ago with injury for she has seen physical therapy.     Otoscopic exam indicates ears are clear of cerumen bilaterally     Pure Tone Thresholds assessed using conventional audiometry with fair  reliability from 250-8000 Hz bilaterally using insert and circumaural earphones     RIGHT:  moderate-severe sloping to severe sensorineural hearing loss    LEFT:    moderate-severe sloping to severe sensorineural hearing loss    Tympanogram:    RIGHT: normal eardrum mobility    LEFT:   normal eardrum mobility    Reflexes (reported by stimulus ear):  - could not seal      Speech Reception Threshold:    RIGHT: 70 dB HL    LEFT:   70 dB HL  Word Recognition Score:     RIGHT: 44% at 95 dB HL using NU-6 recorded word list.    LEFT:   44% at 95 dB HL using NU-6 recorded word list.      ASSESSMENT:     ICD-10-CM    1. Sensorineural hearing loss (SNHL) of both ears  H90.3 Cmprhn Audiometry Thrshld Eval & Speech Recog (31411)     Tympanometry (impedance - testing) (10930)     Hearing Aid Exam, Binaural  (92712)      2. Hearing difficulty of both ears  H91.93 Adult Audiology  Referral          Today s results were discussed with the patient in detail.     Patient is a hearing aid candidate. Patient would like to move forward with a hearing aid evaluation today. Therefore, the patient was presented with different options for amplification to help aid in communication. Discussed styles, levels of technology and monaural vs. binaural fitting.     The hearing aid(s) mutually chosen were:  Binaural: Phonak Janna P70-R  COLOR: beige  BATTERY SIZE: rechargeable  EARMOLD/TIPS: full shell    Otoscopy revealed ears are clear of cerumen bilaterally. Bilateral earmolds were taken without incident.    ASSESSMENT:     ICD-10-CM    1. Sensorineural hearing loss (SNHL) of both ears  H90.3 Cmprhn Audiometry Thrshld Eval & Speech Recog (94177)     Tympanometry (impedance - testing) (86676)     Hearing Aid Exam, Binaural (69990)      2. Hearing difficulty of both ears  H91.93 Adult Audiology  Referral          Reviewed purchase information and warranty information with patient. The 45 day trial period was explained to patient. The patient was given a copy of the [place] Department [employer] consumer brochure on purchasing hearing instruments. Patient risk factors have been provided to the patient in writing prior to the sale of the hearing aid per FDA regulation. The risk factors are also available in the User Instructional Booklet to be presented on the day of the hearing aid fitting. Hearing aid(s) ordered. Hearing aid evaluation completed.    PLAN: Serge will be scheduled for a hearing aid fitting and programming, pending medical clearance. Purchase agreement will be completed on that date. Please contact this clinic with any questions or concerns.      Virginia Carrion.  Doctor of Audiology  [place] License # [id number]

## 2023-09-15 ENCOUNTER — OFFICE VISIT (OUTPATIENT)
Dept: AUDIOLOGY | Facility: CLINIC | Age: 83
End: 2023-09-15
Payer: COMMERCIAL

## 2023-09-15 DIAGNOSIS — H90.3 SENSORINEURAL HEARING LOSS (SNHL) OF BOTH EARS: Primary | ICD-10-CM

## 2023-09-15 PROCEDURE — V5020 CONFORMITY EVALUATION: HCPCS | Mod: RT | Performed by: AUDIOLOGIST

## 2023-09-15 PROCEDURE — V5261 HEARING AID, DIGIT, BIN, BTE: HCPCS | Mod: NU | Performed by: AUDIOLOGIST

## 2023-09-15 PROCEDURE — V5160 DISPENSING FEE BINAURAL: HCPCS | Performed by: AUDIOLOGIST

## 2023-09-15 PROCEDURE — V5011 HEARING AID FITTING/CHECKING: HCPCS | Mod: RT | Performed by: AUDIOLOGIST

## 2023-09-15 PROCEDURE — V5011 HEARING AID FITTING/CHECKING: HCPCS | Mod: LT | Performed by: AUDIOLOGIST

## 2023-09-15 PROCEDURE — V5020 CONFORMITY EVALUATION: HCPCS | Mod: LT | Performed by: AUDIOLOGIST

## 2023-09-15 PROCEDURE — 92593 PR HEARING AID CHECK, BINAURAL: CPT | Performed by: AUDIOLOGIST

## 2023-09-15 PROCEDURE — V5264 EAR MOLD/INSERT: HCPCS | Mod: NU | Performed by: AUDIOLOGIST

## 2023-09-15 NOTE — PATIENT INSTRUCTIONS

## 2023-09-15 NOTE — PROGRESS NOTES
AUDIOLOGY REPORT    SUBJECTIVE: Serge Marin, a 83 year old female, was seen in the Audiology Clinic at [facility] today for a Binaural hearing aid fitting. Previous results have revealed a bilateral moderately-severe to severe sensorineural hearing loss. The patient was given medical clearance to pursue amplification by  Kassie Wyatt MD.      OBJECTIVE:  Prior to fitting, a hearing aid check was performed to ensure device functionality. The hearing aid conformity evaluation was completed.The hearing aids were placed and they provided a good fit. Real-ear-probe-microphone measurements were completed on the [employer]opl[employer] system and were a good match to NAL-NL2 target with soft sounds audible, moderate sounds comfortable, and loud sounds below discomfort. UCLs are verified through maximum power output measures and demonstrate appropriate limiting of loud inputs. Ms. Marin was oriented to proper hearing aid use, care, cleaning (no water, dry brush), batteries (rechargeable, insertion/removal, toxicity, low-battery signal), aid insertion/removal, user booklet, warranty information, storage cases, and other hearing aid details. The patient confirmed understanding of hearing aid use and care, and showed proper insertion of hearing aid and batteries while in the office today. Ms. Marin reported good volume and sound quality today.    EAR(S) FIT: Binaural  MA HEARING AID MAKE: Right: Phonak; Left: Phonak    MA HEARING AID MODEL #: Right: [id number] ; Left:  [id number]  HEARING AID STYLE: Right: BTE; Left: BTE  EARMOLDS: Right: full shell; Left:  full shell  SERIAL NUMBERS: Right: [id number]; Left: [id number]  WARRANTY END DATE: Right: 11/5/2026; Left:: 11/5/2026         ASSESSMENT: Binaural hearing aid fitting completed today. Verification measures were performed. The 45 day trial period was explained to patient, and they expressed understanding. Ms. Marin signed the Hearing Aid Purchase  Agreement and was given a copy, as well as details on her hearing aids. Patient was counseled that exact out of pocket amounts cannot be determined for hearing aid claims being sent to insurance. Any insurance coverage information presented to the patient is an estimate only, and is not a guarantee of payment. Patient has been advised to check with their own insurance.    PLAN: Ms. Marin will return for follow-up in 2-3 weeks for a hearing aid review appointment. Please call this clinic with questions regarding today s appointment.    Virginia Carrion.  Doctor of Audiology  [place] License # [id number]

## 2023-10-03 ENCOUNTER — OFFICE VISIT (OUTPATIENT)
Dept: AUDIOLOGY | Facility: CLINIC | Age: 83
End: 2023-10-03
Payer: COMMERCIAL

## 2023-10-03 DIAGNOSIS — H90.3 SENSORINEURAL HEARING LOSS (SNHL) OF BOTH EARS: Primary | ICD-10-CM

## 2023-10-03 PROCEDURE — V5010 ASSESSMENT FOR HEARING AID: HCPCS | Performed by: AUDIOLOGIST

## 2023-10-03 NOTE — PROGRESS NOTES
AUDIOLOGY REPORT    SUBJECTIVE:Serge Marin is a 83 year old female who was seen in the Audiology Clinic at the [facility] on 10/3/2023  for a follow-up check regarding the fitting of new hearing aids. Previous results have revealed a moderately-severe to severe sensorineural hearing loss bilaterally.  The patient has been seen previously in this clinic and was fit with Phonak Janna  P70-R hearing aids on 9/15/2023.  Serge reports good sound quality with the hearing aid(s). She reports that the right earmold sometimes causes slight discomfort when removing, but that it does not bother her while in.    OBJECTIVE:   The International Outcome Inventory-Hearing Aids (IOI-HA) was administered today.The patient s responses to the 7 questions can be compared to normative data relative to how others are performing with their hearing aids, as well as focusing audiologic care and counseling.This patient s Quality of Life score (Question 7) was 5, which is above normative average.     Reviewed 45 day trial period, care, cleaning (no water, dry brush), batteries (rechargeable) insertion/removal, toxicity, low-battery signal), aid insertion/removal, volume adjustment (if applicable), user booklet, warranty information, storage cases, and other hearing aid details.     No charge visit today (in warranty hearing aid check).     ASSESSMENT: A follow-up appointment for hearing aid fitting was completed today. IOI-HA administered today. Changes to hearing aid was completed as outlined above.     PLAN:Serge will return for follow-up as needed, or at least every 4-6 months for cleaning and assessment of hearing aid.  She will let us know if she has further doiscomfort from her earmold. Please call this clinic with any questions regarding today s appointment.    Virginia Carrion.  Doctor of Audiology  [place] License # [id number]

## 2023-11-07 ENCOUNTER — OFFICE VISIT (OUTPATIENT)
Dept: FAMILY MEDICINE | Facility: CLINIC | Age: 83
End: 2023-11-07
Payer: COMMERCIAL

## 2023-11-07 VITALS
WEIGHT: 115.4 LBS | TEMPERATURE: 98.4 F | SYSTOLIC BLOOD PRESSURE: 129 MMHG | BODY MASS INDEX: 21.8 KG/M2 | OXYGEN SATURATION: 97 % | HEART RATE: 58 BPM | RESPIRATION RATE: 16 BRPM | DIASTOLIC BLOOD PRESSURE: 74 MMHG

## 2023-11-07 DIAGNOSIS — E03.8 OTHER SPECIFIED HYPOTHYROIDISM: Primary | ICD-10-CM

## 2023-11-07 DIAGNOSIS — R19.7 DIARRHEA, UNSPECIFIED TYPE: ICD-10-CM

## 2023-11-07 DIAGNOSIS — F60.9 PERSONALITY DISORDER (H): ICD-10-CM

## 2023-11-07 DIAGNOSIS — I95.1 ORTHOSTASIS: ICD-10-CM

## 2023-11-07 LAB
ANION GAP SERPL CALCULATED.3IONS-SCNC: 10 MMOL/L (ref 7–15)
BUN SERPL-MCNC: 14.5 MG/DL (ref 8–23)
CALCIUM SERPL-MCNC: 9.9 MG/DL (ref 8.8–10.2)
CHLORIDE SERPL-SCNC: 105 MMOL/L (ref 98–107)
CREAT SERPL-MCNC: 0.93 MG/DL (ref 0.51–0.95)
DEPRECATED HCO3 PLAS-SCNC: 25 MMOL/L (ref 22–29)
EGFRCR SERPLBLD CKD-EPI 2021: 61 ML/MIN/1.73M2
GLUCOSE SERPL-MCNC: 85 MG/DL (ref 70–99)
POTASSIUM SERPL-SCNC: 4.4 MMOL/L (ref 3.4–5.3)
SODIUM SERPL-SCNC: 140 MMOL/L (ref 135–145)
TSH SERPL DL<=0.005 MIU/L-ACNC: 1.51 UIU/ML (ref 0.3–4.2)

## 2023-11-07 PROCEDURE — 84443 ASSAY THYROID STIM HORMONE: CPT | Performed by: FAMILY MEDICINE

## 2023-11-07 PROCEDURE — 36415 COLL VENOUS BLD VENIPUNCTURE: CPT | Performed by: FAMILY MEDICINE

## 2023-11-07 PROCEDURE — 80048 BASIC METABOLIC PNL TOTAL CA: CPT | Performed by: FAMILY MEDICINE

## 2023-11-07 PROCEDURE — 99214 OFFICE O/P EST MOD 30 MIN: CPT | Performed by: FAMILY MEDICINE

## 2023-11-07 RX ORDER — RESPIRATORY SYNCYTIAL VIRUS VACCINE 120MCG/0.5
0.5 KIT INTRAMUSCULAR ONCE
Qty: 1 EACH | Refills: 0 | Status: CANCELLED | OUTPATIENT
Start: 2023-11-07 | End: 2023-11-07

## 2023-11-07 NOTE — PROGRESS NOTES
Assessment & Plan   1. Orthostasis  - Symptoms consistent with orthostasis, resolves quickly after standing  - Reviewed conservative measures. If worsening should return for further workupu    2. Diarrhea, unspecified type  - Intermittent symptoms related to diet  - Provided note to her group home to avoid lactose in diet  - Check BMP to ensure electrolytes remain WNL  - Basic metabolic panel    3. Other specified hypothyroidism  - Due for annual recheck  - Last years check was 2.83  - TSH WITH FREE T4 REFLEX    4. Personality disorder (H)  - Chronic problem listed, appears to be well regulated at this time  - In care facility, have not received concerns from facility at this time    In 8 weeks for medicare wellness visit    Zacarias Quinones MD  [facility] TOÑO Valentine is a 83 year old, presenting for the following health issues:  Diarrhea (Pt here for diarrhea)      11/7/2023    10:24 AM   Additional Questions   Roomed by Doua   Accompanied by Self         11/7/2023    10:24 AM   Patient Reported Additional Medications   Patient reports taking the following new medications n/a       HPI   Has had intermittent loose stools for the past several months. Says some BM are formed and others are loose. Denies any blood or mucous in the stools.  Takes imodium which she says helps her symptoms. Denies fever. Weight is overall stable. No urinary symptoms. Says it most often occurs when she eats at the cafeteria in the group home. Has been told in the past that she should avoid dairy products, but she drinks milk intermittently and has cheese when she eats at the cafeteria.     Has intermittent episodes of feeling lightheaded. Has hx of brain aneurysm. Episodes occur when she stands too quickly. Walks with a walker.     Says that she had the flu shot in September, has her COVID shot scheduled tomorrow.       Objective    /74 (BP Location: Left arm, Patient Position: Sitting, Cuff Size:  Adult Regular)   Pulse 58   Temp 98.4  F (36.9  C) (Oral)   Resp 16   Wt 52.3 kg (115 lb 6.4 oz)   LMP  (LMP Unknown)   SpO2 97%   BMI 21.80 kg/m    Body mass index is 21.8 kg/m .  Physical Exam   General: Alert, conversant, cooperative. No acute distress. .  Head, Eyes, Ears, Nose & Throat: Head without evidence of trauma. EOMI. MMM. Has decreased hearing.  Cardiovascular: Regular rate. Regular rhythm. No murmur. .  Respiratory: Lungs clear to auscultation bilaterally. No increased work of breathing. .  Extremities: No cyanosis. No edema.   Neurologic: Moving all extremities. Unsteady gait, utilizes walker. .  Psychiatric: Normal affect.

## 2023-11-07 NOTE — PATIENT INSTRUCTIONS
Patient Education   Here is the plan from today's visit    1. Diarrhea, unspecified type  - Avoid dairy products  - Can use imodium as needed  - Check kidneys and electrolytes today    2. Other specified hypothyroidism  - Recheck your TSH today  - Will call with results-*    Please call or return to clinic if your symptoms don't go away.    Follow up plan  8 weeks for Medicare wellness visit  Thank you for coming to [facility] today.  Lab Testing:  **If you had lab testing today and your results are reassuring or normal they will be mailed to you or sent through [employer] within 7 days.   **If the lab tests need quick action we will call you with the results.  **If you are having labs done on a different day, please call [phone] to schedule at [facility] or [phone] for other [facility]patient Lab locations. Labs do not offer walk-in appointments.  The phone number we will call with results is # [phone] (home) . If this is not the best number please call our clinic and change the number.  Medication Refills:  If you need any refills please call your pharmacy and they will contact us.   If you need to  your refill at a new pharmacy, please contact the new pharmacy directly. The new pharmacy will help you get your medications transferred faster.   Scheduling:  If you have any concerns about today's visit or wish to schedule another appointment please call our office during normal business hours [phone] (8-5:00 M-F). If you can no longer make a scheduled visit, please cancel via [employer] or call us to cancel.   If a referral was made to an [facility] specialty provider and you do not get a call from central scheduling, please refer to directions on your visit summary or call our office during normal business hours for assistance.   If a Mammogram was ordered for you at the Breast Center call [phone] to schedule or change your appointment.  If you had an  XRay/CT/Ultrasound/MRI ordered the number is [phone] to schedule or change your radiology appointment.   [facility] has limited ultrasound appointments available on Wednesdays, if you would like your ultrasound at [facility], please call [phone] to schedule.   Medical Concerns:  If you have urgent medical concerns please call [phone] at any time of the day.    Zacarias Quinones MD

## 2023-11-07 NOTE — LETTER
November 7, 2023      Re: Serge Marin   1940    To Whom It May Concern:    This is to confirm that the above patient was seen on 11/7/2023.  Daltonkatarina Marin was seen and elevated. There is concern for lactose intolerance for patient. Please help her achieve a lactose free diet in the cafeteria    Thank you for your cooperation in this matter.  Please do not hesitate to contact me if you have any further questions.    Sincerely,      MADAI VENTURA

## 2023-12-20 ENCOUNTER — OFFICE VISIT (OUTPATIENT)
Dept: FAMILY MEDICINE | Facility: CLINIC | Age: 83
End: 2023-12-20
Payer: COMMERCIAL

## 2023-12-20 VITALS
HEART RATE: 66 BPM | WEIGHT: 118 LBS | BODY MASS INDEX: 23.16 KG/M2 | HEIGHT: 60 IN | SYSTOLIC BLOOD PRESSURE: 125 MMHG | DIASTOLIC BLOOD PRESSURE: 73 MMHG

## 2023-12-20 DIAGNOSIS — S06.5XAA SDH (SUBDURAL HEMATOMA) (H): ICD-10-CM

## 2023-12-20 DIAGNOSIS — Z29.11 NEED FOR VACCINATION AGAINST RESPIRATORY SYNCYTIAL VIRUS: ICD-10-CM

## 2023-12-20 DIAGNOSIS — F60.9 PERSONALITY DISORDER (H): ICD-10-CM

## 2023-12-20 DIAGNOSIS — F31.9 BIPOLAR 1 DISORDER (H): Primary | ICD-10-CM

## 2023-12-20 PROCEDURE — 99213 OFFICE O/P EST LOW 20 MIN: CPT | Performed by: FAMILY MEDICINE

## 2023-12-20 RX ORDER — RESPIRATORY SYNCYTIAL VIRUS VACCINE 120MCG/0.5
0.5 KIT INTRAMUSCULAR ONCE
Qty: 1 EACH | Refills: 0 | Status: CANCELLED | OUTPATIENT
Start: 2023-12-20 | End: 2023-12-20

## 2023-12-21 NOTE — PROGRESS NOTES
Assessment & Plan     Bipolar 1 disorder (H)  She is known to struggle with executive functioning and with mood.  Form completed and given to care coordinator to send in.     Personality disorder (H)  Demonstrates distrust and may well not do well with strangers on a bus.     SDH (subdural hematoma) (H)  Cognitive issues - would benefit from metromobility    Need for vaccination against respiratory syncytial virus  Encouraged to get this at [facility]    Will return for Annual Wellness visit with her PCP.       I spent a total of 22 minutes on the day of the visit.   Time spent by me doing chart review, history and exam, documentation and further activities per the note           No follow-ups on file.    Libia Shabazz MD  [facility] TOÑO Valentine is a 83 year old, presenting for the following health issues:  Forms      HPI     Here for Metromobility forms - not for AWV.    She comes with papers that demonstrate that she struggles to manage taking a bus - not able to figure out what to do if she misses the bus, or where to get on and off. She also notes having outbursts.    She struggles with the heat - unable to tolerate but does Ok with the cold  She uses a wheeled walker with a seat that helps with her movement.    Unable to walk more than 3 blocks and likely less since mostly able to walk within her dwelling and [place].                 Review of Systems         Objective    /73   Pulse 66   Ht 1.524 m (5')   Wt 53.5 kg (118 lb)   LMP  (LMP Unknown)   BMI 23.05 kg/m    Body mass index is 23.05 kg/m .  Physical Exam   At first very apprehensive and upset that I was not the person she was planning to see.

## 2023-12-25 ENCOUNTER — HOSPITAL ENCOUNTER (EMERGENCY)
Facility: CLINIC | Age: 83
Discharge: HOME | End: 2023-12-25
Attending: EMERGENCY MEDICINE | Admitting: EMERGENCY MEDICINE
Payer: COMMERCIAL

## 2023-12-25 VITALS
DIASTOLIC BLOOD PRESSURE: 87 MMHG | OXYGEN SATURATION: 98 % | HEART RATE: 90 BPM | RESPIRATION RATE: 16 BRPM | TEMPERATURE: 98 F | SYSTOLIC BLOOD PRESSURE: 145 MMHG

## 2023-12-25 DIAGNOSIS — Z59.82 TRANSPORTATION INSECURITY: ICD-10-CM

## 2023-12-25 PROCEDURE — 99283 EMERGENCY DEPT VISIT LOW MDM: CPT

## 2023-12-25 SDOH — ECONOMIC STABILITY - TRANSPORTATION SECURITY: TRANSPORTATION INSECURITY: Z59.82

## 2023-12-26 NOTE — ED NOTES
Patient's sons called by ED provider to come get patient. No answer from sons. Patient has keys to get into her apartment. Patient alert and oriented at this time. i called to transport patient back home.

## 2023-12-26 NOTE — ED PROVIDER NOTES
"  History     Chief Complaint:  Social Work Services       HPI   Serge Marin is a 83 year old female presenting to us because of inability to get home.  The patient lives in [place].  She took a bus to the [employer].  She then lost all of her money there.  She did not have enough money to get home and therefore called police.  They eventually transported her here as they were not able to transport her all the way [facility]    The time of my assessment, the patient has no complaints.  She is frustrated that she is here, simply stating that \"I only need a ride.\"  She has no other complaints at this juncture.      Independent Historian:   None - Patient Only    Review of External Notes:   Yes-I reviewed her visit with her primary care doctor 3 days ago, documenting her cognitive challenges      Medications:    acetaminophen (TYLENOL) 500 MG tablet  calcium carbonate-vitamin D (OSCAL W/D) 500-200 MG-UNIT tablet  cetirizine (ZYRTEC) 10 MG tablet  clotrimazole (LOTRIMIN) 1 % external cream  escitalopram (LEXAPRO) 10 MG tablet  ferrous gluconate (FERGON) 324 (38 Fe) MG tablet  levothyroxine (SYNTHROID/LEVOTHROID) 75 MCG tablet  loperamide (IMODIUM) 2 MG capsule        Past Medical History:    Past Medical History:   Diagnosis Date    Anemia     Bipolar disorder (H)     Elevated fasting glucose     Hernia, abdominal     Kidney stone     Nonimmune to hepatitis B virus     Subdural hematoma (H)     Thyroid disease        Past Surgical History:    Past Surgical History:   Procedure Laterality Date    CHOLECYSTECTOMY      COLONOSCOPY Left 03/18/2015    Procedure: COMBINED COLONOSCOPY, SINGLE OR MULTIPLE BIOPSY/POLYPECTOMY BY BIOPSY;  Surgeon: Stone Lauren MD;  Location: [facility]    CRANIOTOMY Right 12/10/2022    Procedure: RIGHT CRANIOTOMY FOR RIGHT SUBDURAL HEMATOMA EVACUATION;  Surgeon: Dat Avila MD;  Location: [facility]    GYN SURGERY      HERNIA REPAIR      IR NEPHROLITHOTOMY  " 11/22/2022    PERCUTANEOUS NEPHROLITHOTOMY Left 11/22/2022    Procedure: Percutaneous Nephrolithotomy;  Surgeon: Stevo Talbot MD;  Location: [facility] OR        Physical Exam   Patient Vitals for the past 24 hrs:   BP Temp Temp src Pulse Resp SpO2   12/25/23 2152 (!) 145/87 98  F (36.7  C) Temporal 90 16 98 %        Physical Exam    Musculoskeletal:  Normal movement of all extremities without evidence for deficit.    Skin:  Warm and dry without rashes.    Neurologic:  Non-focal exam without asymmetric weakness or numbness.     Psychiatric:  Normal affect with appropriate interaction with examiner.  Poor insight into her medical and transportation issues.      Emergency Department Course     Interventions:  Medications - No data to display       Independent Interpretation (X-rays, CTs, rhythm strip):  None    Consultations/Discussion of Management or Tests:  None        Social Determinants of Health affecting care:   None    Disposition:  The patient was discharged to home.     Impression & Plan    CMS Diagnoses: None    Medical Decision Making:  This elderly woman presents with transportation insecurity.  She has no medical concerns.  Her medical screening exam is unremarkable.  I see no indication for blood work or advanced imaging.  I did attempt to reach her sons and they did not return my call.  Considering the holiday and our situation, we will provide her with a cab ride home.  I explained this is not something she should anticipate in the future, as she needs to secure rides for her social outings.  Otherwise, she was invited back to our facility at any point for any emergent medical concerns.      Diagnosis:    ICD-10-CM    1. Transportation insecurity  Z59.82            Discharge Medications:  New Prescriptions    No medications on file          Chad A. Trierweiler, MD  12/25/2023   Trierweiler, Chad A, MD Trierweiler, Chad A, MD  12/25/23 2227

## 2023-12-26 NOTE — DISCHARGE INSTRUCTIONS
Discharge Instructions  Mental Health Concerns    You were seen today for mental health concerns, such as depression, anxiety, or suicidal thinking. Your provider feels that you do not require hospitalization at this time. However, your symptoms may become worse, and you may need to return to the Emergency Department. Most treatments of depression and suicidal thoughts are a process rather than a single intervention.  Medications and counseling can take several weeks or more to help.    Generally, every Emergency Department visit should have a follow-up clinic visit with either a primary or a specialty clinic/provider. Please follow-up as instructed by your emergency provider today.    By accepting these discharge instructions:  You promise to not harm yourself or others.  You agree that if you feel you are becoming unable to keep that promise, you will do something to help yourself before you do anything to harm yourself or others.   You agree to keep any safety plan arranged on your visit here today.  You agree to take any medication prescribed or recommended by your provider.  If you are getting worse, you can contact a friend or a family member, contact your counselor or family provider, contact a crisis line, or other options discussed with the provider or therapist today.  At any time, you can call 911 and return to the Emergency Department for more help.  You understand that follow-up is essential to your treatment, and you will make and keep appointments recommended on your visit today.    How to improve your mental health and prevent suicide:  Involve others by letting family, friends, counselors know.  Do not isolate yourself.  Avoid alcohol or drugs. Remove weapons, poisons from your home.  Try to stick to routines for eating, sleeping and getting regular exercise.    Try to get into sunlight. Bright natural light not only treats seasonal affective disorder but also depression.  Increase safe activities  that you enjoy.    If you feel worse, contact 1-800-suicide (1-300.370.2970), or call 911, or your primary provider/counselor for additional assistance.    If you were given a prescription for medicine here today, be sure to read all of the information (including the package insert) that comes with your prescription.  This will include important information about the medicine, its side effects, and any warnings that you need to know about.  The pharmacist who fills the prescription can provide more information and answer questions you may have about the medicine.  If you have questions or concerns that the pharmacist cannot address, please call or return to the Emergency Department.   Remember that you can always come back to the Emergency Department if you are not able to see your regular provider in the amount of time listed above, if you get any new symptoms, or if there is anything that worries you.

## 2023-12-26 NOTE — ED TRIAGE NOTES
[place]A from mall, pt called as she had taken a taxi to the [employer] today then a bus to the mall. Pt called PD requesting a ride home to [place], EMS was dispatched but pt had no complaints so EMS left, they then got 2 blocks away and was called back. She then stated she wanted go to the hospital but still has no complaints except for telling EMS that she is bipolar

## 2024-03-22 ENCOUNTER — OFFICE VISIT (OUTPATIENT)
Dept: AUDIOLOGY | Facility: CLINIC | Age: 84
End: 2024-03-22
Payer: COMMERCIAL

## 2024-03-22 DIAGNOSIS — H90.3 SENSORINEURAL HEARING LOSS (SNHL) OF BOTH EARS: Primary | ICD-10-CM

## 2024-03-22 PROCEDURE — V5010 ASSESSMENT FOR HEARING AID: HCPCS | Performed by: AUDIOLOGIST

## 2024-03-22 NOTE — PROGRESS NOTES
AUDIOLOGY REPORT    BACKGROUND INFORMATION: Serge Marin was seen in the Audiology Clinic at [facility] on 3/22/2024 for follow-up.  The patient has been seen previously in this clinic on 7/31/2023 and results revealed a moderately-severe to severe sensorineural hearing loss bilaterally.  The patient was fit with Phonak Janna P70-R on 9/15/2023.  The patient reports good sound quality with the hearing aid(s). However, she reports that her left earmold sometimes is uncomfortable near the helix    TEST RESULTS AND PROCEDURES: An electroacoustic hearing aid check was performed.  Adjustments were made including cleaning the devices, replacing listening tubes and removing some material from the upper portion of the mold and the patient reported comfortable fit and good sound. The hearing aid conformity evaluation was completed. This includes initially evaluating the devices electroacoustically and later matching NAL-NL2 target with soft sounds audible, moderate sounds comfortable and clear, and loud sounds below discomfort.     SUMMARY AND RECOMMENDATIONS: A hearing aid check was performed today and the patient reports good sound and comfort following changes today.  Adjustments were made as noted above and the patient will return as needed or at least every 4-6 months for cleaning and assessment of hearing aid.  Call this clinic with questions regarding today s visit.     Virginia Carrion.  Doctor of Audiology  [place] License # [id number]

## 2024-05-16 ENCOUNTER — DOCUMENTATION ONLY (OUTPATIENT)
Dept: FAMILY MEDICINE | Facility: CLINIC | Age: 84
End: 2024-05-16
Payer: COMMERCIAL

## 2024-05-16 NOTE — PROGRESS NOTES
"When opening a documentation only encounter, be sure to enter in \"Chief Complaint\" Forms and in \" Comments\" Title of form, description if needed.    Serge is a 83 year old  female  Form received via: Fax  Form now resides in: Provider Ready    Angelika Ann MA    Form has been completed by provider.     Form sent out via: Fax to [facility] at Fax Number: [phone]  Patient informed:   Output date: May 22, 2024    Angelika Ann MA      **Please close the encounter**             "

## 2024-05-24 ENCOUNTER — OFFICE VISIT (OUTPATIENT)
Dept: AUDIOLOGY | Facility: CLINIC | Age: 84
End: 2024-05-24
Payer: COMMERCIAL

## 2024-05-24 DIAGNOSIS — H90.3 SENSORINEURAL HEARING LOSS (SNHL) OF BOTH EARS: Primary | ICD-10-CM

## 2024-05-24 PROCEDURE — V5010 ASSESSMENT FOR HEARING AID: HCPCS | Performed by: AUDIOLOGIST

## 2024-05-24 NOTE — PROGRESS NOTES
AUDIOLOGY REPORT    BACKGROUND INFORMATION: Serge Marin was seen in the Audiology Clinic at [facility] on 5/24/2024 for follow-up.  The patient has been seen previously in this clinic on 7/31/2023 and results revealed a moderately-severe to severe sensorineural hearing loss bilaterally.  The patient was fit with Phonak Janna P70-R hearing aids on 9/15/2023.  The patient reports that her right hearing aid squealed when put in yesterday and she has not worn it since..    TEST RESULTS AND PROCEDURES: An electroacoustic hearing aid check was performed.  Adjustments were made including replacing listening tubes and the patient reported good audibility without feedback. The hearing aid conformity evaluation was completed. This includes initially evaluating the devices electroacoustically and later matching NAL-NL2 target with soft sounds audible, moderate sounds comfortable and clear, and loud sounds below discomfort.     SUMMARY AND RECOMMENDATIONS: A hearing aid check was performed today and the patient reports good sound.  Adjustments were made as noted above and the patient will return as needed or at least every 4-6 months for cleaning and assessment of hearing aid.  Call this clinic with questions regarding today s visit.     Virginia Carrion.  Doctor of Audiology  [place] License # [id number]

## 2024-06-04 ENCOUNTER — OFFICE VISIT (OUTPATIENT)
Dept: FAMILY MEDICINE | Facility: CLINIC | Age: 84
End: 2024-06-04
Payer: COMMERCIAL

## 2024-06-04 VITALS
HEART RATE: 68 BPM | BODY MASS INDEX: 23.75 KG/M2 | TEMPERATURE: 97.5 F | SYSTOLIC BLOOD PRESSURE: 115 MMHG | RESPIRATION RATE: 16 BRPM | HEIGHT: 60 IN | OXYGEN SATURATION: 95 % | WEIGHT: 121 LBS | DIASTOLIC BLOOD PRESSURE: 71 MMHG

## 2024-06-04 DIAGNOSIS — S06.5XAA SDH (SUBDURAL HEMATOMA) (H): ICD-10-CM

## 2024-06-04 DIAGNOSIS — Z00.00 ENCOUNTER FOR MEDICARE ANNUAL WELLNESS EXAM: Primary | ICD-10-CM

## 2024-06-04 PROCEDURE — G0439 PPPS, SUBSEQ VISIT: HCPCS | Performed by: FAMILY MEDICINE

## 2024-06-04 RX ORDER — RESPIRATORY SYNCYTIAL VIRUS VACCINE 120MCG/0.5
0.5 KIT INTRAMUSCULAR ONCE
Qty: 1 EACH | Refills: 0 | Status: CANCELLED | OUTPATIENT
Start: 2024-06-04 | End: 2024-06-04

## 2024-06-04 ASSESSMENT — SOCIAL DETERMINANTS OF HEALTH (SDOH): HOW OFTEN DO YOU GET TOGETHER WITH FRIENDS OR RELATIVES?: NEVER

## 2024-06-04 NOTE — Clinical Note
This was the patient we discussed trying to set up an appointment for 40 minutes at a time that works for the son (number confirmed in chart). Son could join for the second 20 minutes as the first half I'll be working with patient to better assess cognitive abilities. Thanks, Dr. Quinones

## 2024-06-04 NOTE — PATIENT INSTRUCTIONS
"Preventive Care Advice   This is general advice we often give to help people stay healthy. Your care team may have specific advice just for you. Please talk to your care team about your own preventive care needs.  Lifestyle  Exercise at least 150 minutes each week (30 minutes a day, 5 days a week).  Do muscle strengthening activities 2 days a week. These help control your weight and prevent disease.  No smoking.  Wear sunscreen to prevent skin cancer.  Have your home tested for radon every 2 to 5 years. Radon is a colorless, odorless gas that can harm your lungs. To learn more, go to www.health.[facility].mn. and search for \"Radon in Homes.\"  Keep guns unloaded and locked up in a safe place like a safe or gun vault, or, use a gun lock and hide the keys. Always lock away bullets separately. To learn more, visit [employer].[place].gov and search for \"safe gun storage.\"  Nutrition  Eat 5 or more servings of fruits and vegetables each day.  Try wheat bread, brown rice and whole grain pasta (instead of white bread, rice, and pasta).  Get enough calcium and vitamin D. Check the label on foods and aim for 100% of the RDA (recommended daily allowance).  Regular exams  Have a dental exam and cleaning every 6 months.  See your health care team every year to talk about:  Any changes in your health.  Any medicines your care team has prescribed.  Preventive care, family planning, and ways to prevent chronic diseases.  Shots (vaccines)   HPV shots (up to age 26), if you've never had them before.  Hepatitis B shots (up to age 59), if you've never had them before.  COVID-19 shot: Get this shot when it's due.  Flu shot: Get a flu shot every year.  Tetanus shot: Get a tetanus shot every 10 years.  Pneumococcal, hepatitis A, and RSV shots: Ask your care team if you need these based on your risk.  Shingles shot (for age 50 and up).  General health tests  Diabetes screening:  Starting at age 35, Get screened for diabetes at least every 3 years.  If " you are younger than age 35, ask your care team if you should be screened for diabetes.  Cholesterol test: At age 39, start having a cholesterol test every 5 years, or more often if advised.  Bone density scan (DEXA): At age 50, ask your care team if you should have this scan for osteoporosis (brittle bones).  Hepatitis C: Get tested at least once in your life.  Abdominal aortic aneurysm screening: Talk to your doctor about having this screening if you:  Have ever smoked; and  Are biologically male; and  Are between the ages of 65 and 75.  STIs (sexually transmitted infections)  Before age 24: Ask your care team if you should be screened for STIs.  After age 24: Get screened for STIs if you're at risk. You are at risk for STIs (including HIV) if:  You are sexually active with more than one person.  You don't use condoms every time.  You or a partner was diagnosed with a sexually transmitted infection.  If you are at risk for HIV, ask about PrEP medicine to prevent HIV.  Get tested for HIV at least once in your life, whether you are at risk for HIV or not.  Cancer screening tests  Cervical cancer screening: If you have a cervix, begin getting regular cervical cancer screening tests at age 21. Most people who have regular screenings with normal results can stop after age 65. Talk about this with your provider.  Breast cancer scan (mammogram): If you've ever had breasts, begin having regular mammograms starting at age 40. This is a scan to check for breast cancer.  Colon cancer screening: It is important to start screening for colon cancer at age 45.  Have a colonoscopy test every 10 years (or more often if you're at risk) Or, ask your provider about stool tests like a FIT test every year or Cologuard test every 3 years.  To learn more about your testing options, visit: www.LRN/558149.pdf.  For help making a decision, visit: jasson/[email or web address].  Prostate cancer screening test: If you have a prostate and are age 55  to 69, ask your provider if you would benefit from a yearly prostate cancer screening test.  Lung cancer screening: If you are a current or former smoker age 50 to 80, ask your care team if ongoing lung cancer screenings are right for you.  For informational purposes only. Not to replace the advice of your health care provider. Copyright   2023 [facility]. All rights reserved. Clinically reviewed by the [facility] Transitions Program. [employer] 097560 - REV 04/24.    Learning About Activities of Daily Living  What are activities of daily living?     Activities of daily living (ADLs) are the basic self-care tasks you do every day. These include eating, bathing, dressing, and moving around.  As you age, and if you have health problems, you may find that it's harder to do some of these tasks. If so, your doctor can suggest ideas that may help.  To measure what kind of help you may need, your doctor will ask how well you are able to do ADLs. Let your doctor know if there are any tasks that you are having trouble doing. This is an important first step to getting help. And when you have the help you need, you can stay as independent as possible.  How will a doctor assess your ADLs?  Asking about ADLs is part of a routine health checkup your doctor will likely do as you age. Your health check might be done in a doctor's office, in your home, or at a hospital. The goal is to find out if you are having any problems that could make it hard to care for yourself or that make it unsafe for you to be on your own.  To measure your ADLs, your doctor will ask how hard it is for you to do routine tasks. Your doctor may also want to know if you have changed the way you do a task because of a health problem. Your doctor may watch how you:  Walk back and forth.  Keep your balance while you stand or walk.  Move from sitting to standing or from a bed to a chair.  Button or unbutton a shirt or sweater.  Remove and put  on your shoes.  It's common to feel a little worried or anxious if you find you can't do all the things you used to be able to do. Talking with your doctor about ADLs is a way to make sure you're as safe as possible and able to care for yourself as well as you can. You may want to bring a caregiver, friend, or family member to your checkup. They can help you talk to your doctor.  Follow-up care is a key part of your treatment and safety. Be sure to make and go to all appointments, and call your doctor if you are having problems. It's also a good idea to know your test results and keep a list of the medicines you take.  Current as of: October 24, 2023               Content Version: 14.0    6247-9895 [employer].   Care instructions adapted under license by your healthcare professional. If you have questions about a medical condition or this instruction, always ask your healthcare professional. [employer] disclaims any warranty or liability for your use of this information.      Preventing Falls: Care Instructions  Injuries and health problems such as trouble walking or poor eyesight can increase your risk of falling. So can some medicines. But there are things you can do to help prevent falls. You can exercise to get stronger. You can also arrange your home to make it safer.    Talk to your doctor about the medicines you take. Ask if any of them increase the risk of falls and whether they can be changed or stopped.   Try to exercise regularly. It can help improve your strength and balance. This can help lower your risk of falling.     Practice fall safety and prevention.    Wear low-heeled shoes that fit well and give your feet good support. Talk to your doctor if you have foot problems that make this hard.  Carry a cellphone or wear a medical alert device that you can use to call for help.  Use stepladders instead of chairs to reach high objects. Don't climb if you're at risk for falls.  "Ask for help, if needed.  Wear the correct eyeglasses, if you need them.    Make your home safer.    Remove rugs, cords, clutter, and furniture from walkways.  Keep your house well lit. Use night-lights in hallways and bathrooms.  Install and use sturdy handrails on stairways.  Wear nonskid footwear, even inside. Don't walk barefoot or in socks without shoes.    Be safe outside.    Use handrails, curb cuts, and ramps whenever possible.  Keep your hands free by using a shoulder bag or backpack.  Try to walk in well-lit areas. Watch out for uneven ground, changes in pavement, and debris.  Be careful in the winter. Walk on the grass or gravel when sidewalks are slippery. Use de-icer on steps and walkways. Add non-slip devices to shoes.    Put grab bars and nonskid mats in your shower or tub and near the toilet. Try to use a shower chair or bath bench when bathing.   Get into a tub or shower by putting in your weaker leg first. Get out with your strong side first. Have a phone or medical alert device in the bathroom with you.   Where can you learn more?  Go to https://www.[employer].net/patiented  Enter G117 in the search box to learn more about \"Preventing Falls: Care Instructions.\"  Current as of: July 17, 2023               Content Version: 14.0    4086-6627 [employer].   Care instructions adapted under license by your healthcare professional. If you have questions about a medical condition or this instruction, always ask your healthcare professional. [employer] disclaims any warranty or liability for your use of this information.      Hearing Loss: Care Instructions  Overview     Hearing loss is a sudden or slow decrease in how well you hear. It can range from slight to profound. Permanent hearing loss can occur with aging. It also can happen when you are exposed long-term to loud noise. Examples include listening to loud music, riding motorcycles, or being around other loud " machines.  Hearing loss can affect your work and home life. It can make you feel lonely or depressed. You may feel that you have lost your independence. But hearing aids and other devices can help you hear better and feel connected to others.  Follow-up care is a key part of your treatment and safety. Be sure to make and go to all appointments, and call your doctor if you are having problems. It's also a good idea to know your test results and keep a list of the medicines you take.  How can you care for yourself at home?  Avoid loud noises whenever possible. This helps keep your hearing from getting worse.  Always wear hearing protection around loud noises.  Wear a hearing aid as directed.  A professional can help you pick a hearing aid that will work best for you.  You can also get hearing aids over the counter for mild to moderate hearing loss.  Have hearing tests as your doctor suggests. They can show whether your hearing has changed. Your hearing aid may need to be adjusted.  Use other devices as needed. These may include:  Telephone amplifiers and hearing aids that can connect to a television, stereo, radio, or microphone.  Devices that use lights or vibrations. These alert you to the doorbell, a ringing telephone, or a baby monitor.  Television closed-captioning. This shows the words at the bottom of the screen. Most new TVs can do this.  TTY (text telephone). This lets you type messages back and forth on the telephone instead of talking or listening. These devices are also called TDD. When messages are typed on the keyboard, they are sent over the phone line to a receiving TTY. The message is shown on a monitor.  Use text messaging, social media, and email if it is hard for you to communicate by telephone.  Try to learn a listening technique called speechreading. It is not lipreading. You pay attention to people's gestures, expressions, posture, and tone of voice. These clues can help you understand what a  "person is saying. Face the person you are talking to, and have them face you. Make sure the lighting is good. You need to see the other person's face clearly.  Think about counseling if you need help to adjust to your hearing loss.  When should you call for help?  Watch closely for changes in your health, and be sure to contact your doctor if:    You think your hearing is getting worse.     You have new symptoms, such as dizziness or nausea.   Where can you learn more?  Go to https://www.[facility].net/patiented  Enter R798 in the search box to learn more about \"Hearing Loss: Care Instructions.\"  Current as of: September 27, 2023               Content Version: 14.0    7319-5641 [employer].   Care instructions adapted under license by your healthcare professional. If you have questions about a medical condition or this instruction, always ask your healthcare professional. [employer] disclaims any warranty or liability for your use of this information.      Relationships for Good Health  Relationships are important for our health and happiness. Social isolation, loneliness and lack of support are bad for your health. Studies show that loneliness can harm health and limit your life span as much as high blood pressure and smoking.   Take some time to reflect on your relationships. Then answer these questions:  Are there people in your life that cause you stress or drain your energy? What can you do to set limits?  ________________________________________________________________________________________________________________________________________________________________________________________________________________________________________________________________________________________________________________________________________________  Who do you enjoy spending time with? Who can you go to for " support?  ________________________________________________________________________________________________________________________________________________________________________________________________________________________________________________________________________________________________________________________________________________  What can you do to improve your relationships with others?  __________________________________________________________________________________________________________________________________________________________________________________________________________________  ______________________________________________________________________________________________________________________________  What do you like most about your relationships with others?  ________________________________________________________________________________________________________________________________________________________________________________________________________________________________________________________________________________________________________________________________________________  My goal: ______________________________________________________________________  I will ______________________________________________________________________________________________________________________________________________________________________________________________    For informational purposes only. Not to replace the advice of your health care provider. Copyright   2018 [facility]. All rights reserved. Clinically reviewed by Bariatric Health  Team. [employer] 189186 - Rev 04/21.    Learning About Sleeping Well  What does sleeping well mean?     Sleeping well means getting enough sleep to feel good and stay healthy. How much sleep is enough varies among people.  The number of hours you sleep and how you feel when you wake up are both important. If you do not  feel refreshed, you probably need more sleep. Another sign of not getting enough sleep is feeling tired during the day.  Experts recommend that adults get at least 7 or more hours of sleep per day. Children and older adults need more sleep.  Why is getting enough sleep important?  Getting enough quality sleep is a basic part of good health. When your sleep suffers, your physical health, mood, and your thoughts can suffer too. You may find yourself feeling more grumpy or stressed. Not getting enough sleep also can lead to serious problems, including injury, accidents, anxiety, and depression.  What might cause poor sleeping?  Many things can cause sleep problems, including:  Changes to your sleep schedule.  Stress. Stress can be caused by fear about a single event, such as giving a speech. Or you may have ongoing stress, such as worry about work or school.  Depression, anxiety, and other mental or emotional conditions.  Changes in your sleep habits or surroundings. This includes changes that happen where you sleep, such as noise, light, or sleeping in a different bed. It also includes changes in your sleep pattern, such as having jet lag or working a late shift.  Health problems, such as pain, breathing problems, and restless legs syndrome.  Lack of regular exercise.  Using alcohol, nicotine, or caffeine before bed.  How can you help yourself?  Here are some tips that may help you sleep more soundly and wake up feeling more refreshed.  Your sleeping area   Use your bedroom only for sleeping and sex. A bit of light reading may help you fall asleep. But if it doesn't, do your reading elsewhere in the house. Try not to use your TV, computer, smartphone, or tablet while you are in bed.  Be sure your bed is big enough to stretch out comfortably, especially if you have a sleep partner.  Keep your bedroom quiet, dark, and cool. Use curtains, blinds, or a sleep mask to block out light. To block out noise, use earplugs,  "soothing music, or a \"white noise\" machine.  Your evening and bedtime routine   Create a relaxing bedtime routine. You might want to take a warm shower or bath, or listen to soothing music.  Go to bed at the same time every night. And get up at the same time every morning, even if you feel tired.  What to avoid   Limit caffeine (coffee, tea, caffeinated sodas) during the day, and don't have any for at least 6 hours before bedtime.  Avoid drinking alcohol before bedtime. Alcohol can cause you to wake up more often during the night.  Try not to smoke or use tobacco, especially in the evening. Nicotine can keep you awake.  Limit naps during the day, especially close to bedtime.  Avoid lying in bed awake for too long. If you can't fall asleep or if you wake up in the middle of the night and can't get back to sleep within about 20 minutes, get out of bed and go to another room until you feel sleepy.  Avoid taking medicine right before bed that may keep you awake or make you feel hyper or energized. Your doctor can tell you if your medicine may do this and if you can take it earlier in the day.  If you can't sleep   Imagine yourself in a peaceful, pleasant scene. Focus on the details and feelings of being in a place that is relaxing.  Get up and do a quiet or boring activity until you feel sleepy.  Avoid drinking any liquids before going to bed to help prevent waking up often to use the bathroom.  Where can you learn more?  Go to https://www.[employer].net/patiented  Enter J942 in the search box to learn more about \"Learning About Sleeping Well.\"  Current as of: July 10, 2023               Content Version: 14.0    9787-3049 [employer].   Care instructions adapted under license by your healthcare professional. If you have questions about a medical condition or this instruction, always ask your healthcare professional. [employer] disclaims any warranty or liability for your use of this " information.

## 2024-06-04 NOTE — COMMUNITY RESOURCES LIST (ENGLISH)
June 4, 2024           YOUR PERSONALIZED LIST OF SERVICES & PROGRAMS           NAVIGATION    Eligibility Screening      [facility] application assistance - [facility]  [address] [place] [address] (Distance: 3.9 miles)  Language: English  Fee: Free      [facility] ([facility]) - Health insurance application assistance  [address] [place] [address] (Distance: 3.3 miles)  Phone: (745) 604-8330  Website: https://Kirkland [place]/  Language: English  Fee: Free  Accessibility: Ada accessible, Blind accommodation      [employer] [place] - SNAP (formerly food stamps) Screening and Application help  Phone: (981) 232-3314  Website: https://www.Bebestore.org/programs/[place]-food-helpline/  Language: English  Hours: Mon 10:00 AM - 5:00 PM Tue 10:00 AM - 5:00 PM Wed 10:00 AM - 5:00 PM Thu 10:00 AM - 5:00 PM Fri 10:00 AM - 5:00 PM  Fee: Free  Accessibility: Ada accessible, Blind accommodation, Deaf or hard of hearing, Translation services        ASSISTANCE    Nutrition Benefits      [facility] application assistance - [facility]  [address] [place] [address] (Distance: 3.9 miles)  Language: English  Fee: Free      [facility] application assistance - [facility]  [address] [place] [address] (Distance: 3.9 miles)  Language: English  Fee: Free      [employer] [place] - SNAP (formerly food stamps) Screening and Application help  Phone: (941) 201-8156  Website: https://www.Bebestore.org/programs/[place]-food-helpline/  Language: English  Hours: Mon 10:00 AM - 5:00 PM Tue 10:00 AM - 5:00 PM Wed 10:00 AM - 5:00 PM Thu 10:00 AM - 5:00 PM Fri 10:00 AM - 5:00 PM  Fee: Free  Accessibility: Ada accessible, Blind accommodation, Deaf or hard of hearing, Translation  services    Pantry      [employer] ([employer]) - Senior Food Shelf  [address] [place] [address] (Distance: 7.0 miles)  Phone: [phone]) [phone]  Language: Danish, English, Omani  Fee: Free      Food Shelf and Thrift Store - Food Shelf  [address] [place] [address] (Distance: 6.3 miles)  Phone: (890) 743-8662  Website: https://SecurActive/food-services  Language: English, Omani  Fee: Free  Accessibility: Ada accessible      Health Advisors - [facility]ocate [facility] Veterans  Phone: (733) 959-8791  Website: https://www.advocateforIntentiva.Carepeutics  Language: English, Omani  Hours: Mon 8:00 AM - 5:00 PM Tue 8:00 AM - 5:00 PM Wed 8:00 AM - 5:00 PM Thu 8:00 AM - 5:00 PM Fri 8:00 AM - 5:00 PM  Fee: Free  Accessibility: Ada accessible        & SHELTER    Case Management      [facility] [place] - Lists of hospitals in the [place] With Services Independent  [address] [place] [address] (Distance: 5.7 miles)  Phone: [phone]) [phone]  Website: https://www.2C2PPrattville Baptist Hospitalskedge.me.Orbital Traction/contact  Language: English, Omani  Accessibility: Ada accessible, Blind accommodation, Deaf or hard of hearing, Translation services      Living - Housing Support-[facility] (HWS-I)  [address] [place] [address] (Distance: 7.3 miles)  Phone: [phone] [phone]  Website: https://www.BloggersBase  Language: Vietnamese, English, Malian  Fee: Insurance      [employer] - Housing Stabilization Services  Phone: (593) 565-3073  Website: https://Publons.Carepeutics/  Language: English  Hours: Mon 8:00 AM - 4:00 PM Tue 8:00 AM - 4:00 PM Wed 8:00 AM - 4:00 PM Thu 8:00 AM - 4:00 PM Fri 8:00 AM - 4:00 PM  Fee: Free  Accessibility: Blind accommodation, Deaf or hard of hearing  Transportation Options: Free transportation    Payment Assistance      [facility] Assistance  [address], [place] [address] (Distance: 6.5 miles)  Language: English  Fee: Free  Accessibility: Deaf or hard of hearing, Ada  accessible      MPLS - Emergency Rental Assistance (ERA)  [address] [place] [address] (Distance: 7.2 miles)  Phone: (115) 566-1507  Email: kelly@vSocialNordic TeleCom  Website: https://www.DinnDinn.wumo/era  Language: English  Fee: Free      30-Days Foundation - Keep the Key  Phone: (968) 292-3858  Website: https://www.tsj59-ureiyapaklfrpz.org/programs.html  Language: English  Hours: Mon 7:00 AM - 7:00 PM Tue 7:00 AM - 7:00 PM Wed 7:00 AM - 7:00 PM Thu 7:00 AM - 7:00 PM Fri 7:00 AM - 7:00 PM  Fee: Free    Mediation & Eviction Prevention      Resource Connections - [place] [address] (Distance: 7.1 miles)  Phone: (800) 875-3977  Website: https://wwwInTouch Technologies/housingcourtinfo  Language: English  Fee: Free      Living - Housing Support-[facility] (HWS-I)  [address] [place] [address] (Distance: 7.3 miles)  Phone: (634) 444-9633  Website: https://wwwThreesixty Campus  Language: Wolof, English, St Helenian  Fee: Insurance      Line - Tenant Rights / Eviction Prevention  Website: https://[facility].org/k-jqib-ca-/  Language: English, Georgian            Medical Transportation, (NEMT)      Transportation - Transportation to medical appointments  [address] [facility] [address] [place] [address] (Distance: 1.7 miles)  Phone: ([phone]) [phone]  Website: https://helpmeconnect.web.[facility].us/HelpMeConnect/Providers/Delight_Transportation/Transportation/2?returnUrl=%2FHelpMeConnect%2FSearch%2FBasicNeeds%2FTransportation%2FTransportationServices%3Fstart%3D40  Language: English  Fee: Self pay, Insurance  Accessibility: Ada accessible      - Transportation to medical appointments  [address] [facility] [address] [place] [address] (Distance: 6.8 miles)  Phone: (398) [phone]  Website: https://www.Big Box Overstocks/  Language: English  Fee: Self pay      Social Service of [place] - Neighbor to Neighbor Program  Phone: (335) 750-3159  Email:  ayde@[facility].org  Website: https://www.[facility].org/services/older-adults/-services/neighbor-to-neighbor  Language: English  Hours: Mon 8:00 AM - 5:00 PM Tue 8:00 AM - 5:00 PM Wed 8:00 AM - 5:00 PM Thu 8:00 AM - 5:00 PM Fri 8:00 AM - 5:00 PM  Fee: Insurance, Self pay  Accessibility: Deaf or hard of hearing, Blind accommodation, Translation services    Expense Assistance      Action [facility] ([facility]) - Vehicle Repair Assistance Program  [address] [place] [address] (Distance: 3.3 miles)  Phone: (781) 300-3075  Website: https://capJefferson Hospital24PageBooks/  Language: English  Fee: Free  Accessibility: Ada accessible, Blind accommodation      School - Wheels for Women  [address] [place] [address] (Distance: 9.0 miles)  Phone: (376) 161-3761  Website: http://www.North American Palladium.ACS Clothing  Language: English  Fee: Free  Accessibility: Translation services  Transportation Options: Free transportation      - Dislocated Worker/Adult WIOA Employment Program  Phone: (258) 422-5662  Email: yael@[facility].ACS Clothing  Website: https://Neurotrope Biosciencemn.org/services/employment-services/dislocated-worker-program/  Language: English, [place] citizen  Hours: Mon 8:00 AM - 4:30 PM Tue 8:00 AM - 4:30 PM Wed 8:00 AM - 4:30 PM Thu 8:00 AM - 4:30 PM Fri 8:00 AM - 4:30 PM  Fee: Free  Accessibility: Ada accessible    Coordination      [employer] - Free or low-cost transportation  [address] [facility] [address] [place] [address] (Distance: 7.3 miles)  Phone: (414) 175-4371  Website: http://www.Alo7.ACS Clothing  Language: English  Fee: Free      [facility] - Bus Passes - Free or low-cost transportation  [address] [place] [address] (Distance: 6.5 miles)  Phone: (734) 143-1183  Language: English  Fee: Free  Accessibility: Deaf or hard of hearing, Ada accessible      Ride Transportation - How BlueRide works  Phone: (387) 124-8119  Website:  https://www.Imprivata/members/shop-plans/[place]-health-care-programs/blueride-transportation  Language: English  Hours: Mon 8:00 AM - 5:00 PM Tue 8:00 AM - 5:00 PM Wed 8:00 AM - 5:00 PM Thu 8:00 AM - 5:00 PM Fri 8:00 AM - 5:00 PM            Bill Payment Assistance      Action [facility]) - Low Income Home Energy Assistance Program (LIHEAP)  [address] [place] [address] (Distance: 3.3 miles)  Phone: (544) 680-3601  Website: https://[facility]/  Language: English  Fee: Free  Accessibility: Ada accessible, [facility] - Emergency Assistance Program (EAP) - Utilities  [address] [place] [address] (Distance: 3.9 miles)  Phone: (768) 449-8347  Language: English  Fee: Free      - Dislocated Worker/Adult WIOA Employment Program  Phone: (330) 949-4742  Email: yael@[employer]  Website: https://Altia Systems/services/employment-services/dislocated-worker-program/  Language: English, Surinamese  Hours: Mon 8:00 AM - 4:30 PM Tue 8:00 AM - 4:30 PM Wed 8:00 AM - 4:30 PM Thu 8:00 AM - 4:30 PM Fri 8:00 AM - 4:30 PM  Fee: Free  Accessibility: Ada accessible               IMPORTANT NUMBERS & WEBSITES        Emergency Services  911  .   [facility]  211 http://211unitedway.org  .   Poison Control  (541) 939-7805 http://mnpoison.org http://wisconsinpoison.org  .     Suicide and Crisis Lifeline  988 http://988lifeline.org  .   Childhelp National Child Abuse Hotline  906.231.4477 http://Childhelphotline.org   .   National Sexual Assault Hotline  (387) 961-5364 (HOPE) http://Rainn.org   .     National Runaway Safeline  (908) 449-7170 (RUNAWAY) http://1800runaCayMay Education.org  .   Pregnancy & Postpartum Support  Call/text 085-203-3217  [place]: http://ppsupportmn.org  [place]: http://PlotWatt.com/wi  .   Substance Abuse National Helpline ([facility])  800-622-HELP (0559) http://Findtreatment.gov   .                DISCLAIMER: These resources have  been generated via the [employer] Collective Platform. [employer] does not endorse any service providers mentioned in this resource list. [employer] does not guarantee that the services mentioned in this resource list will be available to you or will improve your health or wellness.    [facility]

## 2024-06-04 NOTE — PROGRESS NOTES
Preventive Care Visit  [facility] TOÑO Quinones MD, Family Medicine  Jun 4, 2024      Assessment & Plan   1. Encounter for Medicare annual wellness exam  - At this time has aged out of cancer screening  - Not due for labwork  - Declined vaccines today, will revisit RSV and COVID in the fall  - Mental exam concerning for congitive impairment, would benefit from more extensive testing (MOCA)  - Would like son virtually present (lives in [place]) to discuss impairments and goals of care. Will work with care coordinators to set up follow up. Received verbal approval by patient to discuss care and coordinate follow up with son.       Counseling  Appropriate preventive services were discussed with this patient, including applicable screening as appropriate for fall prevention, nutrition, physical activity, Tobacco-use cessation, weight loss and cognition.  Checklist reviewing preventive services available has been given to the patient.  Reviewed patient's diet, addressing concerns and/or questions.   Patient is at risk for social isolation and has been provided with information about the benefit of social connection.   Discussed possible causes of fatigue. Updated plan of care.  Patient reported difficulty with activities of daily living were addressed today.Patient reported safety concerns were addressed today.The patient was provided with written information regarding signs of hearing loss.     2. SDH (subdural hematoma) (H)  - Completed paperwork for transportation, to be scanned into chart.       Return in about 4 weeks (around 7/2/2024).    Michelle Valentine is a 83 year old, presenting for the following:  Annual Visit (Balance concerns. Weakness, decreased appetite. Unsure if it's from medications ) and Forms (Forms )        6/4/2024     1:25 PM   Additional Questions   Roomed by Angelika   Accompanied by self         6/4/2024    Information    services provided? No          Health Care Directive  Patient has a Health Care Directive on file  Advance care planning document is on file but is outdated.  Patient encouraged to updated. Would like  to have discussion along with son.     CONCHA Hoffmankatarina presents for MWV and for paperwork completion.    Looking to get on the [employer] program. Currently only takes transportation tot he ziyad. Patient was taken to the ER on 12/25 of this past year because she did not have enough       6/4/2024   General Health   How would you rate your overall physical health? (!) FAIR   Feel stress (tense, anxious, or unable to sleep) Patient declined         6/4/2024   Nutrition   Diet: Low salt         10/19/2022   Exercise   Frequency of exercise: None         6/4/2024   Social Factors   Frequency of gathering with friends or relatives Never   Worry food won't last until get money to buy more No   Food not last or not have enough money for food? Yes   Do you have housing?  Yes   Are you worried about losing your housing? Yes   Lack of transportation? Yes   Unable to get utilities (heat,electricity)? Yes   Want help with housing or utility concern? (!) YES   (!) FOOD SECURITY CONCERN PRESENT (!) TRANSPORTATION CONCERN PRESENT(!) HOUSING CONCERN PRESENT(!) FINANCIAL RESOURCE STRAIN CONCERN(!) SOCIAL CONNECTIONS CONCERN      6/4/2024   Fall Risk   Fallen 2 or more times in the past year? Yes   Trouble with walking or balance? Yes           6/4/2024   Activities of Daily Living- Home Safety   Needs help with the following daily activites Telephone use    Transportation    Shopping    Preparing meals    Housework    Bathing    Laundry    Medication administration    Money management    Toileting    Feeding    Dressing   Safety concerns in the home No grab bars in the bathroom    No handrails on the stairs         6/4/2024   Dental   Dentist two times every year? Yes         6/4/2024   Hearing Screening   Hearing concerns? (!) I FEEL THAT PEOPLE ARE MUMBLING  OR NOT SPEAKING CLEARLY.    (!) IT'S HARDER TO UNDERSTAND WOMEN'S VOICES THAN MEN'S VOICES.    (!) IT'S HARD TO FOLLOW A CONVERSATION IN A NOISY RESTAURANT OR CROWDED ROOM.         6/4/2024   Driving Risk Screening   Patient/family members have concerns about driving (!) DECLINE         6/4/2024   General Alertness/Fatigue Screening   Have you been more tired than usual lately? (!) YES         6/4/2024   Urinary Incontinence Screening   Bothered by leaking urine in past 6 months No         6/4/2024   TB Screening   Were you born outside of the [place]? No         Today's PHQ-2 Score:       6/4/2024     1:49 PM   PHQ-2 ( 1999 Pfizer)   Q1: Little interest or pleasure in doing things 0   Q2: Feeling down, depressed or hopeless 1   PHQ-2 Score 1   Q1: Little interest or pleasure in doing things Not at all   Q2: Feeling down, depressed or hopeless Several days   PHQ-2 Score 1           6/4/2024   Substance Use   Alcohol more than 3/day or more than 7/wk Not Applicable   Do you have a current opioid prescription? No   How severe/bad is pain from 1 to 10? 0/10 (No Pain)   Do you use any other substances recreationally? No     Social History     Tobacco Use    Smoking status: Never    Smokeless tobacco: Never   Vaping Use    Vaping status: Never Used   Substance Use Topics    Alcohol use: No    Drug use: No          Mammogram Screening - Mammography discussed, not appropriate due to age and life expectancy.  Surgical history and/or SOGI form updated.            Reviewed and updated as needed this visit by Provider        Surg Hx  Fam Hx            Current providers sharing in care for this patient include:  Patient Care Team:  Zacarias Quinones MD as PCP - General (Family Medicine)  Artie Silva MD as MD (Dermapathology)  Kassie Wyatt MD as Referring Physician (Family Medicine)  Blanquita Moran PA-C as Physician Assistant (Neurological Surgery)  Blanquita Moran PA-C as Assigned Neuroscience Provider  Fernie  Angelito VERDUGO RPH as Pharmacist (Pharmacist)  Andrew Bello, Mook (Audiology)  Angelito Enciso RPH as Assigned MTM Pharmacist  Zacarias Quinones MD as Assigned PCP    The following health maintenance items are reviewed in Epic and correct as of today:  Health Maintenance   Topic Date Due    ZOSTER IMMUNIZATION (1 of 2) Never done    RSV VACCINE (Pregnancy & 60+) (1 - 1-dose 60+ series) Never done    COVID-19 Vaccine (4 - 2023-24 season) 03/08/2024    INFLUENZA VACCINE (Season Ended) 09/01/2024    TSH W/FREE T4 REFLEX  11/07/2024    MEDICARE ANNUAL WELLNESS VISIT  06/04/2025    FALL RISK ASSESSMENT  06/04/2025    ADVANCE CARE PLANNING  08/16/2027    DEXA  03/17/2031    DTAP/TDAP/TD IMMUNIZATION (3 - Td or Tdap) 05/05/2033    PHQ-2 (once per calendar year)  Completed    Pneumococcal Vaccine: 65+ Years  Completed    IPV IMMUNIZATION  Aged Out    HPV IMMUNIZATION  Aged Out    MENINGITIS IMMUNIZATION  Aged Out    RSV MONOCLONAL ANTIBODY  Aged Out            Objective    Exam  /71   Pulse 68   Temp 97.5  F (36.4  C) (Oral)   Resp 16   Ht 1.524 m (5')   Wt 54.9 kg (121 lb)   LMP  (LMP Unknown)   SpO2 95%   BMI 23.63 kg/m     Estimated body mass index is 23.63 kg/m  as calculated from the following:    Height as of this encounter: 1.524 m (5').    Weight as of this encounter: 54.9 kg (121 lb).    Physical Exam  General No acute distress, atraumatic  Resp: No respiratory distress, no accessory muscle use  Card: well perfused, no cyanosis  Neuro: Moving all extremities  Psych: Normal mood, appropriate affect          6/4/2024   Mini Cog   Clock Draw Score 0 Abnormal   3 Item Recall 0 objects recalled   Mini Cog Total Score 0          Reach out to son about scheduling an appointment with her and son.     Signed Electronically by: Zacarias Quinones MD

## 2024-06-05 ENCOUNTER — TELEPHONE (OUTPATIENT)
Dept: FAMILY MEDICINE | Facility: CLINIC | Age: 84
End: 2024-06-05
Payer: COMMERCIAL

## 2024-06-05 NOTE — TELEPHONE ENCOUNTER
"6/5/24    CC attempted to contact patient's son to see what days and times could work best to join in on next appointment with Dr. Quinones, per message, \"trying to set up an appointment for 40 minutes at a time that works for the son (number confirmed in chart). Son could join for the second 20 minutes as the first half I'll be working with patient to better assess cognitive abilities.\"    CC left Delfina a voicemail regarding message above, and provided direct call back number to schedule appointment.    Shala Anthony  Care Coordinator- [facility]  [phone]  "

## 2024-06-07 NOTE — TELEPHONE ENCOUNTER
6/7/24     2nd attempt to contact patient's son to schedule an appointment for cognitive assessment per message from Dr. Quinones. CC left patient's son a voicemail and provided direct call back number to schedule.    Shala Anthony  Care Coordinator- [facility]  [phone]

## 2024-06-17 NOTE — TELEPHONE ENCOUNTER
6/17/24    CC 3rd attempt to contact patient's son Delfina to coordinator a conference call for next appointment. Patient is scheduled on 7/8. CC had to leave Delfina a message and requested a call back to confirm upcoming appointment. Provided direct call back number.    CC contacted patient to talk about appointment. Patient said she had been trying to reach her son, but has not been able to talk to him. Said he works a lot and is difficult to reach. CC will send a letter with appointment information and attempt to reach patient's son next week.    Shala Anthony  Care Coordinator- [facility]  [phone]

## 2024-06-18 NOTE — IR NOTE
Doing fine and stable with current management, continue same     Interventional Radiology Intra-procedural Nursing Note    Patient Name: Serge Marin  Medical Record Number: [id number]  Today's Date: November 28, 2022    Procedure: Left nephrostogram, possible removal, possible internalization of ureteral stent  Start time: 1037  End time: 1038t  Report provided to: gen surg RN  Patient depart time and location: 8082 to 2207    Note: Patient entered Interventional Radiology Suite number 2 via cart. Patient awake, alert and oriented. Assisted onto procedural table in prone position. Prepped and draped.  Dr. Cervantes in room. Time out and procedure started.     Procedure well tolerated by patient without complications. Procedure end with debrief by Dr. Cervantes.  Manual pressure applied until hemostasis achieved. Opsite dressing applied to flank interventional procedure access site.

## 2024-06-20 ENCOUNTER — TELEPHONE (OUTPATIENT)
Dept: AUDIOLOGY | Facility: CLINIC | Age: 84
End: 2024-06-20
Payer: COMMERCIAL

## 2024-06-20 NOTE — TELEPHONE ENCOUNTER
[facility] Call [facility]    Phone Message    May a detailed message be left on voicemail: yes     Reason for Call: Other: The pt would like a call back from Andrew to discuss her hearing aids. They aren't charging correctly and she has questions. Please call. Thanks.     Action Taken: Message routed to:  Adult Clinics: ENT p [id number]    Travel Screening: Not Applicable     Date of Service:

## 2024-06-21 NOTE — TELEPHONE ENCOUNTER
Spoke with patient. She will try resetting her hearing aid and let us know if she still has issues charging.

## 2024-07-31 ENCOUNTER — DOCUMENTATION ONLY (OUTPATIENT)
Dept: FAMILY MEDICINE | Facility: CLINIC | Age: 84
End: 2024-07-31
Payer: COMMERCIAL

## 2024-07-31 NOTE — PROGRESS NOTES
"When opening a documentation only encounter, be sure to enter in \"Chief Complaint\" Forms and in \" Comments\" Title of form, description if needed.    Serge is a 84 year old  female  Form received via: Fax  Form now resides in: Provider Ready    Angelika Ann MA    Form has been completed by provider.     Form sent out via: Fax to [facility] at Fax Number: [phone]  Patient informed:   Output date: August 2, 2024    Angelika Ann MA      **Please close the encounter**              "

## 2024-08-09 ENCOUNTER — OFFICE VISIT (OUTPATIENT)
Dept: FAMILY MEDICINE | Facility: CLINIC | Age: 84
End: 2024-08-09
Payer: COMMERCIAL

## 2024-08-09 VITALS
HEART RATE: 67 BPM | SYSTOLIC BLOOD PRESSURE: 119 MMHG | HEIGHT: 60 IN | TEMPERATURE: 97.5 F | RESPIRATION RATE: 14 BRPM | DIASTOLIC BLOOD PRESSURE: 71 MMHG | WEIGHT: 121.8 LBS | BODY MASS INDEX: 23.91 KG/M2 | OXYGEN SATURATION: 96 %

## 2024-08-09 DIAGNOSIS — M25.552 CHRONIC LEFT HIP PAIN: Primary | ICD-10-CM

## 2024-08-09 DIAGNOSIS — L82.1 SEBORRHEIC KERATOSIS: ICD-10-CM

## 2024-08-09 DIAGNOSIS — G89.29 CHRONIC LEFT HIP PAIN: Primary | ICD-10-CM

## 2024-08-09 PROCEDURE — 99213 OFFICE O/P EST LOW 20 MIN: CPT | Mod: GC

## 2024-08-09 RX ORDER — IBUPROFEN 200 MG
200-600 TABLET ORAL EVERY 6 HOURS PRN
Qty: 90 TABLET | Refills: 0 | Status: SHIPPED | OUTPATIENT
Start: 2024-08-09 | End: 2024-09-20
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
## 2024-08-21 NOTE — ED NOTES
.     REASON FOR FOLLOW-UP:     *. 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   Patient was started on carboplatin AUC 5, and the Taxol 200 mg/m² on 7/31/2024.      HISTORY OF PRESENT ILLNESS:  The patient is a 75 y.o. year old male who is here for initial evaluation with the above-mentioned history.  History of Present Illness  The patient presents today on 8/21/2024 for evaluation of multiple medical concerns.    He has been managing his blood sugar levels, which were recorded as 290 at home and have risen to 400 today. He administered his usual dose of 24 units of insulin last night but did not check his glucose levels this morning. His diet included a steak and egg bagel before the appointment. He is making efforts to avoid sugary foods and drinks.    He experienced significant fatigue for about three days following the start of chemotherapy on day 4, but this has since resolved. He is now back to his baseline condition, able to perform tasks such as mowing the lawn without much difficulty. He continues to use oxygen supplementation at 3 L/min.  Patient reports fever sweating chills.    Results  Laboratory Studies  Home glucose level was 290. Glucose level at the clinic was 461.    Past Medical History:   Diagnosis Date    Anxiety     Arthritis     Atrial fibrillation     CURRENTLY    Bunion of right foot     Colon polyps     COPD (chronic obstructive pulmonary disease)     MILD. NO MEDS FOR    Depression     History of atrial fibrillation     WITH CARDIOVERSION. NO PROBLEMS    History of skin cancer     BCC REMOVED FROM BACK    [place] (hard of hearing)     Hyperlipidemia     Inguinal hernia, recurrent     RIGHT    Left foot pain     Lung nodules     Rash     IN GROIN TREATING FOR YEAST INFECTION BY PCP    Redness of skin     LOWER LEGS BILATERAL    Scab     BILATERAL LEGS HEALING    Sleep apnea with use of continuous positive airway pressure  Report called to Missy on inpatient unit 4a.     (CPAP)     CPAP    Streptococcus pneumoniae     ABOUT 6-7 YR AGO.     Type 2 diabetes mellitus      Past Surgical History:   Procedure Laterality Date    BASAL CELL CARCINOMA EXCISION      BRONCHOSCOPY WITH ION ROBOTIC ASSIST N/A 5/6/2024    Procedure: BRONCHOSCOPY WITH ION ROBOT WITH FNA, BIOPSIES, BAL AND ENDOBRONCHIAL ULTRASOUND WITH FNA;  Surgeon: Yony Coles MD;  Location: [facility] ENDOSCOPY;  Service: Robotics - Pulmonary;  Laterality: N/A;  PRE: PULMONARY NODULES  POST: SAME    CARDIAC CATHETERIZATION N/A 11/15/2021    Procedure: Coronary angiography;  Surgeon: Alfredo Jennings MD;  Location: [facility] CATH INVASIVE LOCATION;  Service: Cardiovascular;  Laterality: N/A;    CARDIAC CATHETERIZATION N/A 11/15/2021    Procedure: Left heart cath;  Surgeon: Alfredo Jennings MD;  Location: [facility] CATH INVASIVE LOCATION;  Service: Cardiovascular;  Laterality: N/A;    CARDIAC CATHETERIZATION N/A 11/15/2021    Procedure: Left ventriculography;  Surgeon: Alfredo Jennings MD;  Location: [facility] CATH INVASIVE LOCATION;  Service: Cardiovascular;  Laterality: N/A;    CARDIAC CATHETERIZATION N/A 11/15/2021    Procedure: Aortic root aortogram;  Surgeon: Alfredo Jennings MD;  Location: [facility] CATH INVASIVE LOCATION;  Service: Cardiovascular;  Laterality: N/A;    CARDIOVERSION      CHOLECYSTECTOMY N/A 10/07/2017    Procedure: CHOLECYSTECTOMY LAPAROSCOPIC;  Surgeon: Martir Trevino MD;  Location: [facility] MAIN OR;  Service:     COLONOSCOPY  2007    COLONOSCOPY N/A 8/30/2022    Procedure: COLONOSCOPY TO CECUM AND TI AND COLD SNARE POLYPECTOMY;  Surgeon: Cj Lopez MD;  Location: [facility] ENDOSCOPY;  Service: Gastroenterology;  Laterality: N/A;  Pre: History of colon polyps  Post: POLYP, PREVIOUS TATTOO    FOOT SURGERY Left     TOES ARE PINNED. ALL BUT GREAT TOE    HAND SURGERY      THUMB    HERNIA REPAIR      INGUINAL HERNIA REPAIR Right 06/27/2018    Procedure: INGUINAL HERNIA REPAIR, OPEN, RECURRENT;  Surgeon:  Martir Trevino MD;  Location: [facility] OR [facility]SC;  Service: General    LASIK Bilateral     LOBECTOMY Right 2024    Procedure: BRONCHOSCOPY, RIGHT VIDEO ASSISTED THORACOSCOPY WITH RIGHT MIDDLE LOBECTOMY WITH DAVINCI ROBOT, MEDIASTINAL LYMPH NODE DISSECTION, INTERCOSTAL NERVE BLOCK;  Surgeon: David Figueroa MD;  Location: [facility] OR;  Service: Robotics - DaVinci;  Laterality: Right;    NECK SURGERY      growth on salivary gland    REPLACEMENT TOTAL KNEE Right     (he is going to have a LTKR next month)    REPLACEMENT TOTAL KNEE Left     VENOUS ACCESS DEVICE (PORT) INSERTION Right 2024    Procedure: INSERTION VENOUS ACCESS DEVICE;  Surgeon: David Figueroa MD;  Location: [facility] OR;  Service: Thoracic;  Laterality: Right;       ONCOLOGY HISTORY:  The patient is a 75 years old male, who presents for oncology evaluation 2024. He is accompanied by his wife.    He underwent right middle lobe lobectomy for squamous cell lung cancer by Dr. Figueroa on 2024 and is recovering well from the surgery. However, there is an open wound at the site of chest tube on the right side of the chest which now has a few stitches, which is still leaking. He was advised to apply Band-Aids.  He reports no fever sweating or chills.      Patient has been on oxygen supplementation since surgery, currently 2 L/min.  Patient says occasionally at home he does not need oxygen.  Dr. Figueroa has suggested gradually reducing his oxygen use.     His appetite remains normal.    Patient reports some family health issues.  His son-in-law recently  of HLH just last week.  His brother-in-law has stage IV liver cancer.     This patient has a history of emphysema, followed by pulmonologist Dr. Camp.  His high-resolution chest CT scan on 2021 reported 7 mm nodule in the right middle lobe.  There is also index subcarinal lymph node 1.1 cm.     Patient subsequently had serial CT scan examination.    On 4/10/2023 chest high-resolution CT scan  reported stable examination with the pulmonary nodules measuring up to 9-10 mm in the right middle lobe and a sub-6 mm in the right upper lobe.  The largest subcarinal lymph node measures  2.1 x  1.2 cm.     However chest high resolution CT scan 4/2/2024 reported disease progression, with right middle lobe pulm nodule 1.8 cm from margin at 9 mm.  There is also a new right middle lobe 1.1 cm nodule.  Stable right upper lobe 7 mm nodule.  Also a new right hilar lymphadenopathy up to 2 cm.  The 1.5 cm subcarinal lymph node is stable.  No pleural effusion.    Patient subsequently had PET scan examination on 4/22/2024 requested by Dr. Camp.  This reported SUV 3.1 corresponding to the 19 mm right middle lobe nodule.  The 1.1 cm right middle nodule was photopenic.  The right hilar lymph node with SUV 5.6.  The 1.5 cm subcarinal lymph node with maximum SUV 7.    Patient subsequently seen by Dr. Figueroa who performed ION bronchoscopic biopsy on 5/7/2024 with pathology evaluation reporting squamous cell carcinoma involving one of the right middle lobe lesion, and the other pulmonary nodule is at least squamous cell carcinoma in situ.  PD-L1 study TPS was 0%.       Dr. Figueroa performed robot-assisted thoracoscopic right middle lobe lobectomy and mediastinal lymph node dissection on 6/12/2024.  Pathology evaluation reported poorly differentiated squamous cell carcinoma, clear margin, however with lymph nodes involved 3 lymph nodes in the right 10 R, 11 R and 12 R lymph node station.  There was also frequent lymphovascular invasion and focal perineural invasion.    Patient subsequently had a portacatheter placement on 7/5/2024.      The patient presents today on 7/31/2024 for re-evaluation to start chemotherapy.    His case was presented at the multimodality conference 7/18/2024.  Because of negative margin and N1 disease, as well as marginal pulmonary function, the consensus was for patient to have adjuvant chemotherapy alone without  radiation.    On 7/18/2024 peripheral blood was sent for Tempus xF liquid biopsy with inconclusive results, no reportable treatment options found.    His lung cancer sample from 6/12/2024 was sent for Tempus xT DNA and RNA study.  It reported stable MSI.  Tumor mutation burden 6.3 m/MB.  Negative for EGFR, KRAS, BRAF, ALK, ROS1, RET, MET, HER2.    The patient recently consulted her nephrologist, during which blood work was conducted. The results indicated a slight presence of protein in urine. He denies experiencing any dyspnea.    Today patient reports no nausea no vomiting.  No chest pain.  Has chronic fatigue.          MEDICATIONS    Current Outpatient Medications:     dexAMETHasone (DECADRON) 4 MG tablet, TAKE 2 TABLETS BY MOUTH EVERY MORNING AND TAKE 2 TABLETS BY MOUTH AT NIGHT THE DAY BEFORE CHEMOTHERAPY. TAKE WITH FOOD., Disp: 4 tablet, Rfl: 0    fexofenadine (ALLEGRA) 180 MG tablet, Take 1 tablet by mouth Daily., Disp: , Rfl:     gabapentin (NEURONTIN) 300 MG capsule, Take 1 capsule by mouth Every 8 (Eight) Hours., Disp: 90 capsule, Rfl: 0    glucose blood test strip, Freestyle strips daily E 11.9, Disp: 100 each, Rfl: 12    glucose monitoring kit (FREESTYLE) monitoring kit, Daily E 11.93 FreeStyle meter, Disp: 1 each, Rfl: 1    Insulin Glargine, 1 Unit Dial, (Toujeo SoloStar) 300 UNIT/ML solution pen-injector injection, Inject 22 Units under the skin into the appropriate area as directed Daily., Disp: , Rfl:     Lancets (FREESTYLE) lancets, Daily E 11.9, Disp: 100 each, Rfl: 12    Multiple Vitamin (MULTIVITAMINS PO), Take 1 tablet by mouth Daily. HOLD PRIOR TO SURG, Disp: , Rfl:     ondansetron (ZOFRAN) 8 MG tablet, Take 1 tablet by mouth Every 8 (Eight) Hours As Needed for Nausea or Vomiting., Disp: 30 tablet, Rfl: 5    rosuvastatin (CRESTOR) 40 MG tablet, TAKE ONE TABLET BY MOUTH DAILY, Disp: 90 tablet, Rfl: 1    sertraline (ZOLOFT) 50 MG tablet, TAKE ONE TABLET BY MOUTH DAILY (Patient taking differently:  Take 1 tablet by mouth Daily.), Disp: 90 tablet, Rfl: 1    Tirzepatide (Mounjaro) 2.5 MG/0.5ML solution pen-injector pen, Inject 0.5 mL under the skin into the appropriate area as directed 1 (One) Time Per Week. LAST DOSE 6/3/2024 INSTRUCTED PT TO HOLD FOR SURGERY, Disp: , Rfl:     torsemide (DEMADEX) 20 MG tablet, Take 1 tablet by mouth Daily., Disp: , Rfl:     Xarelto 20 MG tablet, TAKE ONE TABLET BY MOUTH DAILY, Disp: 30 tablet, Rfl: 9    clotrimazole-betamethasone (Lotrisone) 1-0.05 % cream, Apply 1 application topically to the appropriate area as directed 2 (Two) Times a Day. Do exceed 1 week (Patient not taking: Reported on 7/31/2024), Disp: 15 g, Rfl: 1  No current facility-administered medications for this visit.    Facility-Administered Medications Ordered in Other Visits:     CARBOplatin (PARAPLATIN) 750 mg in sodium chloride 0.9 % 325 mL chemo IVPB, 750 mg, Intravenous, Once, Duy Villasenor MD PhD    dexAMETHasone (DECADRON) IVPB 20 mg, 20 mg, Intravenous, Once, Duy Villasenor MD PhD    diphenhydrAMINE (BENADRYL) IVPB 50 mg, 50 mg, Intravenous, Once, Duy Villasenor MD PhD    famotidine (PEPCID) injection 20 mg, 20 mg, Intravenous, Once, Duy Villasenor MD PhD    FOSAPREPITANT 150 MG/100ML NORMAL SALINE (CBC) IVPB 100 mL 100 mL, 150 mg, Intravenous, Once, Duy Villasenor MD PhD    insulin regular (humuLIN R,novoLIN R) injection 12 Units, 12 Units, Subcutaneous, Once, Duy Villasenor MD PhD    PACLitaxel (TAXOL) 485 mg in sodium chloride 0.9 % 580.8 mL chemo IVPB, 200 mg/m2 (Treatment Plan Recorded), Intravenous, Once, Duy Villasenor MD PhD    palonosetron (ALOXI) injection 0.25 mg, 0.25 mg, Intravenous, Once, Duy Villasenor MD PhD    sodium chloride 0.9 % infusion, 20 mL/hr, Intravenous, Once, Duy Villasenor MD PhD    ALLERGIES:   No Known Allergies    SOCIAL HISTORY:       Social History     Socioeconomic History    Marital status:      Spouse name: Luba    Number of children: 2  "  Tobacco Use    Smoking status: Former     Current packs/day: 0.00     Average packs/day: 1 pack/day for 30.0 years (30.0 ttl pk-yrs)     Types: Cigars, Cigarettes     Start date: 1/1/1984     Quit date: 1/1/2014     Years since quitting: 10.6    Smokeless tobacco: Never   Vaping Use    Vaping status: Never Used   Substance and Sexual Activity    Alcohol use: Never     Comment: caffeine use- decaf coffee    Drug use: Never    Sexual activity: Defer         FAMILY HISTORY:  Family History   Problem Relation Age of Onset    Cancer Other     Stroke Mother     Alcohol abuse Father     Malig Hyperthermia Neg Hx        REVIEW OF SYSTEMS:  Review of Systems   Constitutional:  Positive for activity change and fatigue. Negative for appetite change, chills, diaphoresis and fever.   HENT:  Negative for nosebleeds and trouble swallowing.    Eyes:  Negative for visual disturbance.   Respiratory:  Positive for shortness of breath. Negative for cough and wheezing.    Cardiovascular:  Negative for chest pain and leg swelling.   Gastrointestinal:  Negative for abdominal pain and blood in stool.   Genitourinary:  Negative for frequency and hematuria.   Musculoskeletal:  Negative for joint swelling.   Skin:  Positive for wound.   Allergic/Immunologic: Negative for immunocompromised state.   Neurological:  Negative for weakness and numbness.   Hematological:  Negative for adenopathy. Does not bruise/bleed easily.   Psychiatric/Behavioral:  Negative for confusion.               Vitals:    08/21/24 1014   BP: 131/65   Pulse: 52   Resp: 18   Temp: 97.8 °F (36.6 °C)   TempSrc: Oral   SpO2: 93%  Comment: 3 liters oxygen   Weight: 127 kg (279 lb 11.2 oz)   Height: 180.3 cm (70.98\")   PainSc: 0-No pain         8/21/2024    10:14 AM   Current Status   ECOG score 0     Physical Exam    GENERAL:  Well-developed, well-nourished male in no acute distress.  Patient uses oxygen by nasal cannula.  SKIN:  Warm, dry without rashes, purpura or " petechiae.  Portacatheter benign no evidence for infection.  HEENT:  Normocephalic.   LYMPHATICS:  No cervical, supraclavicular or axillary adenopathy.  CHEST: Normal respiratory effort.  Decreased breathing sounds bilaterally no wheezing or rails.    CARDIAC:  Regular rate and rhythm. Normal S1,S2.  ABDOMEN:  Soft, no tender.  Bowel sounds normal.  EXTREMITIES:  No lower extremity edema.        RECENT LABS:  Lab Results   Component Value Date    WBC 11.45 (H) 08/21/2024    HGB 11.6 (L) 08/21/2024    HCT 35.2 (L) 08/21/2024    MCV 91.0 08/21/2024     08/21/2024     Lab Results   Component Value Date    NEUTROABS 10.12 (H) 08/21/2024     Lab Results   Component Value Date    GLUCOSE 461 (C) 08/21/2024    BUN 20 08/21/2024    CREATININE 0.65 (L) 08/21/2024    EGFRRESULT 77.1 01/23/2023    EGFR 98.3 08/21/2024    BCR 30.8 (H) 08/21/2024    K 4.6 08/21/2024    CO2 24.6 08/21/2024    CALCIUM 9.5 08/21/2024    PROTENTOTREF 7.2 02/14/2022    ALBUMIN 3.9 08/21/2024    BILITOT 0.6 08/21/2024    AST 22 08/21/2024    ALT 21 08/21/2024       Assessment & Plan     1. 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  Tumor was poorly differentiated. This patient has stage 3a disease.   Patient had Ion bronchoscopic biopsy 5/6/2024.  Right middle lobe reported fragments of squamous cell carcinoma.  PD-L1 study reported TPS 0%.  I discussed with the patient on 7/12/2024 that he will need adjuvant chemotherapy and concurrent adjuvant radiation therapy, and likely will need consolidative immunotherapy. I recommended using weekly carboplatin plus Taxol, in conjunction with radiotherapy for 6.5 weeks treatment. In reality, he probably will only receive 5 or 6 weekly chemotherapy instead of the 7 doses due to tolerance issues.   We will present his case at the chest conference on 7/18/2024, due to poor pulmonary function, negative surgical margins and N1 disease, the  consensus is for patient to have adjuvant chemotherapy without concurrent radiation therapy.  Also recommended genetic study.   On 7/18/2024 peripheral blood was sent for Tempus xF liquid biopsy with inconclusive results, no reportable treatment options found.   His lung cancer sample from 6/12/2024 was sent for Tempus xT DNA and RNA study.  It reported stable MSI.  Tumor mutation burden 6.3 m/MB.  Negative for EGFR, KRAS, BRAF, ALK, ROS1, RET, MET, HER2.  Tumor sample was positive for BRIP1 mutation, TP53 mutation and also ARID1B mutation, all of those LOF.   On 7/31/2024 will start the patient on adjuvant chemotherapy.  Because his overall health condition, performance status ECOG 2, his age, he would not tolerate cisplatin based chemotherapy.  So we recommended using carboplatin in conjunction with Taxol every 3 weeks chemotherapy for 4 cycles.  Unfortunately was discharged, patient would not be a candidate for immunotherapy as part of adjuvant therapy.  Today on 8/21/2024 patient presents for evaluation prior to cycle #2 adjuvant chemotherapy.  He is currently undergoing chemotherapy with a total of four cycles planned; this is his second cycle. The biopsy from his lung revealed a PD-L1 negative result. Given his overall health condition, he is not physically capable of tolerating the most toxic chemotherapy, cisplatin, and is therefore being treated with carboplatin. A request has been made for a larger tissue sample from his surgery to be retested for PD-L1. If the result is positive, immunotherapy could be considered for prolonged treatment, which has shown better results in preventing cancer recurrence. This decision will be made after the completion of his chemotherapy, to determin whether maintenance or consolidative immunotherapy is necessary. He continues on oxygen supplementation at 3 L/min.    2.  Emphysema.    Patient is on oxygen supplementation 2 L/min since his right middle lobectomy.  He was  instructed by his surgeon Dr. Figueroa to taper off gradually.    3.  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  This was discovered at the his nephrologist office.  Laboratory study on 4/13/2022 reported serum monoclonal IgA kappa with elevated IgA 604 mg/dL, normal IgG 861 and IgM 84.  Free kappa chain was 38.4, lambda 21.3, kappa/lambda ratio 1.8.  Lab study on 7/19/2024 at her nephrologist office Positive serum monoclonal IgA kappa. IgA was slightly elevated at 597 with normal serum IgG 830 and IgM 95. Kappa chain is 50.4 and lambda 23.3 with a kappa lambda ratio of 2.16.  Continue to observe.      4. Elevated blood glucose.  His glucose level today is 461 on 8/21/2024, significantly higher than his home readings of 200s and 290. He reports using 24 units of insulin last night, which is his usual dose. He will be administered insulin due to the elevated glucose levels. He is advised to continue monitoring his glucose levels regularly.      PLAN:   Proceed ahead with cycle #2 adjuvant chemotherapy with carboplatin AUC 5 and Taxol 200 mg/m².  This will be repeated every 3 weeks.  There is no actionable mutation for using TKI as part of adjuvant chemotherapy.   Also not candidate for immunotherapy in the adjuvant setting based on the initial ION biopsy sample.  Nevertheless we will test that his lobectomy tumor sample from 6/12/2024.  Management of diabetes with the primary care physician.  Continue anticoagulation with Xarelto 20 mg daily.  Patient will see APRN in 3 week for patient, prior to cycle #3 of chemotherapy.   I will see patient in 6 weeks for reassessment with laboratory studies CBC CMP magnesium prior to cycle #4 adjuvant chemotherapy.      I spent 41 minutes caring for Branden on this date of service. This time includes time spent by me in the following activities: preparing for the visit, reviewing tests, obtaining and/or reviewing a separately obtained history, performing a medically  appropriate examination and/or evaluation, counseling and educating the patient/family/caregiver, ordering medications, tests, or procedures, referring and communicating with other health care professionals, documenting information in the medical record, independently interpreting results and communicating that information with the patient/family/caregiver and care coordination     Discussed with the patient and his wife about management plan.  They voiced understanding and agreeable.      JEREMY GATES M.D., Ph.D.        CC:  MD John Paul Govea MD Sasser, Robert L, MD

## 2024-08-28 ENCOUNTER — OFFICE VISIT (OUTPATIENT)
Dept: FAMILY MEDICINE | Facility: CLINIC | Age: 84
End: 2024-08-28
Payer: COMMERCIAL

## 2024-08-28 VITALS
WEIGHT: 126.4 LBS | DIASTOLIC BLOOD PRESSURE: 75 MMHG | RESPIRATION RATE: 16 BRPM | HEART RATE: 74 BPM | OXYGEN SATURATION: 96 % | TEMPERATURE: 97.5 F | SYSTOLIC BLOOD PRESSURE: 124 MMHG | HEIGHT: 60 IN | BODY MASS INDEX: 24.81 KG/M2

## 2024-08-28 DIAGNOSIS — L82.0 INFLAMED SEBORRHEIC KERATOSIS: Primary | ICD-10-CM

## 2024-08-28 DIAGNOSIS — E44.0 MODERATE PROTEIN-CALORIE MALNUTRITION (H): ICD-10-CM

## 2024-08-28 DIAGNOSIS — F31.9 BIPOLAR 1 DISORDER (H): ICD-10-CM

## 2024-08-28 DIAGNOSIS — F60.9 PERSONALITY DISORDER (H): ICD-10-CM

## 2024-08-28 DIAGNOSIS — D69.6 THROMBOCYTOPENIA, UNSPECIFIED (H): ICD-10-CM

## 2024-08-28 DIAGNOSIS — M79.652 PAIN OF LEFT THIGH: ICD-10-CM

## 2024-08-28 PROCEDURE — 99214 OFFICE O/P EST MOD 30 MIN: CPT | Performed by: FAMILY MEDICINE

## 2024-08-28 ASSESSMENT — PAIN SCALES - GENERAL: PAINLEVEL: MODERATE PAIN (4)

## 2024-08-28 NOTE — PROGRESS NOTES
Assessment & Plan     Inflamed seborrheic keratosis  Per her verbal consent, peeled off the rest of the Isreal K that she was worried about.  Did not curettage, so a lot of residual waxy tissue left.   I recommend she have the left neck lesion removed (see image) and will see if Dr. Quinones feels comfortable with the procedure.      Moderate protein-calorie malnutrition (H24)  She is in a facility with some supervision.  Weight is stable and slightly up.       Personality disorder (H)  Per chart, she has been diagnosed with this. Presents as impulsive and at times irritable.     Thrombocytopenia, unspecified (H24)  Ongoing, with range 120 - 140. Continue to monitor.     Bipolar 1 disorder (H)  Per chart review, it has been about 10 years since she stopped Abilify. Occurred when she no longer wanted to work with her psychiatrist.   She reports no concern. Is on selective serotonin reuptake inhibitor.  Will review with PCP whether it is reasonable to have her evaluated by our local psychiatrist.     Leg pain   Does not appear to be coming from her back. Do not find any red flags.  Will ask that she stop NSAID due to her age. Continue with tylenol      I spent a total of 32 minutes on the day of the visit.   Time spent by me doing chart review, history and exam, documentation and further activities per the note          No follow-ups on file.    Michelle Valentine is a 84 year old, presenting for the following health issues:  RECHECK (Pain level is at a 4 today. Down the front side of left leg. Pain comes and goes.)      8/28/2024    12:56 PM   Additional Questions   Roomed by Grazyna         8/28/2024    Information    services provided? No        HPI     Left lower quadrant Isreal K - partially came off in the shower today. Thought she would be seeing Dr. Quinones to remove her moles. Wanting it removed. Ugly.     Just started having left leg pain.  Starts where her mole is and then goes  down the lateral side. Started having these pains 1 - 2 weeks ago.  No pain her back. No tinglings in her leg.  No numbness in her legs.  Has been using tylenol and aleve and both help.     Bipolar diagnosis - notes not being on meds for a good decade.  Notes she gets small outbursts but other than that is doing fine. She is living in assisted living and appears to have some assistance there with oversight.                       Objective    /75   Pulse 74   Temp 97.5  F (36.4  C) (Oral)   Resp 16   Ht 1.524 m (5')   Wt 57.3 kg (126 lb 6.4 oz)   LMP  (LMP Unknown)   SpO2 96%   BMI 24.69 kg/m    Body mass index is 24.69 kg/m .  Physical Exam   SKIN: large partially peeled Isreal K in the left groin area.  Multiple seb K.  No inguinal adenopathy  Also - left neck with an atypical looking Isreal K with a very dark and smooth area. No neck adenopathy  EXT; able to move left leg without any pain.  External rotation limited at the hip - only able to externally rotate 5; or so.  More internal rotation ability without pain.  Reasonable flexion.   No tenderness along the IT band or over the trochanter  Neg SLR                          Signed Electronically by: Libia Shabazz MD

## 2024-08-28 NOTE — PATIENT INSTRUCTIONS
Patient Education   Here is the plan from today's visit    1. Inflamed seborrheic keratosis  I will show Dr. Quinones the spots on your skin to see if we should remove some of them. I would think about removing the neck one.    2. Moderate protein-calorie malnutrition (H24)  Keep eating             Please call or return to clinic if your symptoms don't go away.    Follow up plan  No follow-ups on file.    Thank you for coming to [facility] today.  Lab Testing:  **If you had lab testing today and your results are reassuring or normal they will be mailed to you or sent through [employer] within 7 days.   **If the lab tests need quick action we will call you with the results.  **If you are having labs done on a different day, please call [phone] to schedule at [facility] or [phone] for other [facility] locations. Labs do not offer walk-in appointments.  The phone number we will call with results is # [phone] (home) . If this is not the best number please call our clinic and change the number.  Medication Refills:  If you need any refills please call your pharmacy and they will contact us.   If you need to  your refill at a new pharmacy, please contact the new pharmacy directly. The new pharmacy will help you get your medications transferred faster.   Scheduling:  If you have any concerns about today's visit or wish to schedule another appointment please call our office during normal business hours [phone] (8-5:00 M-F). If you can no longer make a scheduled visit, please cancel via [employer] or call us to cancel.   If a referral was made to an [facility] specialty provider and you do not get a call from central scheduling, please refer to directions on your visit summary or call our office during normal business hours for assistance.   If a Mammogram was ordered for you at the Breast Center call [phone] to schedule or change your appointment.  If you had an  XRay/CT/Ultrasound/MRI ordered the number is [phone] to schedule or change your radiology appointment.   [facility] has limited ultrasound appointments available on Wednesdays, if you would like your ultrasound at [facility], please call [phone] to schedule.   Medical Concerns:  If you have urgent medical concerns please call [phone] at any time of the day.    Libia Shabazz MD

## 2024-08-28 NOTE — Clinical Note
Gerry Anglin She thought she was seeing you for mole removal today.  Large Isreal K that I peeled off a bit.  I am more worried about her neck lesion - likely a Isreal K but has a large, smoother raised section. Do you feel comfortable removing?  With 6 mm border? She wants you to do it! And me to help :) Also, she has Bipolar 1 and off meds for 10 years because she didn't like her psychiatrist.  Thoughts about having her see Lebron?   Janell

## 2024-09-20 ENCOUNTER — OFFICE VISIT (OUTPATIENT)
Dept: FAMILY MEDICINE | Facility: CLINIC | Age: 84
End: 2024-09-20
Payer: COMMERCIAL

## 2024-09-20 VITALS
RESPIRATION RATE: 16 BRPM | SYSTOLIC BLOOD PRESSURE: 124 MMHG | HEIGHT: 60 IN | DIASTOLIC BLOOD PRESSURE: 73 MMHG | WEIGHT: 125.5 LBS | HEART RATE: 62 BPM | TEMPERATURE: 98.8 F | OXYGEN SATURATION: 95 % | BODY MASS INDEX: 24.64 KG/M2

## 2024-09-20 DIAGNOSIS — L98.9 LESION OF NECK: Primary | ICD-10-CM

## 2024-09-20 DIAGNOSIS — Z53.9 DIAGNOSIS NOT YET DEFINED: ICD-10-CM

## 2024-09-20 RX ORDER — IBUPROFEN 200 MG
200-600 TABLET ORAL EVERY 6 HOURS PRN
Qty: 90 TABLET | Refills: 0 | Status: SHIPPED | OUTPATIENT
Start: 2024-09-20

## 2024-09-20 NOTE — PATIENT INSTRUCTIONS
Skin Biopsy  What is a skin biopsy?   A skin biopsy is the removal of a small piece of skin for lab tests. It may be done to help diagnose a problem with the skin.   When is it used?   A skin biopsy will help your healthcare provider make a diagnosis of your problem. For example:   You may have an internal disease that a skin biopsy may explain.   You may have a skin disease or cancer.   Your skin may have become discolored.   Your skin may be inflamed.   Alternatives to this procedure include:   to proceed with treatment without a diagnosis   to choose not to have treatment, recognizing the risks of your condition   to take a watchful approach and reevaluate the lesion or disorder at a future time.   When should I call my healthcare provider?   Call your provider right away if:   You have bleeding that cannot be stopped by putting pressure on the wound.   Your wound becomes red or has pus or you develop a fever (signs of infection).   You have significant pain that is not controlled with ibuprofen or tylenol.     Wound Care  1. Keep it covered for the first 2-3 days with a band aid, or if it is a large wound or is draining a lot, a small piece of gauze with tape.  2. Apply a thin film of vaseline over the area to keep it mildly moist and prevent scab formation.   3. Change your bandaid daily.  4. As you heal, the new skin will be very sensitive to sun - please protect your healing wound by covering up and using sunscreen.   5. Results will be mailed or called to you. It can take up to 10 days to get biopsy results back. If you do not hear from us, please call us at [phone] and let us know that you haven't received your results.

## 2024-09-20 NOTE — PROGRESS NOTES
Procedure: Shave Biopsy x 1  The patient was informed of the nature of the procedure and was informed that the procedure will heal with a scar. She  was also informed of the risk of unattractive scarring, recurrence of the lesion, infection, bleeding and pain and that further treatment may be needed. She  consents to the procedure.  The area surrounding the lesion(s) was  prepped with alcohol. Local anesthesia (1% xylocaine with epinephrine) was injected. The lesion(s) was  biopsied with a blue blade. The specimen was sent to Pathology. Drysolwas used for hemostasis. The wound(s) was  dressed with bacitracin ointment and a bandage. Wound care instructions were explained and a wound care sheet was given. Follow up with any wound problems, poor healing or infection.     The following lesions were removed/biopsied:  A) Location: neck Clinical Diagnosis: probable seborrheic dermatitis    Subjective: Serge Marin a 84 year old female who presents today for lesion removal. The lesion is located on the torso (neck),  and measures 3cm.  She denies other significant symptoms on ROS. Medications reviewed.    Objective: The area was prepped and appropriately anesthetized. Using the usual technique, shave excision was performed. The total area excised, including lesion and margins was 3.2 cm. An appropriate  dressing was applied.  The procedure was well tolerated and without complications. Specimen was sent.    Assessment: seborrheic keratosis    Plan: Follow up: The specimen is labelled and sent to pathology for evaluation., The patient may return prn.     Dr. Elicia Perez completed procedure  Dr. Singh Odom was present for entirety of procedure

## 2024-09-20 NOTE — PROGRESS NOTES
Preceptor Attestation:   I was present for and supervised the entire procedure. I have verified the content of the note, which accurately reflects my assessment of the patient and the plan of care.   Supervising Physician:  Singh Odom MD.

## 2024-09-20 NOTE — LETTER
"September 25, 2024      Serge GODFREY [facility]  [address]   [facility] [address]        Dear Serge,    Your lab results show that your skin lesion was seborrheic keratosis, which is a benign skin lesion. Please reach out with any concerns. We have attached some information on seborrheic keratosis below.    Sincerely,    Elicia Perez MD      What is seborrheic keratosis?  Seborrheic keratosis, or \"SK,\" is a benign growth of the skin. \"Benign\" means not cancerous.    SK usually happens in people older than 50, but younger people also get it. The growths can look like something that was stuck on the skin (picture 1). They can be light tan, brown, or black (picture 2). It is usually easy to see where normal skin ends and the growth starts. The growths sometimes look scaly.    A person can have 1 or many of these growths. If there are more than 1, they are called \"seborrheic keratoses.\"    What are the symptoms of SK?  SK often do not cause any symptoms, but they sometimes itch. They are most common on certain parts of the body. These include the:    ?Trunk (the chest, belly, and back)    ?Face    Should I see a doctor or nurse?  SK is common in older people. It is not skin cancer. But some people who get SK also get skin cancer. If you notice any new growths on your skin, ask your doctor or nurse to check them. They can make sure that they are not cancer or treat them if they are.    Is there a test for SK?  No. The doctor or nurse will do an exam and check your skin. They can usually tell if you have SK by looking at any skin changes and touching them.    If the doctor is not sure whether a skin growth is an SK, you will have a test called a \"biopsy.\" During a biopsy, a doctor takes a small sample of the growth or takes off the whole growth. Then, another doctor looks at the tissue under a microscope to check for skin cancer.    The doctor can also do a test called \"dermoscopy.\" In this test, they look at the " skin with a small microscope with a light on it. The doctor holds this over the area with skin changes. The microscope and light help the doctor see under the skin. It can help show if a spot is SK or something else.    How is SK treated?  Most people with SK do not need treatment. But if the growths bother you or look bad, a doctor or nurse can usually take them off with:    ?Liquid nitrogen - This is a special liquid that gets very cold. It can leave an area of lighter skin where the SK was.    ?A scalpel or other small tool - The doctor or nurse can take off the SK after numbing the skin. They might then freeze the SK with liquid nitrogen to get rid of abnormal tissue under it.    ?Electricity - The doctor can use electricity to burn away the SK after numbing the skin. They might take off the SK first with a scalpel or other small tool, and then use electricity to get rid of abnormal tissue under it.

## 2024-09-24 LAB
PATH REPORT.COMMENTS IMP SPEC: NORMAL
PATH REPORT.COMMENTS IMP SPEC: NORMAL
PATH REPORT.FINAL DX SPEC: NORMAL
PATH REPORT.GROSS SPEC: NORMAL
PATH REPORT.MICROSCOPIC SPEC OTHER STN: NORMAL
PATH REPORT.RELEVANT HX SPEC: NORMAL

## 2024-11-12 ENCOUNTER — OFFICE VISIT (OUTPATIENT)
Dept: AUDIOLOGY | Facility: CLINIC | Age: 84
End: 2024-11-12
Payer: COMMERCIAL

## 2024-11-12 DIAGNOSIS — H90.3 SENSORINEURAL HEARING LOSS (SNHL) OF BOTH EARS: Primary | ICD-10-CM

## 2024-11-12 PROCEDURE — V5299 HEARING SERVICE: HCPCS | Performed by: AUDIOLOGIST

## 2024-11-12 NOTE — PROGRESS NOTES
AUDIOLOGY REPORT    BACKGROUND INFORMATION: Serge Marin was seen in the Audiology Clinic at [facility] on 11/12/2024 for follow-up.  The patient has been seen previously in this clinic on 7/31/2023 and results revealed a moderately-sever to severe sensorineural hearing loss bilaterally.  The patient was fit with Phonak Janna P70-R hearing aids on 9/15/2023.  The patient reports that her hearing aids are not working.    TEST RESULTS AND PROCEDURES: An electroacoustic hearing aid check was performed.  Adjustments were made including replacing the listening tubes and cleaning and the patient reported good sound. The hearing aid conformity evaluation was completed. This includes initially evaluating the devices electroacoustically and later matching NAL-NL2 target with soft sounds audible, moderate sounds comfortable and clear, and loud sounds below discomfort.     SUMMARY AND RECOMMENDATIONS: A hearing aid check was performed today and the patient reports good sound following cleaning and tubing replacement.  Adjustments were made as noted above and the patient will return as needed or at least every 4-6 months for cleaning and assessment of hearing aid.  Call this clinic with questions regarding today s visit.     Virginia Carrion.  Doctor of Audiology  [place] License # [id number]

## 2024-12-03 ENCOUNTER — OFFICE VISIT (OUTPATIENT)
Dept: FAMILY MEDICINE | Facility: CLINIC | Age: 84
End: 2024-12-03
Payer: COMMERCIAL

## 2024-12-03 VITALS
SYSTOLIC BLOOD PRESSURE: 120 MMHG | WEIGHT: 127 LBS | RESPIRATION RATE: 16 BRPM | DIASTOLIC BLOOD PRESSURE: 74 MMHG | TEMPERATURE: 98.1 F | HEART RATE: 66 BPM | OXYGEN SATURATION: 97 % | HEIGHT: 60 IN | BODY MASS INDEX: 24.94 KG/M2

## 2024-12-03 DIAGNOSIS — Z01.818 PREOP GENERAL PHYSICAL EXAM: Primary | ICD-10-CM

## 2024-12-03 DIAGNOSIS — R94.6 THYROID FUNCTION TEST ABNORMAL: ICD-10-CM

## 2024-12-03 LAB
ANION GAP SERPL CALCULATED.3IONS-SCNC: 7 MMOL/L (ref 7–15)
BUN SERPL-MCNC: 17.5 MG/DL (ref 8–23)
CALCIUM SERPL-MCNC: 9.5 MG/DL (ref 8.8–10.4)
CHLORIDE SERPL-SCNC: 105 MMOL/L (ref 98–107)
CREAT SERPL-MCNC: 0.86 MG/DL (ref 0.51–0.95)
EGFRCR SERPLBLD CKD-EPI 2021: 66 ML/MIN/1.73M2
GLUCOSE SERPL-MCNC: 88 MG/DL (ref 70–99)
HCO3 SERPL-SCNC: 28 MMOL/L (ref 22–29)
POTASSIUM SERPL-SCNC: 4.4 MMOL/L (ref 3.4–5.3)
SODIUM SERPL-SCNC: 140 MMOL/L (ref 135–145)
TSH SERPL DL<=0.005 MIU/L-ACNC: 2.26 UIU/ML (ref 0.3–4.2)

## 2024-12-03 NOTE — Clinical Note
Hi! Once this note is signed, can you please fax it to [phone]? This is also the phone number of Janneth, the RN that has been in contact with Serge's : [phone] just in case you need it!

## 2024-12-03 NOTE — PROGRESS NOTES
Preoperative Evaluation  [address]  [address] [place] [address]  Phone: [phone]  Fax: [phone]  Primary Provider: Zacarias Quinones MD  Pre-op Performing Provider: Herlinda Culp DO  Dec 3, 2024         12/3/2024   Surgical Information   What procedure is being done? [facility] or Hospital where procedure/surgery will be performed: [facility]    Who is doing the procedure / surgery? I don't know. Janneth is the nurse    Date of surgery / procedure: 12/17 and 12/23    Time of surgery / procedure: times aren't scheduled yet    Where do you plan to recover after surgery? at home with family        Patient-reported     Fax number for surgical facility: [phone]    Assessment & Plan     The proposed surgical procedure is considered LOW risk.    Preop general physical exam  Physical examination was within normal limits. Serge was not wearing her hearing aids and had difficulty hearing, but was able to communicate by projecting my voice. , [place], was present and expressed understanding. No personal history of heart disease. Will check kidney function.   - Basic metabolic panel; Future    Thyroid function test abnormal  Trending TSH in the setting of levothyroxine use.   - TSH with free T4 reflex; Future     - No identified additional risk factors other than previously addressed    Antiplatelet or Anticoagulation Medication Instructions   - Bleeding risk is low for this procedure (e.g. dental, skin, cataract).    Additional Medication Instructions  Take all scheduled medications on the day of surgery EXCEPT for modifications listed below:  Do NOT take Zyrtec on the day of surgery  Do NOT take ibuprofen (Advil) 24 hours before surgery    Recommendation  Approval given to proceed with proposed procedure, without further diagnostic evaluation.    Subjective   Serge is a 84 year old, presenting for the following:  Pre-Op Exam (Catract  surgery )    HPI related to upcoming procedure: Feeling a little bit nervous about the procedure. No concerns at this time.         12/3/2024   Pre-Op Questionnaire   Have you ever had a heart attack or stroke? No    Have you ever had surgery on your heart or blood vessels, such as a stent placement, a coronary artery bypass, or surgery on an artery in your head, neck, heart, or legs? No    Do you have chest pain with activity? No    Do you have a history of heart failure? No    Do you currently have a cold, bronchitis or symptoms of other infection? No    Do you have a cough, shortness of breath, or wheezing? No    Do you or anyone in your family have previous history of blood clots? No    Do you or does anyone in your family have a serious bleeding problem such as prolonged bleeding following surgeries or cuts? No    Have you ever had problems with anemia or been told to take iron pills? No    Have you had any abnormal blood loss such as black, tarry or bloody stools, or abnormal vaginal bleeding? No    Have you ever had a blood transfusion? No    Are you willing to have a blood transfusion if it is medically needed before, during, or after your surgery? Yes    Have you or any of your relatives ever had problems with anesthesia? No    Do you have sleep apnea, excessive snoring or daytime drowsiness? No    Do you have any artifical heart valves or other implanted medical devices like a pacemaker, defibrillator, or continuous glucose monitor? No    Do you have artificial joints? No    Are you allergic to latex? No        Patient-reported     Health Care Directive  Patient has a Health Care Directive on file.    Preoperative Review of    reviewed - no record of controlled substances prescribed.    Status of Chronic Conditions:  See problem list for active medical problems.  Problems all longstanding and stable, except as noted/documented.  See ROS for pertinent symptoms related to these conditions.    Patient  Active Problem List    Diagnosis Date Noted    Personality disorder (H) 11/07/2023     Priority: Medium    Moderate protein-calorie malnutrition (H) 05/05/2023     Priority: Medium    Thrombocytopenia, unspecified (H) 05/05/2023     Priority: Medium    Urinary tract infection 11/25/2022     Priority: Medium    Left renal stone 11/25/2022     Priority: Medium    SDH (subdural hematoma) (H) 10/19/2022     Priority: Medium    Closed fracture of left occipital condyle, initial encounter (H) 10/19/2022     Priority: Medium    Kidney stone 08/04/2022     Priority: Medium    Nonimmune to hepatitis B virus 05/17/2018     Priority: Medium    Elevated fasting glucose 05/17/2018     Priority: Medium    Tinea corporis 03/28/2018     Priority: Medium    Other specified hypothyroidism 10/18/2016     Priority: Medium    Thyroid function test abnormal 11/15/2013     Priority: Medium     History of elevated TSH, has not been on medication.  Further f/up with PMD is advised.      Bipolar 1 disorder (H) 04/19/2013     Priority: Medium    Chronic diarrhea 04/19/2013     Priority: Medium      Past Medical History:   Diagnosis Date    Anemia     Bipolar disorder (H)     Elevated fasting glucose     Hernia, abdominal     Kidney stone     Nonimmune to hepatitis B virus     Subdural hematoma (H)     Thyroid disease     Hypothyroid     Past Surgical History:   Procedure Laterality Date    CHOLECYSTECTOMY      COLONOSCOPY Left 03/18/2015    Procedure: COMBINED COLONOSCOPY, SINGLE OR MULTIPLE BIOPSY/POLYPECTOMY BY BIOPSY;  Surgeon: Stone Lauren MD;  Location: [facility]    CRANIOTOMY Right 12/10/2022    Procedure: RIGHT CRANIOTOMY FOR RIGHT SUBDURAL HEMATOMA EVACUATION;  Surgeon: Dat Avila MD;  Location: [facility] OR    GYN SURGERY      HERNIA REPAIR      IR NEPHROLITHOTOMY  11/22/2022    PERCUTANEOUS NEPHROLITHOTOMY Left 11/22/2022    Procedure: Percutaneous Nephrolithotomy;  Surgeon: Stevo Talbot MD;  Location:  OR     Current  Outpatient Medications   Medication Sig Dispense Refill    acetaminophen (TYLENOL) 500 MG tablet Take 500 mg by mouth every 6 hours as needed for mild pain      calcium carbonate-vitamin D (OSCAL W/D) 500-200 MG-UNIT tablet Take 1 tablet by mouth daily      cetirizine (ZYRTEC) 10 MG tablet Take 10 mg by mouth daily as needed for allergies      clotrimazole (LOTRIMIN) 1 % external cream Apply topically 2 times daily as needed. rash      escitalopram (LEXAPRO) 10 MG tablet Take 0.5 tablets (5 mg) by mouth daily (0.5 x 10 mg = 5 mg)      ferrous gluconate (FERGON) 324 (38 Fe) MG tablet Take 1 tablet (324 mg) by mouth daily (with breakfast) 90 tablet 3    ibuprofen (ADVIL/MOTRIN) 200 MG tablet Take 1-3 tablets (200-600 mg) by mouth every 6 hours as needed for pain. 90 tablet 0    levothyroxine (SYNTHROID/LEVOTHROID) 75 MCG tablet Take 1 tablet (75 mcg) by mouth daily 30 tablet 3    loperamide (IMODIUM) 2 MG capsule Take 2 mg by mouth 4 times daily as needed for diarrhea         Allergies   Allergen Reactions    Bee Pollen     Penicillins     Bactrim [Sulfamethoxazole-Trimethoprim] Itching     Patient prescribed Bactrim at eye appointment. Assisted living reports eyes were red and itching.         Social History     Tobacco Use    Smoking status: Never    Smokeless tobacco: Never   Substance Use Topics    Alcohol use: No     No family history on file.   Paternal lineage has a history of heart disease.   No history of pulmonary disease in the family.  History   Drug Use No         Review of Systems  Constitutional, neuro, ENT, endocrine, pulmonary, cardiac, gastrointestinal, genitourinary, musculoskeletal, integument and psychiatric systems are negative, except as otherwise noted.    Objective    /74   Pulse 66   Resp 16   Ht 1.524 m (5')   Wt 57.6 kg (127 lb)   LMP  (LMP Unknown)   SpO2 97%   BMI 24.80 kg/m     Estimated body mass index is 24.8 kg/m  as calculated from the following:    Height as of this  encounter: 1.524 m (5').    Weight as of this encounter: 57.6 kg (127 lb).  Physical Exam  GENERAL: alert and no distress  EYES: Eyes grossly normal to inspection, PERRL and conjunctivae and sclerae normal  HENT: severe sensorineural hearing loss, not wearing hearing aids, ear canals show mild swelling related to hearing aid insertion, and TM's normal, nose and mouth without ulcers or lesions, Mallampati score II  NECK: no adenopathy, no asymmetry, masses, or scars  RESP: lungs clear to auscultation - no rales, rhonchi or wheezes  CV: regular rate and rhythm, normal S1 S2, no S3 or S4, no murmur, click or rub, no peripheral edema  ABDOMEN: soft, nontender, no hepatosplenomegaly, no masses and bowel sounds normal  MS: no gross musculoskeletal defects noted, no edema  SKIN: no suspicious lesions or rashes  NEURO: Normal strength and tone, mentation intact and speech normal  PSYCH: mentation appears normal, affect normal/bright    Diagnostics  Labs pending at this time.  Results will be reviewed when available.   No EKG required for low risk surgery (cataract, skin procedure, breast biopsy, etc).    Revised Cardiac Risk Index (RCRI)  The patient has the following serious cardiovascular risks for perioperative complications:   - No serious cardiac risks = 0 points     RCRI Interpretation: 0 points: Class I (very low risk - 0.4% complication rate)     Signed Electronically by: Herlinda Culp DO, MPH  A copy of this evaluation report is provided to the requesting physician.

## 2024-12-03 NOTE — LETTER
December 3, 2024      Serge Marin  [address]   [facility] [address]        Dear ,    We are writing to inform you of your test results.    All of your results are normal. You may proceed with the procedure.    Resulted Orders   TSH with free T4 reflex   Result Value Ref Range    TSH 2.26 0.30 - 4.20 uIU/mL   Basic metabolic panel   Result Value Ref Range    Sodium 140 135 - 145 mmol/L    Potassium 4.4 3.4 - 5.3 mmol/L    Chloride 105 98 - 107 mmol/L    Carbon Dioxide (CO2) 28 22 - 29 mmol/L    Anion Gap 7 7 - 15 mmol/L    Urea Nitrogen 17.5 8.0 - 23.0 mg/dL    Creatinine 0.86 0.51 - 0.95 mg/dL    GFR Estimate 66 >60 mL/min/1.73m2      Comment:      eGFR calculated using 2021 CKD-EPI equation.    Calcium 9.5 8.8 - 10.4 mg/dL      Comment:      Reference intervals for this test were updated on 7/16/2024 to reflect our healthy population more accurately. There may be differences in the flagging of prior results with similar values performed with this method. Those prior results can be interpreted in the context of the updated reference intervals.    Glucose 88 70 - 99 mg/dL       If you have any questions or concerns, please call the clinic at the number listed above.       Sincerely,      Herlinda Culp DO, MPH

## 2024-12-03 NOTE — PATIENT INSTRUCTIONS
How to Take Your Medication Before Surgery  Preoperative Medication Instructions   Antiplatelet or Anticoagulation Medication Instructions   - Bleeding risk is low for this procedure (e.g. dental, skin, cataract).    Additional Medication Instructions  Take all scheduled medications on the day of surgery EXCEPT for modifications listed below:   Do NOT take Zyrtec on the day of surgery  Do NOT take ibuprofen (Advil) 24 hours before surgery    Patient Education   Preparing for Your Surgery  For Adults  Getting started  In most cases, a nurse will call to review your health history and instructions. They will give you an arrival time based on your scheduled surgery time. Please be ready to share:  Your doctor's clinic name and phone number  Your medical, surgical, and anesthesia history  A list of allergies and sensitivities  A list of medicines, including herbal treatments and over-the-counter drugs  Whether the patient has a legal guardian (ask how to send us the papers in advance)  Note: You may not receive a call if you were seen at our PAC (Preoperative Assessment Center).  Please tell us if you're pregnant--or if there's any chance you might be pregnant. Some surgeries may injure a fetus (unborn baby), so they require a pregnancy test. Surgeries that are safe for a fetus don't always need a test, and you can choose whether to have one.   Preparing for surgery  Within 10 to 30 days of surgery: Have a pre-op exam (sometimes called an H&P, or History and Physical). This can be done at a clinic or pre-operative center.  If you're having a , you may not need this exam. Talk to your care team.  At your pre-op exam, talk to your care team about all medicines you take. (This includes CBD oil and any drugs, such as THC, marijuana, and other forms of cannabis.) If you need to stop any medicine before surgery, ask when to start taking it again.  This is for your safety. Many medicines and drugs can make you bleed  too much during surgery. Some change how well surgery (anesthesia) drugs work.  Call your insurance company to let them know you're having surgery. (If you don't have insurance, call [phone].)  Call your clinic if there's any change in your health. This includes a scrape or scratch near the surgery site, or any signs of a cold (sore throat, runny nose, cough, rash, fever).  Eating and drinking guidelines  For your safety: Unless your surgeon tells you otherwise, follow the guidelines below.  Eat and drink as normal until 8 hours before you arrive for surgery. After that, no food or milk. You can spit out gum when you arrive.  Drink clear liquids until 2 hours before you arrive. These are liquids you can see through, like water, Gatorade, and Propel Water. They also include plain black coffee and tea (no cream or milk).  No alcohol for 24 hours before you arrive. The night before surgery, stop any drinks that contain THC.  If your care team tells you to take medicine on the morning of surgery, it's okay to take it with a sip of water. No other medicines or drugs are allowed (including CBD oil)--follow your care team's instructions.  If you have questions the day of surgery, call your hospital or surgery center.   Preventing infection  Shower or bathe the night before and the morning of surgery. Follow the instructions your clinic gave you. (If no instructions, use regular soap.)  Don't shave or clip hair near your surgery site. We'll remove the hair if needed.  Don't smoke or vape the morning of surgery. No chewing tobacco for 6 hours before you arrive. A nicotine patch is okay. You may spit out nicotine gum when you arrive.  For some surgeries, the surgeon will tell you to fully quit smoking and nicotine.  We will make every effort to keep you safe from infection. We will:  Clean our hands often with soap and water (or an alcohol-based hand rub).  Clean the skin at your surgery site with a special soap that  kills germs.  Give you a special gown to keep you warm. (Cold raises the risk of infection.)  Wear hair covers, masks, gowns, and gloves during surgery.  Give antibiotic medicine, if prescribed. Not all surgeries need this medicine.  What to bring on the day of surgery  Photo ID and insurance card  Copy of your health care directive, if you have one  Glasses and hearing aids (bring cases)  You can't wear contacts during surgery  Inhaler and eye drops, if you use them (tell us about these when you arrive)  CPAP machine or breathing device, if you use them  A few personal items, if spending the night  If you have . . .  A pacemaker, ICD (cardiac defibrillator), or other implant: Bring the ID card.  An implanted stimulator: Bring the remote control.  A legal guardian: Bring a copy of the certified (court-stamped) guardianship papers.  Please remove any jewelry, including body piercings. Leave jewelry and other valuables at home.  If you're going home the day of surgery  You must have a responsible adult drive you home. They should stay with you overnight as well.  If you don't have someone to stay with you, and you aren't safe to go home alone, we may keep you overnight. Insurance often won't pay for this.  After surgery  If it's hard to control your pain or you need more pain medicine, please call your surgeon's office.    Pre-Op clinic notes and face sheet successfully faxed to [facility]/Janneth MALCOLM at [phone]  Questions?   If you have any questions for your care team, list them here:   ____________________________________________________________________________________________________________________________________________________________________________________________________________________________________________________________  For informational purposes only. Not to replace the advice of your health care provider. Copyright   2003, 2019 [facility]. All rights reserved. Clinically reviewed  by Shayan Randhawa MD. Plusmo [id number] - REV 08/24.

## 2025-02-19 DIAGNOSIS — E03.8 OTHER SPECIFIED HYPOTHYROIDISM: ICD-10-CM

## 2025-02-20 ENCOUNTER — TELEPHONE (OUTPATIENT)
Dept: FAMILY MEDICINE | Facility: CLINIC | Age: 85
End: 2025-02-20
Payer: COMMERCIAL

## 2025-02-20 DIAGNOSIS — F31.9 BIPOLAR 1 DISORDER (H): Primary | ICD-10-CM

## 2025-02-20 DIAGNOSIS — E03.8 OTHER SPECIFIED HYPOTHYROIDISM: ICD-10-CM

## 2025-02-20 DIAGNOSIS — E44.0 MODERATE PROTEIN-CALORIE MALNUTRITION: ICD-10-CM

## 2025-02-20 DIAGNOSIS — Z00.00 HEALTH CARE MAINTENANCE: ICD-10-CM

## 2025-02-20 RX ORDER — ESCITALOPRAM OXALATE 10 MG/1
5 TABLET ORAL DAILY
Qty: 45 TABLET | Refills: 3 | Status: SHIPPED | OUTPATIENT
Start: 2025-02-20

## 2025-02-20 RX ORDER — B-COMPLEX WITH VITAMIN C
1 TABLET ORAL DAILY
Qty: 90 TABLET | Refills: 3 | Status: SHIPPED | OUTPATIENT
Start: 2025-02-20

## 2025-02-20 RX ORDER — LEVOTHYROXINE SODIUM 75 UG/1
TABLET ORAL
Qty: 90 TABLET | Refills: 3 | Status: SHIPPED | OUTPATIENT
Start: 2025-02-20

## 2025-02-20 RX ORDER — FERROUS GLUCONATE 324(38)MG
324 TABLET ORAL
Qty: 90 TABLET | Refills: 3 | Status: SHIPPED | OUTPATIENT
Start: 2025-02-20

## 2025-02-20 RX ORDER — LEVOTHYROXINE SODIUM 75 UG/1
75 TABLET ORAL DAILY
Qty: 30 TABLET | Refills: 3 | OUTPATIENT
Start: 2025-02-20

## 2025-02-20 NOTE — TELEPHONE ENCOUNTER
[facility] Medicine Clinic phone call message- patient requesting a refill:    Full Medication Name: Oyster Shell Calcium with Vitamin D  Full Medication Name: levothyroxine (SYNTHROID/LEVOTHROID) 75 MCG tablet   Full Medication Name: ferrous gluconate (FERGON) 324 (38 Fe) MG tablet   Full Medication Name: escitalopram (LEXAPRO) 10 MG tablet     Pharmacy confirmed as   [facility], [address]  [address]  [facility] [address]  Phone: [phone] Fax: [phone]: Yes    Additional Comments: I wasn't finding the first medication on the medication list     OK to leave a message on voice mail? Yes    Primary language: English      needed? No    Call taken on February 20, 2025 at 8:46 AM by Shen Beltran   ---------  Has been on escitalopram for extended period, stable with manic episodes. Will continue at this time as is a chronic medication.   Zacarias Quinones MD

## 2025-02-20 NOTE — TELEPHONE ENCOUNTER
"Request for medication refill:    Medication Name: levothyroxine (SYNTHROID/LEVOTHROID) 75 MCG tablet     Providers if patient needs an appointment and you are willing to give a one month supply please refill for one month and  send a MyChart using \".SMILLIMITEDREFILL\" .Or route chart to \"P [facility] \" . To call patient and inform of limited refill and providers request to make an appointment. (Giving one month refill in non controlled medications is strongly recommended before denial)    If refill has been denied, meaning absolutely no refills without visit, please complete the smart phrase \".SMIRXREFUSE\" and route it to the \"P [facility] MED REFILLS\"  pool to inform the patient and the pharmacy.    Nargis Muñoz RN  ----  Has been on levothyroxine, though last script not by this clinic. TSH on 12/3 WNL, will send year of prescription.  Zacarias Quinones MD      "

## 2025-06-16 ENCOUNTER — DOCUMENTATION ONLY (OUTPATIENT)
Dept: FAMILY MEDICINE | Facility: CLINIC | Age: 85
End: 2025-06-16
Payer: COMMERCIAL

## 2025-06-16 NOTE — PROGRESS NOTES
"When opening a documentation only encounter, be sure to enter in \"Chief Complaint\" Forms and in \" Comments\" Title of form, description if needed.    Serge is a 84 year old  female  Form received via: Fax  Form now resides in: Provider Ready    Purnima Macias CMA        Form has been completed by provider.     Form sent out via: Fax to [facility] services at Fax Number: [phone]  Patient informed:   Output date: June 17, 2025    Purnima Macias CMA      **Please close the encounter**          "

## 2025-06-17 ENCOUNTER — OFFICE VISIT (OUTPATIENT)
Dept: AUDIOLOGY | Facility: CLINIC | Age: 85
End: 2025-06-17
Payer: COMMERCIAL

## 2025-06-17 DIAGNOSIS — H90.3 SENSORINEURAL HEARING LOSS (SNHL) OF BOTH EARS: Primary | ICD-10-CM

## 2025-06-17 PROCEDURE — 92557 COMPREHENSIVE HEARING TEST: CPT | Performed by: AUDIOLOGIST

## 2025-06-17 PROCEDURE — 92567 TYMPANOMETRY: CPT | Performed by: AUDIOLOGIST

## 2025-06-17 NOTE — PROGRESS NOTES
AUDIOLOGY REPORT    SUBJECTIVE:  Serge Marin is a 84 year old female who was seen in the Audiology Clinic at the [facility] for audiologic evaluation and hearing aid check. The patient has been seen previously in this clinic on 7/31/2023 for assessment and results indicated a moderately-severe to severe sensorineural hearing loss bilaterally. The patient was fit with Phonak Janna P70 RI hearing aids on 9/15/2023. The patient reports that her left hearing aid does not seem to be working. The patient denies other new concerns.     OBJECTIVE:  Falls Risk Screening  Falls Risk Completed by: Audiology  Have you fallen 2 or more times in the past year? No  Have you fallen and had an injury in the past year? No  Is the patient receiving Physical Therapy services? No  Fall Screen Comments:             Otoscopic exam indicates ears are clear of cerumen bilaterally     Pure Tone Thresholds assessed using conventional audiometry with good  reliability from 250-8000 Hz bilaterally using insert earphones and circumaural headphones     RIGHT:  moderate-severe sloping to severe sensorineural hearing loss    LEFT:    moderate-severe sloping to severe sensorineural hearing loss    Tympanogram:    RIGHT: normal eardrum mobility    LEFT:   normal eardrum mobility    Reflexes (reported by stimulus ear):  Could not seal      Speech Reception Threshold:    RIGHT: 75 dB HL    LEFT:   75 dB HL  Word Recognition Score:     RIGHT: 48% at 95 dB HL using NU-6 recorded word list.    LEFT:   44% at 95 dB HL using NU-6 recorded word list.      ASSESSMENT:     ICD-10-CM    1. Sensorineural hearing loss (SNHL) of both ears  H90.3 Cmprhn Audiometry Thrshld Eval & Speech Recog (07138)     Tympanometry (impedance - testing) (25879)          Compared to patient's previous audiogram dated 7/31/2023, hearing has remained stable overall. Today s results were discussed with the patient in detail.     \An electroacoustic  hearing aid check was performed.  Adjustments were made including replacing listening tubes and cleaning the devices and the patient reported good sound and comfort. The hearing aid conformity evaluation was completed. This includes initially evaluating the devices electroacoustically and later matching NAL-NL2 target with soft sounds audible, moderate sounds comfortable and clear, and loud sounds below discomfort.     PLAN: It is recommended that the patient follow up in 4-6 months for a hearing aid check.  Please call this clinic with questions regarding these results or recommendations.        Virginia Carrion.  Doctor of Audiology  [place] License # [id number]

## 2025-07-21 ENCOUNTER — OFFICE VISIT (OUTPATIENT)
Dept: FAMILY MEDICINE | Facility: CLINIC | Age: 85
End: 2025-07-21
Payer: COMMERCIAL

## 2025-07-21 VITALS
BODY MASS INDEX: 25 KG/M2 | DIASTOLIC BLOOD PRESSURE: 69 MMHG | WEIGHT: 128 LBS | SYSTOLIC BLOOD PRESSURE: 103 MMHG | HEART RATE: 65 BPM | TEMPERATURE: 97.8 F | OXYGEN SATURATION: 94 % | RESPIRATION RATE: 16 BRPM

## 2025-07-21 DIAGNOSIS — B35.6 TINEA CRURIS: ICD-10-CM

## 2025-07-21 DIAGNOSIS — L82.0 INFLAMED SEBORRHEIC KERATOSIS: Primary | ICD-10-CM

## 2025-07-21 PROCEDURE — 3078F DIAST BP <80 MM HG: CPT

## 2025-07-21 PROCEDURE — 88305 TISSUE EXAM BY PATHOLOGIST: CPT | Performed by: DERMATOLOGY

## 2025-07-21 PROCEDURE — 99213 OFFICE O/P EST LOW 20 MIN: CPT | Mod: 25

## 2025-07-21 PROCEDURE — 11302 SHAVE SKIN LESION 1.1-2.0 CM: CPT | Mod: GC

## 2025-07-21 PROCEDURE — 3074F SYST BP LT 130 MM HG: CPT

## 2025-07-21 RX ORDER — KETOCONAZOLE 20 MG/G
CREAM TOPICAL DAILY
Qty: 30 G | Refills: 1 | Status: SHIPPED | OUTPATIENT
Start: 2025-07-21

## 2025-07-21 NOTE — PROGRESS NOTES
[facility] Medicine Procedure Note    Serge ceron patient of Zacarias Bejarano here for skin tag removal   Indication: Inflamed SK    Consent: Verbal consent obtained after discussing risks of scar development, pain or bleeding. Patient's questions were elicited and answered.     Preoperative Diagnosis: Seborrheic Keratosis   Postoperative Diagnosis: same    Technique:   Used scalpel to remove 1 skin tag the following locations: left lower trunk  Skin prep Chloroprep  Anesthesia none  EBL:   none  Complications:  none  Tolerance:  Pt tolerated procedure well and was in stable condition.       Follow up: Pt was instructed to call if bleeding, severe pain or foul smell.   Follow up in 1-2 weeks.      Resident: Moni Mukherjee DO  Faculty: Gila Yancey DO present for and supervised this entire procedure.

## 2025-07-21 NOTE — PROGRESS NOTES
Assessment & Plan     Inflamed seborrheic keratosis  See procedure note  - Dermatological Path Order and Indications; Standing  - Dermatological Path Order and Indications  - 11-20MM TRUNK,ARM,LEG    Tinea cruris  Acute, improving in appearance. Provided refill   - ketoconazole (NIZORAL) 2 % external cream; Apply topically daily.    Follow-up in 1 week     Michelle Valentine is a 84 year old, presenting for the following health issues:  Clinic Care Coordination - Follow-up (Mole on side. Medication follow up of cream )      7/21/2025    10:43 AM   Additional Questions   Roomed by Angelika   Accompanied by self         7/21/2025    Information    services provided? No     HPI      Mole/ Rash under her pannus on the left side  Underwear presses on it painfully   Was removed in 9/2024 initially and sent to pathology - reported to be seborrheic dermatitis  She hates how it looks, and wants it gone  Has been using a cream without relief   Wants to know if it is just an age spot  Has pain shooting down her left leg from where the lesion is, no numbness, no back pain          Objective    /69   Pulse 65   Temp 97.8  F (36.6  C) (Temporal)   Resp 16   Wt 58.1 kg (128 lb)   LMP  (LMP Unknown)   SpO2 94%   BMI 25.00 kg/m    Body mass index is 25 kg/m .  Physical Exam  Vitals reviewed.   Constitutional:       General: She is not in acute distress.  Cardiovascular:      Rate and Rhythm: Normal rate and regular rhythm.   Pulmonary:      Effort: Pulmonary effort is normal.   Skin:         Neurological:      Mental Status: She is alert.   Psychiatric:         Attention and Perception: Attention normal.         Mood and Affect: Affect is blunt and flat.         Speech: Speech normal.         Behavior: Behavior normal.         Gila Barr, MS3     I was present with the medical student who participated in the service and in the documentation of this note. I have verified the history and  personally performed the physical exam and medical decision making, and have verified the content of the note, which accurately reflects my assessment of the patient and the plan of care.   Moni Mukherjee DO      Signed Electronically by: Moni Mukherjee DO

## 2025-07-22 ENCOUNTER — TELEPHONE (OUTPATIENT)
Dept: FAMILY MEDICINE | Facility: CLINIC | Age: 85
End: 2025-07-22
Payer: COMMERCIAL

## 2025-07-22 DIAGNOSIS — R30.0 DYSURIA: Primary | ICD-10-CM

## 2025-07-22 NOTE — TELEPHONE ENCOUNTER
RN called and spoke to Paige who stated that pt was seen yesterday but is confused so did not mention having UTI symptoms. Pt is having symptoms such as increased confusion, burning while urinating and increased frequency. RN dicussed with provider that saw pt yesterday and she will put in order. Pt is scheduled for a lab only appointment tomorrow 7/22.      Chely Collins RN

## 2025-07-22 NOTE — TELEPHONE ENCOUNTER
[facility] Medicine Clinic phone call message - order or referral request for patient:     Order or referral being requested: UA for UTI    Additional Comments: Pt had visit yesterday and has upcoming visit with PCP Monday. Home facility called to state they believe she has a UTI and want to know if we are able to get a UA done prior to her upcoming visit with Dr. Quinones so they can treat sooner than Monday. Facility requests a callback before EOD    OK to leave a message on voice mail? Yes    Primary language: English      needed? No    Call taken on July 22, 2025 at 10:55 AM by Shen Beltran

## 2025-07-28 ENCOUNTER — OFFICE VISIT (OUTPATIENT)
Dept: FAMILY MEDICINE | Facility: CLINIC | Age: 85
End: 2025-07-28
Payer: COMMERCIAL

## 2025-07-28 VITALS
HEIGHT: 60 IN | DIASTOLIC BLOOD PRESSURE: 75 MMHG | OXYGEN SATURATION: 94 % | BODY MASS INDEX: 25.13 KG/M2 | HEART RATE: 66 BPM | TEMPERATURE: 97.8 F | WEIGHT: 128 LBS | SYSTOLIC BLOOD PRESSURE: 113 MMHG | RESPIRATION RATE: 16 BRPM

## 2025-07-28 DIAGNOSIS — L82.0 INFLAMED SEBORRHEIC KERATOSIS: ICD-10-CM

## 2025-07-28 DIAGNOSIS — Z13.9 ENCOUNTER FOR SCREENING INVOLVING SOCIAL DETERMINANTS OF HEALTH (SDOH): ICD-10-CM

## 2025-07-28 DIAGNOSIS — B35.6 TINEA CRURIS: Primary | ICD-10-CM

## 2025-07-28 DIAGNOSIS — Z23 NEED FOR VACCINATION: ICD-10-CM

## 2025-07-28 LAB
ALBUMIN UR-MCNC: NEGATIVE MG/DL
APPEARANCE UR: CLEAR
BACTERIA #/AREA URNS HPF: NORMAL /HPF
BILIRUB UR QL STRIP: NEGATIVE
COLOR UR AUTO: YELLOW
GLUCOSE UR STRIP-MCNC: NEGATIVE MG/DL
HGB UR QL STRIP: ABNORMAL
KETONES UR STRIP-MCNC: NEGATIVE MG/DL
LEUKOCYTE ESTERASE UR QL STRIP: ABNORMAL
NITRATE UR QL: NEGATIVE
PH UR STRIP: 5.5 [PH] (ref 5–8)
RBC #/AREA URNS AUTO: NORMAL /HPF
SP GR UR STRIP: 1.02 (ref 1–1.03)
SQUAMOUS #/AREA URNS AUTO: NORMAL /LPF
UROBILINOGEN UR STRIP-ACNC: 1 E.U./DL
WBC #/AREA URNS AUTO: NORMAL /HPF

## 2025-07-28 NOTE — PATIENT INSTRUCTIONS
Patient Education   Here is the plan from today's visit    1. Tinea cruris (Primary)  Please use fungal cream until the area in the inguinal crease is no longer red    2. Inflamed seborrheic keratosis  Please use a padded band-aid daily to the affected area in the left inguinal crease. If able to, remove underwear at night to reduce friction.     3. Need for vaccination  Patient was given COVID vaccine  - She will need to get the RSV vaccine and Zoster vaccine in the pharmacy, order placed for RSV vaccine    Patient is extremely hard of hearing, I am not concerned about confusion or UTI. Please speak louder and ensure she has her hearing aids. If she is becoming combative, slurring words, is unable to be re-directed (after addressing hearing issues) please send her to the ED. There is no sign of urine infection.    Please call or return to clinic if your symptoms don't go away.    Follow up plan  Return for Follow up, Routine preventive, with PCP next avail.    Thank you for coming to [facility] today.  Lab Testing:  **If you had lab testing today and your results are reassuring or normal they will be mailed to you or sent through [employer] within 7 days.   **If the lab tests need quick action we will call you with the results.  **If you are having labs done on a different day, please call [phone] to schedule at [facility] or [phone] for other [facility] locations. Labs do not offer walk-in appointments.  The phone number we will call with results is # [phone] (home) . If this is not the best number please call our clinic and change the number.  Medication Refills:  If you need any refills please call your pharmacy and they will contact us.   If you need to  your refill at a new pharmacy, please contact the new pharmacy directly. The new pharmacy will help you get your medications transferred faster.   Scheduling:  If you have any concerns about today's visit or wish  to schedule another appointment please call our office during normal business hours [phone] (8-5:00 M-F). If you can no longer make a scheduled visit, please cancel via [employer] P[employer] or call us to cancel.   If a referral was made to an [facility] specialty provider and you do not get a call from central scheduling, please refer to directions on your visit summary or call our office during normal business hours for assistance.   If a Mammogram was ordered for you at the Breast Center call [phone] to schedule or change your appointment.  If you had an XRay/CT/Ultrasound/MRI ordered the number is [phone] to schedule or change your radiology appointment.   [facility] has limited ultrasound appointments available on Wednesdays, if you would like your ultrasound at [facility], please call [phone] to schedule.   Medical Concerns:  If you have urgent medical concerns please call [phone] at any time of the day.    Moni Mukherjee, DO

## 2025-07-28 NOTE — PROGRESS NOTES
Assessment & Plan   Ms Marin is an 84 year old female with acute improving tinea cruris and improving post procedural changes after scraping of inflamed seborrheic keratosis. There is mild erythema in the left groin with well defined borders along with mildly erythematous, nontender 2-3cm lesion consistent with prior known seborrheic keratosis, seems to be healing appropriately. Given lesion continues to be bothersome for patient, recommend covering lesion with bandage to prevent irritation by waistband along with continuing ketoconazole cream until erythema subsides. There was no evidence of confusion or urinary symptoms suggestive of UTI, UA was normal, perceived confusion likely secondary to hearing difficulty.    (B35.6) Tinea cruris  (primary encounter diagnosis)  -Continue ketoconazole cream, can stop when redness resolves    (L82.0) Inflamed seborrheic keratosis  -Recommend covering with bandage to prevent irritation  -Orders sent to skilled nursing facility for wound cares-continue to cover with bandage    (Z23) Need for vaccination  Plan: RSV vaccine, bivalent, ABRYSVO, injection      SDOH   Care limited by health literacy, transportation insecurity and hearing difficulties leading to confusion about health and misunderstandings between her caregivers and herself. Advised to use hearing aids whenever possible     Michelle Valentine is a 84 year old, presenting for the following health issues:  Clinic Care Coordination - Follow-up (Bandage came off from procedure. Discomfort and pain )        7/28/2025     8:29 AM   Additional Questions   Roomed by Angelika   Accompanied by self         7/28/2025    Information    services provided? No     HPI    Serge Marin is an 84 year old female who presents for follow up after scraping of skin lesion in the left groin one week prior. Staff at her skilled nursing facility had recently reported some possible confusion and were concerned about  "possible urinary tract infection. Today she has significant hearing impairment and reports she had forgotten her hearing aids, which normally allow her to hear well. She reports some mild pain and irritation at the site of the skin scraping with mild burning radiation down the front of her thigh, which started after the skin scraping was performed. This occurs with \"tossing and turning\" while she is in bed. She has been using the prescribed cream for the area. She would like to have the rest of the lesion removed if possible. She reports no symptoms of burning or difficulties with urination or lower abdominal pain recently, she has had no change in incontinence, wears briefs regularly. Discussed benign results of skin scraping. She was agreeable to getting RSV vaccine today.      Review of Systems  Constitutional, HEENT, cardiovascular, pulmonary, gi and gu systems are negative, except as otherwise noted.      Objective    /75   Pulse 66   Temp 97.8  F (36.6  C) (Temporal)   Resp 16   Ht 1.524 m (5')   Wt 58.1 kg (128 lb)   LMP  (LMP Unknown)   SpO2 94%   BMI 25.00 kg/m    Body mass index is 25 kg/m .  Physical Exam   GENERAL: alert and no distress  RESP: lungs clear to auscultation - no rales, rhonchi or wheezes  CV: regular rate and rhythm, normal S1 S2, no S3 or S4, no murmur, click or rub, no peripheral edema   SKIN:  mild erythema and mildly inflamed 2-3 cm round keratinous lesion in left groin, no bleeding, non tender to palpation  NEURO: Normal strength and tone, mentation intact and speech normal  PSYCH: mentation appears normal, affect normal/bright    No results found for this or any previous visit (from the past 24 hours).        -Bryon Brown, MS4  [employer]    I was present with the medical student who participated in the service and in the documentation of this note. I have verified the history and personally performed the physical exam and medical " decision making, and have verified the content of the note, which accurately reflects my assessment of the patient and the plan of care.   Moni Mukherjee DO      Signed Electronically by: Moni Mukherjee DO

## 2025-08-04 ENCOUNTER — HOSPITAL ENCOUNTER (EMERGENCY)
Facility: CLINIC | Age: 85
Discharge: HOME | End: 2025-08-04
Attending: STUDENT IN AN ORGANIZED HEALTH CARE EDUCATION/TRAINING PROGRAM | Admitting: STUDENT IN AN ORGANIZED HEALTH CARE EDUCATION/TRAINING PROGRAM
Payer: COMMERCIAL

## 2025-08-04 ENCOUNTER — APPOINTMENT (OUTPATIENT)
Dept: CT IMAGING | Facility: CLINIC | Age: 85
End: 2025-08-04
Attending: STUDENT IN AN ORGANIZED HEALTH CARE EDUCATION/TRAINING PROGRAM
Payer: COMMERCIAL

## 2025-08-04 VITALS
RESPIRATION RATE: 18 BRPM | OXYGEN SATURATION: 93 % | BODY MASS INDEX: 21.85 KG/M2 | WEIGHT: 128 LBS | SYSTOLIC BLOOD PRESSURE: 99 MMHG | HEART RATE: 54 BPM | TEMPERATURE: 98 F | HEIGHT: 64 IN | DIASTOLIC BLOOD PRESSURE: 61 MMHG

## 2025-08-04 DIAGNOSIS — S02.2XXA CLOSED FRACTURE OF NASAL BONE, INITIAL ENCOUNTER: ICD-10-CM
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
## 2025-08-04 LAB
ANION GAP SERPL CALCULATED.3IONS-SCNC: 7 MMOL/L (ref 7–15)
BASOPHILS # BLD AUTO: 0 10E3/UL (ref 0–0.2)
BASOPHILS NFR BLD AUTO: 1 %
BUN SERPL-MCNC: 12.3 MG/DL (ref 8–23)
CALCIUM SERPL-MCNC: 9.8 MG/DL (ref 8.8–10.4)
CHLORIDE SERPL-SCNC: 105 MMOL/L (ref 98–107)
CREAT SERPL-MCNC: 1.05 MG/DL (ref 0.51–0.95)
EGFRCR SERPLBLD CKD-EPI 2021: 52 ML/MIN/1.73M2
EOSINOPHIL # BLD AUTO: 0.1 10E3/UL (ref 0–0.7)
EOSINOPHIL NFR BLD AUTO: 3 %
ERYTHROCYTE [DISTWIDTH] IN BLOOD BY AUTOMATED COUNT: 12.6 % (ref 10–15)
GLUCOSE SERPL-MCNC: 97 MG/DL (ref 70–99)
HCO3 SERPL-SCNC: 26 MMOL/L (ref 22–29)
HCT VFR BLD AUTO: 36.7 % (ref 35–47)
HGB BLD-MCNC: 12.5 G/DL (ref 11.7–15.7)
IMM GRANULOCYTES # BLD: 0 10E3/UL
IMM GRANULOCYTES NFR BLD: 0 %
LYMPHOCYTES # BLD AUTO: 1 10E3/UL (ref 0.8–5.3)
LYMPHOCYTES NFR BLD AUTO: 28 %
MCH RBC QN AUTO: 30.9 PG (ref 26.5–33)
MCHC RBC AUTO-ENTMCNC: 34.1 G/DL (ref 31.5–36.5)
MCV RBC AUTO: 91 FL (ref 78–100)
MONOCYTES # BLD AUTO: 0.4 10E3/UL (ref 0–1.3)
MONOCYTES NFR BLD AUTO: 10 %
NEUTROPHILS # BLD AUTO: 2 10E3/UL (ref 1.6–8.3)
NEUTROPHILS NFR BLD AUTO: 58 %
NRBC # BLD AUTO: 0 10E3/UL
NRBC BLD AUTO-RTO: 0 /100
PLATELET # BLD AUTO: 124 10E3/UL (ref 150–450)
POTASSIUM SERPL-SCNC: 4.2 MMOL/L (ref 3.4–5.3)
RBC # BLD AUTO: 4.05 10E6/UL (ref 3.8–5.2)
SODIUM SERPL-SCNC: 138 MMOL/L (ref 135–145)
WBC # BLD AUTO: 3.4 10E3/UL (ref 4–11)

## 2025-08-04 PROCEDURE — 36415 COLL VENOUS BLD VENIPUNCTURE: CPT | Performed by: STUDENT IN AN ORGANIZED HEALTH CARE EDUCATION/TRAINING PROGRAM

## 2025-08-04 PROCEDURE — 70450 CT HEAD/BRAIN W/O DYE: CPT

## 2025-08-04 PROCEDURE — 80048 BASIC METABOLIC PNL TOTAL CA: CPT | Performed by: STUDENT IN AN ORGANIZED HEALTH CARE EDUCATION/TRAINING PROGRAM

## 2025-08-04 PROCEDURE — 99284 EMERGENCY DEPT VISIT MOD MDM: CPT | Mod: 25 | Performed by: STUDENT IN AN ORGANIZED HEALTH CARE EDUCATION/TRAINING PROGRAM

## 2025-08-04 PROCEDURE — 70486 CT MAXILLOFACIAL W/O DYE: CPT

## 2025-08-04 PROCEDURE — 72125 CT NECK SPINE W/O DYE: CPT

## 2025-08-04 PROCEDURE — 85004 AUTOMATED DIFF WBC COUNT: CPT | Performed by: STUDENT IN AN ORGANIZED HEALTH CARE EDUCATION/TRAINING PROGRAM

## 2025-08-04 ASSESSMENT — ACTIVITIES OF DAILY LIVING (ADL)
ADLS_ACUITY_SCORE: 59

## 2025-08-21 ENCOUNTER — OFFICE VISIT (OUTPATIENT)
Dept: FAMILY MEDICINE | Facility: CLINIC | Age: 85
End: 2025-08-21
Payer: COMMERCIAL

## 2025-08-21 VITALS
WEIGHT: 128 LBS | OXYGEN SATURATION: 95 % | HEART RATE: 67 BPM | SYSTOLIC BLOOD PRESSURE: 128 MMHG | RESPIRATION RATE: 16 BRPM | TEMPERATURE: 97.6 F | DIASTOLIC BLOOD PRESSURE: 78 MMHG | BODY MASS INDEX: 21.97 KG/M2
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
## 2025-08-21 DIAGNOSIS — Z23 COVID-19 VACCINE ADMINISTERED: ICD-10-CM

## 2025-08-21 DIAGNOSIS — B02.9 HERPES ZOSTER WITHOUT COMPLICATION: Primary | ICD-10-CM

## 2025-08-21 LAB
ANION GAP SERPL CALCULATED.3IONS-SCNC: 9 MMOL/L (ref 7–15)
BUN SERPL-MCNC: 11.3 MG/DL (ref 8–23)
CALCIUM SERPL-MCNC: 9.8 MG/DL (ref 8.8–10.4)
CHLORIDE SERPL-SCNC: 105 MMOL/L (ref 98–107)
CREAT SERPL-MCNC: 0.91 MG/DL (ref 0.51–0.95)
EGFRCR SERPLBLD CKD-EPI 2021: 62 ML/MIN/1.73M2
GLUCOSE SERPL-MCNC: 90 MG/DL (ref 70–99)
HCO3 SERPL-SCNC: 25 MMOL/L (ref 22–29)
POTASSIUM SERPL-SCNC: 4.4 MMOL/L (ref 3.4–5.3)
SODIUM SERPL-SCNC: 139 MMOL/L (ref 135–145)

## 2025-08-21 PROCEDURE — 36415 COLL VENOUS BLD VENIPUNCTURE: CPT

## 2025-08-21 PROCEDURE — 90480 ADMN SARSCOV2 VAC 1/ONLY CMP: CPT

## 2025-08-21 PROCEDURE — 80048 BASIC METABOLIC PNL TOTAL CA: CPT

## 2025-08-21 PROCEDURE — 91320 SARSCV2 VAC 30MCG TRS-SUC IM: CPT

## 2025-08-21 PROCEDURE — 99213 OFFICE O/P EST LOW 20 MIN: CPT | Mod: 25

## 2025-08-21 PROCEDURE — 3078F DIAST BP <80 MM HG: CPT

## 2025-08-21 PROCEDURE — 3074F SYST BP LT 130 MM HG: CPT

## 2025-08-21 RX ORDER — ARIPIPRAZOLE 2 MG/1
2 TABLET ORAL DAILY
COMMUNITY
Start: 2025-08-07

## 2025-08-21 RX ORDER — VALACYCLOVIR HYDROCHLORIDE 1 G/1
1000 TABLET, FILM COATED ORAL 2 TIMES DAILY
Qty: 14 TABLET | Refills: 0 | Status: SHIPPED | OUTPATIENT
Start: 2025-08-21 | End: 2025-08-28

## (undated) DEVICE — SPONGE COTTONOID 1/2X1 1/2" 20-06S

## (undated) DEVICE — RX SURGIFLO HEMOSTATIC MATRIX W/THROMBIN 8ML 2994

## (undated) DEVICE — Device

## (undated) DEVICE — DECANTER VIAL 2006S

## (undated) DEVICE — CATH ANGIO IMPRESS 5FRX65CM BERENSTEIN 56538BER

## (undated) DEVICE — NDL ECHOTIP PERC ENTRY 18GA 15CM BLUNT CAN G05045

## (undated) DEVICE — KIT CATH BALLOON NEPHROMAX 24FRX12CM M0062101600

## (undated) DEVICE — BUR STRK CARBIDE ROUND 5.0MM 5820-110-050C

## (undated) DEVICE — PAD CHUX UNDERPAD 23X24" 7136

## (undated) DEVICE — TUBING SUCTION 12"X1/4" N612

## (undated) DEVICE — PREP CHLORAPREP CLEAR 3ML 930400

## (undated) DEVICE — GOWN IMPERVIOUS ZONED XLG 9041

## (undated) DEVICE — ESU GROUND PAD ADULT W/CORD E7507

## (undated) DEVICE — CUP AND LID 4OZ STERILE SSK9008A

## (undated) DEVICE — ESU HOLSTER PLASTIC DISP E2400

## (undated) DEVICE — BUR STRK 1.1MM STRAIGHT ROUTER 5820-070-011

## (undated) DEVICE — PREP SKIN SCRUB TRAY 4461A

## (undated) DEVICE — SOL NACL 0.9% IRRIG 1000ML BOTTLE 2F7124

## (undated) DEVICE — DILATOR VASC W/INTRO GW 8FRX19CM .038"

## (undated) DEVICE — DRAPE MAYO STAND 23X54 8337

## (undated) DEVICE — SU VICRYL 2-0 CT-2 CR 8X18" J726D

## (undated) DEVICE — TAPE MEDIPORE 4"X10YD 2964

## (undated) DEVICE — NDL 22GA 1.5"

## (undated) DEVICE — GUIDEWIRE AMPLATZ SUPER STIFF 0.035"X180CM M001465250

## (undated) DEVICE — DRAPE CRANIOTOMY W/POUCH 9450

## (undated) DEVICE — ENDO ADAPTER CHECK-FLO DISP 27550-CKU

## (undated) DEVICE — RAD NDL CHIBA BX 22GAX15CM G00012

## (undated) DEVICE — SYR CONTRAST DELIVERY ANGIOGRAPHY

## (undated) DEVICE — GLOVE PROTEXIS MICRO 7.5  2D73PM75

## (undated) DEVICE — GUIDEWIRE SENSOR DUAL FLEX STR 0.035"X150CM M0066703080

## (undated) DEVICE — CLIP RANEY

## (undated) DEVICE — ESU CORD BIPOLAR GREEN 10-4000

## (undated) DEVICE — RAD NDL TROCAR 18GAX15CM G00945

## (undated) DEVICE — TUBING EXTENSION SET 20" B1327

## (undated) DEVICE — PREP POVIDONE-IODINE 7.5% SCRUB 4OZ BOTTLE MDS093945

## (undated) DEVICE — TUBING IRRIG TUR Y TYPE 96" LF 6543-01

## (undated) DEVICE — DRSG PRIMAPORE 03 1/8X6" 66000318

## (undated) DEVICE — CATH BLNDIL 55CM 7FR 24FR NPHRMX AMPTZ 17CM PTFE SIL HI-PRS

## (undated) DEVICE — SHEATH ACCESS 30FR 130CM AMPLATZ DKS30.0-105-30

## (undated) DEVICE — PACK CRANIOTOMY

## (undated) DEVICE — SUCTION MANIFOLD NEPTUNE 2 SYS 4 PORT 0702-020-000

## (undated) DEVICE — STPL SKIN 35W ROTATING HEAD PRW35

## (undated) DEVICE — SU ETHILON 3-0 PS-1 18" 1663H

## (undated) DEVICE — DEVICE MULTI TORQUE TD01

## (undated) DEVICE — GUIDEWIRE ZIPWIRE STD STR .035"X150CM M006630205B0

## (undated) DEVICE — DRAPE NEURO TIBURON W/XL FLUID CONTROL POUCH

## (undated) DEVICE — SOL NACL 0.9% IRRIG 3000ML BAG 2B7477

## (undated) DEVICE — BASKET NITINOL TIPLESS HALO  1.5FRX120CM 554120

## (undated) DEVICE — BUR STRK 2.3MM TAPERED ROUTER - FA2 5407-FA2-023

## (undated) DEVICE — DRAPE STERI U 1015

## (undated) DEVICE — NDL ECHOTIP PERC ENTRY 18GA 20CM BLUNT CAN G05042

## (undated) DEVICE — DRSG GAUZE 4X4" TRAY 6939

## (undated) DEVICE — SU NUROLON 4-0 TF CR 8X18" C584D

## (undated) DEVICE — SYR 30ML LL W/O NDL 302832

## (undated) DEVICE — DRAIN JACKSON PRATT 10FR ROUND SU130-1321

## (undated) DEVICE — SYR 50ML LL W/O NDL 309653

## (undated) DEVICE — STRAP UNIVERSAL POSITIONING 2-PIECE 4X47X76" 91-287

## (undated) DEVICE — SPONGE SURGIFOAM 100 1974

## (undated) DEVICE — SU MONOCRYL 4-0 PS-2 18" UND Y496G

## (undated) DEVICE — SOL WATER IRRIG 1000ML BOTTLE 2F7114

## (undated) DEVICE — PROBE LITHOTRIPTOR 3.4X396MM SHOCKPULSE BLUE SPL-PDBX340

## (undated) DEVICE — CUP AND LID 2PK 2OZ STERILE  SSK9006A

## (undated) DEVICE — SURGICEL HEMOSTAT 4X8" 1952

## (undated) DEVICE — BAG DRAIN BILIARY NEPHROSTOMY REMINGTON 600ML LF 600-D

## (undated) DEVICE — DRAPE LEGGINGS 8421

## (undated) DEVICE — CATH TRAY FOLEY SURESTEP 16FR WDRAIN BAG STLK LATEX A300316A

## (undated) DEVICE — DRAPE SHEET REV FOLD 3/4 9349

## (undated) DEVICE — GUIDEWIRE TERUMO .035X180 ANG GR3508

## (undated) DEVICE — SU MONOCRYL 3-0 PS-1 27" Y936H

## (undated) DEVICE — TAPE DRSG UNIVERSAL CLOTH 3" WHITE LATEX 881-3

## (undated) DEVICE — CATH TRAY FOLEY SURESTEP 16FR W/URNE MTR STLK LATEX A303316A

## (undated) DEVICE — CATH URETERAL FLEX TIP TIGERTAIL 06FRX70CM 139006

## (undated) DEVICE — SYR BULB IRRIG DOVER 60 ML LATEX FREE 67000

## (undated) DEVICE — MANIFOLD NEPTUNE 4 PORT 700-20

## (undated) DEVICE — ESU PENCIL SMOKE EVAC W/ROCKER SWITCH 0703-047-000

## (undated) DEVICE — POSITIONER PT PRONESAFE HEAD REST W/DERMAPROX INSERT 40599

## (undated) DEVICE — PACK SPECIAL PROCEDURE CUSTOM

## (undated) DEVICE — LINEN TOWEL PACK X5 5464

## (undated) DEVICE — LINEN TOWEL PACK X30 5481

## (undated) DEVICE — CONTRAST MEDIA ISOVUE 300 61% IOPAMIDOL  CHRG PER 1ML 131535

## (undated) DEVICE — SU SILK 0 SH 30" K834H

## (undated) DEVICE — LINEN TOWEL PACK X6 WHITE 5487

## (undated) DEVICE — SPONGE COTTONOID 1/2X1/2" 20-04S

## (undated) DEVICE — DRAIN JACKSON PRATT RESERVOIR 100ML SU130-1305

## (undated) DEVICE — WIPES FOLEY CARE SURESTEP PROVON DFC100

## (undated) DEVICE — WIRE GUIDE AMPLATZ SUPER STIFF 0.035"X145CM 46-524

## (undated) DEVICE — DRSG TEGADERM 6X8" 1628

## (undated) DEVICE — PREP CHLORAPREP 26ML TINTED HI-LITE ORANGE 930815

## (undated) DEVICE — SYR 50ML CATH TIP W/O NDL 309620

## (undated) RX ORDER — PROPOFOL 10 MG/ML
INJECTION, EMULSION INTRAVENOUS
Status: DISPENSED
Start: 2022-11-22

## (undated) RX ORDER — GLYCOPYRROLATE 0.2 MG/ML
INJECTION, SOLUTION INTRAMUSCULAR; INTRAVENOUS
Status: DISPENSED
Start: 2022-12-10

## (undated) RX ORDER — DEXAMETHASONE SODIUM PHOSPHATE 4 MG/ML
INJECTION, SOLUTION INTRA-ARTICULAR; INTRALESIONAL; INTRAMUSCULAR; INTRAVENOUS; SOFT TISSUE
Status: DISPENSED
Start: 2022-11-22

## (undated) RX ORDER — CLINDAMYCIN PHOSPHATE 900 MG/50ML
INJECTION, SOLUTION INTRAVENOUS
Status: DISPENSED
Start: 2022-11-22

## (undated) RX ORDER — BUPIVACAINE HYDROCHLORIDE AND EPINEPHRINE 2.5; 5 MG/ML; UG/ML
INJECTION, SOLUTION EPIDURAL; INFILTRATION; INTRACAUDAL; PERINEURAL
Status: DISPENSED
Start: 2022-12-10

## (undated) RX ORDER — ONDANSETRON 2 MG/ML
INJECTION INTRAMUSCULAR; INTRAVENOUS
Status: DISPENSED
Start: 2022-11-22

## (undated) RX ORDER — LIDOCAINE HYDROCHLORIDE 20 MG/ML
INJECTION, SOLUTION EPIDURAL; INFILTRATION; INTRACAUDAL; PERINEURAL
Status: DISPENSED
Start: 2022-11-22

## (undated) RX ORDER — LIDOCAINE HYDROCHLORIDE AND EPINEPHRINE 10; 10 MG/ML; UG/ML
INJECTION, SOLUTION INFILTRATION; PERINEURAL
Status: DISPENSED
Start: 2022-12-10

## (undated) RX ORDER — FENTANYL CITRATE 50 UG/ML
INJECTION, SOLUTION INTRAMUSCULAR; INTRAVENOUS
Status: DISPENSED
Start: 2022-11-22

## (undated) RX ORDER — EPHEDRINE SULFATE 50 MG/ML
INJECTION, SOLUTION INTRAMUSCULAR; INTRAVENOUS; SUBCUTANEOUS
Status: DISPENSED
Start: 2022-12-10

## (undated) RX ORDER — ONDANSETRON 2 MG/ML
INJECTION INTRAMUSCULAR; INTRAVENOUS
Status: DISPENSED
Start: 2022-12-10

## (undated) RX ORDER — BUPIVACAINE HYDROCHLORIDE 5 MG/ML
INJECTION, SOLUTION EPIDURAL; INTRACAUDAL
Status: DISPENSED
Start: 2022-11-22

## (undated) RX ORDER — FENTANYL CITRATE 0.05 MG/ML
INJECTION, SOLUTION INTRAMUSCULAR; INTRAVENOUS
Status: DISPENSED
Start: 2022-11-22

## (undated) RX ORDER — FENTANYL CITRATE 50 UG/ML
INJECTION, SOLUTION INTRAMUSCULAR; INTRAVENOUS
Status: DISPENSED
Start: 2022-12-10

## (undated) RX ORDER — FENTANYL CITRATE-0.9 % NACL/PF 10 MCG/ML
PLASTIC BAG, INJECTION (ML) INTRAVENOUS
Status: DISPENSED
Start: 2022-12-10

## (undated) RX ORDER — BUPIVACAINE HYDROCHLORIDE 2.5 MG/ML
INJECTION, SOLUTION EPIDURAL; INFILTRATION; INTRACAUDAL
Status: DISPENSED
Start: 2022-12-10

## (undated) RX ORDER — SODIUM CHLORIDE 9 MG/ML
INJECTION, SOLUTION INTRAVENOUS
Status: DISPENSED
Start: 2022-12-10

## (undated) RX ORDER — DEXAMETHASONE SODIUM PHOSPHATE 4 MG/ML
INJECTION, SOLUTION INTRA-ARTICULAR; INTRALESIONAL; INTRAMUSCULAR; INTRAVENOUS; SOFT TISSUE
Status: DISPENSED
Start: 2022-12-10

## (undated) RX ORDER — PROPOFOL 10 MG/ML
INJECTION, EMULSION INTRAVENOUS
Status: DISPENSED
Start: 2022-12-10